# Patient Record
Sex: FEMALE | Race: WHITE | NOT HISPANIC OR LATINO | Employment: UNEMPLOYED | URBAN - METROPOLITAN AREA
[De-identification: names, ages, dates, MRNs, and addresses within clinical notes are randomized per-mention and may not be internally consistent; named-entity substitution may affect disease eponyms.]

---

## 2017-03-15 ENCOUNTER — OFFICE VISIT (OUTPATIENT)
Dept: FAMILY MEDICINE | Facility: CLINIC | Age: 52
End: 2017-03-15
Payer: COMMERCIAL

## 2017-03-15 VITALS
BODY MASS INDEX: 19.36 KG/M2 | RESPIRATION RATE: 14 BRPM | HEIGHT: 65 IN | WEIGHT: 116.19 LBS | TEMPERATURE: 98 F | HEART RATE: 94 BPM | DIASTOLIC BLOOD PRESSURE: 80 MMHG | SYSTOLIC BLOOD PRESSURE: 118 MMHG

## 2017-03-15 DIAGNOSIS — G43.909 MIGRAINE WITHOUT STATUS MIGRAINOSUS, NOT INTRACTABLE, UNSPECIFIED MIGRAINE TYPE: ICD-10-CM

## 2017-03-15 DIAGNOSIS — F33.0 MILD EPISODE OF RECURRENT MAJOR DEPRESSIVE DISORDER: ICD-10-CM

## 2017-03-15 DIAGNOSIS — F32.A DEPRESSION, UNSPECIFIED DEPRESSION TYPE: Primary | ICD-10-CM

## 2017-03-15 DIAGNOSIS — M51.36 DDD (DEGENERATIVE DISC DISEASE), LUMBAR: ICD-10-CM

## 2017-03-15 DIAGNOSIS — H81.03 MENIERE'S DISEASE OF BOTH EARS: ICD-10-CM

## 2017-03-15 DIAGNOSIS — Z00.00 ANNUAL PHYSICAL EXAM: ICD-10-CM

## 2017-03-15 DIAGNOSIS — L65.9 HAIR LOSS: ICD-10-CM

## 2017-03-15 DIAGNOSIS — M50.30 DDD (DEGENERATIVE DISC DISEASE), CERVICAL: ICD-10-CM

## 2017-03-15 DIAGNOSIS — H90.3 SENSORINEURAL HEARING LOSS (SNHL) OF BOTH EARS: ICD-10-CM

## 2017-03-15 DIAGNOSIS — F51.01 PRIMARY INSOMNIA: ICD-10-CM

## 2017-03-15 PROBLEM — M51.369 DDD (DEGENERATIVE DISC DISEASE), LUMBAR: Status: ACTIVE | Noted: 2017-03-15

## 2017-03-15 PROCEDURE — 99386 PREV VISIT NEW AGE 40-64: CPT | Mod: S$GLB,,, | Performed by: FAMILY MEDICINE

## 2017-03-15 PROCEDURE — 99999 PR PBB SHADOW E&M-EST. PATIENT-LVL III: CPT | Mod: PBBFAC,,, | Performed by: FAMILY MEDICINE

## 2017-03-15 RX ORDER — ZOLPIDEM TARTRATE 5 MG/1
5 TABLET ORAL NIGHTLY PRN
Qty: 30 TABLET | Refills: 1
Start: 2017-03-15 | End: 2017-06-14 | Stop reason: SDUPTHER

## 2017-03-15 RX ORDER — TOPIRAMATE 50 MG/1
50 TABLET, FILM COATED ORAL 2 TIMES DAILY
Qty: 60 TABLET | Refills: 11
Start: 2017-03-15 | End: 2019-07-29 | Stop reason: SDUPTHER

## 2017-03-15 RX ORDER — BUPROPION HYDROCHLORIDE 150 MG/1
150 TABLET ORAL DAILY
Qty: 30 TABLET | Refills: 3 | Status: SHIPPED | OUTPATIENT
Start: 2017-03-15 | End: 2017-07-24 | Stop reason: SDUPTHER

## 2017-03-15 RX ORDER — DIAZEPAM 2 MG/1
2 TABLET ORAL EVERY 6 HOURS PRN
Start: 2017-03-15 | End: 2017-08-29

## 2017-03-15 RX ORDER — TRAZODONE HYDROCHLORIDE 150 MG/1
75 TABLET ORAL NIGHTLY
Qty: 15 TABLET | Refills: 1
Start: 2017-03-15 | End: 2019-04-30 | Stop reason: SDUPTHER

## 2017-03-15 RX ORDER — FINASTERIDE 5 MG/1
5 TABLET, FILM COATED ORAL DAILY
Qty: 30 TABLET | Refills: 11
Start: 2017-03-15 | End: 2017-09-11 | Stop reason: SDUPTHER

## 2017-03-15 RX ORDER — NIACIN 50 MG
100 TABLET ORAL
Refills: 0
Start: 2017-03-15 | End: 2017-09-11 | Stop reason: SDUPTHER

## 2017-03-15 RX ORDER — SUMATRIPTAN SUCCINATE 100 MG/1
100 TABLET ORAL
Qty: 40 TABLET | Refills: 1
Start: 2017-03-15 | End: 2023-08-09

## 2017-03-15 RX ORDER — TRAMADOL HYDROCHLORIDE 50 MG/1
50 TABLET ORAL 2 TIMES DAILY PRN
Qty: 60 TABLET | Refills: 0
Start: 2017-03-15 | End: 2017-03-25

## 2017-03-15 NOTE — MR AVS SNAPSHOT
[facility] [address] [place] [address]  Phone: [phone]  Fax: [phone]                  Mae Gregory   3/15/2017 8:40 AM   Office Visit    Description:  Female : 1965   Provider:  Jaun Lazcano MD   Department:  [facility]           Reason for Visit     Establish Care           Diagnoses this Visit        Comments    Depression, unspecified depression type    -  Primary     Migraine without status migrainosus, not intractable, unspecified migraine type         DDD (degenerative disc disease), cervical         DDD (degenerative disc disease), lumbar         Mild episode of recurrent major depressive disorder         Primary insomnia         Meniere's disease of both ears         Sensorineural hearing loss (SNHL) of both ears         Annual physical exam         Hair loss                To Do List           Goals (5 Years of Data)     None      Follow-Up and Disposition     Return in about 6 months (around 9/15/2017).       These Medications        Disp Refills Start End    tramadol (ULTRAM) 50 mg tablet 60 tablet 0 3/15/2017 3/25/2017    Take 1 tablet (50 mg total) by mouth 2 (two) times daily as needed. - Oral    sumatriptan (IMITREX) 100 MG tablet 40 tablet 1 3/15/2017     Take 1 tablet (100 mg total) by mouth every 2 (two) hours as needed for Migraine. - Oral    topiramate (TOPAMAX) 50 MG tablet 60 tablet 11 3/15/2017 3/15/2018    Take 1 tablet (50 mg total) by mouth 2 (two) times daily. - Oral    buPROPion (WELLBUTRIN XL) 150 MG TB24 tablet 30 tablet 3 3/15/2017 3/15/2018    Take 1 tablet (150 mg total) by mouth once daily. - Oral    Pharmacy: [facility]/pharmacy #[id number] - [place] - [address] Ph #: [phone]       zolpidem (AMBIEN) 5 MG Tab 30 tablet 1 3/15/2017 2017    Take 1 tablet (5 mg total) by mouth nightly as needed. - Oral    Pharmacy: [facility]/pharmacy #[id number] [place] - [address] Ph #: [phone]        trazodone (DESYREL) 150 MG tablet 15 tablet 1 3/15/2017 3/15/2018    Take 0.5 tablets (75 mg total) by mouth every evening. - Oral    Pharmacy: [facility]/pharmacy #[id number] [place]. Ph #: [phone]       diazePAM (VALIUM) 2 MG tablet   3/15/2017 4/14/2017    Take 1 tablet (2 mg total) by mouth every 6 (six) hours as needed for Anxiety. - Oral    Pharmacy: [facility]/pharmacy #[place] [address] Ph #: [phone]       niacin 50 MG Tab  0 3/15/2017 3/15/2018    Take 2 tablets (100 mg total) by mouth 3 (three) times daily with meals. - Oral    Pharmacy: [facility]/pharmacy [address] [address] Ph #: [phone]       finasteride (PROSCAR) 5 mg tablet 30 tablet 11 3/15/2017 3/15/2018    Take 1 tablet (5 mg total) by mouth once daily. - Oral    Pharmacy: [facility]/pharmacy [address] [address] Ph #: [phone]         [facility]     [facility] - 24/7 Assistance  Registered nurses in the [facility] provide clinical advisement, health education, appointment booking, and other advisory services.  Call for this free service at [phone].             Medications           Message regarding Medications     Verify the changes and/or additions to your medication regime listed below are the same as discussed with your clinician today.  If any of these changes or additions are incorrect, please notify your healthcare provider.        START taking these NEW medications        Refills    tramadol (ULTRAM) 50 mg tablet 0    Sig: Take 1 tablet (50 mg total) by mouth 2 (two) times daily as needed.    Class: No Print    Route: Oral    sumatriptan (IMITREX) 100 MG tablet 1    Sig: Take 1 tablet (100 mg total) by mouth every 2 (two) hours as needed for Migraine.    Class: No Print    Route: Oral    topiramate (TOPAMAX) 50 MG tablet 11    Sig: Take 1 tablet (50 mg total) by mouth 2 (two) times daily.    Class: No Print    Route: Oral     buPROPion (WELLBUTRIN XL) 150 MG TB24 tablet 3    Sig: Take 1 tablet (150 mg total) by mouth once daily.    Class: Normal    Route: Oral    zolpidem (AMBIEN) 5 MG Tab 1    Sig: Take 1 tablet (5 mg total) by mouth nightly as needed.    Class: No Print    Route: Oral    trazodone (DESYREL) 150 MG tablet 1    Sig: Take 0.5 tablets (75 mg total) by mouth every evening.    Class: No Print    Route: Oral    diazePAM (VALIUM) 2 MG tablet     Sig: Take 1 tablet (2 mg total) by mouth every 6 (six) hours as needed for Anxiety.    Class: No Print    Route: Oral    niacin 50 MG Tab 0    Sig: Take 2 tablets (100 mg total) by mouth 3 (three) times daily with meals.    Class: No Print    Route: Oral    finasteride (PROSCAR) 5 mg tablet 11    Sig: Take 1 tablet (5 mg total) by mouth once daily.    Class: No Print    Route: Oral           Verify that the below list of medications is an accurate representation of the medications you are currently taking.  If none reported, the list may be blank. If incorrect, please contact your healthcare provider. Carry this list with you in case of emergency.           Current Medications     buPROPion (WELLBUTRIN XL) 150 MG TB24 tablet Take 1 tablet (150 mg total) by mouth once daily.    diazePAM (VALIUM) 2 MG tablet Take 1 tablet (2 mg total) by mouth every 6 (six) hours as needed for Anxiety.    finasteride (PROSCAR) 5 mg tablet Take 1 tablet (5 mg total) by mouth once daily.    niacin 50 MG Tab Take 2 tablets (100 mg total) by mouth 3 (three) times daily with meals.    sumatriptan (IMITREX) 100 MG tablet Take 1 tablet (100 mg total) by mouth every 2 (two) hours as needed for Migraine.    topiramate (TOPAMAX) 50 MG tablet Take 1 tablet (50 mg total) by mouth 2 (two) times daily.    tramadol (ULTRAM) 50 mg tablet Take 1 tablet (50 mg total) by mouth 2 (two) times daily as needed.    trazodone (DESYREL) 150 MG tablet Take 0.5 tablets (75 mg total) by mouth every evening.    zolpidem (AMBIEN) 5 MG  "Tab Take 1 tablet (5 mg total) by mouth nightly as needed.           Clinical Reference Information           Your Vitals Were     BP Pulse Temp Resp Height Weight    118/80 (BP Location: Left arm, Patient Position: Sitting, BP Method: Manual) 94 98.2 °F (36.8 °C) (Oral) 14 5' 5" (1.651 m) 52.7 kg (116 lb 2.9 oz)    BMI                19.33 kg/m2          Blood Pressure          Most Recent Value    BP  118/80      Allergies as of 3/15/2017     No Known Allergies      Immunizations Administered on Date of Encounter - 3/15/2017     None      Orders Placed During Today's Visit      Normal Orders This Visit    Ambulatory referral to Dermatology     Future Labs/Procedures Expected by Expires    Mammo Digital Screening Bilateral With CAD  3/15/2017 5/15/2018      [facility] Sign-Up     Activating your [facility] account is as easy as 1-2-3!     1) Visit [email or web address], select Sign Up Now, enter this activation code and your date of birth, then select Next.  [email or web address]  Expires: 4/29/2017  9:51 AM      2) Create a username and password to use when you visit [facility] in the future and select a security question in case you lose your password and select Next.    3) Enter your e-mail address and click Sign Up!    Additional Information  If you have questions, please e-mail [email or web address] or call [phone] to talk to our [facility] staff. Remember, [facility] is NOT to be used for urgent needs. For medical emergencies, dial 911.         Language Assistance Services     ATTENTION: Language assistance services are available, free of charge. Please call [phone].      ATENCIÓN: Si habla español, tiene a dela cruz disposición servicios gratuitos de asistencia lingüística. Llame al [phone].     CHÚ Ý: N?u b?n nói Ti?ng Vi?t, có các d?ch v? h? tr? ngôn ng? mi?n phí dành cho b?n. G?i s? [phone].         [facility] complies with applicable Federal civil rights laws and does not discriminate " on the basis of race, color, national origin, age, disability, or sex.

## 2017-03-15 NOTE — PROGRESS NOTES
Chief Complaint   Patient presents with    [facility]     Need for a new PCP       HPI  Mae Gregory is a 51 y.o. female with medical diagnoses as listed in the medical history and problem list that presents to establish care.      Migraine HA - followed by Neuro, using botox injections q 11-12 weeks. 300 units throughout head and neck.     Cervical/lumbar DDD - chronic, weekly massage therapy.     Insomnia - under control with ambien, trazodone    Meniere's - currently on a antihistamine compound PO, niacin and valium.    Also chronic allergies, tested recently, receives weekly allergy injections.     PAST MEDICAL HISTORY:  History reviewed. No pertinent past medical history.    PAST SURGICAL HISTORY:  History reviewed. No pertinent surgical history.    SOCIAL HISTORY:  Social History     Social History    Marital status:      Spouse name: N/A    Number of children: N/A    Years of education: N/A     Occupational History    Not on file.     Social History Main Topics    Smoking status: Never Smoker    Smokeless tobacco: Not on file    Alcohol use Not on file    Drug use: Not on file    Sexual activity: Not on file     Other Topics Concern    Not on file     Social History Narrative    No narrative on file       FAMILY HISTORY:  History reviewed. No pertinent family history.    ALLERGIES AND MEDICATIONS: updated and reviewed.  Review of patient's allergies indicates:  No Known Allergies  Current Outpatient Prescriptions   Medication Sig Dispense Refill    buPROPion (WELLBUTRIN XL) 150 MG TB24 tablet Take 1 tablet (150 mg total) by mouth once daily. 30 tablet 3    diazePAM (VALIUM) 2 MG tablet Take 1 tablet (2 mg total) by mouth every 6 (six) hours as needed for Anxiety.      finasteride (PROSCAR) 5 mg tablet Take 1 tablet (5 mg total) by mouth once daily. 30 tablet 11    niacin 50 MG Tab Take 2 tablets (100 mg total) by mouth 3 (three) times daily with meals.  0    sumatriptan  "(IMITREX) 100 MG tablet Take 1 tablet (100 mg total) by mouth every 2 (two) hours as needed for Migraine. 40 tablet 1    topiramate (TOPAMAX) 50 MG tablet Take 1 tablet (50 mg total) by mouth 2 (two) times daily. 60 tablet 11    tramadol (ULTRAM) 50 mg tablet Take 1 tablet (50 mg total) by mouth 2 (two) times daily as needed. 60 tablet 0    trazodone (DESYREL) 150 MG tablet Take 0.5 tablets (75 mg total) by mouth every evening. 15 tablet 1    zolpidem (AMBIEN) 5 MG Tab Take 1 tablet (5 mg total) by mouth nightly as needed. 30 tablet 1     No current facility-administered medications for this visit.        ROS  Review of Systems   Constitutional: Negative for activity change, appetite change and fever.   HENT: Positive for hearing loss. Negative for congestion and sore throat.    Eyes: Negative for visual disturbance.   Respiratory: Negative for cough and shortness of breath.    Cardiovascular: Negative for chest pain.   Gastrointestinal: Negative for abdominal pain, diarrhea, nausea and vomiting.   Endocrine: Negative.    Genitourinary: Negative for dysuria.   Musculoskeletal: Positive for back pain, myalgias and neck pain. Negative for arthralgias.   Skin: Negative for rash.   Allergic/Immunologic: Negative.    Neurological: Positive for headaches. Negative for dizziness and weakness.   Hematological: Negative.    Psychiatric/Behavioral: Negative for agitation and confusion.       Physical Exam  Vitals:    03/15/17 0903   BP: 118/80   Pulse: 94   Resp: 14   Temp: 98.2 °F (36.8 °C)    Body mass index is 19.33 kg/(m^2).  Weight: 52.7 kg (116 lb 2.9 oz)   Height: 5' 5" (165.1 cm)     Physical Exam   Constitutional: She is oriented to person, place, and time. She appears well-developed and well-nourished.   HENT:   Head: Normocephalic and atraumatic.   Eyes: Conjunctivae and EOM are normal. Pupils are equal, round, and reactive to light.   Neck: Normal range of motion. Neck supple.   Cardiovascular: Normal rate, " regular rhythm and normal heart sounds.    Pulmonary/Chest: Effort normal and breath sounds normal.   Abdominal: Soft. Bowel sounds are normal.   Musculoskeletal: Normal range of motion.   Neurological: She is alert and oriented to person, place, and time. She has normal reflexes.   Skin: Skin is warm and dry.   Psychiatric: She has a normal mood and affect. Her behavior is normal. Judgment and thought content normal.       Health Maintenance     Patient has no pending health maintenance at this time          Assessment & Plan    Depression, unspecified depression type  -     buPROPion (WELLBUTRIN XL) 150 MG TB24 tablet; Take 1 tablet (150 mg total) by mouth once daily.  Dispense: 30 tablet; Refill: 3  - Continue current medication regimen as prescribed    Migraine without status migrainosus, not intractable, unspecified migraine type  -     sumatriptan (IMITREX) 100 MG tablet; Take 1 tablet (100 mg total) by mouth every 2 (two) hours as needed for Migraine.  Dispense: 40 tablet; Refill: 1  -     topiramate (TOPAMAX) 50 MG tablet; Take 1 tablet (50 mg total) by mouth 2 (two) times daily.  Dispense: 60 tablet; Refill: 11  - Continue current medication regimen as prescribed    DDD (degenerative disc disease), cervical, DDD (degenerative disc disease), lumbar  -     tramadol (ULTRAM) 50 mg tablet; Take 1 tablet (50 mg total) by mouth 2 (two) times daily as needed.  Dispense: 60 tablet; Refill: 0  - Refilled meds today    Primary insomnia  -     zolpidem (AMBIEN) 5 MG Tab; Take 1 tablet (5 mg total) by mouth nightly as needed.  Dispense: 30 tablet; Refill: 1  -     trazodone (DESYREL) 150 MG tablet; Take 0.5 tablets (75 mg total) by mouth every evening.  Dispense: 15 tablet; Refill: 1  - Refilled medications today    Meniere's disease of both ears, Sensorineural hearing loss (SNHL) of both ears  -     diazePAM (VALIUM) 2 MG tablet; Take 1 tablet (2 mg total) by mouth every 6 (six) hours as needed for Anxiety.  -      niacin 50 MG Tab; Take 2 tablets (100 mg total) by mouth 3 (three) times daily with meals.; Refill: 0    Annual physical exam  -     Mammo Digital Screening Bilateral With CAD; Future; Expected date: 3/15/17  -     Ambulatory referral to Dermatology  - Counseled on age appropriate medical preventative services, including age appropriate cancer screenings, over all nutritional health, need for a consistent exercise regimen and an over all push towards maintaining a vigorous and active lifestyle.      - Counseled on age appropriate vaccines and discussed upcoming health care needs based on age/gender.  Spent time with patient counseling on need for a good patient/doctor relationship moving forward.  Discussed use of common OTC medications and supplements.  Discussed common dietary aids and use of caffeine and the need for good sleep hygiene and stress management.    Hair loss  -     finasteride (PROSCAR) 5 mg tablet; Take 1 tablet (5 mg total) by mouth once daily.  Dispense: 30 tablet; Refill: 11        Return in about 6 months (around 9/15/2017).

## 2017-04-07 ENCOUNTER — OFFICE VISIT (OUTPATIENT)
Dept: FAMILY MEDICINE | Facility: CLINIC | Age: 52
End: 2017-04-07
Payer: COMMERCIAL

## 2017-04-07 VITALS
BODY MASS INDEX: 18.95 KG/M2 | HEART RATE: 78 BPM | HEIGHT: 65 IN | DIASTOLIC BLOOD PRESSURE: 58 MMHG | OXYGEN SATURATION: 99 % | TEMPERATURE: 98 F | SYSTOLIC BLOOD PRESSURE: 128 MMHG | WEIGHT: 113.75 LBS | RESPIRATION RATE: 16 BRPM

## 2017-04-07 DIAGNOSIS — H81.03 MENIERE'S DISEASE OF BOTH EARS: ICD-10-CM

## 2017-04-07 DIAGNOSIS — M50.30 DDD (DEGENERATIVE DISC DISEASE), CERVICAL: Primary | ICD-10-CM

## 2017-04-07 DIAGNOSIS — J01.10 ACUTE NON-RECURRENT FRONTAL SINUSITIS: ICD-10-CM

## 2017-04-07 DIAGNOSIS — M51.36 DDD (DEGENERATIVE DISC DISEASE), LUMBAR: ICD-10-CM

## 2017-04-07 PROCEDURE — 99999 PR PBB SHADOW E&M-EST. PATIENT-LVL III: CPT | Mod: PBBFAC,,, | Performed by: FAMILY MEDICINE

## 2017-04-07 PROCEDURE — 1160F RVW MEDS BY RX/DR IN RCRD: CPT | Mod: S$GLB,,, | Performed by: FAMILY MEDICINE

## 2017-04-07 PROCEDURE — 99214 OFFICE O/P EST MOD 30 MIN: CPT | Mod: 25,S$GLB,, | Performed by: FAMILY MEDICINE

## 2017-04-07 PROCEDURE — 96372 THER/PROPH/DIAG INJ SC/IM: CPT | Mod: S$GLB,,, | Performed by: FAMILY MEDICINE

## 2017-04-07 RX ORDER — FLUTICASONE PROPIONATE 50 MCG
1 SPRAY, SUSPENSION (ML) NASAL DAILY
Qty: 16 G | Refills: 12 | Status: SHIPPED | OUTPATIENT
Start: 2017-04-07 | End: 2017-05-07

## 2017-04-07 RX ORDER — AZITHROMYCIN 250 MG/1
TABLET, FILM COATED ORAL
Qty: 6 TABLET | Refills: 0 | Status: SHIPPED | OUTPATIENT
Start: 2017-04-07 | End: 2017-08-29

## 2017-04-07 NOTE — PROGRESS NOTES
..Patient given solumedrol 125 mg injection eight ventrogluteal, tolerated well, no complaints, no reaction noted

## 2017-04-07 NOTE — PROGRESS NOTES
Chief Complaint   Patient presents with    Sinus Problem    Nasal Congestion       HPI  Mae Gregory is a 51 y.o. female with multiple medical diagnoses as listed in the medical history and problem list that presents for URI.    URI - had been followed by ENT/Allergy, had been giving shots to self, continues to follow at this time. Today with URI symptoms, +cough, 3-4x/year hx, exposed to high amounts of dust recently. Decreased sleep, +green/yellow prod cough, 5 day hx, +sore throat, prog worsenting.     Pt is known to me and was last seen by me on 3/15/2017.    PAST MEDICAL HISTORY:  History reviewed. No pertinent past medical history.    PAST SURGICAL HISTORY:  History reviewed. No pertinent surgical history.    SOCIAL HISTORY:  Social History     Social History    Marital status:      Spouse name: N/A    Number of children: N/A    Years of education: N/A     Occupational History    Not on file.     Social History Main Topics    Smoking status: Never Smoker    Smokeless tobacco: Not on file    Alcohol use Not on file    Drug use: Not on file    Sexual activity: Not on file     Other Topics Concern    Not on file     Social History Narrative       FAMILY HISTORY:  History reviewed. No pertinent family history.    ALLERGIES AND MEDICATIONS: updated and reviewed.  Review of patient's allergies indicates:  No Known Allergies  Current Outpatient Prescriptions   Medication Sig Dispense Refill    buPROPion (WELLBUTRIN XL) 150 MG TB24 tablet Take 1 tablet (150 mg total) by mouth once daily. 30 tablet 3    diazePAM (VALIUM) 2 MG tablet Take 1 tablet (2 mg total) by mouth every 6 (six) hours as needed for Anxiety.      finasteride (PROSCAR) 5 mg tablet Take 1 tablet (5 mg total) by mouth once daily. 30 tablet 11    niacin 50 MG Tab Take 2 tablets (100 mg total) by mouth 3 (three) times daily with meals.  0    sumatriptan (IMITREX) 100 MG tablet Take 1 tablet (100 mg total) by mouth every 2 (two)  "hours as needed for Migraine. 40 tablet 1    topiramate (TOPAMAX) 50 MG tablet Take 1 tablet (50 mg total) by mouth 2 (two) times daily. 60 tablet 11    trazodone (DESYREL) 150 MG tablet Take 0.5 tablets (75 mg total) by mouth every evening. 15 tablet 1    zolpidem (AMBIEN) 5 MG Tab Take 1 tablet (5 mg total) by mouth nightly as needed. 30 tablet 1    azithromycin (ZITHROMAX Z-JEREMY) 250 MG tablet 2 tabs today, then 1 tab per day for 4 days. 6 tablet 0    fluticasone (FLONASE) 50 mcg/actuation nasal spray 1 spray by Each Nare route once daily. 16 g 12     No current facility-administered medications for this visit.        ROS  Review of Systems   Constitutional: Positive for fever. Negative for activity change and diaphoresis.   HENT: Positive for congestion, postnasal drip, rhinorrhea, sinus pressure, sneezing and sore throat. Negative for voice change.    Eyes: Negative for pain.   Respiratory: Positive for cough. Negative for shortness of breath.    Cardiovascular: Negative for chest pain.   Gastrointestinal: Negative for abdominal pain and nausea.   Endocrine: Negative.    Genitourinary: Negative for difficulty urinating and frequency.   Musculoskeletal: Negative for arthralgias and neck stiffness.   Skin: Negative for rash.   Allergic/Immunologic: Negative.    Neurological: Negative for dizziness and numbness.   Psychiatric/Behavioral: Positive for sleep disturbance. Negative for agitation.       Physical Exam  Vitals:    04/07/17 0833   BP: (!) 128/58   Pulse: 78   Resp: 16   Temp: 98.4 °F (36.9 °C)    Body mass index is 18.93 kg/(m^2).  Weight: 51.6 kg (113 lb 12.1 oz)   Height: 5' 5" (165.1 cm)     Physical Exam   Constitutional: She is oriented to person, place, and time. She appears well-developed and well-nourished.   HENT:   Head: Normocephalic.   Mouth/Throat: No oropharyngeal exudate.   Erythematous pharynx  Erythematous, edematous nares  Mild dull TMs bilaterally   Eyes: Pupils are equal, round, " and reactive to light. No scleral icterus.   Neck: Normal range of motion. Neck supple.   Cardiovascular: Normal rate, regular rhythm and normal heart sounds.    Pulmonary/Chest: Effort normal and breath sounds normal. No respiratory distress.   Abdominal: Soft. Bowel sounds are normal. There is no tenderness.   Lymphadenopathy:     She has cervical adenopathy.   Neurological: She is alert and oriented to person, place, and time.   Skin: Skin is warm. No rash noted.   Psychiatric: She has a normal mood and affect. Her behavior is normal. Judgment and thought content normal.       Health Maintenance       Date Due Completion Date    Hepatitis C Screening 1965 ---    Lipid Panel 1965 ---    TETANUS VACCINE 6/15/1983 ---    Pap Smear 6/15/1986 ---    Mammogram 6/15/2005 ---    Colonoscopy 6/15/2015 ---    Influenza Vaccine 8/1/2016 ---          Assessment & Plan    DDD (degenerative disc disease), cervical, DDD (degenerative disc disease), lumbar  - Continue current medication regimen as prescribed    Meniere's disease of both ears  - Continue current medication regimen as prescribed  - Cont follow up with ENT as scheduled    Acute non-recurrent frontal sinusitis  -     azithromycin (ZITHROMAX Z-JEREMY) 250 MG tablet; 2 tabs today, then 1 tab per day for 4 days.  Dispense: 6 tablet; Refill: 0  -     methylPREDNISolone sod suc(PF) 125 mg/2 mL injection 125 mg; Inject 125 mg.  -     fluticasone (FLONASE) 50 mcg/actuation nasal spray; 1 spray by Each Nare route once daily.  Dispense: 16 g; Refill: 12  - Counseled on hydration and rest.     Pt previously followed by ENT in [place] who provided allergy testing. We are unable to inject in clinic as [facility] physician required to order.     Discussed with patient, who will discuss with prior ENT. Gave information to local ENT as well as possibly referral to [facility].     Return in about 2 weeks (around 4/21/2017).

## 2017-06-14 DIAGNOSIS — F51.01 PRIMARY INSOMNIA: ICD-10-CM

## 2017-06-14 RX ORDER — ZOLPIDEM TARTRATE 5 MG/1
TABLET ORAL
Qty: 90 TABLET | Refills: 0 | Status: SHIPPED | OUTPATIENT
Start: 2017-06-14 | End: 2017-08-29

## 2017-07-24 DIAGNOSIS — F32.A DEPRESSION, UNSPECIFIED DEPRESSION TYPE: ICD-10-CM

## 2017-07-24 RX ORDER — BUPROPION HYDROCHLORIDE 150 MG/1
150 TABLET ORAL DAILY
Qty: 30 TABLET | Refills: 3 | Status: SHIPPED | OUTPATIENT
Start: 2017-07-24 | End: 2017-12-05 | Stop reason: SDUPTHER

## 2017-08-29 ENCOUNTER — OFFICE VISIT (OUTPATIENT)
Dept: FAMILY MEDICINE | Facility: CLINIC | Age: 52
End: 2017-08-29
Payer: MEDICAID

## 2017-08-29 VITALS
RESPIRATION RATE: 12 BRPM | HEART RATE: 90 BPM | BODY MASS INDEX: 19.91 KG/M2 | HEIGHT: 65 IN | DIASTOLIC BLOOD PRESSURE: 84 MMHG | TEMPERATURE: 98 F | SYSTOLIC BLOOD PRESSURE: 122 MMHG | OXYGEN SATURATION: 99 % | WEIGHT: 119.5 LBS

## 2017-08-29 DIAGNOSIS — B96.89 ACUTE BACTERIAL SINUSITIS: Primary | ICD-10-CM

## 2017-08-29 DIAGNOSIS — J01.90 ACUTE BACTERIAL SINUSITIS: Primary | ICD-10-CM

## 2017-08-29 DIAGNOSIS — G47.00 INSOMNIA, UNSPECIFIED TYPE: ICD-10-CM

## 2017-08-29 DIAGNOSIS — F51.01 PRIMARY INSOMNIA: ICD-10-CM

## 2017-08-29 PROCEDURE — 99214 OFFICE O/P EST MOD 30 MIN: CPT | Mod: S$PBB,,, | Performed by: FAMILY MEDICINE

## 2017-08-29 PROCEDURE — 96372 THER/PROPH/DIAG INJ SC/IM: CPT | Mod: PBBFAC,PO

## 2017-08-29 PROCEDURE — 99999 PR PBB SHADOW E&M-EST. PATIENT-LVL III: CPT | Mod: PBBFAC,,, | Performed by: FAMILY MEDICINE

## 2017-08-29 PROCEDURE — 99213 OFFICE O/P EST LOW 20 MIN: CPT | Mod: PBBFAC,PO | Performed by: FAMILY MEDICINE

## 2017-08-29 PROCEDURE — 3008F BODY MASS INDEX DOCD: CPT | Mod: ,,, | Performed by: FAMILY MEDICINE

## 2017-08-29 RX ORDER — AZITHROMYCIN 250 MG/1
TABLET, FILM COATED ORAL
Qty: 6 TABLET | Refills: 0 | Status: SHIPPED | OUTPATIENT
Start: 2017-08-29 | End: 2018-03-07 | Stop reason: ALTCHOICE

## 2017-08-29 RX ORDER — TRAMADOL HYDROCHLORIDE 200 MG/1
200 TABLET, EXTENDED RELEASE ORAL DAILY PRN
COMMUNITY
Start: 2017-08-15 | End: 2019-04-30

## 2017-08-29 RX ORDER — DIAZEPAM 2 MG/1
TABLET ORAL
COMMUNITY
Start: 2017-06-13 | End: 2019-04-30

## 2017-08-29 RX ORDER — ZOLPIDEM TARTRATE 5 MG/1
5 TABLET ORAL NIGHTLY PRN
Qty: 30 TABLET | Refills: 3 | Status: SHIPPED | OUTPATIENT
Start: 2017-08-29 | End: 2018-01-29 | Stop reason: SDUPTHER

## 2017-08-29 RX ADMIN — METHYLPREDNISOLONE SODIUM SUCCINATE 125 MG: 125 INJECTION, POWDER, FOR SOLUTION INTRAMUSCULAR; INTRAVENOUS at 11:08

## 2017-08-29 NOTE — PROGRESS NOTES
.Patient given solumedrol 125 mg injection right ventrogluteal, tolerated well, no complaints, no reaction noted

## 2017-08-29 NOTE — PROGRESS NOTES
Chief Complaint   Patient presents with    Sore Throat     sore throat since saturday       HPI  Mae Gregory is a 52 y.o. female with multiple medical diagnoses as listed in the medical history and problem list that presents for URI.    URI - 5 day hx, +sore throat, +cough productive, +inhaler use, +chest congestion, +increased PND.     Pt is known to me and was last seen by me on 4/7/2017.    PAST MEDICAL HISTORY:  No past medical history on file.    PAST SURGICAL HISTORY:  No past surgical history on file.    SOCIAL HISTORY:  Social History     Social History    Marital status:      Spouse name: N/A    Number of children: N/A    Years of education: N/A     Occupational History    Not on file.     Social History Main Topics    Smoking status: Never Smoker    Smokeless tobacco: Never Used    Alcohol use Not on file    Drug use: Unknown    Sexual activity: Not on file     Other Topics Concern    Not on file     Social History Narrative    No narrative on file       FAMILY HISTORY:  No family history on file.    ALLERGIES AND MEDICATIONS: updated and reviewed.  Review of patient's allergies indicates:  No Known Allergies  Current Outpatient Prescriptions   Medication Sig Dispense Refill    buPROPion (WELLBUTRIN XL) 150 MG TB24 tablet Take 1 tablet (150 mg total) by mouth once daily. 30 tablet 3    diazePAM (VALIUM) 2 MG tablet       finasteride (PROSCAR) 5 mg tablet Take 1 tablet (5 mg total) by mouth once daily. (Patient taking differently: Take 5 mg by mouth once daily. Takes a piece of tablet) 30 tablet 11    niacin 50 MG Tab Take 2 tablets (100 mg total) by mouth 3 (three) times daily with meals.  0    sumatriptan (IMITREX) 100 MG tablet Take 1 tablet (100 mg total) by mouth every 2 (two) hours as needed for Migraine. 40 tablet 1    topiramate (TOPAMAX) 50 MG tablet Take 1 tablet (50 mg total) by mouth 2 (two) times daily. (Patient taking differently: Take 25 mg by mouth once daily. )  "60 tablet 11    tramadol (ULTRAM-ER) 200 MG Tb24 Take 200 mg by mouth every other day.       trazodone (DESYREL) 150 MG tablet Take 0.5 tablets (75 mg total) by mouth every evening. 15 tablet 1    azithromycin (ZITHROMAX Z-JEREMY) 250 MG tablet 2 tabs today, then 1 tab per day for 4 days. 6 tablet 0    zolpidem (AMBIEN) 5 MG Tab Take 1 tablet (5 mg total) by mouth nightly as needed. 30 tablet 3     No current facility-administered medications for this visit.        ROS  Review of Systems   HENT: Positive for congestion, ear pain and sore throat. Negative for drooling, ear discharge and trouble swallowing.    Respiratory: Positive for cough. Negative for shortness of breath and stridor.    Gastrointestinal: Negative for abdominal pain, diarrhea and vomiting.   Neurological: Negative for headaches.       Physical Exam  Vitals:    08/29/17 1040   BP: 122/84   Pulse: 90   Resp: 12   Temp: 98 °F (36.7 °C)    Body mass index is 19.88 kg/m².  Weight: 54.2 kg (119 lb 7.8 oz)   Height: 5' 5" (165.1 cm)     Physical Exam   Constitutional: She is oriented to person, place, and time. She appears well-developed and well-nourished.   HENT:   Head: Normocephalic.   Mouth/Throat: No oropharyngeal exudate.   Erythematous pharynx  Erythematous, edematous nares  Mild dull TMs bilaterally   Eyes: Pupils are equal, round, and reactive to light. No scleral icterus.   Neck: Normal range of motion. Neck supple.   Cardiovascular: Normal rate, regular rhythm and normal heart sounds.    Pulmonary/Chest: Effort normal and breath sounds normal. No respiratory distress.   Abdominal: Soft. Bowel sounds are normal. There is no tenderness.   Lymphadenopathy:     She has cervical adenopathy.   Neurological: She is alert and oriented to person, place, and time.   Skin: Skin is warm. No rash noted.   Psychiatric: She has a normal mood and affect. Her behavior is normal. Judgment and thought content normal.       Health Maintenance       Date Due " Completion Date    Hepatitis C Screening 1965 ---    TETANUS VACCINE 06/15/1983 ---    Influenza Vaccine 08/01/2017 4/7/2017 (Declined)    Override on 4/7/2017: Declined    Mammogram 12/30/2017 12/30/2015 (Done)    Override on 12/30/2015: Done    Pap Smear with HPV Cotest 09/14/2019 9/14/2016 (Done)    Override on 9/14/2016: Done    Lipid Panel 08/31/2021 8/31/2016 (Done)    Override on 8/31/2016: Done    Colonoscopy 08/21/2023 8/21/2013 (Done)    Override on 8/21/2013: Done (done in [place])          Assessment & Plan    Acute bacterial sinusitis  -     methylPREDNISolone sod suc(PF) injection 125 mg; Inject 125 mg into the muscle one time.  -     azithromycin (ZITHROMAX Z-JEREMY) 250 MG tablet; 2 tabs today, then 1 tab per day for 4 days.  Dispense: 6 tablet; Refill: 0  - Will treat at this time, counseled on hydration and rest.     Insomnia, unspecified type  -     zolpidem (AMBIEN) 5 MG Tab; Take 1 tablet (5 mg total) by mouth nightly as needed.  Dispense: 30 tablet; Refill: 3  - Refilled medications    Primary insomnia  -     zolpidem (AMBIEN) 5 MG Tab; Take 1 tablet (5 mg total) by mouth nightly as needed.  Dispense: 30 tablet; Refill: 3  - Refilled medications today.        Return in about 4 weeks (around 9/26/2017).          Answers for HPI/ROS submitted by the patient on 8/28/2017   Sore throat  Chronicity: new  Onset: in the past 7 days  Progression since onset: rapidly worsening  Pain worse on: neither  Fever: no fever  Pain - numeric: 3/10  hoarse voice: Yes  plugged ear sensation: Yes  swollen glands: Yes

## 2017-09-07 ENCOUNTER — TELEPHONE (OUTPATIENT)
Dept: FAMILY MEDICINE | Facility: CLINIC | Age: 52
End: 2017-09-07

## 2017-09-07 NOTE — TELEPHONE ENCOUNTER
----- Message from Woodrow Coon sent at 9/7/2017  3:29 PM CDT -----  Contact: Self/[phone]  Patient states that she still has a sore throat and it's getting worse. She's requesting a sooner appointment than October 10, 2017. Thank you.

## 2017-09-08 ENCOUNTER — OFFICE VISIT (OUTPATIENT)
Dept: FAMILY MEDICINE | Facility: CLINIC | Age: 52
End: 2017-09-08
Payer: MEDICAID

## 2017-09-08 ENCOUNTER — LAB VISIT (OUTPATIENT)
Dept: LAB | Facility: HOSPITAL | Age: 52
End: 2017-09-08
Attending: FAMILY MEDICINE
Payer: MEDICAID

## 2017-09-08 VITALS
BODY MASS INDEX: 20.06 KG/M2 | TEMPERATURE: 99 F | WEIGHT: 120.38 LBS | DIASTOLIC BLOOD PRESSURE: 82 MMHG | RESPIRATION RATE: 12 BRPM | HEART RATE: 86 BPM | SYSTOLIC BLOOD PRESSURE: 124 MMHG | OXYGEN SATURATION: 99 % | HEIGHT: 65 IN

## 2017-09-08 DIAGNOSIS — R53.83 FATIGUE, UNSPECIFIED TYPE: Primary | ICD-10-CM

## 2017-09-08 DIAGNOSIS — Z00.00 ANNUAL PHYSICAL EXAM: ICD-10-CM

## 2017-09-08 DIAGNOSIS — B37.31 VAGINA, CANDIDIASIS: ICD-10-CM

## 2017-09-08 DIAGNOSIS — J02.9 SORE THROAT: ICD-10-CM

## 2017-09-08 LAB
ALBUMIN SERPL BCP-MCNC: 3.7 G/DL
ALP SERPL-CCNC: 77 U/L
ALT SERPL W/O P-5'-P-CCNC: 29 U/L
ANION GAP SERPL CALC-SCNC: 8 MMOL/L
AST SERPL-CCNC: 33 U/L
BASOPHILS # BLD AUTO: 0.02 K/UL
BASOPHILS NFR BLD: 0.3 %
BILIRUB SERPL-MCNC: 0.2 MG/DL
BILIRUB SERPL-MCNC: NORMAL MG/DL
BLOOD URINE, POC: NORMAL
BUN SERPL-MCNC: 18 MG/DL
CALCIUM SERPL-MCNC: 9.3 MG/DL
CHLORIDE SERPL-SCNC: 104 MMOL/L
CHOLEST SERPL-MCNC: 198 MG/DL
CHOLEST/HDLC SERPL: 2 {RATIO}
CO2 SERPL-SCNC: 25 MMOL/L
COLOR, POC UA: YELLOW
CREAT SERPL-MCNC: 0.8 MG/DL
DIFFERENTIAL METHOD: NORMAL
EOSINOPHIL # BLD AUTO: 0.1 K/UL
EOSINOPHIL NFR BLD: 1.6 %
ERYTHROCYTE [DISTWIDTH] IN BLOOD BY AUTOMATED COUNT: 13.3 %
EST. GFR  (AFRICAN AMERICAN): >60 ML/MIN/1.73 M^2
EST. GFR  (NON AFRICAN AMERICAN): >60 ML/MIN/1.73 M^2
GLUCOSE SERPL-MCNC: 94 MG/DL
GLUCOSE UR QL STRIP: NORMAL
HCT VFR BLD AUTO: 42.2 %
HDLC SERPL-MCNC: 100 MG/DL
HDLC SERPL: 50.5 %
HGB BLD-MCNC: 14.4 G/DL
KETONES UR QL STRIP: NORMAL
LDLC SERPL CALC-MCNC: 84.4 MG/DL
LEUKOCYTE ESTERASE URINE, POC: NORMAL
LYMPHOCYTES # BLD AUTO: 1.5 K/UL
LYMPHOCYTES NFR BLD: 23.7 %
MCH RBC QN AUTO: 30.4 PG
MCHC RBC AUTO-ENTMCNC: 34.1 G/DL
MCV RBC AUTO: 89 FL
MONOCYTES # BLD AUTO: 0.4 K/UL
MONOCYTES NFR BLD: 6.2 %
NEUTROPHILS # BLD AUTO: 4.2 K/UL
NEUTROPHILS NFR BLD: 67.9 %
NITRITE, POC UA: NORMAL
NONHDLC SERPL-MCNC: 98 MG/DL
PH, POC UA: 7
PLATELET # BLD AUTO: 246 K/UL
PMV BLD AUTO: 10.8 FL
POTASSIUM SERPL-SCNC: 4.4 MMOL/L
PROT SERPL-MCNC: 7.3 G/DL
PROTEIN, POC: NORMAL
RBC # BLD AUTO: 4.74 M/UL
SODIUM SERPL-SCNC: 137 MMOL/L
SPECIFIC GRAVITY, POC UA: 1
T4 FREE SERPL-MCNC: 0.84 NG/DL
TRIGL SERPL-MCNC: 68 MG/DL
TSH SERPL DL<=0.005 MIU/L-ACNC: 0.93 UIU/ML
UROBILINOGEN, POC UA: NORMAL
WBC # BLD AUTO: 6.13 K/UL

## 2017-09-08 PROCEDURE — 80074 ACUTE HEPATITIS PANEL: CPT

## 2017-09-08 PROCEDURE — 87591 N.GONORRHOEAE DNA AMP PROB: CPT

## 2017-09-08 PROCEDURE — 85025 COMPLETE CBC W/AUTO DIFF WBC: CPT

## 2017-09-08 PROCEDURE — 81001 URINALYSIS AUTO W/SCOPE: CPT | Mod: PBBFAC,PO | Performed by: FAMILY MEDICINE

## 2017-09-08 PROCEDURE — 99999 PR PBB SHADOW E&M-EST. PATIENT-LVL III: CPT | Mod: PBBFAC,,, | Performed by: FAMILY MEDICINE

## 2017-09-08 PROCEDURE — 36415 COLL VENOUS BLD VENIPUNCTURE: CPT | Mod: PO

## 2017-09-08 PROCEDURE — 86703 HIV-1/HIV-2 1 RESULT ANTBDY: CPT

## 2017-09-08 PROCEDURE — 99396 PREV VISIT EST AGE 40-64: CPT | Mod: S$PBB,,, | Performed by: FAMILY MEDICINE

## 2017-09-08 PROCEDURE — 86592 SYPHILIS TEST NON-TREP QUAL: CPT

## 2017-09-08 PROCEDURE — 84443 ASSAY THYROID STIM HORMONE: CPT

## 2017-09-08 PROCEDURE — 99213 OFFICE O/P EST LOW 20 MIN: CPT | Mod: PBBFAC,PO | Performed by: FAMILY MEDICINE

## 2017-09-08 PROCEDURE — 84439 ASSAY OF FREE THYROXINE: CPT

## 2017-09-08 PROCEDURE — 80053 COMPREHEN METABOLIC PANEL: CPT

## 2017-09-08 PROCEDURE — 80061 LIPID PANEL: CPT

## 2017-09-08 RX ORDER — ACYCLOVIR 200 MG/1
200 CAPSULE ORAL
Qty: 25 CAPSULE | Refills: 0 | Status: SHIPPED | OUTPATIENT
Start: 2017-09-08 | End: 2018-04-30

## 2017-09-08 RX ORDER — FLUCONAZOLE 150 MG/1
150 TABLET ORAL DAILY
Qty: 1 TABLET | Refills: 0 | Status: SHIPPED | OUTPATIENT
Start: 2017-09-08 | End: 2017-09-09

## 2017-09-08 NOTE — PROGRESS NOTES
Chief Complaint   Patient presents with    Sore Throat     feeling bad past couple weeks       HPI  Mae Gregory is a 52 y.o. female with multiple medical diagnoses as listed in the medical history and problem list that presents for sore throat.      Sore throat - completed previous abx, improved initially, then sore throat returned worse with fatigue, +soft stools, +mild abd pain, +vaginal itching/raw, +fatigue, +subj fatigue mild, +prod cough.     Pt is known to me and was last seen by me on 8/29/2017.    PAST MEDICAL HISTORY:  History reviewed. No pertinent past medical history.    PAST SURGICAL HISTORY:  History reviewed. No pertinent surgical history.    SOCIAL HISTORY:  Social History     Social History    Marital status:      Spouse name: N/A    Number of children: N/A    Years of education: N/A     Occupational History    Not on file.     Social History Main Topics    Smoking status: Never Smoker    Smokeless tobacco: Never Used    Alcohol use Not on file    Drug use: Unknown    Sexual activity: Not on file     Other Topics Concern    Not on file     Social History Narrative    No narrative on file       FAMILY HISTORY:  History reviewed. No pertinent family history.    ALLERGIES AND MEDICATIONS: updated and reviewed.  Review of patient's allergies indicates:  No Known Allergies  Current Outpatient Prescriptions   Medication Sig Dispense Refill    azithromycin (ZITHROMAX Z-JEREMY) 250 MG tablet 2 tabs today, then 1 tab per day for 4 days. 6 tablet 0    buPROPion (WELLBUTRIN XL) 150 MG TB24 tablet Take 1 tablet (150 mg total) by mouth once daily. 30 tablet 3    diazePAM (VALIUM) 2 MG tablet       sumatriptan (IMITREX) 100 MG tablet Take 1 tablet (100 mg total) by mouth every 2 (two) hours as needed for Migraine. 40 tablet 1    topiramate (TOPAMAX) 50 MG tablet Take 1 tablet (50 mg total) by mouth 2 (two) times daily. (Patient taking differently: Take 25 mg by mouth once daily. ) 60  "tablet 11    tramadol (ULTRAM-ER) 200 MG Tb24 Take 200 mg by mouth every other day.       trazodone (DESYREL) 150 MG tablet Take 0.5 tablets (75 mg total) by mouth every evening. 15 tablet 1    zolpidem (AMBIEN) 5 MG Tab Take 1 tablet (5 mg total) by mouth nightly as needed. 30 tablet 3    acyclovir (ZOVIRAX) 200 MG capsule Take 1 capsule (200 mg total) by mouth 5 (five) times daily. 25 capsule 0    finasteride (PROSCAR) 5 mg tablet Take 1 tablet (5 mg total) by mouth once daily. 30 tablet 11    niacin 50 MG Tab Take 2 tablets (100 mg total) by mouth 3 (three) times daily with meals.  0     No current facility-administered medications for this visit.        ROS  Review of Systems   Constitutional: Positive for fever. Negative for activity change and diaphoresis.   HENT: Positive for congestion, postnasal drip, rhinorrhea, sinus pressure, sneezing and sore throat. Negative for voice change.    Eyes: Negative for pain.   Respiratory: Positive for cough. Negative for shortness of breath.    Cardiovascular: Negative for chest pain.   Gastrointestinal: Negative for abdominal pain and nausea.   Endocrine: Negative.    Genitourinary: Negative for difficulty urinating and frequency.   Musculoskeletal: Negative for arthralgias and neck stiffness.   Skin: Negative for rash.   Allergic/Immunologic: Negative.    Neurological: Negative for dizziness and numbness.   Psychiatric/Behavioral: Positive for sleep disturbance. Negative for agitation.       Physical Exam  Vitals:    09/08/17 1324   BP: 124/82   Pulse: 86   Resp: 12   Temp: 98.7 °F (37.1 °C)    Body mass index is 20.03 kg/m².  Weight: 54.6 kg (120 lb 5.9 oz)   Height: 5' 5" (165.1 cm)     Physical Exam   Constitutional: She is oriented to person, place, and time. She appears well-developed and well-nourished.   HENT:   Head: Normocephalic.   Mouth/Throat: No oropharyngeal exudate.   Erythematous pharynx  Erythematous, edematous nares  Mild dull TMs bilaterally "   Eyes: Pupils are equal, round, and reactive to light. No scleral icterus.   Neck: Normal range of motion. Neck supple.   Cardiovascular: Normal rate, regular rhythm and normal heart sounds.    Pulmonary/Chest: Effort normal and breath sounds normal. No respiratory distress.   Abdominal: Soft. Bowel sounds are normal. There is no tenderness.   Lymphadenopathy:     She has cervical adenopathy.   Neurological: She is alert and oriented to person, place, and time.   Skin: Skin is warm. No rash noted.   Psychiatric: She has a normal mood and affect. Her behavior is normal. Judgment and thought content normal.       Health Maintenance       Date Due Completion Date    Hepatitis C Screening 1965 ---    TETANUS VACCINE 06/15/1983 ---    Influenza Vaccine 08/01/2017 4/7/2017 (Declined)    Override on 4/7/2017: Declined    Mammogram 12/30/2017 12/30/2015 (Done)    Override on 12/30/2015: Done    Pap Smear with HPV Cotest 09/14/2019 9/14/2016 (Done)    Override on 9/14/2016: Done    Lipid Panel 08/31/2021 8/31/2016 (Done)    Override on 8/31/2016: Done    Colonoscopy 08/21/2023 8/21/2013 (Done)    Override on 8/21/2013: Done (done in [place])          Assessment & Plan    Fatigue, unspecified type  -     POCT urinalysis, dipstick or tablet reag    Sore throat  -     POCT urinalysis, dipstick or tablet reag  -     acyclovir (ZOVIRAX) 200 MG capsule; Take 1 capsule (200 mg total) by mouth 5 (five) times daily.  Dispense: 25 capsule; Refill: 0    Annual physical exam  -     CBC auto differential; Future; Expected date: 09/08/2017  -     Comprehensive metabolic panel; Future; Expected date: 09/08/2017  -     T4, free; Future; Expected date: 09/08/2017  -     TSH; Future; Expected date: 09/08/2017  -     Lipid panel; Future; Expected date: 09/08/2017  -     HIV-1 and HIV-2 antibodies; Future; Expected date: 09/08/2017  -     C. trachomatis/N. gonorrhoeae by AMP DNA Urine  -     RPR; Future; Expected date: 09/08/2017  -      HEPATITIS PANEL, ACUTE; Future; Expected date: 09/08/2017  - Counseled on age appropriate medical preventative services, including age appropriate cancer screenings, over all nutritional health, need for a consistent exercise regimen and an over all push towards maintaining a vigorous and active lifestyle.      - Counseled on age appropriate vaccines and discussed upcoming health care needs based on age/gender.  Spent time with patient counseling on need for a good patient/doctor relationship moving forward.  Discussed use of common OTC medications and supplements.  Discussed common dietary aids and use of caffeine and the need for good sleep hygiene and stress management.      Vagina, candidiasis  -     fluconazole (DIFLUCAN) 150 MG Tab; Take 1 tablet (150 mg total) by mouth once daily.  Dispense: 1 tablet; Refill: 0        Return in about 3 months (around 12/8/2017), or if symptoms worsen or fail to improve.

## 2017-09-09 LAB
C TRACH DNA SPEC QL NAA+PROBE: NOT DETECTED
N GONORRHOEA DNA SPEC QL NAA+PROBE: NOT DETECTED
RPR SER QL: NORMAL

## 2017-09-11 ENCOUNTER — TELEPHONE (OUTPATIENT)
Dept: FAMILY MEDICINE | Facility: CLINIC | Age: 52
End: 2017-09-11

## 2017-09-11 ENCOUNTER — PATIENT MESSAGE (OUTPATIENT)
Dept: FAMILY MEDICINE | Facility: CLINIC | Age: 52
End: 2017-09-11

## 2017-09-11 DIAGNOSIS — H81.03 MENIERE'S DISEASE OF BOTH EARS: ICD-10-CM

## 2017-09-11 LAB
HAV IGM SERPL QL IA: NEGATIVE
HBV CORE IGM SERPL QL IA: NEGATIVE
HBV SURFACE AG SERPL QL IA: NEGATIVE
HCV AB SERPL QL IA: NEGATIVE
HIV 1+2 AB+HIV1 P24 AG SERPL QL IA: NEGATIVE

## 2017-09-11 RX ORDER — FINASTERIDE 5 MG/1
5 TABLET, FILM COATED ORAL DAILY
Qty: 30 TABLET | Refills: 11
Start: 2017-09-11 | End: 2018-03-07 | Stop reason: SDUPTHER

## 2017-09-11 RX ORDER — NIACIN 50 MG
100 TABLET ORAL
Refills: 0
Start: 2017-09-11 | End: 2019-01-14

## 2017-09-11 NOTE — TELEPHONE ENCOUNTER
----- Message from Natividad Lula sent at 9/11/2017 11:37 AM CDT -----  Contact: self  Patient said the medication fluconazole works but the yeast infection is back. Should patient stop taking the antibiotics? Patient can be reached at [phone].        Thanks,

## 2017-09-11 NOTE — TELEPHONE ENCOUNTER
Patient said the medication fluconazole works but the yeast infection is back. Should patient stop taking the antibiotics?  Please advise, thank you.

## 2017-09-12 RX ORDER — FLUCONAZOLE 150 MG/1
150 TABLET ORAL DAILY
Qty: 1 TABLET | Refills: 2 | Status: SHIPPED | OUTPATIENT
Start: 2017-09-12 | End: 2017-09-13

## 2017-09-19 ENCOUNTER — HOSPITAL ENCOUNTER (OUTPATIENT)
Dept: RADIOLOGY | Facility: HOSPITAL | Age: 52
Discharge: HOME | End: 2017-09-19
Attending: FAMILY MEDICINE
Payer: MEDICAID

## 2017-09-19 DIAGNOSIS — Z00.00 ANNUAL PHYSICAL EXAM: ICD-10-CM

## 2017-09-19 DIAGNOSIS — Z12.31 ENCOUNTER FOR SCREENING MAMMOGRAM FOR BREAST CANCER: ICD-10-CM

## 2017-09-19 PROCEDURE — 77067 SCR MAMMO BI INCL CAD: CPT | Mod: 26,,, | Performed by: RADIOLOGY

## 2017-09-19 PROCEDURE — 77067 SCR MAMMO BI INCL CAD: CPT | Mod: TC

## 2017-09-19 PROCEDURE — 77063 BREAST TOMOSYNTHESIS BI: CPT | Mod: 26,,, | Performed by: RADIOLOGY

## 2017-09-25 ENCOUNTER — TELEPHONE (OUTPATIENT)
Dept: FAMILY MEDICINE | Facility: CLINIC | Age: 52
End: 2017-09-25

## 2017-09-25 NOTE — TELEPHONE ENCOUNTER
Patient was in the ER at [facility] last night    She was given prednisone 20 mg bid 3 days and zithromax. Patient is requesting a xopenex inhaler and levoabuterol nebulizer solution sent to [facility]. She has a horrible cough and body aches.      ----- Message from Ashlie Rudd sent at 9/25/2017 12:45 PM CDT -----  Contact: self  Patient called requesting a refill on Xopenex Inhaler and nebulizer solution. Please send to [phone]. Please contact her at [phone].    Thanks!

## 2017-09-26 ENCOUNTER — TELEPHONE (OUTPATIENT)
Dept: FAMILY MEDICINE | Facility: CLINIC | Age: 52
End: 2017-09-26

## 2017-09-26 RX ORDER — LEVALBUTEROL TARTRATE 45 UG/1
2 AEROSOL, METERED ORAL EVERY 8 HOURS PRN
Qty: 1 INHALER | Refills: 12 | Status: SHIPPED | OUTPATIENT
Start: 2017-09-26 | End: 2022-08-09

## 2017-09-26 RX ORDER — LEVALBUTEROL INHALATION SOLUTION 1.25 MG/3ML
1 SOLUTION RESPIRATORY (INHALATION) EVERY 4 HOURS PRN
Qty: 60 ML | Refills: 1 | Status: SHIPPED | OUTPATIENT
Start: 2017-09-26 | End: 2018-03-07

## 2017-09-26 NOTE — TELEPHONE ENCOUNTER
----- Message from Natividad Cotton sent at 9/26/2017 11:40 AM CDT -----  Contact: self  Patient is has question about medication? Patient can be reached at [phone].      Thanks,

## 2017-12-05 DIAGNOSIS — F32.A DEPRESSION, UNSPECIFIED DEPRESSION TYPE: ICD-10-CM

## 2017-12-05 RX ORDER — BUPROPION HYDROCHLORIDE 150 MG/1
150 TABLET ORAL DAILY
Qty: 30 TABLET | Refills: 3 | Status: SHIPPED | OUTPATIENT
Start: 2017-12-05 | End: 2018-03-24 | Stop reason: SDUPTHER

## 2018-01-29 DIAGNOSIS — G47.00 INSOMNIA, UNSPECIFIED TYPE: ICD-10-CM

## 2018-01-29 DIAGNOSIS — F51.01 PRIMARY INSOMNIA: ICD-10-CM

## 2018-01-29 RX ORDER — ZOLPIDEM TARTRATE 5 MG/1
5 TABLET ORAL NIGHTLY PRN
Qty: 30 TABLET | Refills: 1 | Status: SHIPPED | OUTPATIENT
Start: 2018-01-29 | End: 2018-03-26 | Stop reason: SDUPTHER

## 2018-03-02 ENCOUNTER — TELEPHONE (OUTPATIENT)
Dept: FAMILY MEDICINE | Facility: CLINIC | Age: 53
End: 2018-03-02

## 2018-03-02 NOTE — TELEPHONE ENCOUNTER
----- Message from Lorelei Childs sent at 3/2/2018 10:43 AM CST -----  Contact: Self   Patient is trying to schedule a physical but no appointment slots are available. She would like to be squeezed in. Please call at [phone].

## 2018-03-07 ENCOUNTER — OFFICE VISIT (OUTPATIENT)
Dept: FAMILY MEDICINE | Facility: CLINIC | Age: 53
End: 2018-03-07
Payer: MEDICAID

## 2018-03-07 VITALS
TEMPERATURE: 99 F | BODY MASS INDEX: 20.97 KG/M2 | HEART RATE: 84 BPM | SYSTOLIC BLOOD PRESSURE: 100 MMHG | WEIGHT: 125.88 LBS | OXYGEN SATURATION: 99 % | HEIGHT: 65 IN | RESPIRATION RATE: 16 BRPM | DIASTOLIC BLOOD PRESSURE: 74 MMHG

## 2018-03-07 DIAGNOSIS — Z00.00 ANNUAL PHYSICAL EXAM: ICD-10-CM

## 2018-03-07 DIAGNOSIS — H81.03 MENIERE'S DISEASE OF BOTH EARS: ICD-10-CM

## 2018-03-07 DIAGNOSIS — M50.30 DDD (DEGENERATIVE DISC DISEASE), CERVICAL: Primary | ICD-10-CM

## 2018-03-07 DIAGNOSIS — Z23 NEED FOR DIPHTHERIA-TETANUS-PERTUSSIS (TDAP) VACCINE: ICD-10-CM

## 2018-03-07 DIAGNOSIS — G43.909 MIGRAINE WITHOUT STATUS MIGRAINOSUS, NOT INTRACTABLE, UNSPECIFIED MIGRAINE TYPE: ICD-10-CM

## 2018-03-07 DIAGNOSIS — M51.36 DDD (DEGENERATIVE DISC DISEASE), LUMBAR: ICD-10-CM

## 2018-03-07 DIAGNOSIS — H40.9 GLAUCOMA, UNSPECIFIED GLAUCOMA TYPE, UNSPECIFIED LATERALITY: ICD-10-CM

## 2018-03-07 DIAGNOSIS — L65.9 HAIR LOSS: ICD-10-CM

## 2018-03-07 PROCEDURE — 99396 PREV VISIT EST AGE 40-64: CPT | Mod: S$PBB,,, | Performed by: FAMILY MEDICINE

## 2018-03-07 PROCEDURE — 99999 PR PBB SHADOW E&M-EST. PATIENT-LVL IV: CPT | Mod: PBBFAC,,, | Performed by: FAMILY MEDICINE

## 2018-03-07 PROCEDURE — 90471 IMMUNIZATION ADMIN: CPT | Mod: PBBFAC,PO

## 2018-03-07 PROCEDURE — 99214 OFFICE O/P EST MOD 30 MIN: CPT | Mod: PBBFAC,PO,25 | Performed by: FAMILY MEDICINE

## 2018-03-07 RX ORDER — FINASTERIDE 5 MG/1
5 TABLET, FILM COATED ORAL DAILY
Qty: 30 TABLET | Refills: 11
Start: 2018-03-07 | End: 2018-04-03 | Stop reason: SDUPTHER

## 2018-03-07 NOTE — PROGRESS NOTES
Chief Complaint   Patient presents with    Annual Exam       HPI  Mae Gregory is a 52 y.o. female with multiple medical diagnoses as listed in the medical history and problem list that presents for annual exam.      Overall doing well    Avid exerciser and paleo diet    Hx of migraines - followed by Neuro in [place], in need of a botox neuro specialist here.   Insomnia - currently doing well with current medications.     Pt is known to me and was last seen by me on 9/8/2017.    PAST MEDICAL HISTORY:  No past medical history on file.    PAST SURGICAL HISTORY:  Past Surgical History:   Procedure Laterality Date    BREAST SURGERY         SOCIAL HISTORY:  Social History     Social History    Marital status:      Spouse name: N/A    Number of children: N/A    Years of education: N/A     Occupational History    Not on file.     Social History Main Topics    Smoking status: Never Smoker    Smokeless tobacco: Never Used    Alcohol use Not on file    Drug use: Unknown    Sexual activity: Not on file     Other Topics Concern    Not on file     Social History Narrative    No narrative on file       FAMILY HISTORY:  No family history on file.    ALLERGIES AND MEDICATIONS: updated and reviewed.  Review of patient's allergies indicates:  No Known Allergies  Current Outpatient Prescriptions   Medication Sig Dispense Refill    buPROPion (WELLBUTRIN XL) 150 MG TB24 tablet TAKE 1 TABLET (150 MG TOTAL) BY MOUTH ONCE DAILY. 30 tablet 3    diazePAM (VALIUM) 2 MG tablet       finasteride (PROSCAR) 5 mg tablet Take 1 tablet (5 mg total) by mouth once daily. 30 tablet 11    FLUCELVAX QUAD [id number], PF, 60 mcg (15 mcg x 4)/0.5 mL Syrg vaccine 0.5 mLs. Went to [facility] pharmacy 11/2017      levalbuterol (XOPENEX HFA) 45 mcg/actuation inhaler Inhale 2 puffs into the lungs every 8 (eight) hours as needed for Wheezing. (Patient taking differently: Inhale 2 puffs into the lungs every 8 (eight) hours as needed for  Wheezing. PRN) 1 Inhaler 12    niacin 50 MG Tab Take 2 tablets (100 mg total) by mouth 3 (three) times daily with meals. (Patient taking differently: Take 100 mg by mouth 2 (two) times daily with meals. )  0    topiramate (TOPAMAX) 50 MG tablet Take 1 tablet (50 mg total) by mouth 2 (two) times daily. (Patient taking differently: Take 25 mg by mouth once daily. ) 60 tablet 11    tramadol (ULTRAM-ER) 200 MG Tb24 Take 200 mg by mouth every other day.       trazodone (DESYREL) 150 MG tablet Take 0.5 tablets (75 mg total) by mouth every evening. 15 tablet 1    zolpidem (AMBIEN) 5 MG Tab TAKE 1 TABLET (5 MG TOTAL) BY MOUTH NIGHTLY AS NEEDED. 30 tablet 1    acyclovir (ZOVIRAX) 200 MG capsule Take 1 capsule (200 mg total) by mouth 5 (five) times daily. (Patient taking differently: Take 200 mg by mouth 5 (five) times daily. Not longer taking) 25 capsule 0    sumatriptan (IMITREX) 100 MG tablet Take 1 tablet (100 mg total) by mouth every 2 (two) hours as needed for Migraine. 40 tablet 1     No current facility-administered medications for this visit.        ROS  Review of Systems   Constitutional: Negative for activity change, appetite change and fever.   HENT: Positive for hearing loss. Negative for congestion and sore throat.    Eyes: Negative for visual disturbance.   Respiratory: Negative for cough and shortness of breath.    Cardiovascular: Negative for chest pain.   Gastrointestinal: Negative for abdominal pain, diarrhea, nausea and vomiting.   Endocrine: Negative.    Genitourinary: Negative for dysuria.   Musculoskeletal: Positive for myalgias and neck pain. Negative for arthralgias and back pain.   Skin: Negative for rash.   Allergic/Immunologic: Negative.    Neurological: Negative for dizziness, weakness and headaches.   Hematological: Negative.    Psychiatric/Behavioral: Negative for agitation and confusion.       Physical Exam  Vitals:    03/07/18 1123   BP: 100/74   Pulse: 84   Resp: 16   Temp: 98.5 °F  "(36.9 °C)    Body mass index is 20.95 kg/m².  Weight: 57.1 kg (125 lb 14.1 oz)   Height: 5' 5" (165.1 cm)     Physical Exam   Constitutional: She is oriented to person, place, and time. She appears well-developed and well-nourished.   HENT:   Head: Normocephalic and atraumatic.   Eyes: Conjunctivae and EOM are normal. Pupils are equal, round, and reactive to light.   Neck: Normal range of motion. Neck supple.   Cardiovascular: Normal rate, regular rhythm and normal heart sounds.    Pulmonary/Chest: Effort normal and breath sounds normal.   Abdominal: Soft. Bowel sounds are normal.   Musculoskeletal: Normal range of motion.   Neurological: She is alert and oriented to person, place, and time. She has normal reflexes.   Skin: Skin is warm and dry.   Psychiatric: She has a normal mood and affect. Her behavior is normal. Judgment and thought content normal.       Health Maintenance       Date Due Completion Date    TETANUS VACCINE 06/15/1983 ---    Pap Smear with HPV Cotest 09/14/2019 9/14/2016 (Done)    Override on 9/14/2016: Done    Mammogram 09/19/2019 9/19/2017    Override on 12/30/2015: Done    Lipid Panel 09/08/2022 9/8/2017    Override on 8/31/2016: Done    Colonoscopy 08/21/2023 8/21/2013 (Done)    Override on 8/21/2013: Done (done in [place])          Assessment & Plan    DDD (degenerative disc disease), cervical, DDD (degenerative disc disease), lumbar  - Continue current medication regimen as prescribed  - Overall well controlled    Migraine without status migrainosus, not intractable, unspecified migraine type  - Continue current medication regimen as prescribed  - Searching for new provider for botox treatments    Meniere's disease of both ears  - Continue current medication regimen as prescribed    Other orders  -     Ambulatory Referral to Ophthalmology, hx of glaucoma  -     finasteride (PROSCAR) 5 mg tablet; Take 1 tablet (5 mg total) by mouth once daily.  Dispense: 30 tablet; Refill: 11        Follow-up " in about 4 weeks (around 4/4/2018).

## 2018-03-14 ENCOUNTER — TELEPHONE (OUTPATIENT)
Dept: ADMINISTRATIVE | Facility: HOSPITAL | Age: 53
End: 2018-03-14

## 2018-03-24 DIAGNOSIS — F32.A DEPRESSION, UNSPECIFIED DEPRESSION TYPE: ICD-10-CM

## 2018-03-26 DIAGNOSIS — G47.00 INSOMNIA, UNSPECIFIED TYPE: ICD-10-CM

## 2018-03-26 DIAGNOSIS — F51.01 PRIMARY INSOMNIA: ICD-10-CM

## 2018-03-26 RX ORDER — BUPROPION HYDROCHLORIDE 150 MG/1
150 TABLET ORAL DAILY
Qty: 30 TABLET | Refills: 3 | Status: SHIPPED | OUTPATIENT
Start: 2018-03-26 | End: 2018-07-22 | Stop reason: SDUPTHER

## 2018-03-27 RX ORDER — ZOLPIDEM TARTRATE 5 MG/1
5 TABLET ORAL NIGHTLY PRN
Qty: 30 TABLET | Refills: 1 | Status: SHIPPED | OUTPATIENT
Start: 2018-03-27 | End: 2018-05-25 | Stop reason: SDUPTHER

## 2018-04-03 ENCOUNTER — PATIENT MESSAGE (OUTPATIENT)
Dept: FAMILY MEDICINE | Facility: CLINIC | Age: 53
End: 2018-04-03

## 2018-04-03 DIAGNOSIS — L65.9 HAIR LOSS: ICD-10-CM

## 2018-04-03 RX ORDER — FINASTERIDE 5 MG/1
5 TABLET, FILM COATED ORAL DAILY
Qty: 30 TABLET | Refills: 11
Start: 2018-04-03 | End: 2020-08-19 | Stop reason: SDUPTHER

## 2018-04-30 ENCOUNTER — OFFICE VISIT (OUTPATIENT)
Dept: OBSTETRICS AND GYNECOLOGY | Facility: CLINIC | Age: 53
End: 2018-04-30
Attending: OBSTETRICS & GYNECOLOGY
Payer: MEDICAID

## 2018-04-30 VITALS
BODY MASS INDEX: 21.26 KG/M2 | WEIGHT: 127.63 LBS | SYSTOLIC BLOOD PRESSURE: 142 MMHG | HEIGHT: 65 IN | DIASTOLIC BLOOD PRESSURE: 84 MMHG

## 2018-04-30 DIAGNOSIS — Z01.419 WELL WOMAN EXAM WITH ROUTINE GYNECOLOGICAL EXAM: Primary | ICD-10-CM

## 2018-04-30 DIAGNOSIS — Z12.31 VISIT FOR SCREENING MAMMOGRAM: ICD-10-CM

## 2018-04-30 PROCEDURE — 87624 HPV HI-RISK TYP POOLED RSLT: CPT

## 2018-04-30 PROCEDURE — 99213 OFFICE O/P EST LOW 20 MIN: CPT | Mod: PBBFAC | Performed by: OBSTETRICS & GYNECOLOGY

## 2018-04-30 PROCEDURE — 88175 CYTOPATH C/V AUTO FLUID REDO: CPT

## 2018-04-30 PROCEDURE — 99999 PR PBB SHADOW E&M-EST. PATIENT-LVL III: CPT | Mod: PBBFAC,,, | Performed by: OBSTETRICS & GYNECOLOGY

## 2018-04-30 PROCEDURE — 99386 PREV VISIT NEW AGE 40-64: CPT | Mod: S$PBB,,, | Performed by: OBSTETRICS & GYNECOLOGY

## 2018-04-30 NOTE — PROGRESS NOTES
SUBJECTIVE:   52 y.o. female   for annual routine Pap and checkup. No LMP recorded. Patient has had an ablation..  She has no unusual complaints.  Recently moved home from [place].        History reviewed. No pertinent past medical history.  Past Surgical History:   Procedure Laterality Date    BREAST SURGERY       Social History     Social History    Marital status:      Spouse name: N/A    Number of children: N/A    Years of education: N/A     Occupational History    Not on file.     Social History Main Topics    Smoking status: Never Smoker    Smokeless tobacco: Never Used    Alcohol use No    Drug use: No    Sexual activity: Yes     Partners: Male      Comment: ablation     Other Topics Concern    Not on file     Social History Narrative    No narrative on file     Family History   Problem Relation Age of Onset    Stroke Maternal Grandfather     Hypertension Father     Hypertension Mother     Hypertension Brother     Ovarian cancer Neg Hx     Cancer Neg Hx      OB History    Para Term  AB Living   3 3 3         SAB TAB Ectopic Multiple Live Births                  # Outcome Date GA Lbr Vaibhav/2nd Weight Sex Delivery Anes PTL Lv   3 Term            2 Term            1 Term                     Current Outpatient Prescriptions   Medication Sig Dispense Refill    buPROPion (WELLBUTRIN XL) 150 MG TB24 tablet TAKE 1 TABLET (150 MG TOTAL) BY MOUTH ONCE DAILY. 30 tablet 3    diazePAM (VALIUM) 2 MG tablet       finasteride (PROSCAR) 5 mg tablet Take 1 tablet (5 mg total) by mouth once daily. 30 tablet 11    niacin 50 MG Tab Take 2 tablets (100 mg total) by mouth 3 (three) times daily with meals. (Patient taking differently: Take 100 mg by mouth 2 (two) times daily with meals. )  0    sumatriptan (IMITREX) 100 MG tablet Take 1 tablet (100 mg total) by mouth every 2 (two) hours as needed for Migraine. 40 tablet 1    tramadol (ULTRAM-ER) 200 MG Tb24 Take 200 mg by mouth every  other day.       zolpidem (AMBIEN) 5 MG Tab TAKE 1 TABLET (5 MG TOTAL) BY MOUTH NIGHTLY AS NEEDED. 30 tablet 1    levalbuterol (XOPENEX HFA) 45 mcg/actuation inhaler Inhale 2 puffs into the lungs every 8 (eight) hours as needed for Wheezing. (Patient taking differently: Inhale 2 puffs into the lungs every 8 (eight) hours as needed for Wheezing. PRN) 1 Inhaler 12    topiramate (TOPAMAX) 50 MG tablet Take 1 tablet (50 mg total) by mouth 2 (two) times daily. (Patient taking differently: Take 25 mg by mouth once daily. ) 60 tablet 11    trazodone (DESYREL) 150 MG tablet Take 0.5 tablets (75 mg total) by mouth every evening. 15 tablet 1     No current facility-administered medications for this visit.      Allergies: Patient has no known allergies.     ROS:  Constitutional: no weight loss, weight gain, fever, fatigue  Eyes:  No vision changes, glasses/contacts  ENT/Mouth: No ulcers, sinus problems, ears ringing, headache  Cardiovascular: No inability to lie flat, chest pain, exercise intolerance, swelling, heart palpitations  Respiratory: No wheezing, coughing blood, shortness of breath, or cough  Gastrointestinal: No diarrhea, bloody stool, nausea/vomiting, constipation, gas, hemorrhoids  Genitourinary: No blood in urine, painful urination, urgency of urination, frequency of urination, incomplete emptying, incontinence, abnormal bleeding, painful periods, heavy periods, vaginal discharge, vaginal odor, painful intercourse, sexual problems, bleeding after intercourse.  Musculoskeletal: No muscle weakness  Skin/Breast: No painful breasts, nipple discharge, masses, rash, ulcers  Neurological: No passing out, seizures, numbness, headache  Endocrine: No diabetes, hypothyroid, hyperthyroid, hot flashes, hair loss, abnormal hair growth, ance  Psychiatric: No depression, crying  Hematologic: No bruises, bleeding, swollen lymph nodes, anemia.      OBJECTIVE:   The patient appears well, alert, oriented x 3, in no distress.  BP  "(!) 142/84    5' 5" (1.651 m)   Wt 57.9 kg (127 lb 10.3 oz)   BMI 21.24 kg/m²   NECK: no thyromegaly, trachea midline  SKIN: no acne, striae, hirsutism  BREAST EXAM: breasts appear normal, no suspicious masses, no skin or nipple changes or axillary nodes  ABDOMEN: no hernias, masses, or hepatosplenomegaly  GENITALIA: normal external genitalia, no erythema, no discharge  URETHRA: normal urethra, normal urethral meatus  VAGINA: Normal  CERVIX: no lesions or cervical motion tenderness  UTERUS: normal size, contour, position, consistency, mobility, non-tender  ADNEXA: no mass, fullness, tenderness    \  ASSESSMENT:   Kellee was seen today for annual exam, vaginal itching and vulvar rash.    Diagnoses and all orders for this visit:    Well woman exam with routine gynecological exam  -     Liquid-based pap smear, screening  -     HPV High Risk Genotypes, PCR    Visit for screening mammogram  -     Mammo Digital Screening Bilat with Tomos; Future      "

## 2018-04-30 NOTE — LETTER
April 30, 2018      Jaun Lazcano MD  [address] [place] [address]           [facility] [address]  [address] [facility] [address]  Phone: [phone]          Patient: Mae Gregory   MR Number: [id number]   YOB: 1965   Date of Visit: 4/30/2018       Dear Dr. Jaun Lazcano:    Thank you for referring Mae Gregory to me for evaluation. Attached you will find relevant portions of my assessment and plan of care.    If you have questions, please do not hesitate to call me. I look forward to following Mae Gregory along with you.    Sincerely,    Vale Trejo MD    Enclosure  CC:  No Recipients    If you would like to receive this communication electronically, please contact [email or web address] or [phone] to request more information on [employer] access.    For providers and/or their staff who would like to refer a patient to [facility], please contact us through our one-stop-shop provider referral line, Valeriy Holloway, at [phone].    If you feel you have received this communication in error or would no longer like to receive these types of communications, please e-mail [email or web address]

## 2018-05-03 LAB
HPV16 AG SPEC QL: NEGATIVE
HPV16+18+H RISK 12 DNA CVX-IMP: POSITIVE
HPV18 DNA SPEC QL NAA+PROBE: NEGATIVE

## 2018-05-25 DIAGNOSIS — G47.00 INSOMNIA, UNSPECIFIED TYPE: ICD-10-CM

## 2018-05-25 DIAGNOSIS — F51.01 PRIMARY INSOMNIA: ICD-10-CM

## 2018-05-25 RX ORDER — ZOLPIDEM TARTRATE 5 MG/1
5 TABLET ORAL NIGHTLY PRN
Qty: 30 TABLET | Refills: 1 | Status: SHIPPED | OUTPATIENT
Start: 2018-05-25 | End: 2018-07-24 | Stop reason: SDUPTHER

## 2018-07-22 DIAGNOSIS — F32.A DEPRESSION, UNSPECIFIED DEPRESSION TYPE: ICD-10-CM

## 2018-07-23 RX ORDER — BUPROPION HYDROCHLORIDE 150 MG/1
150 TABLET ORAL DAILY
Qty: 30 TABLET | Refills: 3 | Status: SHIPPED | OUTPATIENT
Start: 2018-07-23 | End: 2018-11-13 | Stop reason: SDUPTHER

## 2018-07-24 DIAGNOSIS — G47.00 INSOMNIA, UNSPECIFIED TYPE: ICD-10-CM

## 2018-07-24 DIAGNOSIS — F51.01 PRIMARY INSOMNIA: ICD-10-CM

## 2018-07-27 RX ORDER — ZOLPIDEM TARTRATE 5 MG/1
5 TABLET ORAL NIGHTLY PRN
Qty: 30 TABLET | Refills: 1 | Status: SHIPPED | OUTPATIENT
Start: 2018-07-27 | End: 2018-09-25 | Stop reason: SDUPTHER

## 2018-08-15 ENCOUNTER — PATIENT MESSAGE (OUTPATIENT)
Dept: FAMILY MEDICINE | Facility: CLINIC | Age: 53
End: 2018-08-15

## 2018-08-17 ENCOUNTER — TELEPHONE (OUTPATIENT)
Dept: FAMILY MEDICINE | Facility: CLINIC | Age: 53
End: 2018-08-17

## 2018-08-17 NOTE — TELEPHONE ENCOUNTER
----- Message from Lorelei Ramonatalimigue sent at 8/17/2018  4:00 PM CDT -----  Contact: Howie Lazo's office   's office is calling to inform you that they don't accept medicaid, so they are unable to schedule the patient from the referral sent over. [phone]

## 2018-09-25 DIAGNOSIS — F51.01 PRIMARY INSOMNIA: ICD-10-CM

## 2018-09-25 DIAGNOSIS — G47.00 INSOMNIA, UNSPECIFIED TYPE: ICD-10-CM

## 2018-09-26 RX ORDER — ZOLPIDEM TARTRATE 5 MG/1
5 TABLET ORAL NIGHTLY PRN
Qty: 30 TABLET | Refills: 1 | Status: SHIPPED | OUTPATIENT
Start: 2018-09-26 | End: 2018-11-24 | Stop reason: SDUPTHER

## 2018-10-26 RX ORDER — OXYCODONE AND ACETAMINOPHEN 5; 325 MG/1; MG/1
1 TABLET ORAL EVERY 4 HOURS PRN
Qty: 10 TABLET | Refills: 0 | Status: SHIPPED | OUTPATIENT
Start: 2018-10-26 | End: 2019-01-14

## 2018-10-26 NOTE — TELEPHONE ENCOUNTER
----- Message from Jolie Gibbs sent at 10/26/2018  1:18 PM CDT -----  Contact: Patient  RASHMI- Patient is in pain and [facility] on [address] told the patient she would have to contact our office for a refill on Percocet 5 mg.  Patient can be reached at [phone].

## 2018-10-30 ENCOUNTER — HOSPITAL ENCOUNTER (OUTPATIENT)
Dept: RADIOLOGY | Facility: HOSPITAL | Age: 53
Discharge: HOME | End: 2018-10-30
Attending: NURSE PRACTITIONER
Payer: MEDICAID

## 2018-10-30 ENCOUNTER — TELEPHONE (OUTPATIENT)
Dept: FAMILY MEDICINE | Facility: CLINIC | Age: 53
End: 2018-10-30

## 2018-10-30 ENCOUNTER — OFFICE VISIT (OUTPATIENT)
Dept: FAMILY MEDICINE | Facility: CLINIC | Age: 53
End: 2018-10-30
Payer: MEDICAID

## 2018-10-30 VITALS
HEART RATE: 90 BPM | BODY MASS INDEX: 19 KG/M2 | OXYGEN SATURATION: 97 % | DIASTOLIC BLOOD PRESSURE: 80 MMHG | WEIGHT: 114.06 LBS | HEIGHT: 65 IN | SYSTOLIC BLOOD PRESSURE: 118 MMHG | TEMPERATURE: 98 F

## 2018-10-30 DIAGNOSIS — H57.12 EYE PAIN, LEFT: ICD-10-CM

## 2018-10-30 DIAGNOSIS — H40.9 GLAUCOMA, UNSPECIFIED GLAUCOMA TYPE, UNSPECIFIED LATERALITY: ICD-10-CM

## 2018-10-30 DIAGNOSIS — H11.32 CONJUNCTIVAL HEMORRHAGE OF LEFT EYE: Primary | ICD-10-CM

## 2018-10-30 DIAGNOSIS — R51.9 LEFT-SIDED HEADACHE: ICD-10-CM

## 2018-10-30 DIAGNOSIS — H11.32 CONJUNCTIVAL HEMORRHAGE OF LEFT EYE: ICD-10-CM

## 2018-10-30 PROCEDURE — 99214 OFFICE O/P EST MOD 30 MIN: CPT | Mod: S$PBB,,, | Performed by: NURSE PRACTITIONER

## 2018-10-30 PROCEDURE — 70450 CT HEAD/BRAIN W/O DYE: CPT | Mod: TC

## 2018-10-30 PROCEDURE — 70450 CT HEAD/BRAIN W/O DYE: CPT | Mod: 26,,, | Performed by: RADIOLOGY

## 2018-10-30 PROCEDURE — 99999 PR PBB SHADOW E&M-EST. PATIENT-LVL V: CPT | Mod: PBBFAC,,, | Performed by: NURSE PRACTITIONER

## 2018-10-30 PROCEDURE — 99215 OFFICE O/P EST HI 40 MIN: CPT | Mod: PBBFAC,PO | Performed by: NURSE PRACTITIONER

## 2018-10-30 RX ORDER — DIPHENHYDRAMINE HCL 25 MG
CAPSULE ORAL
COMMUNITY
Start: 2018-10-15 | End: 2020-05-18

## 2018-10-30 RX ORDER — ONDANSETRON 4 MG/1
TABLET, FILM COATED ORAL
COMMUNITY
Start: 2018-10-18 | End: 2019-01-14

## 2018-10-30 RX ORDER — SULFAMETHOXAZOLE AND TRIMETHOPRIM 800; 160 MG/1; MG/1
TABLET ORAL
COMMUNITY
Start: 2018-10-27 | End: 2019-01-14

## 2018-10-30 NOTE — PROGRESS NOTES
Subjective:       Patient ID: Mae Gregory is a 53 y.o. female.    Chief Complaint: Eye Problem    Eye Problem    The left eye is affected. This is a new problem. The current episode started today. The problem occurs constantly. There was no injury mechanism. The pain is at a severity of 2/10. There is no known exposure to pink eye. She does not wear contacts. Associated symptoms include blurred vision and eye redness. Pertinent negatives include no eye discharge, double vision, nausea, vomiting or weakness. Associated symptoms comments: Corrected with glasses. She has tried nothing for the symptoms.       History reviewed. No pertinent past medical history.    Social History     Socioeconomic History    Marital status:      Spouse name: Not on file    Number of children: Not on file    Years of education: Not on file    Highest education level: Not on file   Social Needs    Financial resource strain: Not on file    Food insecurity - worry: Not on file    Food insecurity - inability: Not on file    Transportation needs - medical: Not on file    Transportation needs - non-medical: Not on file   Occupational History    Not on file   Tobacco Use    Smoking status: Never Smoker    Smokeless tobacco: Never Used   Substance and Sexual Activity    Alcohol use: No    Drug use: No    Sexual activity: Yes     Partners: Male     Comment: ablation   Other Topics Concern    Not on file   Social History Narrative    Not on file       Past Surgical History:   Procedure Laterality Date    BREAST SURGERY      COLON SURGERY  10/15/2018    colon resection        Review of Systems   Constitutional: Negative for activity change and unexpected weight change.   HENT: Negative for hearing loss, rhinorrhea and trouble swallowing.    Eyes: Positive for blurred vision, pain, redness and visual disturbance. Negative for double vision and discharge.   Respiratory: Negative for chest tightness and wheezing.   "  Cardiovascular: Negative for chest pain and palpitations.   Gastrointestinal: Negative for blood in stool, constipation, diarrhea, nausea and vomiting.   Endocrine: Negative for polydipsia and polyuria.   Genitourinary: Negative for difficulty urinating, dysuria, hematuria and menstrual problem.   Musculoskeletal: Negative for arthralgias, joint swelling and neck pain.   Neurological: Negative for dizziness, syncope, weakness, light-headedness and headaches.   Psychiatric/Behavioral: Negative for confusion and dysphoric mood.       Objective:   /80 (BP Location: Right arm, Patient Position: Sitting, BP Method: Small (Manual))   Pulse 90   Temp 97.8 °F (36.6 °C) (Oral)   Ht 5' 5" (1.651 m)   Wt 51.8 kg (114 lb 1.4 oz)   SpO2 97%   BMI 18.99 kg/m²      Physical Exam   Constitutional: She is oriented to person, place, and time. She appears well-developed and well-nourished. No distress.   HENT:   Head: Normocephalic and atraumatic.   Right Ear: Hearing, external ear and ear canal normal. A middle ear effusion is present.   Left Ear: Hearing, tympanic membrane, external ear and ear canal normal.   Eyes: EOM and lids are normal. Pupils are equal, round, and reactive to light. Left eye exhibits no discharge and no hordeolum. No foreign body present in the left eye. Left conjunctiva is not injected. Left conjunctiva has a hemorrhage.       Cardiovascular: Normal rate, regular rhythm and normal heart sounds.   Pulmonary/Chest: Effort normal and breath sounds normal.   Neurological: She is alert and oriented to person, place, and time.   Skin: Skin is warm and dry. Capillary refill takes less than 2 seconds. She is not diaphoretic.   Vitals reviewed.      Assessment:       1. Conjunctival hemorrhage of left eye    2. Eye pain, left    3. Glaucoma, unspecified glaucoma type, unspecified laterality    4. Left-sided headache        Plan:       Mae was seen today for eye problem.    Diagnoses and all orders for " this visit:    Conjunctival hemorrhage of left eye  -     CT Head Without Contrast; Future    Eye pain, left    Glaucoma, unspecified glaucoma type, unspecified laterality  -     Ambulatory referral to Ophthalmology    Left-sided headache  -     CT Head Without Contrast; Future  -     Comprehensive metabolic panel; Future        Follow-up if symptoms worsen or fail to improve.

## 2018-10-30 NOTE — PATIENT INSTRUCTIONS
Subconjunctival Hemorrhage    A subconjunctival hemorrhage is when a blood vessel breaks open in the white of the eye. It causes a bright red patch in the white of the eye. It is similar to a bruise on the skin. This type of hemorrhage is common. It can look quite alarming, but it is usually harmless.  Understanding the conjunctiva  The conjunctiva is the thin layer that covers the inside of the eyelids and the surface of the eye. It has many tiny blood vessels that bring oxygen and nutrients to the eye. The sclera is the white part of the eye that lies beneath the conjunctiva. Sometimes a blood vessel in the conjunctiva breaks open and bleeds. The blood then collects under the conjunctiva and turns part of the eye red. Over several weeks, your body then absorbs the blood.  What causes subconjunctival hemorrhage?  In many cases the cause isnt known. But some health conditions may make it more likely. These include:  · Eye injury  · Eye surgery  · High blood pressure  · Inflammation of the conjunctiva  · Contact lens use  · Diabetes  · Arteriosclerosis  · Tumor of the conjunctiva  · Diseases that affect blood clotting  · Violent sneezing, coughing, or vomiting  · Certain medicines that can increase bleeding, such as aspirin  · Pushing hard during childbirth  · Straining during constipation  Symptoms of subconjunctival hemorrhage  The main symptom is a red patch on the eye. You may notice it after waking up in the morning. In most cases just one eye will have a hemorrhage. It usually happens once and then goes away. But some health conditions may cause repeat hemorrhages. You may feel like you have something in your eye, but this is not common. The hemorrhage shouldnt affect your eyesight or cause any pain. If you do have pain, you may have another type of problem with your eye.  Diagnosing subconjunctival hemorrhage  Your healthcare provider will ask about your health history. You may have a physical exam. This  includes a basic eye exam. Your provider will make sure you dont have other causes of red eye that may need other treatment.  You will need to see an eye doctor (ophthalmologist) if you have had an eye injury. This doctor might use a special lighted microscope to look closely at your eye. This helps show the doctor if the injury hurt the eye itself and not just its outer layer.  If this is not your first subconjunctival hemorrhage, your doctor may need to find the cause. For example, you may need blood tests to check for a blood clotting disorder.  Treatment for subconjunctival hemorrhage  In most cases you will not need treatment. The red patch will usually go away on its own in a few weeks. It will turn from red to brown and then yellow. There are no treatments to speed up this process. Your doctor may suggest you use a warm compress and artificial tears eye drops to help relieve some of the redness.  If your subconjunctival hemorrhage was caused by a health condition, that condition will be treated. For example, you may need a blood pressure medicine to treat high blood pressure.  When to call your healthcare provider  Call your healthcare provider right away if you have any of these:  · Hemorrhage that doesnt go away in 2 to 3 weeks  · Eye pain  · Loss of eyesight  · Another subconjunctival hemorrhage    Date Last Reviewed: 2/1/2017  © 2197-1090 [employer]. [address] [place] [address]. All rights reserved. This information is not intended as a substitute for professional medical care. Always follow your healthcare professional's instructions.        Subconjunctival Hemorrhage    A subconjunctival hemorrhage is a result of a broken blood vessel in the white portion of the eye. It is usually painless and may be caused by coughing, sneezing, or vomiting. An injury to the eye can cause this. It can also be a sign of hypertension (high blood pressure) or a bleeding disorder.  Although  it can look frightening, the presence of the blood is not serious. The blood will be reabsorbed without treatment within 2 to 3 weeks.  Home care  You may continue your usual activities.  Follow-up care  Follow up with your healthcare provider, or as advised.  When to seek medical advice  Contact your healthcare provider right away if any of these occur:  · Pain in the eye  · Change in vision  · The blood does not disappear within three weeks  · Increasing redness or swelling of the eye  · Severe headache or dizziness  · Signs of bruising or bleeding from other parts of your body  Date Last Reviewed: 6/14/2015  © 6940-3378 [employer]. [address] [place] [address], [place] [address]. All rights reserved. This information is not intended as a substitute for professional medical care. Always follow your healthcare professional's instructions.

## 2018-11-02 ENCOUNTER — TELEPHONE (OUTPATIENT)
Dept: OPHTHALMOLOGY | Facility: CLINIC | Age: 53
End: 2018-11-02

## 2018-11-06 ENCOUNTER — HOSPITAL ENCOUNTER (OUTPATIENT)
Dept: RADIOLOGY | Facility: HOSPITAL | Age: 53
Discharge: HOME | End: 2018-11-06
Attending: OBSTETRICS & GYNECOLOGY
Payer: MEDICAID

## 2018-11-06 DIAGNOSIS — Z12.31 VISIT FOR SCREENING MAMMOGRAM: ICD-10-CM

## 2018-11-06 PROCEDURE — 77067 SCR MAMMO BI INCL CAD: CPT | Mod: TC,PO

## 2018-11-06 PROCEDURE — 77063 BREAST TOMOSYNTHESIS BI: CPT | Mod: 26,,, | Performed by: RADIOLOGY

## 2018-11-06 PROCEDURE — 77067 SCR MAMMO BI INCL CAD: CPT | Mod: 26,,, | Performed by: RADIOLOGY

## 2018-11-08 ENCOUNTER — OFFICE VISIT (OUTPATIENT)
Dept: NEUROLOGY | Facility: HOSPITAL | Age: 53
End: 2018-11-08
Attending: SURGERY
Payer: MEDICAID

## 2018-11-08 VITALS
WEIGHT: 116.06 LBS | BODY MASS INDEX: 19.34 KG/M2 | HEIGHT: 65 IN | DIASTOLIC BLOOD PRESSURE: 83 MMHG | SYSTOLIC BLOOD PRESSURE: 125 MMHG | HEART RATE: 82 BPM | TEMPERATURE: 98 F

## 2018-11-08 DIAGNOSIS — Z09 POSTOP CHECK: Primary | ICD-10-CM

## 2018-11-08 PROCEDURE — 99214 OFFICE O/P EST MOD 30 MIN: CPT | Performed by: SURGERY

## 2018-11-08 RX ORDER — GABAPENTIN 300 MG/1
300 CAPSULE ORAL 3 TIMES DAILY
Qty: 90 CAPSULE | Refills: 11 | Status: SHIPPED | OUTPATIENT
Start: 2018-11-08 | End: 2019-01-14

## 2018-11-08 RX ORDER — CHOLESTYRAMINE 4 G/9G
4 POWDER, FOR SUSPENSION ORAL
Qty: 270 PACKET | Refills: 3 | Status: SHIPPED | OUTPATIENT
Start: 2018-11-08 | End: 2020-01-28

## 2018-11-08 RX ORDER — CETIRIZINE HYDROCHLORIDE 10 MG/1
10 TABLET ORAL DAILY PRN
COMMUNITY
End: 2023-12-27

## 2018-11-08 NOTE — PROGRESS NOTES
S/p rt hemicolectomy for cecal volvulus    Having diarrhea    Called in for redness of wound    On exam  Erythema upper part, seropurulent fluid drained    Otherwise ok    Having lot of pain    willprecribe gabapentin    F/u 2 months

## 2018-11-12 ENCOUNTER — TELEPHONE (OUTPATIENT)
Dept: NEUROLOGY | Facility: HOSPITAL | Age: 53
End: 2018-11-12

## 2018-11-12 NOTE — TELEPHONE ENCOUNTER
----- Message from Jolie Gibbs sent at 11/12/2018 10:21 AM CST -----  Contact: Patient  RASHMI- Does the doctor want to see the patient today in clinic?  Patient had to drain the wound yesterday. It is not as red today as it was. Please call the patient at [phone].

## 2018-11-12 NOTE — TELEPHONE ENCOUNTER
Returned pts call. Advised pt that per Dr. Vo, no appt necessary, but monitor and call with update next week if not improved. Pt verbalizes understanding.

## 2018-11-13 DIAGNOSIS — F32.A DEPRESSION, UNSPECIFIED DEPRESSION TYPE: ICD-10-CM

## 2018-11-13 RX ORDER — BUPROPION HYDROCHLORIDE 150 MG/1
TABLET ORAL
Qty: 30 TABLET | Refills: 3 | Status: SHIPPED | OUTPATIENT
Start: 2018-11-13 | End: 2019-03-09 | Stop reason: SDUPTHER

## 2018-11-24 DIAGNOSIS — F51.01 PRIMARY INSOMNIA: ICD-10-CM

## 2018-11-24 DIAGNOSIS — G47.00 INSOMNIA, UNSPECIFIED TYPE: ICD-10-CM

## 2018-11-28 ENCOUNTER — TELEPHONE (OUTPATIENT)
Dept: FAMILY MEDICINE | Facility: CLINIC | Age: 53
End: 2018-11-28

## 2018-11-28 RX ORDER — ZOLPIDEM TARTRATE 5 MG/1
5 TABLET ORAL NIGHTLY PRN
Qty: 30 TABLET | Refills: 1 | Status: SHIPPED | OUTPATIENT
Start: 2018-11-28 | End: 2019-01-26 | Stop reason: SDUPTHER

## 2018-11-28 NOTE — TELEPHONE ENCOUNTER
----- Message from Vania London sent at 11/28/2018 11:14 AM CST -----  Contact: self  [phone]  Pt is requesting a refill on Ambein. PLs call..    [facility]/pharmacy #[id number] - [place] - [address]  [address] [place] [address]  Phone: [phone] Fax: [phone]    Thanks......Angela

## 2019-01-14 ENCOUNTER — OFFICE VISIT (OUTPATIENT)
Dept: NEUROLOGY | Facility: HOSPITAL | Age: 54
End: 2019-01-14
Attending: SURGERY
Payer: MEDICAID

## 2019-01-14 VITALS
HEART RATE: 92 BPM | HEIGHT: 65 IN | SYSTOLIC BLOOD PRESSURE: 155 MMHG | BODY MASS INDEX: 20.22 KG/M2 | WEIGHT: 121.38 LBS | DIASTOLIC BLOOD PRESSURE: 95 MMHG | TEMPERATURE: 98 F

## 2019-01-14 DIAGNOSIS — Z90.49 S/P COLECTOMY: Primary | ICD-10-CM

## 2019-01-14 DIAGNOSIS — K90.89 BILE SALT-INDUCED DIARRHEA: ICD-10-CM

## 2019-01-14 PROCEDURE — 99214 OFFICE O/P EST MOD 30 MIN: CPT | Performed by: SURGERY

## 2019-01-14 NOTE — PATIENT INSTRUCTIONS
Someone will contact you w/ appt w/ Dr. Castanon   Return to clinic in 3 months--appointment made

## 2019-01-14 NOTE — PROGRESS NOTES
"NOLANETS:  [facility]  A collaboration between [facility] and [facility]      PATIENT: Mae Gregory  MRN: [id number]  DATE: 1/14/2019    Subjective:      Chief Complaint: Follow-up    Doing ok  Had 2 more times upper part of incision opened, spontaneously healed not much pain    However diarrrhea is regular 2-6 times. Intermittently takes cholestyramine but still not much helpful    Vitals:   Vitals:    01/14/19 1504   BP: (!) 155/95   Pulse: 92   Temp: 98.1 °F (36.7 °C)   TempSrc: Oral   Weight: 55 kg (121 lb 5.8 oz)   Height: 5' 5" (1.651 m)        Karnofsky Score:     Diagnosis: No diagnosis found.     Oncologic History:   Interval History:     Past Medical History:  History reviewed. No pertinent past medical history.    Past Surgical History:  Past Surgical History:   Procedure Laterality Date    BREAST SURGERY      COLON SURGERY  10/15/2018    colon resection        Family History:  Family History   Problem Relation Age of Onset    Stroke Maternal Grandfather     Hypertension Father     Hypertension Mother     Hypertension Brother     Ovarian cancer Neg Hx     Cancer Neg Hx        Allergies:  Review of patient's allergies indicates:  No Known Allergies    Medications:  Current Outpatient Medications   Medication Sig Dispense Refill    buPROPion (WELLBUTRIN XL) 150 MG TB24 tablet TAKE 1 TABLET BY MOUTH EVERY DAY 30 tablet 3    cetirizine (ZYRTEC) 10 MG tablet Take 10 mg by mouth once daily.      cholestyramine (QUESTRAN) 4 gram packet Take 1 packet (4 g total) by mouth 3 (three) times daily with meals. 270 packet 3    COCONUT OIL ORAL Take by mouth.      diphenhydrAMINE (BENADRYL) 25 mg capsule Take by mouth.      finasteride (PROSCAR) 5 mg tablet Take 1 tablet (5 mg total) by mouth once daily. (Patient taking differently: Take 1 mg by mouth once daily. ) 30 tablet 11    levalbuterol (XOPENEX HFA) 45 " mcg/actuation inhaler Inhale 2 puffs into the lungs every 8 (eight) hours as needed for Wheezing. (Patient taking differently: Inhale 2 puffs into the lungs every 8 (eight) hours as needed for Wheezing. PRN) 1 Inhaler 12    multivitamin with minerals tablet Take 1 tablet by mouth once daily.      sumatriptan (IMITREX) 100 MG tablet Take 1 tablet (100 mg total) by mouth every 2 (two) hours as needed for Migraine. 40 tablet 1    topiramate (TOPAMAX) 50 MG tablet Take 1 tablet (50 mg total) by mouth 2 (two) times daily. (Patient taking differently: Take 25 mg by mouth once daily. ) 60 tablet 11    tramadol (ULTRAM-ER) 200 MG Tb24 Take 200 mg by mouth daily as needed.       trazodone (DESYREL) 150 MG tablet Take 0.5 tablets (75 mg total) by mouth every evening. 15 tablet 1    zolpidem (AMBIEN) 5 MG Tab TAKE 1 TABLET (5 MG TOTAL) BY MOUTH NIGHTLY AS NEEDED. 30 tablet 1    diazePAM (VALIUM) 2 MG tablet        No current facility-administered medications for this visit.        Review of Systems   Constitutional: Negative for activity change, appetite change, chills, diaphoresis, fatigue, fever and unexpected weight change.   HENT: Negative for drooling, ear pain, facial swelling, nosebleeds, postnasal drip, rhinorrhea, sinus pressure and sinus pain.    Eyes: Negative for photophobia, discharge, redness, itching and visual disturbance.   Respiratory: Negative for cough, chest tightness, shortness of breath, wheezing and stridor.    Cardiovascular: Negative for chest pain, palpitations and leg swelling.   Gastrointestinal: Positive for diarrhea. Negative for blood in stool, constipation, rectal pain and vomiting.   Endocrine: Negative.    Musculoskeletal: Negative.    Skin: Negative.    Allergic/Immunologic: Negative.    Neurological: Negative.    Hematological: Negative.    Psychiatric/Behavioral: Negative.       Objective:      Physical Exam   Constitutional: She is oriented to person, place, and time. She appears  well-developed and well-nourished. No distress.   HENT:   Head: Normocephalic and atraumatic.   Right Ear: External ear normal.   Left Ear: External ear normal.   Eyes: Conjunctivae and EOM are normal. Pupils are equal, round, and reactive to light.   Neck: Normal range of motion.   Cardiovascular: Normal rate and regular rhythm.   Pulmonary/Chest: Effort normal and breath sounds normal.   Abdominal: Soft. Bowel sounds are normal. She exhibits no distension and no mass. There is no tenderness. There is no rebound and no guarding. No hernia.   Incision healed completely   Musculoskeletal: Normal range of motion.   Neurological: She is alert and oriented to person, place, and time.   Skin: Skin is warm. She is not diaphoretic.   Psychiatric: She has a normal mood and affect. Her behavior is normal.      Assessment:       No diagnosis found.    Laboratory Data:       Impression: s/p t hemicolectomy for cecal volvulus with bile acid diarrhea  Not responding to cholestyramine    Will start on creon  Will refer to dr. Castanon  Plan:       Take half tab imodium daily  Start creon 1 tab thrice daily with meals  F/u 3 months                  KECIA Vo MD, FACS   Associate Professor of Surgery, [facility]   Neuroendocrine Surgery, Hepatic/Pancreatic & General Surgery   [address], [address]   [place] [address]   ph. [phone]; [phone]   fax. [phone]

## 2019-01-17 ENCOUNTER — TELEPHONE (OUTPATIENT)
Dept: NEUROLOGY | Facility: HOSPITAL | Age: 54
End: 2019-01-17

## 2019-01-17 DIAGNOSIS — R19.7 DIARRHEA, UNSPECIFIED TYPE: Primary | ICD-10-CM

## 2019-01-17 NOTE — TELEPHONE ENCOUNTER
Nulytely colon prep to [facility] at [phone].  Take as directed with no refills.

## 2019-01-17 NOTE — TELEPHONE ENCOUNTER
----- Message from Jose Castanon MD sent at 1/16/2019 10:35 AM CST -----  You can direct schedule for colonoscopy with nulytely prep     ----- Message -----  From: Sera Dillon LPN  Sent: 1/15/2019   1:16 PM  To: Jose Castanon MD    Ok to schedule in clinic?  ----- Message -----  From: Danielle Agrawal LPN  Sent: 1/14/2019   3:32 PM  To: DEMI Richards,   Can Dr. Castanon see this pt for a colonoscopy? Diarrhea post op. Wanted to ask before I put her on since she has medicaid     Thanks!  Whit

## 2019-01-17 NOTE — TELEPHONE ENCOUNTER
Colonoscopy scheduled with pt on Monday, February 11, 2019.  Nulytely prep instructions with pt.  Pt notified Nulytely to [facility] pharmacy at [phone].  GOLYTELY/ COLYTE/ NULYTELY Instructions    You are scheduled for a colonoscopy with Dr. Castanon on Monday, February 11, 2019 at 7am arrival.  To ensure that your test is accurate and complete, you MUST follow these instructions listed below.  If you have any questions, please call our office at [phone].  Plan on being at the hospital for your procedure for 3-4 hours.    1.  Follow a CLEAR LIQUID DIET for the entire day before your scheduled colonoscopy.  This means no solid food the entire day starting when you wake.  You may have as much of the clear liquids as you want throughout the day.   CLEAR LIQUID DIET:   - Avoid Red, Orange, Purple, and/or Blue food coloring   - NO DAIRY   - You can have:  Coffee with sugar (no creamer), tea, water, soda, apple or white grape juice, chicken or beef broth/bouillon (no meat, noodles, or veggies), green/yellow popsicles, green/yellow Jell-O, lemonade.    2.  MIX GOLYTELY/COLYTE/NULYTELY (all names for same product) WITH ONE (1) GALLON OF WATER.  YOU MAY ADD A FLAVOR PACKET OR YELLOW/GREEN POWDER DRINK MIX TO THIS.  PUT IN REFRIGERATOR.  This is easier to drink if this solution is cold, so you can mix the solution one day ahead of time and place in the refrigerator prior to drinking.  You have to drink the solution within 24-36 hours of mixing it.  Do NOT put this solution over ice.  It IS ok to drink with a straw.    3. AT 5 PM THE DAY BEFORE YOUR COLONOSCOPY, DRINK ONE (1) 8 OUNCE GLASS OF MIXTURE EVERY 10 MINUTES UNTIL HALF OF THE GALLON IS CONSUMED.  Keep this mixture cold and in refrigerator as much as you can while drinking it.  Place the remaining half of mixture in the refrigerator when you finish the first half.    4.  The endoscopy department will call you 2 days before your colonoscopy to tell you the exact time to  arrive, AND to tell you the exact time to drink the 2nd portion of your prep (which will be FIVE HOURS BEFORE YOUR ARRIVAL TIME).  At this time given to you, DRINK ONE (1) 8 OUNCE GLASS OF MIXTURE EVERY 10 MINUTES UNTIL THE OTHER HALF IS CONSUMED. Keep the mixture cold while you are drinking it. Once this is complete, you may not have ANYTHING else by mouth!      5.  You must have someone with you to DRIVE YOU HOME since you will be receiving IV sedation for the colonoscopy.    6.  It is ok to take your heart, blood pressure, and seizure medications in the morning of your test with a SIP of water.  Hold other medications until after your procedure.  Do NOT have anything else to eat or drink the morning of your colonoscopy.  It is ok to brush your teeth.    7.  If you are on blood thinners THAT YOU HAVE BEEN INSTRUCTED TO HOLD BY YOUR DOCTOR FOR THIS PROCEDURE, then do NOT take this the morning of your colonoscopy.  Do NOT stop these medications on your own, they must be approved to be held by your doctor.  Your colonoscopy can NOT be done if you are on these medications.  Examples of blood thinners include: Coumadin, Aggrenox, Plavix, Pradaxa, Reapro, Pletal, Xarelto, Ticagrelor, Brilinta, Eliquis, and high dose aspirin (325 mg).  You do not have to stop baby aspirin 81 mg.    8.  IF YOU ARE DIABETIC:  NO INSULIN OR ORAL MEDICATIONS THE MORNING OF THE COLONOSCOPY.  TAKE ONLY HALF THE DOSE OF YOUR INSULIN THE DAY BEFORE THE COLONOSCOPY.  DO NOT TAKE ANY ORAL DIABETIC MEDICATIONS THE DAY BEFORE THE COLONOSCOPY.  IF YOU ARE AN INSULIN DEPENDENT DIABETIC WITH UNSTABLE BLOOD SUGARDS, NOTIFY YOUR PRIMARY CARE PHYSICIAN FOR INSTRUCTIONS.

## 2019-01-26 DIAGNOSIS — G47.00 INSOMNIA, UNSPECIFIED TYPE: ICD-10-CM

## 2019-01-26 DIAGNOSIS — F51.01 PRIMARY INSOMNIA: ICD-10-CM

## 2019-01-28 RX ORDER — ZOLPIDEM TARTRATE 5 MG/1
5 TABLET ORAL NIGHTLY PRN
Qty: 30 TABLET | Refills: 1 | Status: SHIPPED | OUTPATIENT
Start: 2019-01-28 | End: 2019-04-30 | Stop reason: SDUPTHER

## 2019-02-05 ENCOUNTER — TELEPHONE (OUTPATIENT)
Dept: NEUROLOGY | Facility: HOSPITAL | Age: 54
End: 2019-02-05

## 2019-02-05 NOTE — TELEPHONE ENCOUNTER
Pt request to reschedule colonoscopy from 2/11/19. Pt must confirm with mother before rescheduling.

## 2019-02-05 NOTE — TELEPHONE ENCOUNTER
----- Message from Trisha Gilson sent at 2/5/2019  4:31 PM CST -----  GI- Patient is needing to reschedule procedure for next Monday. Please call back to assist at [phone].

## 2019-03-09 DIAGNOSIS — L65.9 HAIR LOSS: ICD-10-CM

## 2019-03-09 DIAGNOSIS — F32.A DEPRESSION, UNSPECIFIED DEPRESSION TYPE: ICD-10-CM

## 2019-03-09 RX ORDER — FINASTERIDE 5 MG/1
TABLET, FILM COATED ORAL
Qty: 30 TABLET | Refills: 11 | OUTPATIENT
Start: 2019-03-09

## 2019-03-11 RX ORDER — BUPROPION HYDROCHLORIDE 150 MG/1
TABLET ORAL
Qty: 30 TABLET | Refills: 3 | Status: SHIPPED | OUTPATIENT
Start: 2019-03-11 | End: 2019-04-30 | Stop reason: SDUPTHER

## 2019-03-27 DIAGNOSIS — F51.01 PRIMARY INSOMNIA: ICD-10-CM

## 2019-03-27 DIAGNOSIS — G47.00 INSOMNIA, UNSPECIFIED TYPE: ICD-10-CM

## 2019-03-27 RX ORDER — ZOLPIDEM TARTRATE 5 MG/1
5 TABLET ORAL NIGHTLY PRN
Qty: 30 TABLET | Refills: 1 | OUTPATIENT
Start: 2019-03-27 | End: 2019-09-25

## 2019-03-27 NOTE — TELEPHONE ENCOUNTER
----- Message from Chantel Butch sent at 3/27/2019  1:32 PM CDT -----  Contact: pt  Name of Who is Calling: pt      What is the request in detail: pt is calling for a refill for zolpidem (AMBIEN) 5 MG Tab. Previous doctor was eliceo. Scheduled appt with np. [facility] [address]       Can the clinic reply by [facility]: no      What Number to Call Back if not in [facility]: [phone]

## 2019-03-27 NOTE — TELEPHONE ENCOUNTER
Patient use to see dr fenton. Now has an appt to get established with you on 4/16/19. Would like a refill on Ambien 5mg 30 day supply sent to [facility] in [place]

## 2019-03-28 NOTE — TELEPHONE ENCOUNTER
----- Message from Zohra Koroma MA sent at 3/27/2019  4:17 PM CDT -----  Patient has an appt schedule with blayne on 4/16/19. Was a past [facility] patient. Patient stated she will out of her ambien in 2 days, can not sleep with out it. Sent a refill request to [facility] and it was denied. She has medicaid and no sooner spots open for her insurance. Can yall possible put her in sooner.  Thanks   Zohra BROWN   [facility]

## 2019-03-28 NOTE — TELEPHONE ENCOUNTER
Patient advised that she needs to select a new PCP if she wishes to continue being seen at the [place] office and that she must wait until the scheduled appointment to get the refill from [facility].  I also offered her the phone number to the [facility] so that she may see if a sooner appointment would be available by a provider accepting her insurance.  She verbalized intent to seek a new PCP but refused the [facility] number stating that she doesn't want to see different people.

## 2019-04-30 ENCOUNTER — OFFICE VISIT (OUTPATIENT)
Dept: FAMILY MEDICINE | Facility: CLINIC | Age: 54
End: 2019-04-30
Payer: MEDICAID

## 2019-04-30 VITALS
HEART RATE: 81 BPM | WEIGHT: 122.44 LBS | TEMPERATURE: 98 F | BODY MASS INDEX: 20.4 KG/M2 | HEIGHT: 65 IN | SYSTOLIC BLOOD PRESSURE: 134 MMHG | DIASTOLIC BLOOD PRESSURE: 86 MMHG | OXYGEN SATURATION: 99 %

## 2019-04-30 DIAGNOSIS — M50.30 DDD (DEGENERATIVE DISC DISEASE), CERVICAL: ICD-10-CM

## 2019-04-30 DIAGNOSIS — F51.01 PRIMARY INSOMNIA: ICD-10-CM

## 2019-04-30 DIAGNOSIS — F32.A DEPRESSION, UNSPECIFIED DEPRESSION TYPE: ICD-10-CM

## 2019-04-30 DIAGNOSIS — Z00.00 ANNUAL PHYSICAL EXAM: Primary | ICD-10-CM

## 2019-04-30 DIAGNOSIS — F41.9 ANXIETY: ICD-10-CM

## 2019-04-30 PROCEDURE — 99396 PR PREVENTIVE VISIT,EST,40-64: ICD-10-PCS | Mod: S$PBB,,, | Performed by: FAMILY MEDICINE

## 2019-04-30 PROCEDURE — 99999 PR PBB SHADOW E&M-EST. PATIENT-LVL III: CPT | Mod: PBBFAC,,, | Performed by: FAMILY MEDICINE

## 2019-04-30 PROCEDURE — 99396 PREV VISIT EST AGE 40-64: CPT | Mod: S$PBB,,, | Performed by: FAMILY MEDICINE

## 2019-04-30 PROCEDURE — 99213 OFFICE O/P EST LOW 20 MIN: CPT | Mod: PBBFAC,PO | Performed by: FAMILY MEDICINE

## 2019-04-30 PROCEDURE — 99999 PR PBB SHADOW E&M-EST. PATIENT-LVL III: ICD-10-PCS | Mod: PBBFAC,,, | Performed by: FAMILY MEDICINE

## 2019-04-30 RX ORDER — BUPROPION HYDROCHLORIDE 150 MG/1
150 TABLET ORAL DAILY
Qty: 90 TABLET | Refills: 3 | Status: SHIPPED | OUTPATIENT
Start: 2019-04-30 | End: 2020-02-18 | Stop reason: SDUPTHER

## 2019-04-30 RX ORDER — ZOLPIDEM TARTRATE 5 MG/1
5 TABLET ORAL NIGHTLY PRN
Qty: 30 TABLET | Refills: 2 | Status: SHIPPED | OUTPATIENT
Start: 2019-04-30 | End: 2019-07-29 | Stop reason: SDUPTHER

## 2019-04-30 RX ORDER — TRAZODONE HYDROCHLORIDE 150 MG/1
150 TABLET ORAL NIGHTLY PRN
Qty: 90 TABLET | Refills: 3 | Status: SHIPPED | OUTPATIENT
Start: 2019-04-30 | End: 2019-07-29 | Stop reason: SDUPTHER

## 2019-04-30 NOTE — PROGRESS NOTES
Office Visit    Patient Name: Mae Gregory    : 1965  MRN: [id number]      Assessment/Plan:  Mae Gregory is a 53 y.o. female who presents today for :    Annual physical exam  -     Hemoglobin A1c; Future; Expected date: 2019  -     CBC Without Differential; Future; Expected date: 2019  -     Comprehensive metabolic panel; Future; Expected date: 2019  -anticipatory guidance provided with age appropriate preventative services discussed, healthy diet and regular physical exercise also discussed with patient  -Diet and exercise planning discussed.  -Recommend 15-30 minutes of moderate intensity exercise 5 days/week.    Primary insomnia  -     traZODone (DESYREL) 150 MG tablet; Take 1 tablet (150 mg total) by mouth nightly as needed for Insomnia.  Dispense: 90 tablet; Refill: 3  -     zolpidem (AMBIEN) 5 MG Tab; Take 1 tablet (5 mg total) by mouth nightly as needed.  Dispense: 30 tablet; Refill: 2  -stable, continue current meds  -potential side effects associated with medications discussed with patient, which patient voices understanding    Anxiety  -     buPROPion (WELLBUTRIN XL) 150 MG TB24 tablet; Take 1 tablet (150 mg total) by mouth once daily.  Dispense: 90 tablet; Refill: 3  Depression, unspecified depression type  -stable, continue current meds    Chronic migraine  -stable, continue current meds PRN    DDD (degenerative disc disease)  -daily stretches and exercises      Follow up in about 6 months (around 10/30/2019).     This note was created by combination of typed  and Dragon dictation.  Transcription errors may be present.  If there are any questions, please contact me.        ----------------------------------------------------------------------------------------------------------------------      HPI:  Patient Care Team:  Rajiv Garcia MD as PCP - General (Family Medicine)  Tavo Boateng MD as Consulting Physician (General Surgery)    Mae is a 53 y.o.  female with      Patient Active Problem List   Diagnosis    Migraine    DDD (degenerative disc disease), cervical    DDD (degenerative disc disease), lumbar    Mild episode of recurrent major depressive disorder    Primary insomnia    Meniere's disease of both ears    Sensorineural hearing loss (SNHL) of both ears    Hair loss    Glaucoma    Anxiety    Depression     This patient is new to me   Patient presents today for:  Annual      Patient is doing well and has no major complaints today.   Patient is up to date on MMG/Pap - but has upcoming Colonoscopy.  No new changes in health since last visit with her former PCP, ernestina recently left our practice.  She has h/o controlled depression and anxiety -for which she takes Wellbutrin daily. She also has chronic insomnia, which she takes Trazodone daily and Ambien as needed - no adverse side effects.  She also takes topamax daily with PRN use of Imitrex - she follows up with her neurologist in [place] as needed as he treats her for Dystonia with botox injections - which she pays out of pocket for as her insurance doesn't cover it. Patient denies any cardiovascular or neurologic complaints and has no known vascular disease.  She also has chronic h/o mid back pain that is controlled with regular stretches, prev on Tramadol, but she had weaned herself off of it.          Answers for HPI/ROS submitted by the patient on 4/28/2019   activity change: No  unexpected weight change: No  neck pain: No  hearing loss: No  rhinorrhea: No  trouble swallowing: No  eye discharge: No  visual disturbance: No  chest tightness: No  wheezing: No  chest pain: No  palpitations: No  blood in stool: No  constipation: No  vomiting: No  diarrhea: No  polydipsia: No  polyuria: No  difficulty urinating: No  hematuria: No  menstrual problem: No  dysuria: No  joint swelling: No  arthralgias: No  headaches: No  weakness: No  confusion: No  dysphoric mood: No        Current  Medications  Medications reviewed and updated.       Current Outpatient Medications:     buPROPion (WELLBUTRIN XL) 150 MG TB24 tablet, Take 1 tablet (150 mg total) by mouth once daily., Disp: 90 tablet, Rfl: 3    cetirizine (ZYRTEC) 10 MG tablet, Take 10 mg by mouth once daily., Disp: , Rfl:     cholestyramine (QUESTRAN) 4 gram packet, Take 1 packet (4 g total) by mouth 3 (three) times daily with meals., Disp: 270 packet, Rfl: 3    COCONUT OIL ORAL, Take by mouth., Disp: , Rfl:     diphenhydrAMINE (BENADRYL) 25 mg capsule, Take by mouth., Disp: , Rfl:     multivitamin with minerals tablet, Take 1 tablet by mouth once daily., Disp: , Rfl:     sumatriptan (IMITREX) 100 MG tablet, Take 1 tablet (100 mg total) by mouth every 2 (two) hours as needed for Migraine., Disp: 40 tablet, Rfl: 1    traZODone (DESYREL) 150 MG tablet, Take 1 tablet (150 mg total) by mouth nightly as needed for Insomnia., Disp: 90 tablet, Rfl: 3    zolpidem (AMBIEN) 5 MG Tab, Take 1 tablet (5 mg total) by mouth nightly as needed., Disp: 30 tablet, Rfl: 2    finasteride (PROSCAR) 5 mg tablet, Take 1 tablet (5 mg total) by mouth once daily. (Patient taking differently: Take 1 mg by mouth once daily. ), Disp: 30 tablet, Rfl: 11    levalbuterol (XOPENEX HFA) 45 mcg/actuation inhaler, Inhale 2 puffs into the lungs every 8 (eight) hours as needed for Wheezing. (Patient taking differently: Inhale 2 puffs into the lungs every 8 (eight) hours as needed for Wheezing. PRN), Disp: 1 Inhaler, Rfl: 12    lipase-protease-amylase 24,000-76,000-120,000 units (CREON) 24,000-76,000 -120,000 unit capsule, Take 1 capsule by mouth 3 (three) times daily with meals., Disp: 90 capsule, Rfl: 11    topiramate (TOPAMAX) 50 MG tablet, Take 1 tablet (50 mg total) by mouth 2 (two) times daily. (Patient taking differently: Take 25 mg by mouth once daily. ), Disp: 60 tablet, Rfl: 11    Past Surgical History:   Procedure Laterality Date    BREAST SURGERY      COLON  "SURGERY  10/15/2018    colon resection        Family History   Problem Relation Age of Onset    Stroke Maternal Grandfather     Hypertension Father     Hypertension Mother     Hypertension Brother     Ovarian cancer Neg Hx     Cancer Neg Hx        Social History     Socioeconomic History    Marital status:      Spouse name: Not on file    Number of children: Not on file    Years of education: Not on file    Highest education level: Not on file   Occupational History    Not on file   Social Needs    Financial resource strain: Not on file    Food insecurity:     Worry: Not on file     Inability: Not on file    Transportation needs:     Medical: Not on file     Non-medical: Not on file   Tobacco Use    Smoking status: Never Smoker    Smokeless tobacco: Never Used   Substance and Sexual Activity    Alcohol use: No    Drug use: No    Sexual activity: Yes     Partners: Male     Comment: ablation   Lifestyle    Physical activity:     Days per week: Not on file     Minutes per session: Not on file    Stress: Not on file   Relationships    Social connections:     Talks on phone: Not on file     Gets together: Not on file     Attends Gnosticist service: Not on file     Active member of club or organization: Not on file     Attends meetings of clubs or organizations: Not on file     Relationship status: Not on file   Other Topics Concern    Not on file   Social History Narrative    Not on file           Allergies   Review of patient's allergies indicates:  No Known Allergies          Review of Systems  See HPI      Physical Exam  /86   Pulse 81   Temp 98.4 °F (36.9 °C) (Oral)   Ht 5' 5" (1.651 m)   Wt 55.6 kg (122 lb 7.5 oz)   SpO2 99%   BMI 20.38 kg/m²     GEN: NAD, well developed, pleasant, well nourished  HEENT: NCAT, PERRLA, EOMI, sclera clear, anicteric, bilateral ear exam wnl, O/P clear, MMM with no lesions  NECK: normal, supple with midline trachea, no LAD, no " thyromegaly  LUNGS: CTAB, no w/r/r, no increased work of breathing   HEART: RRR, normal S1 and S2, no m/r/g, no edema  ABD: s/nt/nd, NABS  SKIN: normal turgor, no rashes  PSYCH: AOx3, appropriate mood and affect  MSK: warm/well perfused, normal ROM in all extremities, no c/c/e.

## 2019-07-15 ENCOUNTER — PATIENT OUTREACH (OUTPATIENT)
Dept: ADMINISTRATIVE | Facility: HOSPITAL | Age: 54
End: 2019-07-15

## 2019-07-29 ENCOUNTER — OFFICE VISIT (OUTPATIENT)
Dept: FAMILY MEDICINE | Facility: CLINIC | Age: 54
End: 2019-07-29
Payer: MEDICAID

## 2019-07-29 VITALS
SYSTOLIC BLOOD PRESSURE: 126 MMHG | HEART RATE: 81 BPM | RESPIRATION RATE: 16 BRPM | TEMPERATURE: 98 F | OXYGEN SATURATION: 98 % | WEIGHT: 121.81 LBS | BODY MASS INDEX: 20.29 KG/M2 | DIASTOLIC BLOOD PRESSURE: 80 MMHG | HEIGHT: 65 IN

## 2019-07-29 DIAGNOSIS — L70.8 OTHER ACNE: ICD-10-CM

## 2019-07-29 DIAGNOSIS — G24.9 DYSTONIA: ICD-10-CM

## 2019-07-29 DIAGNOSIS — F51.01 PRIMARY INSOMNIA: ICD-10-CM

## 2019-07-29 DIAGNOSIS — G43.909 MIGRAINE WITHOUT STATUS MIGRAINOSUS, NOT INTRACTABLE, UNSPECIFIED MIGRAINE TYPE: Primary | ICD-10-CM

## 2019-07-29 PROCEDURE — 99214 OFFICE O/P EST MOD 30 MIN: CPT | Mod: S$PBB,,, | Performed by: FAMILY MEDICINE

## 2019-07-29 PROCEDURE — 99999 PR PBB SHADOW E&M-EST. PATIENT-LVL III: CPT | Mod: PBBFAC,,, | Performed by: FAMILY MEDICINE

## 2019-07-29 PROCEDURE — 99999 PR PBB SHADOW E&M-EST. PATIENT-LVL III: ICD-10-PCS | Mod: PBBFAC,,, | Performed by: FAMILY MEDICINE

## 2019-07-29 PROCEDURE — 99214 PR OFFICE/OUTPT VISIT, EST, LEVL IV, 30-39 MIN: ICD-10-PCS | Mod: S$PBB,,, | Performed by: FAMILY MEDICINE

## 2019-07-29 PROCEDURE — 99213 OFFICE O/P EST LOW 20 MIN: CPT | Mod: PBBFAC,PO | Performed by: FAMILY MEDICINE

## 2019-07-29 RX ORDER — TRAZODONE HYDROCHLORIDE 150 MG/1
150 TABLET ORAL NIGHTLY PRN
Qty: 90 TABLET | Refills: 3 | Status: SHIPPED | OUTPATIENT
Start: 2019-07-29 | End: 2020-02-18 | Stop reason: SDUPTHER

## 2019-07-29 RX ORDER — TOPIRAMATE 50 MG/1
50 TABLET, FILM COATED ORAL 2 TIMES DAILY
Qty: 60 TABLET | Refills: 11 | Status: SHIPPED | OUTPATIENT
Start: 2019-07-29 | End: 2020-08-24

## 2019-07-29 RX ORDER — ZOLPIDEM TARTRATE 5 MG/1
5 TABLET ORAL NIGHTLY PRN
Qty: 30 TABLET | Refills: 5 | Status: SHIPPED | OUTPATIENT
Start: 2019-07-29 | End: 2020-01-27

## 2019-07-29 NOTE — PROGRESS NOTES
Office Visit    Patient Name: Mae Gregory    : 1965  MRN: [id number]      Assessment/Plan:  Mae Gregory is a 54 y.o. female who presents today for :    Migraine without status migrainosus, not intractable, unspecified migraine type  -     topiramate (TOPAMAX) 50 MG tablet; Take 1 tablet (50 mg total) by mouth 2 (two) times daily.  Dispense: 60 tablet; Refill: 11  -controlled on Imitrex and Topamax  -f/u neurology as needed    Dystonia - sees Dr. Maynard - neurology in [place]  -     Hemoglobin A1c; Future; Expected date: 2019  -     CBC Without Differential; Future; Expected date: 2019  -     Comprehensive metabolic panel; Future; Expected date: 2019  -     Lipid panel; Future; Expected date: 2019  -stable, f/u Neurology as needed      Primary insomnia  -     traZODone (DESYREL) 150 MG tablet; Take 1 tablet (150 mg total) by mouth nightly as needed for Insomnia.  Dispense: 90 tablet; Refill: 3  -     zolpidem (AMBIEN) 5 MG Tab; Take 1 tablet (5 mg total) by mouth nightly as needed.  Dispense: 30 tablet; Refill: 5  -stable, continue current medication regimen  Prn, -potential side effects associated with medications reviewed with patient, which patient voices understanding    Other acne  -     Ambulatory referral to Dermatology        Follow up in about 6 months (around 2020).     This note was created by combination of typed  and Dragon dictation.  Transcription errors may be present.  If there are any questions, please contact me.      ----------------------------------------------------------------------------------------------------------------------      HPI:  Patient Care Team:  Rajiv Garcia MD as PCP - General (Family Medicine)  Tavo Boateng MD as Consulting Physician (General Surgery)  May De La Torre MA    Mae is a 54 y.o. female with      Patient Active Problem List   Diagnosis    Migraine    DDD (degenerative disc disease), cervical    DDD  (degenerative disc disease), lumbar    Mild episode of recurrent major depressive disorder    Primary insomnia    Meniere's disease of both ears    Sensorineural hearing loss (SNHL) of both ears    Hair loss    Glaucoma    Anxiety    Depression       Patient presents today for f/u :  Medication Refill (Topiramate, Trazodone, and Ambien)    Insomnia - controlled on ambien prn, she also takes Trazodone prn for sleepwalking. No side effects reports.    Migraine - well controlled on Topamax and Imitrex, which has managed by Neurology in [place] Dr. Maynard, who also provides Botox injections for patient every 3 months for her chronic dystonia.     She would like to be referred to Derm for chronic recurrent acne.      Additional ROS    No F/C/wt changes/fatigue  No dysphagia/sore throat  No CP/DELANEY/palpitations/swelling  No cough/wheezing/SOB  No nausea/vomiting/abd pain  No MSK weakness/HA/tingling/numbness  No anxiety/depression  No dysuria/hematuria              Patient Active Problem List   Diagnosis    Migraine    DDD (degenerative disc disease), cervical    DDD (degenerative disc disease), lumbar    Mild episode of recurrent major depressive disorder    Primary insomnia    Meniere's disease of both ears    Sensorineural hearing loss (SNHL) of both ears    Hair loss    Glaucoma    Anxiety    Depression       Current Medications  Medications reviewed/updated.     Current Outpatient Medications on File Prior to Visit   Medication Sig Dispense Refill    buPROPion (WELLBUTRIN XL) 150 MG TB24 tablet Take 1 tablet (150 mg total) by mouth once daily. 90 tablet 3    cetirizine (ZYRTEC) 10 MG tablet Take 10 mg by mouth once daily.      cholestyramine (QUESTRAN) 4 gram packet Take 1 packet (4 g total) by mouth 3 (three) times daily with meals. 270 packet 3    COCONUT OIL ORAL Take by mouth.      lipase-protease-amylase 24,000-76,000-120,000 units (CREON) 24,000-76,000 -120,000 unit capsule Take 1 capsule  by mouth 3 (three) times daily with meals. 90 capsule 11    multivitamin with minerals tablet Take 1 tablet by mouth once daily.      sumatriptan (IMITREX) 100 MG tablet Take 1 tablet (100 mg total) by mouth every 2 (two) hours as needed for Migraine. 40 tablet 1    [DISCONTINUED] topiramate (TOPAMAX) 50 MG tablet Take 1 tablet (50 mg total) by mouth 2 (two) times daily. (Patient taking differently: Take 25 mg by mouth once daily. ) 60 tablet 11    [DISCONTINUED] traZODone (DESYREL) 150 MG tablet Take 1 tablet (150 mg total) by mouth nightly as needed for Insomnia. 90 tablet 3    [DISCONTINUED] zolpidem (AMBIEN) 5 MG Tab Take 1 tablet (5 mg total) by mouth nightly as needed. 30 tablet 2    diphenhydrAMINE (BENADRYL) 25 mg capsule Take by mouth.      finasteride (PROSCAR) 5 mg tablet Take 1 tablet (5 mg total) by mouth once daily. (Patient taking differently: Take 1 mg by mouth once daily. ) 30 tablet 11    levalbuterol (XOPENEX HFA) 45 mcg/actuation inhaler Inhale 2 puffs into the lungs every 8 (eight) hours as needed for Wheezing. (Patient taking differently: Inhale 2 puffs into the lungs every 8 (eight) hours as needed for Wheezing. PRN) 1 Inhaler 12     No current facility-administered medications on file prior to visit.            Past Surgical History:   Procedure Laterality Date    BREAST SURGERY      COLON SURGERY  10/15/2018    colon resection        Family History   Problem Relation Age of Onset    Stroke Maternal Grandfather     Hypertension Father     Hypertension Mother     Hypertension Brother     Ovarian cancer Neg Hx     Cancer Neg Hx        Social History     Socioeconomic History    Marital status:      Spouse name: Not on file    Number of children: Not on file    Years of education: Not on file    Highest education level: Not on file   Occupational History    Not on file   Social Needs    Financial resource strain: Not on file    Food insecurity:     Worry: Not on  "file     Inability: Not on file    Transportation needs:     Medical: Not on file     Non-medical: Not on file   Tobacco Use    Smoking status: Never Smoker    Smokeless tobacco: Never Used   Substance and Sexual Activity    Alcohol use: No    Drug use: No    Sexual activity: Yes     Partners: Male     Comment: ablation   Lifestyle    Physical activity:     Days per week: Not on file     Minutes per session: Not on file    Stress: Not on file   Relationships    Social connections:     Talks on phone: Not on file     Gets together: Not on file     Attends [employer] service: Not on file     Active member of club or organization: Not on file     Attends meetings of clubs or organizations: Not on file     Relationship status: Not on file   Other Topics Concern    Not on file   Social History Narrative    Not on file             Allergies   Review of patient's allergies indicates:  No Known Allergies          Review of Systems  See HPI      Physical Exam  /80 (BP Location: Right arm, Patient Position: Sitting, BP Method: Small (Manual))   Pulse 81   Temp 98.2 °F (36.8 °C) (Oral)   Resp 16   Ht 5' 5" (1.651 m)   Wt 55.3 kg (121 lb 12.9 oz)   SpO2 98%   BMI 20.27 kg/m²     GEN: NAD, well developed, pleasant, well nourished  HEENT: NCAT, PERRLA, EOMI, sclera clear, anicteric, O/P clear, MMM with no lesions  NECK: normal, supple with midline trachea, no LAD, no thyromegaly  LUNGS: CTAB, no w/r/r, no increased work of breathing   HEART: RRR, normal S1 and S2, no m/r/g, no edema  ABD: s/nt/nd, NABS  SKIN: normal turgor, no rashes but +scattered acne over L face  PSYCH: AOx3, appropriate mood and affect  MSK: warm/well perfused, normal ROM in all extremities, no c/c/e.          "

## 2019-08-12 ENCOUNTER — OFFICE VISIT (OUTPATIENT)
Dept: OBSTETRICS AND GYNECOLOGY | Facility: CLINIC | Age: 54
End: 2019-08-12
Attending: OBSTETRICS & GYNECOLOGY
Payer: MEDICAID

## 2019-08-12 VITALS
SYSTOLIC BLOOD PRESSURE: 122 MMHG | WEIGHT: 117.06 LBS | DIASTOLIC BLOOD PRESSURE: 70 MMHG | HEIGHT: 65 IN | BODY MASS INDEX: 19.5 KG/M2

## 2019-08-12 DIAGNOSIS — Z01.419 WELL WOMAN EXAM WITH ROUTINE GYNECOLOGICAL EXAM: Primary | ICD-10-CM

## 2019-08-12 DIAGNOSIS — Z12.31 VISIT FOR SCREENING MAMMOGRAM: ICD-10-CM

## 2019-08-12 PROCEDURE — 99999 PR PBB SHADOW E&M-EST. PATIENT-LVL IV: CPT | Mod: PBBFAC,,, | Performed by: OBSTETRICS & GYNECOLOGY

## 2019-08-12 PROCEDURE — 88175 CYTOPATH C/V AUTO FLUID REDO: CPT | Performed by: PATHOLOGY

## 2019-08-12 PROCEDURE — 99999 PR PBB SHADOW E&M-EST. PATIENT-LVL IV: ICD-10-PCS | Mod: PBBFAC,,, | Performed by: OBSTETRICS & GYNECOLOGY

## 2019-08-12 PROCEDURE — 99396 PREV VISIT EST AGE 40-64: CPT | Mod: S$PBB,,, | Performed by: OBSTETRICS & GYNECOLOGY

## 2019-08-12 PROCEDURE — 87624 HPV HI-RISK TYP POOLED RSLT: CPT

## 2019-08-12 PROCEDURE — 88141 CYTOPATH C/V INTERPRET: CPT | Mod: ,,, | Performed by: PATHOLOGY

## 2019-08-12 PROCEDURE — 88141 LIQUID-BASED PAP SMEAR, SCREENING: ICD-10-PCS | Mod: ,,, | Performed by: PATHOLOGY

## 2019-08-12 PROCEDURE — 99396 PR PREVENTIVE VISIT,EST,40-64: ICD-10-PCS | Mod: S$PBB,,, | Performed by: OBSTETRICS & GYNECOLOGY

## 2019-08-12 PROCEDURE — 99214 OFFICE O/P EST MOD 30 MIN: CPT | Mod: PBBFAC | Performed by: OBSTETRICS & GYNECOLOGY

## 2019-08-12 RX ORDER — VALACYCLOVIR HYDROCHLORIDE 1 G/1
TABLET, FILM COATED ORAL
COMMUNITY
Start: 2019-08-02 | End: 2020-01-28

## 2019-08-12 RX ORDER — IBUPROFEN 800 MG/1
TABLET ORAL
COMMUNITY
Start: 2019-06-10 | End: 2020-01-28

## 2019-08-12 RX ORDER — AMOXICILLIN 875 MG/1
TABLET, FILM COATED ORAL
COMMUNITY
Start: 2019-08-02 | End: 2020-01-28

## 2019-08-12 NOTE — PROGRESS NOTES
SUBJECTIVE:   54 y.o. female   for annual routine Pap and checkup. No LMP recorded. Patient has had an ablation..  She complains of occasional hot flashes and vaginal dryness.        History reviewed. No pertinent past medical history.  Past Surgical History:   Procedure Laterality Date    BREAST SURGERY      COLON SURGERY  10/15/2018    colon resection      Social History     Socioeconomic History    Marital status:      Spouse name: Not on file    Number of children: Not on file    Years of education: Not on file    Highest education level: Not on file   Occupational History    Not on file   Social Needs    Financial resource strain: Not on file    Food insecurity:     Worry: Not on file     Inability: Not on file    Transportation needs:     Medical: Not on file     Non-medical: Not on file   Tobacco Use    Smoking status: Never Smoker    Smokeless tobacco: Never Used   Substance and Sexual Activity    Alcohol use: No    Drug use: No    Sexual activity: Yes     Partners: Male     Comment: ablation   Lifestyle    Physical activity:     Days per week: Not on file     Minutes per session: Not on file    Stress: Not on file   Relationships    Social connections:     Talks on phone: Not on file     Gets together: Not on file     Attends [employer] service: Not on file     Active member of club or organization: Not on file     Attends meetings of clubs or organizations: Not on file     Relationship status: Not on file   Other Topics Concern    Not on file   Social History Narrative    Not on file     Family History   Problem Relation Age of Onset    Stroke Maternal Grandfather     Hypertension Father     Hypertension Mother     Hypertension Brother     Ovarian cancer Neg Hx     Cancer Neg Hx      OB History    Para Term  AB Living   3 3 3         SAB TAB Ectopic Multiple Live Births                  # Outcome Date GA Lbr Vaibhav/2nd Weight Sex Delivery Anes PTL Lv   3  Term            2 Term            1 Term                  Current Outpatient Medications   Medication Sig Dispense Refill    amoxicillin (AMOXIL) 875 MG tablet       buPROPion (WELLBUTRIN XL) 150 MG TB24 tablet Take 1 tablet (150 mg total) by mouth once daily. 90 tablet 3    cetirizine (ZYRTEC) 10 MG tablet Take 10 mg by mouth once daily.      cholestyramine (QUESTRAN) 4 gram packet Take 1 packet (4 g total) by mouth 3 (three) times daily with meals. 270 packet 3    COCONUT OIL ORAL Take by mouth.      diphenhydrAMINE (BENADRYL) 25 mg capsule Take by mouth.       mg tablet       lipase-protease-amylase 24,000-76,000-120,000 units (CREON) 24,000-76,000 -120,000 unit capsule Take 1 capsule by mouth 3 (three) times daily with meals. 90 capsule 11    multivitamin with minerals tablet Take 1 tablet by mouth once daily.      sumatriptan (IMITREX) 100 MG tablet Take 1 tablet (100 mg total) by mouth every 2 (two) hours as needed for Migraine. 40 tablet 1    topiramate (TOPAMAX) 50 MG tablet Take 1 tablet (50 mg total) by mouth 2 (two) times daily. 60 tablet 11    traZODone (DESYREL) 150 MG tablet Take 1 tablet (150 mg total) by mouth nightly as needed for Insomnia. 90 tablet 3    valACYclovir (VALTREX) 1000 MG tablet       zolpidem (AMBIEN) 5 MG Tab Take 1 tablet (5 mg total) by mouth nightly as needed. 30 tablet 5    finasteride (PROSCAR) 5 mg tablet Take 1 tablet (5 mg total) by mouth once daily. (Patient taking differently: Take 1 mg by mouth once daily. ) 30 tablet 11    levalbuterol (XOPENEX HFA) 45 mcg/actuation inhaler Inhale 2 puffs into the lungs every 8 (eight) hours as needed for Wheezing. (Patient taking differently: Inhale 2 puffs into the lungs every 8 (eight) hours as needed for Wheezing. PRN) 1 Inhaler 12     No current facility-administered medications for this visit.      Allergies: Patient has no known allergies.     ROS:  Constitutional: no weight loss, weight gain, fever,  "fatigue  Eyes:  No vision changes, glasses/contacts  ENT/Mouth: No ulcers, sinus problems, ears ringing, headache  Cardiovascular: No inability to lie flat, chest pain, exercise intolerance, swelling, heart palpitations  Respiratory: No wheezing, coughing blood, shortness of breath, or cough  Gastrointestinal: No diarrhea, bloody stool, nausea/vomiting, constipation, gas, hemorrhoids  Genitourinary: No blood in urine, painful urination, urgency of urination, frequency of urination, incomplete emptying, incontinence, abnormal bleeding, painful periods, heavy periods, vaginal discharge, vaginal odor, painful intercourse, sexual problems, bleeding after intercourse.  Musculoskeletal: No muscle weakness  Skin/Breast: No painful breasts, nipple discharge, masses, rash, ulcers  Neurological: No passing out, seizures, numbness, headache  Endocrine: No diabetes, hypothyroid, hyperthyroid, hot flashes, hair loss, abnormal hair growth, ance  Psychiatric: No depression, crying  Hematologic: No bruises, bleeding, swollen lymph nodes, anemia.      OBJECTIVE:   The patient appears well, alert, oriented x 3, in no distress.  /70   Ht 5' 5" (1.651 m)   Wt 53.1 kg (117 lb 1 oz)   BMI 19.48 kg/m²   NECK: no thyromegaly, trachea midline  SKIN: no acne, striae, hirsutism  BREAST EXAM: breasts appear normal, no suspicious masses, no skin or nipple changes or axillary nodes  ABDOMEN: no hernias, masses, or hepatosplenomegaly  GENITALIA: normal external genitalia, no erythema, no discharge  URETHRA: normal urethra, normal urethral meatus  VAGINA: Normal  CERVIX: no lesions or cervical motion tenderness  UTERUS: normal size, contour, position, consistency, mobility, non-tender  ADNEXA: normal adnexa and no mass, fullness, tenderness    \  ASSESSMENT:   Kellee was seen today for annual exam.    Diagnoses and all orders for this visit:    Well woman exam with routine gynecological exam  -     Liquid-based pap smear, screening  -     " HPV High Risk Genotypes, PCR    Visit for screening mammogram  -     Mammo Digital Screening Bilat w/ Lb; Future    A full discussion of the benefit-risk ratio of hormonal replacement therapy was carried out. Improvement in vasomotor and other climacteric symptoms is discussed, including possible improvements in sleep and mood. Reduction of risk for osteoporosis was explained. We discussed the study data showing increased risk of thrombo-embolic events such as myocardial infarction, stroke and also possibly breast cancer with estrogen replacement, and how this might affect her. The range of side effects such as breast tenderness, weight gain and including possible increases in lifetime risk of breast cancer and possible thrombotic complications was discussed. We also discussed ACOG's recommendation to use hormone replacement therapy for the relief of hot flashes alone and to be on the lowest dose possible for the shortest amount of time.  Alternative such as herbal and soy-based products were reviewed. All of her questions about this therapy were answered.  She will keep a symptom diary and let me know

## 2019-08-15 LAB
HPV HR 12 DNA CVX QL NAA+PROBE: POSITIVE
HPV16 AG SPEC QL: NEGATIVE
HPV18 DNA SPEC QL NAA+PROBE: NEGATIVE

## 2019-08-26 ENCOUNTER — TELEPHONE (OUTPATIENT)
Dept: OBSTETRICS AND GYNECOLOGY | Facility: CLINIC | Age: 54
End: 2019-08-26

## 2019-08-26 NOTE — TELEPHONE ENCOUNTER
----- Message from Andra Hinton MA sent at 8/26/2019  4:29 PM CDT -----  Pt returning your call [phone]

## 2019-08-27 ENCOUNTER — TELEPHONE (OUTPATIENT)
Dept: FAMILY MEDICINE | Facility: CLINIC | Age: 54
End: 2019-08-27

## 2019-08-27 NOTE — LETTER
August 27, 2019    Mae Briseno Colt  [address]  [place] [address]             [place] [id number] [facility]  [place] [address]  Phone: [phone]  Fax: [phone] Dear Mrs. Gregory:    Sorry we were unable to contact you to schedule your Dermatology appointment. Please give the referral department a call at [phone].        If you have any questions or concerns, please don't hesitate to call.    Sincerely,        Dixie Rg MA

## 2019-08-30 DIAGNOSIS — Z12.11 SPECIAL SCREENING FOR MALIGNANT NEOPLASMS, COLON: Primary | ICD-10-CM

## 2019-08-30 RX ORDER — POLYETHYLENE GLYCOL 3350, SODIUM SULFATE ANHYDROUS, SODIUM BICARBONATE, SODIUM CHLORIDE, POTASSIUM CHLORIDE 236; 22.74; 6.74; 5.86; 2.97 G/4L; G/4L; G/4L; G/4L; G/4L
4 POWDER, FOR SOLUTION ORAL ONCE
Qty: 4000 ML | Refills: 0 | Status: SHIPPED | OUTPATIENT
Start: 2019-08-30 | End: 2019-08-30

## 2019-09-09 ENCOUNTER — PATIENT MESSAGE (OUTPATIENT)
Dept: OBSTETRICS AND GYNECOLOGY | Facility: CLINIC | Age: 54
End: 2019-09-09

## 2019-09-09 ENCOUNTER — TELEPHONE (OUTPATIENT)
Dept: OBSTETRICS AND GYNECOLOGY | Facility: CLINIC | Age: 54
End: 2019-09-09

## 2019-09-09 NOTE — TELEPHONE ENCOUNTER
----- Message from Omaira Dean LPN sent at 8/26/2019  4:48 PM CDT -----  Called and notified of abnormal pap.  Please schedule colpo

## 2019-09-24 ENCOUNTER — PROCEDURE VISIT (OUTPATIENT)
Dept: OBSTETRICS AND GYNECOLOGY | Facility: CLINIC | Age: 54
End: 2019-09-24
Payer: MEDICAID

## 2019-09-24 VITALS
SYSTOLIC BLOOD PRESSURE: 124 MMHG | WEIGHT: 121.69 LBS | BODY MASS INDEX: 20.25 KG/M2 | DIASTOLIC BLOOD PRESSURE: 80 MMHG

## 2019-09-24 DIAGNOSIS — R87.810 ASCUS WITH POSITIVE HIGH RISK HPV CERVICAL: Primary | ICD-10-CM

## 2019-09-24 DIAGNOSIS — R87.610 ASCUS WITH POSITIVE HIGH RISK HPV CERVICAL: Primary | ICD-10-CM

## 2019-09-24 PROCEDURE — 57456 ENDOCERV CURETTAGE W/SCOPE: CPT | Mod: PBBFAC,PO | Performed by: OBSTETRICS & GYNECOLOGY

## 2019-09-24 PROCEDURE — 88305 TISSUE EXAM BY PATHOLOGIST: CPT | Mod: 26,,, | Performed by: PATHOLOGY

## 2019-09-24 PROCEDURE — 88305 TISSUE SPECIMEN TO PATHOLOGY, OBSTETRICS/GYNECOLOGY: ICD-10-PCS | Mod: 26,,, | Performed by: PATHOLOGY

## 2019-09-24 PROCEDURE — 88305 TISSUE EXAM BY PATHOLOGIST: CPT | Performed by: PATHOLOGY

## 2019-09-24 PROCEDURE — 57456 COLPOSCOPY: ICD-10-PCS | Mod: S$PBB,,, | Performed by: OBSTETRICS & GYNECOLOGY

## 2019-09-24 RX ORDER — PIMECROLIMUS 10 MG/G
CREAM TOPICAL
COMMUNITY
Start: 2019-09-06 | End: 2021-02-05

## 2019-09-24 RX ORDER — POLYETHYLENE GLYCOL-3350 AND ELECTROLYTES 236; 6.74; 5.86; 2.97; 22.74 G/274.31G; G/274.31G; G/274.31G; G/274.31G; G/274.31G
POWDER, FOR SOLUTION ORAL
COMMUNITY
Start: 2019-08-30 | End: 2020-10-07

## 2019-09-24 RX ORDER — ACYCLOVIR 50 MG/G
OINTMENT TOPICAL
COMMUNITY
Start: 2019-08-21 | End: 2020-01-28

## 2019-09-24 RX ORDER — METRONIDAZOLE 7.5 MG/G
GEL TOPICAL
COMMUNITY
Start: 2019-09-06 | End: 2021-02-05

## 2019-09-24 NOTE — PROCEDURES
Colposcopy  Date/Time: 9/24/2019 8:00 AM  Performed by: Vale Trejo MD  Authorized by: Vale Trejo MD     Consent Done?:  Yes (Written)  Assistants?: No      Colposcopy Site:  Cervix  Position:  Supine  Acrowhite Lesion: No    Atypical Vessels: No    Transformation Zone Adequate?: No    Biopsy?: No    ECC Performed?: Yes    LEEP Performed?: No     Patient tolerated the procedure well with no immediate complications.   Post-operative instructions were provided for the patient.   Patient was discharged and will follow up if any complications occur

## 2020-01-22 ENCOUNTER — HOSPITAL ENCOUNTER (OUTPATIENT)
Dept: RADIOLOGY | Facility: HOSPITAL | Age: 55
Discharge: HOME | End: 2020-01-22
Attending: OBSTETRICS & GYNECOLOGY
Payer: MEDICAID

## 2020-01-22 DIAGNOSIS — Z12.31 VISIT FOR SCREENING MAMMOGRAM: ICD-10-CM

## 2020-01-22 PROCEDURE — 77063 MAMMO DIGITAL SCREENING BILAT WITH TOMOSYNTHESIS_CAD: ICD-10-PCS | Mod: 26,,, | Performed by: RADIOLOGY

## 2020-01-22 PROCEDURE — 77067 SCR MAMMO BI INCL CAD: CPT | Mod: 26,,, | Performed by: RADIOLOGY

## 2020-01-22 PROCEDURE — 77063 BREAST TOMOSYNTHESIS BI: CPT | Mod: 26,,, | Performed by: RADIOLOGY

## 2020-01-22 PROCEDURE — 77067 SCR MAMMO BI INCL CAD: CPT | Mod: TC,PO

## 2020-01-22 PROCEDURE — 77067 MAMMO DIGITAL SCREENING BILAT WITH TOMOSYNTHESIS_CAD: ICD-10-PCS | Mod: 26,,, | Performed by: RADIOLOGY

## 2020-01-27 DIAGNOSIS — F51.01 PRIMARY INSOMNIA: ICD-10-CM

## 2020-01-27 RX ORDER — ZOLPIDEM TARTRATE 5 MG/1
5 TABLET ORAL NIGHTLY PRN
Qty: 30 TABLET | Refills: 3 | Status: SHIPPED | OUTPATIENT
Start: 2020-01-27 | End: 2020-02-05 | Stop reason: SDUPTHER

## 2020-01-28 ENCOUNTER — OFFICE VISIT (OUTPATIENT)
Dept: OBSTETRICS AND GYNECOLOGY | Facility: CLINIC | Age: 55
End: 2020-01-28
Payer: MEDICAID

## 2020-01-28 VITALS
WEIGHT: 119 LBS | DIASTOLIC BLOOD PRESSURE: 62 MMHG | HEIGHT: 65 IN | BODY MASS INDEX: 19.83 KG/M2 | SYSTOLIC BLOOD PRESSURE: 124 MMHG

## 2020-01-28 DIAGNOSIS — F51.01 PRIMARY INSOMNIA: ICD-10-CM

## 2020-01-28 DIAGNOSIS — N95.1 SYMPTOMATIC MENOPAUSAL OR FEMALE CLIMACTERIC STATES: Primary | ICD-10-CM

## 2020-01-28 PROCEDURE — 99213 PR OFFICE/OUTPT VISIT, EST, LEVL III, 20-29 MIN: ICD-10-PCS | Mod: S$PBB,,, | Performed by: OBSTETRICS & GYNECOLOGY

## 2020-01-28 PROCEDURE — 99999 PR PBB SHADOW E&M-EST. PATIENT-LVL III: CPT | Mod: PBBFAC,,, | Performed by: OBSTETRICS & GYNECOLOGY

## 2020-01-28 PROCEDURE — 99999 PR PBB SHADOW E&M-EST. PATIENT-LVL III: ICD-10-PCS | Mod: PBBFAC,,, | Performed by: OBSTETRICS & GYNECOLOGY

## 2020-01-28 PROCEDURE — 99213 OFFICE O/P EST LOW 20 MIN: CPT | Mod: PBBFAC,PO | Performed by: OBSTETRICS & GYNECOLOGY

## 2020-01-28 PROCEDURE — 99213 OFFICE O/P EST LOW 20 MIN: CPT | Mod: S$PBB,,, | Performed by: OBSTETRICS & GYNECOLOGY

## 2020-01-28 RX ORDER — DOXYCYCLINE 100 MG/1
CAPSULE ORAL
COMMUNITY
Start: 2020-01-07 | End: 2020-05-19

## 2020-01-28 RX ORDER — HYDROCORTISONE 25 MG/G
CREAM TOPICAL
COMMUNITY
Start: 2020-01-07 | End: 2021-09-13

## 2020-01-28 RX ORDER — ESTRADIOL AND NORETHINDRONE ACETATE 1; .5 MG/1; MG/1
1 TABLET ORAL DAILY
Qty: 28 TABLET | Refills: 12 | Status: SHIPPED | OUTPATIENT
Start: 2020-01-28 | End: 2020-10-07 | Stop reason: SDUPTHER

## 2020-01-28 RX ORDER — ZOLPIDEM TARTRATE 5 MG/1
TABLET ORAL
Qty: 30 TABLET | OUTPATIENT
Start: 2020-01-28

## 2020-01-28 NOTE — PROGRESS NOTES
Subjective:       Patient ID: Mae Gregory is a 54 y.o. female.    Chief Complaint: Menopause (pt states that she has been suffering with hot flashes,fatigue, and mood irreg.)    HPI She complains of hot flashes, difficulty sleeping, mood changes, and vaginal dryness.  I have previously counseled her on treatment options.  She is interested in hrt.    Review of Systems    ROS:  GENERAL: No fever, chills, fatigability or weight loss.  VULVAR: No pain, no lesions and no itching.  VAGINAL: No relaxation, no itching, no discharge, no abnormal bleeding and no lesions.  ABDOMEN: No abdominal pain. Denies nausea. Denies vomiting. No diarrhea. No constipation  BREAST: Denies pain. No lumps. No discharge.  URINARY: No incontinence, no nocturia, no frequency and no dysuria.  CARDIOVASCULAR: No chest pain. No shortness of breath. No leg cramps.  NEUROLOGICAL: no headaches. No vision changes.   Objective:      Physical Exam    Assessment:       1. Symptomatic menopausal or female climacteric states        Plan:       Mae was seen today for menopause.    Diagnoses and all orders for this visit:    Symptomatic menopausal or female climacteric states  -     estradiol-norethindrone (ACTIVELLA) 1-0.5 mg per tablet; Take 1 tablet by mouth once daily.

## 2020-01-31 ENCOUNTER — PATIENT MESSAGE (OUTPATIENT)
Dept: OBSTETRICS AND GYNECOLOGY | Facility: CLINIC | Age: 55
End: 2020-01-31

## 2020-02-05 ENCOUNTER — TELEPHONE (OUTPATIENT)
Dept: OBSTETRICS AND GYNECOLOGY | Facility: CLINIC | Age: 55
End: 2020-02-05

## 2020-02-05 DIAGNOSIS — F51.01 PRIMARY INSOMNIA: ICD-10-CM

## 2020-02-05 RX ORDER — ZOLPIDEM TARTRATE 5 MG/1
5 TABLET ORAL NIGHTLY PRN
Qty: 30 TABLET | Refills: 3 | Status: SHIPPED | OUTPATIENT
Start: 2020-02-05 | End: 2020-02-18 | Stop reason: SDUPTHER

## 2020-02-05 NOTE — TELEPHONE ENCOUNTER
----- Message from Omaira Dean LPN sent at 2/4/2020  4:10 PM CST -----  Hi Dr. Trejo,     I was hoping you had a chance to see me earlier message.   Many thanks,   Mae

## 2020-02-05 NOTE — TELEPHONE ENCOUNTER
Called to notify pt that Rx for Ambien has been sent to the pharmacy. No answer. Left message to call the clinic.

## 2020-02-18 ENCOUNTER — OFFICE VISIT (OUTPATIENT)
Dept: FAMILY MEDICINE | Facility: CLINIC | Age: 55
End: 2020-02-18
Payer: MEDICAID

## 2020-02-18 VITALS
BODY MASS INDEX: 20.57 KG/M2 | TEMPERATURE: 99 F | HEART RATE: 72 BPM | SYSTOLIC BLOOD PRESSURE: 110 MMHG | DIASTOLIC BLOOD PRESSURE: 69 MMHG | HEIGHT: 65 IN | OXYGEN SATURATION: 99 % | WEIGHT: 123.44 LBS

## 2020-02-18 DIAGNOSIS — F32.A DEPRESSION, UNSPECIFIED DEPRESSION TYPE: ICD-10-CM

## 2020-02-18 DIAGNOSIS — F51.3 SLEEPWALKING: ICD-10-CM

## 2020-02-18 DIAGNOSIS — F41.9 ANXIETY: Primary | ICD-10-CM

## 2020-02-18 DIAGNOSIS — G24.9 DYSTONIA: ICD-10-CM

## 2020-02-18 DIAGNOSIS — R73.09 ABNORMAL GLUCOSE: ICD-10-CM

## 2020-02-18 DIAGNOSIS — F51.01 PRIMARY INSOMNIA: ICD-10-CM

## 2020-02-18 DIAGNOSIS — G43.909 MIGRAINE WITHOUT STATUS MIGRAINOSUS, NOT INTRACTABLE, UNSPECIFIED MIGRAINE TYPE: ICD-10-CM

## 2020-02-18 PROCEDURE — 99214 PR OFFICE/OUTPT VISIT, EST, LEVL IV, 30-39 MIN: ICD-10-PCS | Mod: S$PBB,,, | Performed by: FAMILY MEDICINE

## 2020-02-18 PROCEDURE — 99999 PR PBB SHADOW E&M-EST. PATIENT-LVL III: CPT | Mod: PBBFAC,,, | Performed by: FAMILY MEDICINE

## 2020-02-18 PROCEDURE — 99999 PR PBB SHADOW E&M-EST. PATIENT-LVL III: ICD-10-PCS | Mod: PBBFAC,,, | Performed by: FAMILY MEDICINE

## 2020-02-18 PROCEDURE — 99214 OFFICE O/P EST MOD 30 MIN: CPT | Mod: S$PBB,,, | Performed by: FAMILY MEDICINE

## 2020-02-18 PROCEDURE — 99213 OFFICE O/P EST LOW 20 MIN: CPT | Mod: PBBFAC,PO | Performed by: FAMILY MEDICINE

## 2020-02-18 RX ORDER — MONTELUKAST SODIUM 10 MG/1
TABLET ORAL
COMMUNITY
Start: 2020-02-06 | End: 2021-07-15 | Stop reason: SDUPTHER

## 2020-02-18 RX ORDER — ZOLPIDEM TARTRATE 5 MG/1
5 TABLET ORAL NIGHTLY PRN
Qty: 30 TABLET | Refills: 2 | Status: SHIPPED | OUTPATIENT
Start: 2020-02-18 | End: 2020-04-24 | Stop reason: SDUPTHER

## 2020-02-18 RX ORDER — BUPROPION HYDROCHLORIDE 150 MG/1
150 TABLET ORAL DAILY
Qty: 90 TABLET | Refills: 0 | Status: SHIPPED | OUTPATIENT
Start: 2020-02-18 | End: 2020-06-16

## 2020-02-18 RX ORDER — TRAZODONE HYDROCHLORIDE 150 MG/1
150 TABLET ORAL NIGHTLY PRN
Qty: 30 TABLET | Refills: 2 | Status: SHIPPED | OUTPATIENT
Start: 2020-02-18 | End: 2020-05-11 | Stop reason: SDUPTHER

## 2020-02-18 NOTE — PROGRESS NOTES
Office Visit    Patient Name: Mae Gregory    : 1965  MRN: [id number]      Assessment/Plan:  Mae Gregory is a 54 y.o. female who presents today for :    Anxiety  -     buPROPion (WELLBUTRIN XL) 150 MG TB24 tablet; Take 1 tablet (150 mg total) by mouth once daily. Follow up Dr. KRIS Garcia May 11 2020  Dispense: 90 tablet; Refill: 0  -     Hemoglobin A1c; Future; Expected date: 2020  -     CBC Without Differential; Future; Expected date: 2020  Depression, unspecified depression type  Primary insomnia  -     traZODone (DESYREL) 150 MG tablet; Take 1 tablet (150 mg total) by mouth nightly as needed for Insomnia. Follow up Dr. KRIS Garcia May 11 2020  Dispense: 30 tablet; Refill: 2  -     zolpidem (AMBIEN) 5 MG Tab; Take 1 tablet (5 mg total) by mouth nightly as needed. Follow up Dr. KRIS Garcia May 11 2020  Dispense: 30 tablet; Refill: 2  Sleepwalking - controlled on PRN Trazodone  -stable, continue current medication regimen, discussed use of Trazodone PRN and potential side effects with her combination of medications - which patient voices understanding.     Migraine without status migrainosus, not intractable, unspecified migraine type  Dystonia - sees neurology Dr. Maynard in [place] for q12w Botox injections  -stable, continue current regimen - f/u with Neurology as needed              Follow up 3month      This note was created by combination of typed  and MModal dictation.  Transcription errors may be present.  If there are any questions, please contact me.      ----------------------------------------------------------------------------------------------------------------------      HPI:  Patient Care Team:  Rajiv Garcia MD as PCP - General (Family Medicine)  Tavo Boateng MD as Consulting Physician (General Surgery)  MICHELLE Jackson MA as Care Coordinator    Mae is a 54 y.o. female with      Patient Active Problem List   Diagnosis    Migraine    DDD  (degenerative disc disease), cervical    DDD (degenerative disc disease), lumbar    Mild episode of recurrent major depressive disorder    Primary insomnia    Meniere's disease of both ears    Sensorineural hearing loss (SNHL) of both ears    Hair loss    Glaucoma    Anxiety    Depression    Sleepwalking - controlled on PRN Trazodone    Dystonia - sees neurology Dr. Maynard in [place] for q12w Botox injections         Patient presents today for f/u of:  Medication Refill  for her chronic medical issues      Anxiety/Depression -   Doing well on wellbutrin - she denies any recent change in jobs/marital status. Has been cutting down Trazodone to only as needed use as she sometimes sleep walks without it.  She denies CP/SOB/palpitation/panic attacks/SI/HI/mood changes/new stressors. She continues to see Neurology in [place] for migraines and dystonia follow up. Denies changes in sleep or appetite. No adverse side effects, but we reviewed potential side effects associated with medications, which patient acknowledges understanding.        Additional ROS    No F/C/wt changes/fatigue  No dysphagia/sore throat  No CP/DELANEY/palpitations/swelling  No cough/wheezing/SOB  No nausea/vomiting/abd pain  No MSK weakness/HA/tingling/numbness  No rashes  No anxiety/depression  No dysuria/hematuria              Patient Active Problem List   Diagnosis    Migraine    DDD (degenerative disc disease), cervical    DDD (degenerative disc disease), lumbar    Mild episode of recurrent major depressive disorder    Primary insomnia    Meniere's disease of both ears    Sensorineural hearing loss (SNHL) of both ears    Hair loss    Glaucoma    Anxiety    Depression    Sleepwalking - controlled on PRN Trazodone    Dystonia - sees neurology Dr. Maynard in [place] for q12w Botox injections       Current Medications  Medications reviewed/updated.     Current Outpatient Medications on File Prior to Visit   Medication Sig Dispense Refill     cetirizine (ZYRTEC) 10 MG tablet Take 10 mg by mouth once daily.      COCONUT OIL ORAL Take by mouth.      diphenhydrAMINE (BENADRYL) 25 mg capsule Take by mouth.      doxycycline (MONODOX) 100 MG capsule       estradiol-norethindrone (ACTIVELLA) 1-0.5 mg per tablet Take 1 tablet by mouth once daily. 28 tablet 12    GAVILYTE-G 236-22.74-6.74 -5.86 gram suspension       hydrocortisone 2.5 % cream       metroNIDAZOLE (METROGEL) 0.75 % gel       montelukast (SINGULAIR) 10 mg tablet       multivitamin with minerals tablet Take 1 tablet by mouth once daily.      pimecrolimus (ELIDEL) 1 % cream       sumatriptan (IMITREX) 100 MG tablet Take 1 tablet (100 mg total) by mouth every 2 (two) hours as needed for Migraine. 40 tablet 1    topiramate (TOPAMAX) 50 MG tablet Take 1 tablet (50 mg total) by mouth 2 (two) times daily. 60 tablet 11    [DISCONTINUED] buPROPion (WELLBUTRIN XL) 150 MG TB24 tablet Take 1 tablet (150 mg total) by mouth once daily. 90 tablet 3    [DISCONTINUED] traZODone (DESYREL) 150 MG tablet Take 1 tablet (150 mg total) by mouth nightly as needed for Insomnia. 90 tablet 3    [DISCONTINUED] zolpidem (AMBIEN) 5 MG Tab Take 1 tablet (5 mg total) by mouth nightly as needed. Follow up Dr. KRIS Garica APRIL 2020 30 tablet 3    finasteride (PROSCAR) 5 mg tablet Take 1 tablet (5 mg total) by mouth once daily. (Patient taking differently: Take 1 mg by mouth once daily. ) 30 tablet 11    levalbuterol (XOPENEX HFA) 45 mcg/actuation inhaler Inhale 2 puffs into the lungs every 8 (eight) hours as needed for Wheezing. (Patient taking differently: Inhale 2 puffs into the lungs every 8 (eight) hours as needed for Wheezing. PRN) 1 Inhaler 12     No current facility-administered medications on file prior to visit.            Past Surgical History:   Procedure Laterality Date    AUGMENTATION OF BREAST      BREAST SURGERY      COLON SURGERY  10/15/2018    colon resection        Family History   Problem  "Relation Age of Onset    Stroke Maternal Grandfather     Hypertension Father     Hypertension Mother     Hypertension Brother     Ovarian cancer Neg Hx     Cancer Neg Hx        Social History     Socioeconomic History    Marital status:      Spouse name: Not on file    Number of children: Not on file    Years of education: Not on file    Highest education level: Not on file   Occupational History    Not on file   Social Needs    Financial resource strain: Not on file    Food insecurity:     Worry: Not on file     Inability: Not on file    Transportation needs:     Medical: Not on file     Non-medical: Not on file   Tobacco Use    Smoking status: Never Smoker    Smokeless tobacco: Never Used   Substance and Sexual Activity    Alcohol use: No     Binge frequency: Never    Drug use: No    Sexual activity: Yes     Partners: Male     Comment: ablation   Lifestyle    Physical activity:     Days per week: 5 days     Minutes per session: 60 min    Stress: Not on file   Relationships    Social connections:     Talks on phone: Patient refused     Gets together: Patient refused     Attends Jew service: Not on file     Active member of club or organization: Patient refused     Attends meetings of clubs or organizations: Patient refused     Relationship status:    Other Topics Concern    Not on file   Social History Narrative    Not on file             Allergies   Review of patient's allergies indicates:  No Known Allergies          Review of Systems  See HPI      Physical Exam  /69   Pulse 72   Temp 98.7 °F (37.1 °C)   Ht 5' 5" (1.651 m)   Wt 56 kg (123 lb 7.3 oz)   SpO2 99%   BMI 20.54 kg/m²     GEN: NAD, well developed, pleasant, well nourished  HEENT: NCAT, PERRLA, EOMI, sclera clear, anicteric, O/P clear, MMM with no lesions  NECK: normal, supple with midline trachea, no LAD, no thyromegaly  LUNGS: CTAB, no w/r/r, no increased work of breathing   HEART: RRR, normal " S1 and S2, no m/r/g, no edema  ABD: s/nt/nd, NABS  SKIN: normal turgor, no rashes  PSYCH: AOx3, appropriate mood and affect  MSK: warm/well perfused, normal ROM in all extremities, no c/c/e.      Labs  No results found for: LABA1C, HGBA1C  Lab Results   Component Value Date     09/08/2017    K 4.4 09/08/2017     09/08/2017    CO2 25 09/08/2017    BUN 18 09/08/2017    CREATININE 0.8 09/08/2017    CALCIUM 9.3 09/08/2017    ANIONGAP 8 09/08/2017    ESTGFRAFRICA >60.0 09/08/2017    EGFRNONAA >60.0 09/08/2017     Lab Results   Component Value Date    CHOL 198 09/08/2017     Lab Results   Component Value Date     (H) 09/08/2017     Lab Results   Component Value Date    LDLCALC 84.4 09/08/2017     Lab Results   Component Value Date    TRIG 68 09/08/2017     Lab Results   Component Value Date    CHOLHDL 50.5 (H) 09/08/2017     Last set of blood work has been reviewed as noted above.

## 2020-04-24 DIAGNOSIS — F51.01 PRIMARY INSOMNIA: ICD-10-CM

## 2020-04-24 RX ORDER — ZOLPIDEM TARTRATE 5 MG/1
5 TABLET ORAL NIGHTLY PRN
Qty: 30 TABLET | Refills: 1 | Status: SHIPPED | OUTPATIENT
Start: 2020-04-24 | End: 2020-05-11 | Stop reason: SDUPTHER

## 2020-05-11 ENCOUNTER — OFFICE VISIT (OUTPATIENT)
Dept: FAMILY MEDICINE | Facility: CLINIC | Age: 55
End: 2020-05-11
Payer: MEDICAID

## 2020-05-11 DIAGNOSIS — F51.01 PRIMARY INSOMNIA: Primary | ICD-10-CM

## 2020-05-11 DIAGNOSIS — F51.3 SLEEPWALKING: ICD-10-CM

## 2020-05-11 PROCEDURE — 99442 PR PHYSICIAN TELEPHONE EVALUATION 11-20 MIN: ICD-10-PCS | Mod: 95,,, | Performed by: FAMILY MEDICINE

## 2020-05-11 PROCEDURE — 99442 PR PHYSICIAN TELEPHONE EVALUATION 11-20 MIN: CPT | Mod: 95,,, | Performed by: FAMILY MEDICINE

## 2020-05-11 RX ORDER — TRAZODONE HYDROCHLORIDE 150 MG/1
150 TABLET ORAL NIGHTLY PRN
Qty: 30 TABLET | Refills: 5 | Status: SHIPPED | OUTPATIENT
Start: 2020-05-11 | End: 2021-06-01

## 2020-05-11 RX ORDER — ZOLPIDEM TARTRATE 5 MG/1
5 TABLET ORAL NIGHTLY PRN
Qty: 30 TABLET | Refills: 5 | Status: SHIPPED | OUTPATIENT
Start: 2020-05-11 | End: 2020-08-20 | Stop reason: SDUPTHER

## 2020-05-11 NOTE — PROGRESS NOTES
Established Patient - Audio Only Telehealth Visit    The patient location is: home  The chief complaint leading to consultation is: Insomnia    Visit type: Virtual visit with audio only (telephone) - patient verbally consented to an audio visit today prior to this telephone encounter.  The reason for the audio only service rather than synchronous audio and video virtual visit was related to technical difficulties or patient preference/necessity.  Total time spent with patient: 15 minutes  Each patient to whom he or she provides medical services by telemedicine is:  (1) informed of the relationship between the physician and patient and the respective role of any other health care provider with respect to management of the patient; and (2) notified that he or she may decline to receive medical services by telemedicine and may withdraw from such care at any time.     Office Visit    Patient Name: Mae Gregory    : 1965  MRN: [id number]      Assessment/Plan:  Mae Gregory is a 54 y.o. female who presents today for :    Primary insomnia  -     traZODone (DESYREL) 150 MG tablet; Take 1 tablet (150 mg total) by mouth nightly as needed for Insomnia. Followup with doctor 2020  Dispense: 30 tablet; Refill: 5  -     zolpidem (AMBIEN) 5 MG Tab; Take 1 tablet (5 mg total) by mouth nightly as needed. Followup with doctor 2020  Dispense: 30 tablet; Refill: 5  Sleepwalking - controlled on PRN Trazodone  -     traZODone (DESYREL) 150 MG tablet; Take 1 tablet (150 mg total) by mouth nightly as needed for Insomnia. Followup with doctor 2020  Dispense: 30 tablet; Refill: 5  -stable, continue current regimen, potential side effects associated with medications reviewed with patient, which patient voices understanding      Follow up PRN    This note was created by combination of typed  and MModal dictation.  Transcription errors may be present.  If there are any questions, please contact  me.      ----------------------------------------------------------------------------------------------------------------------      HPI:  Patient Care Team:  Rajiv Garcia MD as PCP - General (Family Medicine)  Tavo Boateng MD as Consulting Physician (General Surgery)  MICHELLE Jackson MA as Care Coordinator    Mae is a 54 y.o. female with      Patient Active Problem List   Diagnosis    Migraine    DDD (degenerative disc disease), cervical    DDD (degenerative disc disease), lumbar    Mild episode of recurrent major depressive disorder    Primary insomnia    Meniere's disease of both ears    Sensorineural hearing loss (SNHL) of both ears    Hair loss    Glaucoma    Anxiety    Depression    Sleepwalking - controlled on PRN Trazodone    Dystonia - sees neurology Dr. Maynard in [place] for q12w Botox injections       Patient presents today for f/u of:  Insomnia  -this is a chronic issue for patient, which is controlled by taking Ambien PRN. She also has a h/o sleepwalking that she takes Trazodone with good control. She denies any adverse side effects, no falls/lethargay/excessive drowsiness.      Additional ROS    No F/C/wt changes/fatigue  No dysphagia/sore throat  No CP/DELANEY/palpitations/swelling  No cough/wheezing/SOB  No nausea/vomiting/abd pain/no diarrhea, no constipation  No rashes  No anxiety/depression  No dysuria/hematuria              Patient Active Problem List   Diagnosis    Migraine    DDD (degenerative disc disease), cervical    DDD (degenerative disc disease), lumbar    Mild episode of recurrent major depressive disorder    Primary insomnia    Meniere's disease of both ears    Sensorineural hearing loss (SNHL) of both ears    Hair loss    Glaucoma    Anxiety    Depression    Sleepwalking - controlled on PRN Trazodone    Dystonia - sees neurology Dr. Maynard in [place] for q12w Botox injections       Current Medications  Medications reviewed/updated.      Current Outpatient Medications on File Prior to Visit   Medication Sig Dispense Refill    buPROPion (WELLBUTRIN XL) 150 MG TB24 tablet Take 1 tablet (150 mg total) by mouth once daily. Follow up Dr. KRIS Garcia May 11 2020 90 tablet 0    cetirizine (ZYRTEC) 10 MG tablet Take 10 mg by mouth once daily.      COCONUT OIL ORAL Take by mouth.      diphenhydrAMINE (BENADRYL) 25 mg capsule Take by mouth.      doxycycline (MONODOX) 100 MG capsule       estradiol-norethindrone (ACTIVELLA) 1-0.5 mg per tablet Take 1 tablet by mouth once daily. 28 tablet 12    finasteride (PROSCAR) 5 mg tablet Take 1 tablet (5 mg total) by mouth once daily. (Patient taking differently: Take 1 mg by mouth once daily. ) 30 tablet 11    GAVILYTE-G 236-22.74-6.74 -5.86 gram suspension       hydrocortisone 2.5 % cream       levalbuterol (XOPENEX HFA) 45 mcg/actuation inhaler Inhale 2 puffs into the lungs every 8 (eight) hours as needed for Wheezing. (Patient taking differently: Inhale 2 puffs into the lungs every 8 (eight) hours as needed for Wheezing. PRN) 1 Inhaler 12    metroNIDAZOLE (METROGEL) 0.75 % gel       montelukast (SINGULAIR) 10 mg tablet       multivitamin with minerals tablet Take 1 tablet by mouth once daily.      pimecrolimus (ELIDEL) 1 % cream       sumatriptan (IMITREX) 100 MG tablet Take 1 tablet (100 mg total) by mouth every 2 (two) hours as needed for Migraine. 40 tablet 1    topiramate (TOPAMAX) 50 MG tablet Take 1 tablet (50 mg total) by mouth 2 (two) times daily. 60 tablet 11    [DISCONTINUED] traZODone (DESYREL) 150 MG tablet Take 1 tablet (150 mg total) by mouth nightly as needed for Insomnia. Follow up Dr. KRIS Garcia May 11 2020 30 tablet 2    [DISCONTINUED] zolpidem (AMBIEN) 5 MG Tab Take 1 tablet (5 mg total) by mouth nightly as needed. Followup with doctor June 2020, get fasting labs 1wk before (ordered) 30 tablet 1     No current facility-administered medications on file prior to visit.             Past Surgical History:   Procedure Laterality Date    AUGMENTATION OF BREAST      BREAST SURGERY      COLON SURGERY  10/15/2018    colon resection        Family History   Problem Relation Age of Onset    Stroke Maternal Grandfather     Hypertension Father     Hypertension Mother     Hypertension Brother     Ovarian cancer Neg Hx     Cancer Neg Hx        Social History     Socioeconomic History    Marital status:      Spouse name: Not on file    Number of children: Not on file    Years of education: Not on file    Highest education level: Not on file   Occupational History    Not on file   Social Needs    Financial resource strain: Not on file    Food insecurity:     Worry: Not on file     Inability: Not on file    Transportation needs:     Medical: Not on file     Non-medical: Not on file   Tobacco Use    Smoking status: Never Smoker    Smokeless tobacco: Never Used   Substance and Sexual Activity    Alcohol use: No     Binge frequency: Never    Drug use: No    Sexual activity: Yes     Partners: Male     Comment: ablation   Lifestyle    Physical activity:     Days per week: 5 days     Minutes per session: 60 min    Stress: Not on file   Relationships    Social connections:     Talks on phone: Patient refused     Gets together: Patient refused     Attends Jain service: Not on file     Active member of club or organization: Patient refused     Attends meetings of clubs or organizations: Patient refused     Relationship status:    Other Topics Concern    Not on file   Social History Narrative    Not on file             Allergies   Review of patient's allergies indicates:  No Known Allergies          Review of Systems  See HPI      Physical Exam  There were no vitals taken for this visit.

## 2020-05-15 ENCOUNTER — PATIENT MESSAGE (OUTPATIENT)
Dept: FAMILY MEDICINE | Facility: CLINIC | Age: 55
End: 2020-05-15

## 2020-05-15 DIAGNOSIS — Z00.00 ANNUAL PHYSICAL EXAM: Primary | ICD-10-CM

## 2020-05-15 NOTE — TELEPHONE ENCOUNTER
Annual physical exam  -     Hemoglobin A1C; Future; Expected date: 05/15/2020  -     CBC Without Differential; Future; Expected date: 05/15/2020  -     Comprehensive metabolic panel; Future; Expected date: 05/15/2020  -     COVID-19 (SARS CoV-2) IgG Antibody; Future; Expected date: 05/15/2020

## 2020-05-16 ENCOUNTER — LAB VISIT (OUTPATIENT)
Dept: LAB | Facility: HOSPITAL | Age: 55
End: 2020-05-16
Attending: FAMILY MEDICINE
Payer: MEDICAID

## 2020-05-16 DIAGNOSIS — Z00.00 ANNUAL PHYSICAL EXAM: ICD-10-CM

## 2020-05-16 LAB
ALBUMIN SERPL BCP-MCNC: 4 G/DL (ref 3.5–5.2)
ALP SERPL-CCNC: 42 U/L (ref 55–135)
ALT SERPL W/O P-5'-P-CCNC: 10 U/L (ref 10–44)
ANION GAP SERPL CALC-SCNC: 11 MMOL/L (ref 8–16)
AST SERPL-CCNC: 19 U/L (ref 10–40)
BILIRUB SERPL-MCNC: 0.4 MG/DL (ref 0.1–1)
BUN SERPL-MCNC: 19 MG/DL (ref 6–20)
CALCIUM SERPL-MCNC: 8.8 MG/DL (ref 8.7–10.5)
CHLORIDE SERPL-SCNC: 109 MMOL/L (ref 95–110)
CO2 SERPL-SCNC: 19 MMOL/L (ref 23–29)
CREAT SERPL-MCNC: 0.9 MG/DL (ref 0.5–1.4)
ERYTHROCYTE [DISTWIDTH] IN BLOOD BY AUTOMATED COUNT: 12.9 % (ref 11.5–14.5)
EST. GFR  (AFRICAN AMERICAN): >60 ML/MIN/1.73 M^2
EST. GFR  (NON AFRICAN AMERICAN): >60 ML/MIN/1.73 M^2
GLUCOSE SERPL-MCNC: 85 MG/DL (ref 70–110)
HCT VFR BLD AUTO: 42.4 % (ref 37–48.5)
HGB BLD-MCNC: 14 G/DL (ref 12–16)
MCH RBC QN AUTO: 30.2 PG (ref 27–31)
MCHC RBC AUTO-ENTMCNC: 33 G/DL (ref 32–36)
MCV RBC AUTO: 91 FL (ref 82–98)
PLATELET # BLD AUTO: 204 K/UL (ref 150–350)
PMV BLD AUTO: 10.6 FL (ref 9.2–12.9)
POTASSIUM SERPL-SCNC: 4 MMOL/L (ref 3.5–5.1)
PROT SERPL-MCNC: 6.8 G/DL (ref 6–8.4)
RBC # BLD AUTO: 4.64 M/UL (ref 4–5.4)
SODIUM SERPL-SCNC: 139 MMOL/L (ref 136–145)
WBC # BLD AUTO: 4.3 K/UL (ref 3.9–12.7)

## 2020-05-16 PROCEDURE — 80053 COMPREHEN METABOLIC PANEL: CPT

## 2020-05-16 PROCEDURE — 86769 SARS-COV-2 COVID-19 ANTIBODY: CPT

## 2020-05-16 PROCEDURE — 36415 COLL VENOUS BLD VENIPUNCTURE: CPT

## 2020-05-16 PROCEDURE — 83036 HEMOGLOBIN GLYCOSYLATED A1C: CPT

## 2020-05-16 PROCEDURE — 85027 COMPLETE CBC AUTOMATED: CPT

## 2020-05-17 LAB
ESTIMATED AVG GLUCOSE: 94 MG/DL (ref 68–131)
HBA1C MFR BLD HPLC: 4.9 % (ref 4–5.6)
SARS-COV-2 IGG SERPLBLD QL IA.RAPID: NEGATIVE

## 2020-05-18 ENCOUNTER — OFFICE VISIT (OUTPATIENT)
Dept: FAMILY MEDICINE | Facility: CLINIC | Age: 55
End: 2020-05-18
Payer: MEDICAID

## 2020-05-18 VITALS
WEIGHT: 130.06 LBS | BODY MASS INDEX: 21.67 KG/M2 | SYSTOLIC BLOOD PRESSURE: 132 MMHG | DIASTOLIC BLOOD PRESSURE: 62 MMHG | TEMPERATURE: 98 F | OXYGEN SATURATION: 98 % | HEIGHT: 65 IN | HEART RATE: 81 BPM

## 2020-05-18 DIAGNOSIS — Z12.11 COLON CANCER SCREENING: ICD-10-CM

## 2020-05-18 DIAGNOSIS — Z00.00 ANNUAL PHYSICAL EXAM: Primary | ICD-10-CM

## 2020-05-18 DIAGNOSIS — F51.3 SLEEPWALKING: ICD-10-CM

## 2020-05-18 DIAGNOSIS — G43.909 MIGRAINE WITHOUT STATUS MIGRAINOSUS, NOT INTRACTABLE, UNSPECIFIED MIGRAINE TYPE: ICD-10-CM

## 2020-05-18 DIAGNOSIS — F32.A DEPRESSION, UNSPECIFIED DEPRESSION TYPE: ICD-10-CM

## 2020-05-18 DIAGNOSIS — G24.9 DYSTONIA: ICD-10-CM

## 2020-05-18 DIAGNOSIS — F41.9 ANXIETY: ICD-10-CM

## 2020-05-18 PROCEDURE — 99999 PR PBB SHADOW E&M-EST. PATIENT-LVL III: CPT | Mod: PBBFAC,,, | Performed by: FAMILY MEDICINE

## 2020-05-18 PROCEDURE — 99213 OFFICE O/P EST LOW 20 MIN: CPT | Mod: PBBFAC,PO | Performed by: FAMILY MEDICINE

## 2020-05-18 PROCEDURE — 99396 PREV VISIT EST AGE 40-64: CPT | Mod: S$PBB,,, | Performed by: FAMILY MEDICINE

## 2020-05-18 PROCEDURE — 99999 PR PBB SHADOW E&M-EST. PATIENT-LVL III: ICD-10-PCS | Mod: PBBFAC,,, | Performed by: FAMILY MEDICINE

## 2020-05-18 PROCEDURE — 99396 PR PREVENTIVE VISIT,EST,40-64: ICD-10-PCS | Mod: S$PBB,,, | Performed by: FAMILY MEDICINE

## 2020-05-18 RX ORDER — ALBUTEROL SULFATE 90 UG/1
AEROSOL, METERED RESPIRATORY (INHALATION)
COMMUNITY
Start: 2020-02-20 | End: 2021-02-05 | Stop reason: SDUPTHER

## 2020-05-19 NOTE — PROGRESS NOTES
Office Visit    Patient Name: Mae Gregory    : 1965  MRN: [id number]      Assessment/Plan:  Mae Gregory is a 54 y.o. female who presents today for :    Annual physical exam  Colon cancer screening  -     Cologuard Screening (Multitarget Stool DNA); Future; Expected date: 2020  -previous labs reviewed and discussed with patient  -anticipatory guidance provided with age appropriate preventative services discussed, healthy diet and regular physical exercise also discussed with patient  -any additional health maintenance will be readdressed at the next physical if declined or deferred by the patient today   -Recommend 15-30 minutes of moderate intensity exercise 5 days/week.      Migraine without status migrainosus, not intractable, unspecified migraine type  -stable, continue Topamax    Sleepwalking - controlled on PRN Trazodone  -stable, continue current regimen     Dystonia - sees neurology Dr. Maynard in [place] for q12w Botox injections  -stable, has f/u with Neurologist in 2 days    Anxiety  Depression, unspecified depression type  -stable, continue con Wellbutrin  -f/u as needed            Follow up PRN    This note was created by combination of typed  and MModal dictation.  Transcription errors may be present.  If there are any questions, please contact me.        ----------------------------------------------------------------------------------------------------------------------      HPI:  Patient Care Team:  Rajiv Garcia MD as PCP - General (Family Medicine)  Tavo Boateng MD as Consulting Physician (General Surgery)  MICHELLE Jackson MA as Care Coordinator    Mae is a 54 y.o. female with      Patient Active Problem List   Diagnosis    Migraine    DDD (degenerative disc disease), cervical    DDD (degenerative disc disease), lumbar    Mild episode of recurrent major depressive disorder    Primary insomnia    Meniere's disease of both ears     Sensorineural hearing loss (SNHL) of both ears    Hair loss    Glaucoma    Anxiety    Depression    Sleepwalking - controlled on PRN Trazodone    Dystonia - sees neurology Dr. Maynard in [place] for q12w Botox injections         Patient presents today for:  Annual Exam (labs results )        Patient is doing well and has no major complaints today. Health maintenance-wise, she is due for colon cancer screening. She is up to date on MMG/Pap. Overall, no new changes in health since prior visit. She feels her anxiety and depression is well controlled on Wellbutrin. She has f/u with Neurology for botox injection in 2 days for management of her dystonia - no new flare up recently.  She denies any cardiovascular or neurologic complaints today        Answers for HPI/ROS submitted by the patient on 5/15/2020   activity change: No  unexpected weight change: No  neck pain: No  hearing loss: No  rhinorrhea: No  trouble swallowing: No  eye discharge: No  visual disturbance: No  chest tightness: No  wheezing: No  chest pain: No  palpitations: No  blood in stool: No  constipation: No  vomiting: No  diarrhea: No  polydipsia: No  polyuria: No  difficulty urinating: No  hematuria: No  menstrual problem: No  dysuria: No  joint swelling: No  arthralgias: No  headaches: No  weakness: No  confusion: No  dysphoric mood: No            Current Medications  Medications reviewed and updated.       Current Outpatient Medications:     albuterol (PROVENTIL/VENTOLIN HFA) 90 mcg/actuation inhaler, TAKE 1 2 PUFF(S) (INHALATION) EVERY 4 HOURS PRN   WHEEZING, Disp: , Rfl:     buPROPion (WELLBUTRIN XL) 150 MG TB24 tablet, Take 1 tablet (150 mg total) by mouth once daily. Follow up Dr. KRIS Garcia May 11 2020, Disp: 90 tablet, Rfl: 0    cetirizine (ZYRTEC) 10 MG tablet, Take 10 mg by mouth once daily., Disp: , Rfl:     COCONUT OIL ORAL, Take by mouth., Disp: , Rfl:     estradiol-norethindrone (ACTIVELLA) 1-0.5 mg per tablet, Take 1 tablet by mouth  once daily., Disp: 28 tablet, Rfl: 12    GAVILYTE-G 236-22.74-6.74 -5.86 gram suspension, , Disp: , Rfl:     hydrocortisone 2.5 % cream, , Disp: , Rfl:     metroNIDAZOLE (METROGEL) 0.75 % gel, , Disp: , Rfl:     montelukast (SINGULAIR) 10 mg tablet, , Disp: , Rfl:     multivitamin with minerals tablet, Take 1 tablet by mouth once daily., Disp: , Rfl:     pimecrolimus (ELIDEL) 1 % cream, , Disp: , Rfl:     sumatriptan (IMITREX) 100 MG tablet, Take 1 tablet (100 mg total) by mouth every 2 (two) hours as needed for Migraine., Disp: 40 tablet, Rfl: 1    topiramate (TOPAMAX) 50 MG tablet, Take 1 tablet (50 mg total) by mouth 2 (two) times daily., Disp: 60 tablet, Rfl: 11    traZODone (DESYREL) 150 MG tablet, Take 1 tablet (150 mg total) by mouth nightly as needed for Insomnia. Followup with doctor NOV 2020, Disp: 30 tablet, Rfl: 5    zolpidem (AMBIEN) 5 MG Tab, Take 1 tablet (5 mg total) by mouth nightly as needed. Followup with doctor NOV 2020, Disp: 30 tablet, Rfl: 5    finasteride (PROSCAR) 5 mg tablet, Take 1 tablet (5 mg total) by mouth once daily. (Patient taking differently: Take 1 mg by mouth once daily. ), Disp: 30 tablet, Rfl: 11    levalbuterol (XOPENEX HFA) 45 mcg/actuation inhaler, Inhale 2 puffs into the lungs every 8 (eight) hours as needed for Wheezing. (Patient taking differently: Inhale 2 puffs into the lungs every 8 (eight) hours as needed for Wheezing. PRN), Disp: 1 Inhaler, Rfl: 12    Past Surgical History:   Procedure Laterality Date    AUGMENTATION OF BREAST      BREAST SURGERY      COLON SURGERY  10/15/2018    colon resection        Family History   Problem Relation Age of Onset    Stroke Maternal Grandfather     Hypertension Father     Hypertension Mother     Hypertension Brother     Ovarian cancer Neg Hx     Cancer Neg Hx        Social History     Socioeconomic History    Marital status:      Spouse name: Not on file    Number of children: Not on file    Years of  "education: Not on file    Highest education level: Not on file   Occupational History    Not on file   Social Needs    Financial resource strain: Not on file    Food insecurity:     Worry: Not on file     Inability: Not on file    Transportation needs:     Medical: Not on file     Non-medical: Not on file   Tobacco Use    Smoking status: Never Smoker    Smokeless tobacco: Never Used   Substance and Sexual Activity    Alcohol use: No     Binge frequency: Never    Drug use: No    Sexual activity: Yes     Partners: Male     Comment: ablation   Lifestyle    Physical activity:     Days per week: 5 days     Minutes per session: 60 min    Stress: Not on file   Relationships    Social connections:     Talks on phone: Patient refused     Gets together: Patient refused     Attends Worship service: Not on file     Active member of club or organization: Patient refused     Attends meetings of clubs or organizations: Patient refused     Relationship status:    Other Topics Concern    Not on file   Social History Narrative    Not on file           Allergies   Review of patient's allergies indicates:  No Known Allergies          Review of Systems  See HPI      Physical Exam  /62   Pulse 81   Temp 98.2 °F (36.8 °C)   Ht 5' 5" (1.651 m)   Wt 59 kg (130 lb 1.1 oz)   SpO2 98%   BMI 21.64 kg/m²     GEN: NAD, well developed, pleasant, well nourished  HEENT: NCAT, PERRLA, EOMI, sclera clear, anicteric, bilateral ear exam wnl, O/P clear, MMM with no lesions  NECK: normal, supple with midline trachea, no LAD, no thyromegaly  LUNGS: CTAB, no w/r/r, no increased work of breathing   HEART: RRR, normal S1 and S2, no m/r/g, no edema  ABD: s/nt/nd, NABS  SKIN: normal turgor, no rashes  PSYCH: AOx3, appropriate mood and affect  MSK: warm/well perfused, normal ROM in all extremities, no c/c/e.  NEURO: normal without focal findings, CN II-XII are grossly intact.  Sensation/strength grossly normal, gait and " station normal.         Labs  Lab Results   Component Value Date    HGBA1C 4.9 05/16/2020     Lab Results   Component Value Date     05/16/2020    K 4.0 05/16/2020     05/16/2020    CO2 19 (L) 05/16/2020    BUN 19 05/16/2020    CREATININE 0.9 05/16/2020    CALCIUM 8.8 05/16/2020    ANIONGAP 11 05/16/2020    ESTGFRAFRICA >60 05/16/2020    EGFRNONAA >60 05/16/2020     Lab Results   Component Value Date    CHOL 198 09/08/2017     Lab Results   Component Value Date     (H) 09/08/2017     Lab Results   Component Value Date    LDLCALC 84.4 09/08/2017     Lab Results   Component Value Date    TRIG 68 09/08/2017     Lab Results   Component Value Date    CHOLHDL 50.5 (H) 09/08/2017     Last set of blood work has been reviewed as noted above.

## 2020-07-27 ENCOUNTER — LAB VISIT (OUTPATIENT)
Dept: LAB | Facility: OTHER | Age: 55
End: 2020-07-27
Attending: FAMILY MEDICINE
Payer: MEDICAID

## 2020-07-27 DIAGNOSIS — F41.9 ANXIETY: ICD-10-CM

## 2020-07-27 DIAGNOSIS — R73.09 ABNORMAL GLUCOSE: ICD-10-CM

## 2020-07-27 DIAGNOSIS — G24.9 DYSTONIA: ICD-10-CM

## 2020-07-27 DIAGNOSIS — L65.9 ALOPECIA: ICD-10-CM

## 2020-07-27 LAB
ALBUMIN SERPL BCP-MCNC: 3.9 G/DL (ref 3.5–5.2)
ALBUMIN SERPL BCP-MCNC: 3.9 G/DL (ref 3.5–5.2)
ALP SERPL-CCNC: 44 U/L (ref 55–135)
ALP SERPL-CCNC: 44 U/L (ref 55–135)
ALT SERPL W/O P-5'-P-CCNC: 20 U/L (ref 10–44)
ALT SERPL W/O P-5'-P-CCNC: 20 U/L (ref 10–44)
ANION GAP SERPL CALC-SCNC: 7 MMOL/L (ref 8–16)
ANION GAP SERPL CALC-SCNC: 7 MMOL/L (ref 8–16)
AST SERPL-CCNC: 21 U/L (ref 10–40)
AST SERPL-CCNC: 21 U/L (ref 10–40)
BILIRUB SERPL-MCNC: 0.4 MG/DL (ref 0.1–1)
BILIRUB SERPL-MCNC: 0.4 MG/DL (ref 0.1–1)
BUN SERPL-MCNC: 29 MG/DL (ref 6–20)
BUN SERPL-MCNC: 29 MG/DL (ref 6–20)
CALCIUM SERPL-MCNC: 8.7 MG/DL (ref 8.7–10.5)
CALCIUM SERPL-MCNC: 8.7 MG/DL (ref 8.7–10.5)
CHLORIDE SERPL-SCNC: 108 MMOL/L (ref 95–110)
CHLORIDE SERPL-SCNC: 108 MMOL/L (ref 95–110)
CHOLEST SERPL-MCNC: 143 MG/DL (ref 120–199)
CHOLEST/HDLC SERPL: 2.4 {RATIO} (ref 2–5)
CO2 SERPL-SCNC: 22 MMOL/L (ref 23–29)
CO2 SERPL-SCNC: 22 MMOL/L (ref 23–29)
CREAT SERPL-MCNC: 0.9 MG/DL (ref 0.5–1.4)
CREAT SERPL-MCNC: 0.9 MG/DL (ref 0.5–1.4)
ERYTHROCYTE [DISTWIDTH] IN BLOOD BY AUTOMATED COUNT: 12.2 % (ref 11.5–14.5)
ERYTHROCYTE [DISTWIDTH] IN BLOOD BY AUTOMATED COUNT: 12.2 % (ref 11.5–14.5)
EST. GFR  (AFRICAN AMERICAN): >60 ML/MIN/1.73 M^2
EST. GFR  (AFRICAN AMERICAN): >60 ML/MIN/1.73 M^2
EST. GFR  (NON AFRICAN AMERICAN): >60 ML/MIN/1.73 M^2
EST. GFR  (NON AFRICAN AMERICAN): >60 ML/MIN/1.73 M^2
GLUCOSE SERPL-MCNC: 95 MG/DL (ref 70–110)
GLUCOSE SERPL-MCNC: 95 MG/DL (ref 70–110)
HCT VFR BLD AUTO: 41.7 % (ref 37–48.5)
HCT VFR BLD AUTO: 41.7 % (ref 37–48.5)
HDLC SERPL-MCNC: 59 MG/DL (ref 40–75)
HDLC SERPL: 41.3 % (ref 20–50)
HGB BLD-MCNC: 13.7 G/DL (ref 12–16)
HGB BLD-MCNC: 13.7 G/DL (ref 12–16)
LDLC SERPL CALC-MCNC: 68.2 MG/DL (ref 63–159)
MCH RBC QN AUTO: 29.8 PG (ref 27–31)
MCH RBC QN AUTO: 29.8 PG (ref 27–31)
MCHC RBC AUTO-ENTMCNC: 32.9 G/DL (ref 32–36)
MCHC RBC AUTO-ENTMCNC: 32.9 G/DL (ref 32–36)
MCV RBC AUTO: 91 FL (ref 82–98)
MCV RBC AUTO: 91 FL (ref 82–98)
NONHDLC SERPL-MCNC: 84 MG/DL
PLATELET # BLD AUTO: 215 K/UL (ref 150–350)
PLATELET # BLD AUTO: 215 K/UL (ref 150–350)
PMV BLD AUTO: 10.9 FL (ref 9.2–12.9)
PMV BLD AUTO: 10.9 FL (ref 9.2–12.9)
POTASSIUM SERPL-SCNC: 4.1 MMOL/L (ref 3.5–5.1)
POTASSIUM SERPL-SCNC: 4.1 MMOL/L (ref 3.5–5.1)
PROT SERPL-MCNC: 6.8 G/DL (ref 6–8.4)
PROT SERPL-MCNC: 6.8 G/DL (ref 6–8.4)
RBC # BLD AUTO: 4.59 M/UL (ref 4–5.4)
RBC # BLD AUTO: 4.59 M/UL (ref 4–5.4)
SODIUM SERPL-SCNC: 137 MMOL/L (ref 136–145)
SODIUM SERPL-SCNC: 137 MMOL/L (ref 136–145)
TRIGL SERPL-MCNC: 79 MG/DL (ref 30–150)
TSH SERPL DL<=0.005 MIU/L-ACNC: 1.22 UIU/ML (ref 0.4–4)
WBC # BLD AUTO: 5.28 K/UL (ref 3.9–12.7)
WBC # BLD AUTO: 5.28 K/UL (ref 3.9–12.7)

## 2020-07-27 PROCEDURE — 80053 COMPREHEN METABOLIC PANEL: CPT

## 2020-07-27 PROCEDURE — 80061 LIPID PANEL: CPT

## 2020-07-27 PROCEDURE — 83036 HEMOGLOBIN GLYCOSYLATED A1C: CPT

## 2020-07-27 PROCEDURE — 85027 COMPLETE CBC AUTOMATED: CPT

## 2020-07-27 PROCEDURE — 36415 COLL VENOUS BLD VENIPUNCTURE: CPT

## 2020-07-27 PROCEDURE — 84443 ASSAY THYROID STIM HORMONE: CPT

## 2020-07-28 LAB
ESTIMATED AVG GLUCOSE: 97 MG/DL (ref 68–131)
HBA1C MFR BLD HPLC: 5 % (ref 4–5.6)

## 2020-08-07 ENCOUNTER — TELEPHONE (OUTPATIENT)
Dept: DERMATOLOGY | Facility: CLINIC | Age: 55
End: 2020-08-07

## 2020-08-07 NOTE — TELEPHONE ENCOUNTER
Scheduled patient appointment per message received in the Dermatology department. Patient acknowledges appointment made and confirms.       RD        ----- Message from Marsha Flanagan sent at 8/7/2020  1:51 PM CDT -----  Contact: self @ [phone]  Pt is calling to schedule a NP appt for excessive hair loss.  There is a referral in the system.  There is currently nothing available.  That may be due to pot having Medicaid.  Pls call.

## 2020-08-14 ENCOUNTER — OFFICE VISIT (OUTPATIENT)
Dept: DERMATOLOGY | Facility: CLINIC | Age: 55
End: 2020-08-14
Payer: MEDICAID

## 2020-08-14 DIAGNOSIS — L65.9 HAIR LOSS DISORDER: Primary | ICD-10-CM

## 2020-08-14 DIAGNOSIS — L65.9 ALOPECIA: ICD-10-CM

## 2020-08-14 PROCEDURE — 99999 PR PBB SHADOW E&M-EST. PATIENT-LVL III: ICD-10-PCS | Mod: PBBFAC,,, | Performed by: PHYSICIAN ASSISTANT

## 2020-08-14 PROCEDURE — 99999 PR PBB SHADOW E&M-EST. PATIENT-LVL III: CPT | Mod: PBBFAC,,, | Performed by: PHYSICIAN ASSISTANT

## 2020-08-14 PROCEDURE — 99213 OFFICE O/P EST LOW 20 MIN: CPT | Mod: PBBFAC | Performed by: PHYSICIAN ASSISTANT

## 2020-08-14 PROCEDURE — 99202 OFFICE O/P NEW SF 15 MIN: CPT | Mod: S$PBB,,, | Performed by: PHYSICIAN ASSISTANT

## 2020-08-14 PROCEDURE — 99202 PR OFFICE/OUTPT VISIT, NEW, LEVL II, 15-29 MIN: ICD-10-PCS | Mod: S$PBB,,, | Performed by: PHYSICIAN ASSISTANT

## 2020-08-14 RX ORDER — CLOBETASOL PROPIONATE 0.46 MG/ML
SOLUTION TOPICAL
Qty: 50 ML | Refills: 3 | Status: SHIPPED | OUTPATIENT
Start: 2020-08-14 | End: 2021-02-05

## 2020-08-14 RX ORDER — KETOCONAZOLE 20 MG/ML
SHAMPOO, SUSPENSION TOPICAL
Qty: 120 ML | Refills: 5 | Status: SHIPPED | OUTPATIENT
Start: 2020-08-14 | End: 2021-02-05

## 2020-08-14 NOTE — PROGRESS NOTES
Subjective:       Patient ID:  Mae Gregory is a 55 y.o. female who presents for   Chief Complaint   Patient presents with    Hair Loss     scalp, X6mos, thinning, no tx      Pt c/o hair loss (see below).  Recent labs including TSH and CBC wnl.  +fhx of hair loss/ thin hair (mother)    Hair Loss - Initial  Affected locations: scalp  Duration: many yrs but worsened within the past 6 months.  Signs and Symptoms: increased shedding with washing.  Exacerbated by: started shortly after beginning HRT per OBGYN.  Treatments tried: propecia x yrs, nutrafol.  Improvement on treatment: no relief      Review of Systems   Constitutional: Negative for fever, chills, weight loss, weight gain and fatigue.   Genitourinary: Negative for irregular periods (suman-menopausal).   Psychiatric/Behavioral: Negative for high stress.   Hematologic/Lymphatic: Does not bruise/bleed easily.   Allergic/Immunologic: Positive for environmental allergies (seasonal).        Objective:    Physical Exam   Constitutional: She appears well-developed and well-nourished. No distress.   Neurological: She is alert and oriented to person, place, and time. She is not disoriented.   Psychiatric: She has a normal mood and affect.   Skin:   Areas Examined (abnormalities noted in diagram):   Scalp / Hair Palpated and Inspected  Head / Face Inspection Performed  Neck Inspection Performed              Diagram Legend     Erythematous scaling macule/papule c/w actinic keratosis       Vascular papule c/w angioma      Pigmented verrucoid papule/plaque c/w seborrheic keratosis      Yellow umbilicated papule c/w sebaceous hyperplasia      Irregularly shaped tan macule c/w lentigo     1-2 mm smooth white papules consistent with Milia      Movable subcutaneous cyst with punctum c/w epidermal inclusion cyst      Subcutaneous movable cyst c/w pilar cyst      Firm pink to brown papule c/w dermatofibroma      Pedunculated fleshy papule(s) c/w skin tag(s)      Evenly  pigmented macule c/w junctional nevus     Mildly variegated pigmented, slightly irregular-bordered macule c/w mildly atypical nevus      Flesh colored to evenly pigmented papule c/w intradermal nevus       Pink pearly papule/plaque c/w basal cell carcinoma      Erythematous hyperkeratotic cursted plaque c/w SCC      Surgical scar with no sign of skin cancer recurrence      Open and closed comedones      Inflammatory papules and pustules      Verrucoid papule consistent consistent with wart     Erythematous eczematous patches and plaques     Dystrophic onycholytic nail with subungual debris c/w onychomycosis     Umbilicated papule    Erythematous-base heme-crusted tan verrucoid plaque consistent with inflamed seborrheic keratosis     Erythematous Silvery Scaling Plaque c/w Psoriasis     See annotation    Assessment / Plan:      Hair loss disorder (suspect combination of female pattern hair loss and telogen effluvium due to recent increase in hair shedding)  -     ketoconazole (NIZORAL) 2 % shampoo; Wash hair with medicated shampoo at least 2x/week - let sit on scalp at least 5 minutes prior to rinsing  Dispense: 120 mL; Refill: 5  -     clobetasoL (TEMOVATE) 0.05 % external solution; Apply to scalp qhs.  Dispense: 50 mL; Refill: 3    Continue Nutrafol and propecia.    Start Men's otc Rogaine once daily to scalp 5% solution  Use vaseline around hairline to prevent excessive hair growth  Encourage natural (chemical and heat-free) hair styles and limited styling products.  Counseling done on chronic nature of hair loss and that at least a 6 month trial must be done before stopping minoxidil.         Follow up if symptoms worsen or fail to improve.

## 2020-08-19 ENCOUNTER — PATIENT MESSAGE (OUTPATIENT)
Dept: FAMILY MEDICINE | Facility: CLINIC | Age: 55
End: 2020-08-19

## 2020-08-19 DIAGNOSIS — L65.9 HAIR LOSS: ICD-10-CM

## 2020-08-19 DIAGNOSIS — F51.01 PRIMARY INSOMNIA: ICD-10-CM

## 2020-08-19 RX ORDER — FINASTERIDE 5 MG/1
5 TABLET, FILM COATED ORAL DAILY
Qty: 30 TABLET | Refills: 11
Start: 2020-08-19 | End: 2021-02-05 | Stop reason: SDUPTHER

## 2020-08-19 RX ORDER — ZOLPIDEM TARTRATE 5 MG/1
5 TABLET ORAL NIGHTLY PRN
Qty: 30 TABLET | Refills: 5 | Status: CANCELLED | OUTPATIENT
Start: 2020-08-19

## 2020-08-20 ENCOUNTER — PATIENT MESSAGE (OUTPATIENT)
Dept: FAMILY MEDICINE | Facility: CLINIC | Age: 55
End: 2020-08-20

## 2020-08-20 DIAGNOSIS — F51.01 PRIMARY INSOMNIA: ICD-10-CM

## 2020-08-20 RX ORDER — ZOLPIDEM TARTRATE 5 MG/1
5 TABLET ORAL NIGHTLY PRN
Qty: 30 TABLET | Refills: 5 | Status: SHIPPED | OUTPATIENT
Start: 2020-08-20 | End: 2021-02-05 | Stop reason: SDUPTHER

## 2020-08-20 NOTE — TELEPHONE ENCOUNTER
Primary insomnia  -     zolpidem (AMBIEN) 5 MG Tab; Take 1 tablet (5 mg total) by mouth nightly as needed. Followup with doctor NOV 2020  Dispense: 30 tablet; Refill: 5

## 2020-10-06 ENCOUNTER — PATIENT OUTREACH (OUTPATIENT)
Dept: ADMINISTRATIVE | Facility: OTHER | Age: 55
End: 2020-10-06

## 2020-10-07 ENCOUNTER — OFFICE VISIT (OUTPATIENT)
Dept: OBSTETRICS AND GYNECOLOGY | Facility: CLINIC | Age: 55
End: 2020-10-07
Payer: MEDICAID

## 2020-10-07 DIAGNOSIS — Z01.419 WELL WOMAN EXAM WITH ROUTINE GYNECOLOGICAL EXAM: Primary | ICD-10-CM

## 2020-10-07 DIAGNOSIS — N95.1 SYMPTOMATIC MENOPAUSAL OR FEMALE CLIMACTERIC STATES: ICD-10-CM

## 2020-10-07 DIAGNOSIS — N76.0 ACUTE VAGINITIS: ICD-10-CM

## 2020-10-07 PROCEDURE — 99396 PR PREVENTIVE VISIT,EST,40-64: ICD-10-PCS | Mod: S$PBB,,, | Performed by: OBSTETRICS & GYNECOLOGY

## 2020-10-07 PROCEDURE — 99999 PR PBB SHADOW E&M-EST. PATIENT-LVL III: ICD-10-PCS | Mod: PBBFAC,,, | Performed by: OBSTETRICS & GYNECOLOGY

## 2020-10-07 PROCEDURE — 99999 PR PBB SHADOW E&M-EST. PATIENT-LVL III: CPT | Mod: PBBFAC,,, | Performed by: OBSTETRICS & GYNECOLOGY

## 2020-10-07 PROCEDURE — 88175 CYTOPATH C/V AUTO FLUID REDO: CPT

## 2020-10-07 PROCEDURE — 99396 PREV VISIT EST AGE 40-64: CPT | Mod: S$PBB,,, | Performed by: OBSTETRICS & GYNECOLOGY

## 2020-10-07 PROCEDURE — 99213 OFFICE O/P EST LOW 20 MIN: CPT | Mod: PBBFAC,PO | Performed by: OBSTETRICS & GYNECOLOGY

## 2020-10-07 PROCEDURE — 87624 HPV HI-RISK TYP POOLED RSLT: CPT

## 2020-10-07 RX ORDER — ESTRADIOL AND NORETHINDRONE ACETATE 1; .5 MG/1; MG/1
1 TABLET ORAL DAILY
Qty: 28 TABLET | Refills: 12 | Status: SHIPPED | OUTPATIENT
Start: 2020-10-07 | End: 2021-09-28 | Stop reason: SDUPTHER

## 2020-10-07 NOTE — PROGRESS NOTES
SUBJECTIVE:   55 y.o. female  complains of white vaginal discharge for 1 week. No LMP recorded. Patient has had an ablation..  She is doing well on her hrt.    ROS:  GENERAL: No fever, chills, fatigability or weight loss.  VULVAR: No pain, no lesions and no itching.  VAGINAL: No relaxation, no itching, no discharge, no abnormal bleeding and no lesions.  ABDOMEN: No abdominal pain. Denies nausea. Denies vomiting. No diarrhea. No constipation  BREAST: Denies pain. No lumps. No discharge.  URINARY: No incontinence, no nocturia, no frequency and no dysuria.  CARDIOVASCULAR: No chest pain. No shortness of breath. No leg cramps.  NEUROLOGICAL: No headaches. No vision changes.        There were no vitals filed for this visit.      OBJECTIVE:   She appears well, afebrile.  Abdomen: benign, soft, nontender, no masses.  VULVA: Normal external female genitalia, normal urethra, normal urethral meatus  VAGINA:discharge: scant and white  CERVIXNormal  UTERUSnormal size, contour, position, consistency, mobility, non-tender  ADNEXAnormal adnexa and no mass, fullness, tenderness      ASSESSMENT:   Mae was seen today for well woman and vaginal discharge.    Diagnoses and all orders for this visit:    Well woman exam with routine gynecological exam  -     Liquid-Based Pap Smear, Screening  -     HPV High Risk Genotypes, PCR    Acute vaginitis  -     Vaginosis Screen by DNA Probe

## 2020-10-08 LAB
CANDIDA RRNA VAG QL PROBE: NEGATIVE
G VAGINALIS RRNA GENITAL QL PROBE: NEGATIVE
T VAGINALIS RRNA GENITAL QL PROBE: NEGATIVE

## 2020-10-13 ENCOUNTER — PATIENT MESSAGE (OUTPATIENT)
Dept: FAMILY MEDICINE | Facility: CLINIC | Age: 55
End: 2020-10-13

## 2020-10-13 DIAGNOSIS — M79.673 PAIN OF FOOT, UNSPECIFIED LATERALITY: Primary | ICD-10-CM

## 2020-10-13 NOTE — TELEPHONE ENCOUNTER
Pain of foot, unspecified laterality  -     Ambulatory referral/consult to Podiatry; Future; Expected date: 10/20/2020

## 2020-10-14 LAB
HPV HR 12 DNA SPEC QL NAA+PROBE: NEGATIVE
HPV16 AG SPEC QL: NEGATIVE
HPV18 DNA SPEC QL NAA+PROBE: NEGATIVE

## 2020-11-03 LAB
FINAL PATHOLOGIC DIAGNOSIS: NORMAL
Lab: NORMAL

## 2020-12-31 ENCOUNTER — HOSPITAL ENCOUNTER (OUTPATIENT)
Dept: RADIOLOGY | Facility: HOSPITAL | Age: 55
Discharge: HOME | End: 2020-12-31
Attending: PODIATRIST
Payer: MEDICAID

## 2020-12-31 ENCOUNTER — OFFICE VISIT (OUTPATIENT)
Dept: PODIATRY | Facility: CLINIC | Age: 55
End: 2020-12-31
Payer: MEDICAID

## 2020-12-31 VITALS — BODY MASS INDEX: 21.67 KG/M2 | HEIGHT: 65 IN | WEIGHT: 130.06 LBS

## 2020-12-31 DIAGNOSIS — M79.673 PAIN OF FOOT, UNSPECIFIED LATERALITY: ICD-10-CM

## 2020-12-31 DIAGNOSIS — M79.673 PAIN OF FOOT, UNSPECIFIED LATERALITY: Primary | ICD-10-CM

## 2020-12-31 PROCEDURE — 99203 OFFICE O/P NEW LOW 30 MIN: CPT | Mod: S$PBB,,, | Performed by: PODIATRIST

## 2020-12-31 PROCEDURE — 99999 PR PBB SHADOW E&M-EST. PATIENT-LVL V: CPT | Mod: PBBFAC,,, | Performed by: PODIATRIST

## 2020-12-31 PROCEDURE — 73630 XR FOOT COMPLETE 3 VIEW BILATERAL: ICD-10-PCS | Mod: 26,50,, | Performed by: RADIOLOGY

## 2020-12-31 PROCEDURE — 73630 X-RAY EXAM OF FOOT: CPT | Mod: 26,50,, | Performed by: RADIOLOGY

## 2020-12-31 PROCEDURE — 73630 X-RAY EXAM OF FOOT: CPT | Mod: TC,50,FY,PO

## 2020-12-31 PROCEDURE — 99999 PR PBB SHADOW E&M-EST. PATIENT-LVL V: ICD-10-PCS | Mod: PBBFAC,,, | Performed by: PODIATRIST

## 2020-12-31 PROCEDURE — 99203 PR OFFICE/OUTPT VISIT, NEW, LEVL III, 30-44 MIN: ICD-10-PCS | Mod: S$PBB,,, | Performed by: PODIATRIST

## 2020-12-31 PROCEDURE — 99215 OFFICE O/P EST HI 40 MIN: CPT | Mod: PBBFAC,25,PO | Performed by: PODIATRIST

## 2020-12-31 RX ORDER — DICLOFENAC SODIUM 10 MG/G
2 GEL TOPICAL DAILY
Qty: 100 G | Refills: 3 | Status: SHIPPED | OUTPATIENT
Start: 2020-12-31 | End: 2021-09-28

## 2020-12-31 RX ORDER — METHYLPREDNISOLONE 4 MG/1
TABLET ORAL
Qty: 1 PACKAGE | Refills: 0 | Status: SHIPPED | OUTPATIENT
Start: 2020-12-31 | End: 2021-01-21

## 2021-01-01 ENCOUNTER — PATIENT OUTREACH (OUTPATIENT)
Dept: ADMINISTRATIVE | Facility: OTHER | Age: 56
End: 2021-01-01

## 2021-01-04 ENCOUNTER — HOSPITAL ENCOUNTER (OUTPATIENT)
Dept: RADIOLOGY | Facility: HOSPITAL | Age: 56
Discharge: HOME | End: 2021-01-04
Attending: PODIATRIST
Payer: MEDICAID

## 2021-01-04 DIAGNOSIS — M79.673 PAIN OF FOOT, UNSPECIFIED LATERALITY: ICD-10-CM

## 2021-01-04 PROCEDURE — 73718 MRI FOOT (MIDFOOT) LEFT WITHOUT CONTRAST: ICD-10-PCS | Mod: 26,LT,, | Performed by: RADIOLOGY

## 2021-01-04 PROCEDURE — 73718 MRI LOWER EXTREMITY W/O DYE: CPT | Mod: TC,PO,LT

## 2021-01-04 PROCEDURE — 73718 MRI LOWER EXTREMITY W/O DYE: CPT | Mod: 26,LT,, | Performed by: RADIOLOGY

## 2021-01-09 ENCOUNTER — TELEPHONE (OUTPATIENT)
Dept: PODIATRY | Facility: CLINIC | Age: 56
End: 2021-01-09

## 2021-01-13 ENCOUNTER — CLINICAL SUPPORT (OUTPATIENT)
Dept: REHABILITATION | Facility: HOSPITAL | Age: 56
End: 2021-01-13
Attending: PODIATRIST
Payer: MEDICAID

## 2021-01-13 DIAGNOSIS — M79.673 PAIN OF FOOT, UNSPECIFIED LATERALITY: ICD-10-CM

## 2021-01-13 DIAGNOSIS — R52 PAIN: ICD-10-CM

## 2021-01-13 PROCEDURE — 97161 PT EVAL LOW COMPLEX 20 MIN: CPT | Performed by: PHYSICAL THERAPIST

## 2021-01-13 PROCEDURE — 97140 MANUAL THERAPY 1/> REGIONS: CPT | Performed by: PHYSICAL THERAPIST

## 2021-01-25 ENCOUNTER — CLINICAL SUPPORT (OUTPATIENT)
Dept: REHABILITATION | Facility: HOSPITAL | Age: 56
End: 2021-01-25
Attending: PODIATRIST
Payer: MEDICAID

## 2021-01-25 DIAGNOSIS — R52 PAIN: ICD-10-CM

## 2021-01-25 PROCEDURE — 97110 THERAPEUTIC EXERCISES: CPT | Mod: CQ

## 2021-01-27 DIAGNOSIS — Z12.31 OTHER SCREENING MAMMOGRAM: ICD-10-CM

## 2021-02-02 ENCOUNTER — CLINICAL SUPPORT (OUTPATIENT)
Dept: REHABILITATION | Facility: HOSPITAL | Age: 56
End: 2021-02-02
Attending: PODIATRIST
Payer: MEDICAID

## 2021-02-02 DIAGNOSIS — R52 PAIN: ICD-10-CM

## 2021-02-02 PROCEDURE — 97110 THERAPEUTIC EXERCISES: CPT | Performed by: PHYSICAL THERAPIST

## 2021-02-02 PROCEDURE — 97140 MANUAL THERAPY 1/> REGIONS: CPT | Performed by: PHYSICAL THERAPIST

## 2021-02-05 ENCOUNTER — OFFICE VISIT (OUTPATIENT)
Dept: FAMILY MEDICINE | Facility: CLINIC | Age: 56
End: 2021-02-05
Payer: MEDICAID

## 2021-02-05 VITALS
HEART RATE: 79 BPM | BODY MASS INDEX: 21.6 KG/M2 | HEIGHT: 65 IN | DIASTOLIC BLOOD PRESSURE: 71 MMHG | WEIGHT: 129.63 LBS | OXYGEN SATURATION: 97 % | RESPIRATION RATE: 18 BRPM | SYSTOLIC BLOOD PRESSURE: 110 MMHG

## 2021-02-05 DIAGNOSIS — B00.1 FEVER BLISTER: ICD-10-CM

## 2021-02-05 DIAGNOSIS — J45.20 MILD INTERMITTENT ASTHMA WITHOUT COMPLICATION: Primary | ICD-10-CM

## 2021-02-05 DIAGNOSIS — L65.9 HAIR LOSS: ICD-10-CM

## 2021-02-05 DIAGNOSIS — F51.01 PRIMARY INSOMNIA: ICD-10-CM

## 2021-02-05 PROCEDURE — 99999 PR PBB SHADOW E&M-EST. PATIENT-LVL IV: CPT | Mod: PBBFAC,,, | Performed by: FAMILY MEDICINE

## 2021-02-05 PROCEDURE — 99999 PR PBB SHADOW E&M-EST. PATIENT-LVL IV: ICD-10-PCS | Mod: PBBFAC,,, | Performed by: FAMILY MEDICINE

## 2021-02-05 PROCEDURE — 99214 OFFICE O/P EST MOD 30 MIN: CPT | Mod: S$PBB,,, | Performed by: FAMILY MEDICINE

## 2021-02-05 PROCEDURE — 99214 OFFICE O/P EST MOD 30 MIN: CPT | Mod: PBBFAC,PO | Performed by: FAMILY MEDICINE

## 2021-02-05 PROCEDURE — 99214 PR OFFICE/OUTPT VISIT, EST, LEVL IV, 30-39 MIN: ICD-10-PCS | Mod: S$PBB,,, | Performed by: FAMILY MEDICINE

## 2021-02-05 RX ORDER — FINASTERIDE 5 MG/1
5 TABLET, FILM COATED ORAL DAILY
Qty: 30 TABLET | Refills: 11
Start: 2021-02-05 | End: 2024-02-26

## 2021-02-05 RX ORDER — VALACYCLOVIR HYDROCHLORIDE 1 G/1
2000 TABLET, FILM COATED ORAL EVERY 12 HOURS
Qty: 4 TABLET | Refills: 3 | Status: SHIPPED | OUTPATIENT
Start: 2021-02-05 | End: 2021-12-03 | Stop reason: SDUPTHER

## 2021-02-05 RX ORDER — ALBUTEROL SULFATE 90 UG/1
AEROSOL, METERED RESPIRATORY (INHALATION)
Qty: 18 G | Refills: 12 | Status: SHIPPED | OUTPATIENT
Start: 2021-02-05 | End: 2022-12-02 | Stop reason: SDUPTHER

## 2021-02-05 RX ORDER — MINOCYCLINE HYDROCHLORIDE 100 MG/1
CAPSULE ORAL
COMMUNITY
Start: 2021-01-16 | End: 2021-09-13

## 2021-02-05 RX ORDER — SPIRONOLACTONE 100 MG/1
TABLET, FILM COATED ORAL
COMMUNITY
Start: 2021-01-16 | End: 2021-09-13

## 2021-02-05 RX ORDER — ZOLPIDEM TARTRATE 5 MG/1
5 TABLET ORAL NIGHTLY PRN
Qty: 30 TABLET | Refills: 4 | Status: SHIPPED | OUTPATIENT
Start: 2021-02-05 | End: 2021-07-12 | Stop reason: SDUPTHER

## 2021-02-08 ENCOUNTER — HOSPITAL ENCOUNTER (OUTPATIENT)
Dept: RADIOLOGY | Facility: HOSPITAL | Age: 56
Discharge: HOME | End: 2021-02-08
Attending: FAMILY MEDICINE
Payer: MEDICAID

## 2021-02-08 DIAGNOSIS — Z12.31 OTHER SCREENING MAMMOGRAM: ICD-10-CM

## 2021-02-08 PROCEDURE — 77067 MAMMO DIGITAL SCREENING BILAT WITH TOMO: ICD-10-PCS | Mod: 26,,, | Performed by: RADIOLOGY

## 2021-02-08 PROCEDURE — 77067 SCR MAMMO BI INCL CAD: CPT | Mod: 26,,, | Performed by: RADIOLOGY

## 2021-02-08 PROCEDURE — 77067 SCR MAMMO BI INCL CAD: CPT | Mod: TC,PO

## 2021-02-08 PROCEDURE — 77063 BREAST TOMOSYNTHESIS BI: CPT | Mod: 26,,, | Performed by: RADIOLOGY

## 2021-02-08 PROCEDURE — 77063 MAMMO DIGITAL SCREENING BILAT WITH TOMO: ICD-10-PCS | Mod: 26,,, | Performed by: RADIOLOGY

## 2021-02-09 ENCOUNTER — CLINICAL SUPPORT (OUTPATIENT)
Dept: REHABILITATION | Facility: HOSPITAL | Age: 56
End: 2021-02-09
Attending: PODIATRIST
Payer: MEDICAID

## 2021-02-09 DIAGNOSIS — R52 PAIN: ICD-10-CM

## 2021-02-09 PROCEDURE — 97110 THERAPEUTIC EXERCISES: CPT | Performed by: PHYSICAL THERAPIST

## 2021-02-09 PROCEDURE — 97140 MANUAL THERAPY 1/> REGIONS: CPT | Performed by: PHYSICAL THERAPIST

## 2021-03-11 ENCOUNTER — OFFICE VISIT (OUTPATIENT)
Dept: PODIATRY | Facility: CLINIC | Age: 56
End: 2021-03-11
Payer: MEDICAID

## 2021-03-11 VITALS — BODY MASS INDEX: 21.6 KG/M2 | WEIGHT: 129.63 LBS | HEIGHT: 65 IN

## 2021-03-11 DIAGNOSIS — D36.10 NEUROMA: ICD-10-CM

## 2021-03-11 DIAGNOSIS — M79.671 RIGHT FOOT PAIN: Primary | ICD-10-CM

## 2021-03-11 PROCEDURE — 99999 PR PBB SHADOW E&M-EST. PATIENT-LVL III: CPT | Mod: PBBFAC,,, | Performed by: PODIATRIST

## 2021-03-11 PROCEDURE — 99499 UNLISTED E&M SERVICE: CPT | Mod: S$PBB,,, | Performed by: PODIATRIST

## 2021-03-11 PROCEDURE — 99213 OFFICE O/P EST LOW 20 MIN: CPT | Mod: PBBFAC,PO | Performed by: PODIATRIST

## 2021-03-11 PROCEDURE — 99499 NO LOS: ICD-10-PCS | Mod: S$PBB,,, | Performed by: PODIATRIST

## 2021-03-11 PROCEDURE — 64455 PR INJECT ANES/STEROID PLANTAR COMMON DIGITAL NERVE: ICD-10-PCS | Mod: S$PBB,RT,, | Performed by: PODIATRIST

## 2021-03-11 PROCEDURE — 64455 NJX AA&/STRD PLTR COM DG NRV: CPT | Mod: S$PBB,RT,, | Performed by: PODIATRIST

## 2021-03-11 PROCEDURE — 64455 NJX AA&/STRD PLTR COM DG NRV: CPT | Mod: PBBFAC,PO | Performed by: PODIATRIST

## 2021-03-11 PROCEDURE — 99999 PR PBB SHADOW E&M-EST. PATIENT-LVL III: ICD-10-PCS | Mod: PBBFAC,,, | Performed by: PODIATRIST

## 2021-03-11 RX ORDER — TRIAMCINOLONE ACETONIDE 40 MG/ML
20 INJECTION, SUSPENSION INTRA-ARTICULAR; INTRAMUSCULAR ONCE
Status: COMPLETED | OUTPATIENT
Start: 2021-03-11 | End: 2021-03-11

## 2021-03-11 RX ADMIN — TRIAMCINOLONE ACETONIDE 20 MG: 40 INJECTION, SUSPENSION INTRA-ARTICULAR; INTRAMUSCULAR at 11:03

## 2021-03-23 LAB — HEMOCCULT STL QL IA: NEGATIVE

## 2021-03-29 ENCOUNTER — PATIENT OUTREACH (OUTPATIENT)
Dept: ADMINISTRATIVE | Facility: HOSPITAL | Age: 56
End: 2021-03-29

## 2021-04-12 DIAGNOSIS — F41.9 ANXIETY: ICD-10-CM

## 2021-04-12 DIAGNOSIS — Z00.00 ANNUAL PHYSICAL EXAM: Primary | ICD-10-CM

## 2021-04-12 RX ORDER — BUPROPION HYDROCHLORIDE 150 MG/1
150 TABLET ORAL DAILY
Qty: 90 TABLET | Refills: 1 | Status: SHIPPED | OUTPATIENT
Start: 2021-04-12 | End: 2021-10-25

## 2021-05-27 ENCOUNTER — OFFICE VISIT (OUTPATIENT)
Dept: PODIATRY | Facility: CLINIC | Age: 56
End: 2021-05-27
Payer: MEDICAID

## 2021-05-27 VITALS — HEIGHT: 65 IN | WEIGHT: 129.63 LBS | BODY MASS INDEX: 21.6 KG/M2

## 2021-05-27 DIAGNOSIS — M79.671 RIGHT FOOT PAIN: Primary | ICD-10-CM

## 2021-05-27 DIAGNOSIS — D36.10 NEUROMA: ICD-10-CM

## 2021-05-27 PROCEDURE — 99214 OFFICE O/P EST MOD 30 MIN: CPT | Mod: S$PBB,,, | Performed by: PODIATRIST

## 2021-05-27 PROCEDURE — 99999 PR PBB SHADOW E&M-EST. PATIENT-LVL IV: CPT | Mod: PBBFAC,,, | Performed by: PODIATRIST

## 2021-05-27 PROCEDURE — 99999 PR PBB SHADOW E&M-EST. PATIENT-LVL IV: ICD-10-PCS | Mod: PBBFAC,,, | Performed by: PODIATRIST

## 2021-05-27 PROCEDURE — 99214 PR OFFICE/OUTPT VISIT, EST, LEVL IV, 30-39 MIN: ICD-10-PCS | Mod: S$PBB,,, | Performed by: PODIATRIST

## 2021-05-27 PROCEDURE — 99214 OFFICE O/P EST MOD 30 MIN: CPT | Mod: PBBFAC,PO | Performed by: PODIATRIST

## 2021-07-09 ENCOUNTER — PATIENT MESSAGE (OUTPATIENT)
Dept: FAMILY MEDICINE | Facility: CLINIC | Age: 56
End: 2021-07-09

## 2021-07-09 DIAGNOSIS — F51.01 PRIMARY INSOMNIA: ICD-10-CM

## 2021-07-12 ENCOUNTER — TELEPHONE (OUTPATIENT)
Dept: RHEUMATOLOGY | Facility: CLINIC | Age: 56
End: 2021-07-12

## 2021-07-12 RX ORDER — ZOLPIDEM TARTRATE 5 MG/1
5 TABLET ORAL NIGHTLY PRN
Qty: 30 TABLET | Refills: 4 | Status: SHIPPED | OUTPATIENT
Start: 2021-07-12 | End: 2021-07-20

## 2021-07-15 ENCOUNTER — PATIENT MESSAGE (OUTPATIENT)
Dept: FAMILY MEDICINE | Facility: CLINIC | Age: 56
End: 2021-07-15

## 2021-07-20 ENCOUNTER — PATIENT MESSAGE (OUTPATIENT)
Dept: FAMILY MEDICINE | Facility: CLINIC | Age: 56
End: 2021-07-20

## 2021-07-20 RX ORDER — MONTELUKAST SODIUM 10 MG/1
10 TABLET ORAL DAILY
Qty: 30 TABLET | Refills: 3 | Status: SHIPPED | OUTPATIENT
Start: 2021-07-20 | End: 2021-09-13

## 2021-09-13 ENCOUNTER — OFFICE VISIT (OUTPATIENT)
Dept: RHEUMATOLOGY | Facility: CLINIC | Age: 56
End: 2021-09-13
Payer: MEDICAID

## 2021-09-13 DIAGNOSIS — D36.10 NEUROMA: ICD-10-CM

## 2021-09-13 DIAGNOSIS — I73.00 RAYNAUD'S PHENOMENON WITHOUT GANGRENE: Primary | ICD-10-CM

## 2021-09-13 PROCEDURE — 99204 PR OFFICE/OUTPT VISIT, NEW, LEVL IV, 45-59 MIN: ICD-10-PCS | Mod: 95,,,

## 2021-09-13 PROCEDURE — 99204 OFFICE O/P NEW MOD 45 MIN: CPT | Mod: 95,,,

## 2021-09-14 ENCOUNTER — TELEPHONE (OUTPATIENT)
Dept: FAMILY MEDICINE | Facility: CLINIC | Age: 56
End: 2021-09-14

## 2021-09-28 ENCOUNTER — OFFICE VISIT (OUTPATIENT)
Dept: OBSTETRICS AND GYNECOLOGY | Facility: CLINIC | Age: 56
End: 2021-09-28
Attending: OBSTETRICS & GYNECOLOGY
Payer: MEDICAID

## 2021-09-28 VITALS
BODY MASS INDEX: 21.01 KG/M2 | WEIGHT: 126.13 LBS | HEIGHT: 65 IN | DIASTOLIC BLOOD PRESSURE: 70 MMHG | SYSTOLIC BLOOD PRESSURE: 140 MMHG

## 2021-09-28 DIAGNOSIS — N95.1 SYMPTOMATIC MENOPAUSAL OR FEMALE CLIMACTERIC STATES: ICD-10-CM

## 2021-09-28 DIAGNOSIS — R39.15 URINARY URGENCY: ICD-10-CM

## 2021-09-28 DIAGNOSIS — Z01.419 WELL WOMAN EXAM WITH ROUTINE GYNECOLOGICAL EXAM: Primary | ICD-10-CM

## 2021-09-28 DIAGNOSIS — N76.0 ACUTE VAGINITIS: ICD-10-CM

## 2021-09-28 PROCEDURE — 99999 PR PBB SHADOW E&M-EST. PATIENT-LVL III: CPT | Mod: PBBFAC,,, | Performed by: OBSTETRICS & GYNECOLOGY

## 2021-09-28 PROCEDURE — 99999 PR PBB SHADOW E&M-EST. PATIENT-LVL III: ICD-10-PCS | Mod: PBBFAC,,, | Performed by: OBSTETRICS & GYNECOLOGY

## 2021-09-28 PROCEDURE — 87481 CANDIDA DNA AMP PROBE: CPT | Mod: 59 | Performed by: OBSTETRICS & GYNECOLOGY

## 2021-09-28 PROCEDURE — 99213 OFFICE O/P EST LOW 20 MIN: CPT | Mod: PBBFAC,PN | Performed by: OBSTETRICS & GYNECOLOGY

## 2021-09-28 PROCEDURE — 99396 PR PREVENTIVE VISIT,EST,40-64: ICD-10-PCS | Mod: S$PBB,,, | Performed by: OBSTETRICS & GYNECOLOGY

## 2021-09-28 PROCEDURE — 87086 URINE CULTURE/COLONY COUNT: CPT | Performed by: OBSTETRICS & GYNECOLOGY

## 2021-09-28 PROCEDURE — 99396 PREV VISIT EST AGE 40-64: CPT | Mod: S$PBB,,, | Performed by: OBSTETRICS & GYNECOLOGY

## 2021-09-28 RX ORDER — ESTRADIOL AND NORETHINDRONE ACETATE 1; .5 MG/1; MG/1
1 TABLET ORAL DAILY
Qty: 28 TABLET | Refills: 12 | Status: SHIPPED | OUTPATIENT
Start: 2021-09-28 | End: 2021-10-20

## 2021-09-30 ENCOUNTER — PATIENT MESSAGE (OUTPATIENT)
Dept: OBSTETRICS AND GYNECOLOGY | Facility: CLINIC | Age: 56
End: 2021-09-30

## 2021-09-30 LAB — BACTERIA UR CULT: NORMAL

## 2021-09-30 RX ORDER — OXYBUTYNIN CHLORIDE 5 MG/1
5 TABLET ORAL 2 TIMES DAILY
Qty: 60 TABLET | Refills: 2 | Status: SHIPPED | OUTPATIENT
Start: 2021-09-30 | End: 2022-03-30

## 2021-10-04 LAB
BACTERIAL VAGINOSIS DNA: NEGATIVE
CANDIDA GLABRATA DNA: NEGATIVE
CANDIDA KRUSEI DNA: NEGATIVE
CANDIDA RRNA VAG QL PROBE: POSITIVE
T VAGINALIS RRNA GENITAL QL PROBE: NEGATIVE

## 2021-10-05 ENCOUNTER — PATIENT MESSAGE (OUTPATIENT)
Dept: OBSTETRICS AND GYNECOLOGY | Facility: CLINIC | Age: 56
End: 2021-10-05

## 2021-10-05 RX ORDER — FLUCONAZOLE 150 MG/1
150 TABLET ORAL DAILY
Qty: 1 TABLET | Refills: 1 | Status: SHIPPED | OUTPATIENT
Start: 2021-10-05 | End: 2021-10-06

## 2021-10-15 ENCOUNTER — LAB VISIT (OUTPATIENT)
Dept: LAB | Facility: HOSPITAL | Age: 56
End: 2021-10-15
Payer: MEDICAID

## 2021-10-15 DIAGNOSIS — I73.00 RAYNAUD'S PHENOMENON WITHOUT GANGRENE: ICD-10-CM

## 2021-10-15 PROCEDURE — 84165 PATHOLOGIST INTERPRETATION SPE: ICD-10-PCS | Mod: 26,,, | Performed by: PATHOLOGY

## 2021-10-15 PROCEDURE — 86334 PATHOLOGIST INTERPRETATION IFE: ICD-10-PCS | Mod: 26,,, | Performed by: PATHOLOGY

## 2021-10-15 PROCEDURE — 86146 BETA-2 GLYCOPROTEIN ANTIBODY: CPT | Mod: 59

## 2021-10-15 PROCEDURE — 36415 COLL VENOUS BLD VENIPUNCTURE: CPT

## 2021-10-15 PROCEDURE — 86334 IMMUNOFIX E-PHORESIS SERUM: CPT

## 2021-10-15 PROCEDURE — 84165 PROTEIN E-PHORESIS SERUM: CPT | Mod: 26,,, | Performed by: PATHOLOGY

## 2021-10-15 PROCEDURE — 85613 RUSSELL VIPER VENOM DILUTED: CPT

## 2021-10-15 PROCEDURE — 86147 CARDIOLIPIN ANTIBODY EA IG: CPT

## 2021-10-15 PROCEDURE — 86038 ANTINUCLEAR ANTIBODIES: CPT

## 2021-10-15 PROCEDURE — 86334 IMMUNOFIX E-PHORESIS SERUM: CPT | Mod: 26,,, | Performed by: PATHOLOGY

## 2021-10-15 PROCEDURE — 84165 PROTEIN E-PHORESIS SERUM: CPT

## 2021-10-18 LAB
ALBUMIN SERPL ELPH-MCNC: 4.06 G/DL (ref 3.35–5.55)
ALPHA1 GLOB SERPL ELPH-MCNC: 0.26 G/DL (ref 0.17–0.41)
ALPHA2 GLOB SERPL ELPH-MCNC: 0.63 G/DL (ref 0.43–0.99)
ANA SER QL IF: NORMAL
B-GLOBULIN SERPL ELPH-MCNC: 0.87 G/DL (ref 0.5–1.1)
CARDIOLIPIN IGG SER IA-ACNC: <9.4 GPL (ref 0–14.99)
CARDIOLIPIN IGM SER IA-ACNC: <9.4 MPL (ref 0–12.49)
GAMMA GLOB SERPL ELPH-MCNC: 0.58 G/DL (ref 0.67–1.58)
INTERPRETATION SERPL IFE-IMP: NORMAL
PATHOLOGIST INTERPRETATION IFE: NORMAL
PROT SERPL-MCNC: 6.4 G/DL (ref 6–8.4)

## 2021-10-19 LAB
B2 GLYCOPROT1 IGA SER QL: <9 SAU
B2 GLYCOPROT1 IGG SER QL: <9 SGU
B2 GLYCOPROT1 IGM SER QL: <9 SMU
PATHOLOGIST INTERPRETATION SPE: NORMAL

## 2021-10-20 LAB
APTT IMM NP PPP: ABNORMAL SEC (ref 32–48)
APTT P HEP NEUT PPP: ABNORMAL SEC (ref 32–48)
CONFIRM APTT STACLOT: ABNORMAL
DRVVT SCREEN TO CONFIRM RATIO: ABNORMAL RATIO
LA 3 SCREEN W REFLEX-IMP: ABNORMAL
LA NT DPL PPP QL: ABNORMAL
MIXING DRVVT: ABNORMAL SEC (ref 33–44)
PROTHROMBIN TIME: 12.5 SEC (ref 12–15.5)
REPTILASE TIME: ABNORMAL SEC
SCREEN APTT: 35 SEC (ref 32–48)
SCREEN DRVVT: 32 SEC (ref 33–44)
THROMBIN TIME: ABNORMAL SEC (ref 14.7–19.5)

## 2021-10-25 ENCOUNTER — LAB VISIT (OUTPATIENT)
Dept: LAB | Facility: HOSPITAL | Age: 56
End: 2021-10-25
Payer: MEDICAID

## 2021-10-25 ENCOUNTER — OFFICE VISIT (OUTPATIENT)
Dept: RHEUMATOLOGY | Facility: CLINIC | Age: 56
End: 2021-10-25
Payer: MEDICAID

## 2021-10-25 VITALS
SYSTOLIC BLOOD PRESSURE: 120 MMHG | WEIGHT: 129.19 LBS | BODY MASS INDEX: 21.52 KG/M2 | HEIGHT: 65 IN | DIASTOLIC BLOOD PRESSURE: 86 MMHG

## 2021-10-25 DIAGNOSIS — R13.10 DYSPHAGIA, UNSPECIFIED TYPE: ICD-10-CM

## 2021-10-25 DIAGNOSIS — R77.8 ABNORMAL SPEP: ICD-10-CM

## 2021-10-25 DIAGNOSIS — I73.00 RAYNAUD'S PHENOMENON WITHOUT GANGRENE: Primary | ICD-10-CM

## 2021-10-25 LAB
C3 SERPL-MCNC: 105 MG/DL (ref 50–180)
C4 SERPL-MCNC: 28 MG/DL (ref 11–44)
CCP AB SER IA-ACNC: <0.5 U/ML
IGA SERPL-MCNC: 380 MG/DL (ref 40–350)
IGG SERPL-MCNC: 619 MG/DL (ref 650–1600)
IGM SERPL-MCNC: 27 MG/DL (ref 50–300)
RHEUMATOID FACT SERPL-ACNC: <13 IU/ML (ref 0–15)

## 2021-10-25 PROCEDURE — 82595 ASSAY OF CRYOGLOBULIN: CPT

## 2021-10-25 PROCEDURE — 86160 COMPLEMENT ANTIGEN: CPT | Mod: 59

## 2021-10-25 PROCEDURE — 86162 COMPLEMENT TOTAL (CH50): CPT

## 2021-10-25 PROCEDURE — 86200 CCP ANTIBODY: CPT

## 2021-10-25 PROCEDURE — 86803 HEPATITIS C AB TEST: CPT

## 2021-10-25 PROCEDURE — 86160 COMPLEMENT ANTIGEN: CPT

## 2021-10-25 PROCEDURE — 86431 RHEUMATOID FACTOR QUANT: CPT

## 2021-10-25 PROCEDURE — 86704 HEP B CORE ANTIBODY TOTAL: CPT

## 2021-10-25 PROCEDURE — 99214 OFFICE O/P EST MOD 30 MIN: CPT | Mod: S$PBB,,,

## 2021-10-25 PROCEDURE — 99999 PR PBB SHADOW E&M-EST. PATIENT-LVL IV: CPT | Mod: PBBFAC,,,

## 2021-10-25 PROCEDURE — 86706 HEP B SURFACE ANTIBODY: CPT

## 2021-10-25 PROCEDURE — 82784 ASSAY IGA/IGD/IGG/IGM EACH: CPT

## 2021-10-25 PROCEDURE — 99214 OFFICE O/P EST MOD 30 MIN: CPT | Mod: PBBFAC

## 2021-10-25 PROCEDURE — 87340 HEPATITIS B SURFACE AG IA: CPT

## 2021-10-25 PROCEDURE — 99214 PR OFFICE/OUTPT VISIT, EST, LEVL IV, 30-39 MIN: ICD-10-PCS | Mod: S$PBB,,,

## 2021-10-25 PROCEDURE — 99999 PR PBB SHADOW E&M-EST. PATIENT-LVL IV: ICD-10-PCS | Mod: PBBFAC,,,

## 2021-10-25 RX ORDER — AMLODIPINE BESYLATE 2.5 MG/1
2.5 TABLET ORAL DAILY
Qty: 30 TABLET | Refills: 3 | Status: SHIPPED | OUTPATIENT
Start: 2021-10-25 | End: 2022-03-21 | Stop reason: SDUPTHER

## 2021-10-25 ASSESSMENT — ROUTINE ASSESSMENT OF PATIENT INDEX DATA (RAPID3)
PSYCHOLOGICAL DISTRESS SCORE: 0
MDHAQ FUNCTION SCORE: 0
AM STIFFNESS SCORE: 0, NO
PAIN SCORE: 0
PATIENT GLOBAL ASSESSMENT SCORE: 0.5
FATIGUE SCORE: 0
TOTAL RAPID3 SCORE: 0.17

## 2021-10-26 LAB
HBV CORE AB SERPL QL IA: NEGATIVE
HBV SURFACE AB SER-ACNC: POSITIVE M[IU]/ML
HBV SURFACE AG SERPL QL IA: NEGATIVE
HCV AB SERPL QL IA: NEGATIVE

## 2021-10-29 LAB — CH50 SERPL-ACNC: 78 U/ML (ref 42–95)

## 2021-11-04 LAB — CRYOGLOB SER QL: NORMAL

## 2021-11-06 ENCOUNTER — PATIENT MESSAGE (OUTPATIENT)
Dept: RHEUMATOLOGY | Facility: CLINIC | Age: 56
End: 2021-11-06
Payer: MEDICAID

## 2021-11-06 DIAGNOSIS — R77.8 ABNORMAL SPEP: Primary | ICD-10-CM

## 2021-11-16 ENCOUNTER — OFFICE VISIT (OUTPATIENT)
Dept: HEMATOLOGY/ONCOLOGY | Facility: CLINIC | Age: 56
End: 2021-11-16
Payer: MEDICAID

## 2021-11-16 DIAGNOSIS — R77.8 ABNORMAL SPEP: ICD-10-CM

## 2021-11-16 PROCEDURE — 99205 PR OFFICE/OUTPT VISIT, NEW, LEVL V, 60-74 MIN: ICD-10-PCS | Mod: 95,,, | Performed by: INTERNAL MEDICINE

## 2021-11-16 PROCEDURE — 99205 OFFICE O/P NEW HI 60 MIN: CPT | Mod: 95,,, | Performed by: INTERNAL MEDICINE

## 2021-11-30 ENCOUNTER — PATIENT MESSAGE (OUTPATIENT)
Dept: FAMILY MEDICINE | Facility: CLINIC | Age: 56
End: 2021-11-30
Payer: MEDICAID

## 2021-12-02 ENCOUNTER — PATIENT MESSAGE (OUTPATIENT)
Dept: FAMILY MEDICINE | Facility: CLINIC | Age: 56
End: 2021-12-02

## 2021-12-02 ENCOUNTER — OFFICE VISIT (OUTPATIENT)
Dept: FAMILY MEDICINE | Facility: CLINIC | Age: 56
End: 2021-12-02
Payer: COMMERCIAL

## 2021-12-02 VITALS
WEIGHT: 126.13 LBS | BODY MASS INDEX: 21.01 KG/M2 | HEART RATE: 75 BPM | TEMPERATURE: 98 F | SYSTOLIC BLOOD PRESSURE: 118 MMHG | DIASTOLIC BLOOD PRESSURE: 84 MMHG | HEIGHT: 65 IN | OXYGEN SATURATION: 99 %

## 2021-12-02 DIAGNOSIS — F41.9 ANXIETY: ICD-10-CM

## 2021-12-02 DIAGNOSIS — F51.01 PRIMARY INSOMNIA: ICD-10-CM

## 2021-12-02 DIAGNOSIS — Z00.00 ANNUAL PHYSICAL EXAM: Primary | ICD-10-CM

## 2021-12-02 DIAGNOSIS — R77.8 ABNORMAL SPEP: ICD-10-CM

## 2021-12-02 DIAGNOSIS — G43.909 MIGRAINE WITHOUT STATUS MIGRAINOSUS, NOT INTRACTABLE, UNSPECIFIED MIGRAINE TYPE: ICD-10-CM

## 2021-12-02 DIAGNOSIS — F32.A DEPRESSION, UNSPECIFIED DEPRESSION TYPE: ICD-10-CM

## 2021-12-02 DIAGNOSIS — B00.1 FEVER BLISTER: ICD-10-CM

## 2021-12-02 DIAGNOSIS — F51.3 SLEEPWALKING: ICD-10-CM

## 2021-12-02 DIAGNOSIS — I73.00 RAYNAUD'S PHENOMENON WITHOUT GANGRENE: ICD-10-CM

## 2021-12-02 DIAGNOSIS — G24.9 DYSTONIA: ICD-10-CM

## 2021-12-02 PROCEDURE — 99396 PR PREVENTIVE VISIT,EST,40-64: ICD-10-PCS | Mod: S$GLB,,, | Performed by: FAMILY MEDICINE

## 2021-12-02 PROCEDURE — 99999 PR PBB SHADOW E&M-EST. PATIENT-LVL IV: ICD-10-PCS | Mod: PBBFAC,,, | Performed by: FAMILY MEDICINE

## 2021-12-02 PROCEDURE — 99396 PREV VISIT EST AGE 40-64: CPT | Mod: S$GLB,,, | Performed by: FAMILY MEDICINE

## 2021-12-02 PROCEDURE — 99214 OFFICE O/P EST MOD 30 MIN: CPT | Mod: PBBFAC,PO | Performed by: FAMILY MEDICINE

## 2021-12-02 PROCEDURE — 99999 PR PBB SHADOW E&M-EST. PATIENT-LVL IV: CPT | Mod: PBBFAC,,, | Performed by: FAMILY MEDICINE

## 2021-12-02 RX ORDER — ZOLPIDEM TARTRATE 5 MG/1
5 TABLET ORAL NIGHTLY PRN
Qty: 30 TABLET | Refills: 5 | Status: SHIPPED | OUTPATIENT
Start: 2021-12-02 | End: 2022-04-19

## 2021-12-05 RX ORDER — VALACYCLOVIR HYDROCHLORIDE 1 G/1
2000 TABLET, FILM COATED ORAL EVERY 12 HOURS
Qty: 4 TABLET | Refills: 5 | Status: SHIPPED | OUTPATIENT
Start: 2021-12-05 | End: 2023-02-06 | Stop reason: SDUPTHER

## 2021-12-13 ENCOUNTER — PATIENT MESSAGE (OUTPATIENT)
Dept: HEMATOLOGY/ONCOLOGY | Facility: CLINIC | Age: 56
End: 2021-12-13
Payer: MEDICAID

## 2021-12-13 DIAGNOSIS — R77.8 ABNORMAL SPEP: Primary | ICD-10-CM

## 2021-12-19 ENCOUNTER — PATIENT MESSAGE (OUTPATIENT)
Dept: RHEUMATOLOGY | Facility: HOSPITAL | Age: 56
End: 2021-12-19
Payer: MEDICAID

## 2021-12-23 ENCOUNTER — PATIENT MESSAGE (OUTPATIENT)
Dept: HEMATOLOGY/ONCOLOGY | Facility: CLINIC | Age: 56
End: 2021-12-23
Payer: MEDICAID

## 2022-01-04 ENCOUNTER — LAB VISIT (OUTPATIENT)
Dept: LAB | Facility: HOSPITAL | Age: 57
End: 2022-01-04
Attending: INTERNAL MEDICINE
Payer: COMMERCIAL

## 2022-01-04 DIAGNOSIS — R77.8 ABNORMAL SPEP: ICD-10-CM

## 2022-01-04 LAB
ALBUMIN SERPL BCP-MCNC: 3.9 G/DL (ref 3.5–5.2)
ALP SERPL-CCNC: 40 U/L (ref 55–135)
ALT SERPL W/O P-5'-P-CCNC: 15 U/L (ref 10–44)
ANION GAP SERPL CALC-SCNC: 6 MMOL/L (ref 8–16)
AST SERPL-CCNC: 18 U/L (ref 10–40)
BILIRUB SERPL-MCNC: 0.3 MG/DL (ref 0.1–1)
BUN SERPL-MCNC: 13 MG/DL (ref 6–20)
CALCIUM SERPL-MCNC: 8.8 MG/DL (ref 8.7–10.5)
CHLORIDE SERPL-SCNC: 108 MMOL/L (ref 95–110)
CO2 SERPL-SCNC: 23 MMOL/L (ref 23–29)
CREAT SERPL-MCNC: 0.8 MG/DL (ref 0.5–1.4)
EST. GFR  (AFRICAN AMERICAN): >60 ML/MIN/1.73 M^2
EST. GFR  (NON AFRICAN AMERICAN): >60 ML/MIN/1.73 M^2
GLUCOSE SERPL-MCNC: 93 MG/DL (ref 70–110)
POTASSIUM SERPL-SCNC: 3.8 MMOL/L (ref 3.5–5.1)
PROT SERPL-MCNC: 6.5 G/DL (ref 6–8.4)
SODIUM SERPL-SCNC: 137 MMOL/L (ref 136–145)

## 2022-01-04 PROCEDURE — 83520 IMMUNOASSAY QUANT NOS NONAB: CPT | Performed by: INTERNAL MEDICINE

## 2022-01-04 PROCEDURE — 36415 COLL VENOUS BLD VENIPUNCTURE: CPT | Performed by: INTERNAL MEDICINE

## 2022-01-04 PROCEDURE — 80053 COMPREHEN METABOLIC PANEL: CPT | Performed by: INTERNAL MEDICINE

## 2022-01-05 LAB
KAPPA LC SER QL IA: 1.42 MG/DL (ref 0.33–1.94)
KAPPA LC/LAMBDA SER IA: 1.53 (ref 0.26–1.65)
LAMBDA LC SER QL IA: 0.93 MG/DL (ref 0.57–2.63)

## 2022-01-07 ENCOUNTER — HOSPITAL ENCOUNTER (OUTPATIENT)
Dept: RADIOLOGY | Facility: OTHER | Age: 57
Discharge: HOME | End: 2022-01-07
Attending: INTERNAL MEDICINE
Payer: COMMERCIAL

## 2022-01-07 DIAGNOSIS — R77.8 ABNORMAL SPEP: ICD-10-CM

## 2022-01-07 PROCEDURE — A9585 GADOBUTROL INJECTION: HCPCS | Performed by: INTERNAL MEDICINE

## 2022-01-07 PROCEDURE — 72158 MRI SPINE CERVICAL-THORACIC-LUMBAR W W/O CONTRAST (XPD): ICD-10-PCS | Mod: 26,,, | Performed by: RADIOLOGY

## 2022-01-07 PROCEDURE — 72158 MRI LUMBAR SPINE W/O & W/DYE: CPT | Mod: 26,,, | Performed by: RADIOLOGY

## 2022-01-07 PROCEDURE — 72157 MRI CHEST SPINE W/O & W/DYE: CPT | Mod: TC

## 2022-01-07 PROCEDURE — 72156 MRI SPINE CERVICAL-THORACIC-LUMBAR W W/O CONTRAST (XPD): ICD-10-PCS | Mod: 26,,, | Performed by: RADIOLOGY

## 2022-01-07 PROCEDURE — 72157 MRI CHEST SPINE W/O & W/DYE: CPT | Mod: 26,,, | Performed by: RADIOLOGY

## 2022-01-07 PROCEDURE — 25500020 PHARM REV CODE 255: Performed by: INTERNAL MEDICINE

## 2022-01-07 PROCEDURE — 72157 MRI SPINE CERVICAL-THORACIC-LUMBAR W W/O CONTRAST (XPD): ICD-10-PCS | Mod: 26,,, | Performed by: RADIOLOGY

## 2022-01-07 PROCEDURE — 72156 MRI NECK SPINE W/O & W/DYE: CPT | Mod: TC

## 2022-01-07 PROCEDURE — 72156 MRI NECK SPINE W/O & W/DYE: CPT | Mod: 26,,, | Performed by: RADIOLOGY

## 2022-01-07 RX ORDER — GADOBUTROL 604.72 MG/ML
6 INJECTION INTRAVENOUS
Status: COMPLETED | OUTPATIENT
Start: 2022-01-07 | End: 2022-01-07

## 2022-01-07 RX ADMIN — GADOBUTROL 6 ML: 604.72 INJECTION INTRAVENOUS at 02:01

## 2022-02-14 ENCOUNTER — OFFICE VISIT (OUTPATIENT)
Dept: RHEUMATOLOGY | Facility: CLINIC | Age: 57
End: 2022-02-14
Payer: COMMERCIAL

## 2022-02-14 ENCOUNTER — HOSPITAL ENCOUNTER (OUTPATIENT)
Dept: RADIOLOGY | Facility: HOSPITAL | Age: 57
Discharge: HOME | End: 2022-02-14
Payer: COMMERCIAL

## 2022-02-14 VITALS
WEIGHT: 129 LBS | DIASTOLIC BLOOD PRESSURE: 73 MMHG | HEIGHT: 65 IN | BODY MASS INDEX: 21.49 KG/M2 | HEART RATE: 79 BPM | SYSTOLIC BLOOD PRESSURE: 111 MMHG

## 2022-02-14 DIAGNOSIS — R13.10 DYSPHAGIA, UNSPECIFIED TYPE: ICD-10-CM

## 2022-02-14 DIAGNOSIS — R77.8 ABNORMAL SPEP: ICD-10-CM

## 2022-02-14 DIAGNOSIS — I73.00 RAYNAUD'S PHENOMENON WITHOUT GANGRENE: ICD-10-CM

## 2022-02-14 DIAGNOSIS — I73.00 RAYNAUD'S PHENOMENON WITHOUT GANGRENE: Primary | ICD-10-CM

## 2022-02-14 PROCEDURE — 99999 PR PBB SHADOW E&M-EST. PATIENT-LVL IV: CPT | Mod: PBBFAC,,,

## 2022-02-14 PROCEDURE — 99214 OFFICE O/P EST MOD 30 MIN: CPT | Mod: S$GLB,,,

## 2022-02-14 PROCEDURE — 99214 OFFICE O/P EST MOD 30 MIN: CPT | Mod: PBBFAC,25

## 2022-02-14 PROCEDURE — 99999 PR PBB SHADOW E&M-EST. PATIENT-LVL IV: ICD-10-PCS | Mod: PBBFAC,,,

## 2022-02-14 PROCEDURE — 99214 PR OFFICE/OUTPT VISIT, EST, LEVL IV, 30-39 MIN: ICD-10-PCS | Mod: S$GLB,,,

## 2022-02-14 PROCEDURE — 71046 XR CHEST PA AND LATERAL: ICD-10-PCS | Mod: 26,,, | Performed by: STUDENT IN AN ORGANIZED HEALTH CARE EDUCATION/TRAINING PROGRAM

## 2022-02-14 PROCEDURE — 71046 X-RAY EXAM CHEST 2 VIEWS: CPT | Mod: 26,,, | Performed by: STUDENT IN AN ORGANIZED HEALTH CARE EDUCATION/TRAINING PROGRAM

## 2022-02-14 PROCEDURE — 71046 X-RAY EXAM CHEST 2 VIEWS: CPT | Mod: TC,FY

## 2022-02-14 NOTE — PROGRESS NOTES
I have personally taken the history and examined the patient and concur with the resident's note as above.  At this point in time she appears to have primary Raynaud's phenomena.  She has no evidence of an underlying connective tissue disease.  She has not responded to low-dose Norvasc.  We will gradually increase the dosage to see if we can improve her symptoms.

## 2022-02-14 NOTE — PROGRESS NOTES
Subjective:       Patient ID: Mae Gregory is a 56 y.o. female.    Chief Complaint: Follow-up for Raynaud's    Interval History:  Pt following up from her last clinic visit on 10/25.  Overall she is doing well.  No new symptoms.  She has been taking the Amlodipine 2.5mg daily and tolerating well.  Does have occasional slight lightheadedness.  Has not provided too much reduction in the flares of her RP.  She denies any digital ulcerations.  She has been following with Dr. Francisco for her abnormal SPEP.  At this point seems to be MGUS.  She has had an MRI of her spine which showed no evidence of plasmacytoma and will get repeat labs and follow-up with Dr. Francisco in several months.    Initial HPI:  Pt is a 55yo F who presents after being referred from podiatry to be further evaluated for Raynaud's.  Patient states that she has been having this for a little over ten years approximately.  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  Reports classic color changes of white digits fingers > toes which would be numb then painful and turn purple and red with clear demartcations.  Has never seen a rheumatologist before.  States that she has very frequent episodes and manages by cold avoidance and wearing thick layers.  Warming her hands in warm water helps relieve her symptoms.  She denies ever taking any mediations or trying any topical medications to help with the RP.  Denies any skin thickening or decreased mouth opening.  Has not had nail changes or ulcerations of her fingers.      Review of Systems   Constitutional: Negative for fever and unexpected weight change.   HENT: Negative for mouth sores and trouble swallowing.    Eyes: Negative for redness.   Respiratory: Negative for cough and shortness of breath.    Cardiovascular: Negative for chest pain.   Gastrointestinal: Negative for constipation and diarrhea.   Genitourinary: Negative for dysuria and  "genital sores.   Skin: Negative for rash.   Neurological: Negative for headaches.   Hematological: Does not bruise/bleed easily.       +Blistering rash around nasolabial folds worse with heat; No malar rash, photosensitivity   No telangiectasias   No calcinosis   No psoriasis   No patchy alopecia   No oral and nasal ulcers   No dry eyes and dry mouth   No pleurisy or any cardiopulmonary complaints   +Possibly mild swallowing difficulty; No diplopia, dysphonia or muscle weakness   No n/v/d/c   +acid reflux  +Raynaud's  No digital ulcers   No cytopenias   No renal issues   No blood clots   No fever, chills, night sweats, weight loss or loss of appetite   +1 miscarriage at 13.5 weeks; 1 early delivery at 28 weeks 2/2 HELLP; 1 helathy pregnancy  No new onset headaches; History of migraines since 2012  No recurrent conjunctivitis, uveitis, scleritis or episcleritis   No chronic or bloody diarrhea with no ulcerative colitis or crohn's disease/inflammatory bowel disease   No vaginal or urethral d/c; No STDs; No genital ulcers     FamHx: No autoimmune history she is aware of    SocHx: Tob: Denies; Alcohol: Rarely; Occupation:       Objective:   /73   Pulse 79   Ht 5' 5" (1.651 m)   Wt 58.5 kg (128 lb 15.5 oz)   BMI 21.46 kg/m²         Physical Exam   Constitutional: She is oriented to person, place, and time. No distress.   HENT:   Head: Normocephalic and atraumatic.   Eyes: Pupils are equal, round, and reactive to light. Conjunctivae are normal. Right eye exhibits no discharge. Left eye exhibits no discharge. No scleral icterus.   Cardiovascular: Normal rate and regular rhythm.   Pulmonary/Chest: Effort normal and breath sounds normal. No respiratory distress.   Abdominal: Soft. She exhibits no distension. There is no abdominal tenderness. There is no rebound and no guarding.   Musculoskeletal:         General: Normal range of motion.      Cervical back: Normal range of motion and neck supple. "   Neurological: She is alert and oriented to person, place, and time.   Skin: Skin is warm and dry. No rash noted. She is not diaphoretic. No erythema.   Psychiatric: Mood and affect normal.   Nursing note and vitals reviewed.       No data to display     Assessment:       1. Raynaud's phenomenon without gangrene    2. Abnormal SPEP    3. Dysphagia, unspecified type          Pt is a 55yo F with long-standing history of Raynaud's Phenomenon with classic, well-demarcated color changes (white>purple>red) with cold exposure.  Denies any ulcerations or skin fissuring.  Does not seem to have any other associated symptoms that are worrisome for this being a secondary RP to an autoimmune disorder although she does have a history of miscarriages, but the remainder of her rheumatologic review of systems is unremarkable.  CATA Negative.  APS labs negative.  SPEP showed a paraprotein band in beta-2 and STANLEY showed two adjacent IgA kappa specific monoclonal bands.  Evaluated by Hematology.  Appears to be MGUS.  MRI imaging of spine without plasmacytoma.  Her Hep labs, RF, CCP, C3, C4, CH50 were normal.    Plan:         Will plan to increase Amlodipine to 5mg daily.  Encouraged to check blood pressure at home and let us know if it seems to be helping.  Will get CXR to screen for ILD changed or dilation of esophagus.        Problem List Items Addressed This Visit        Cardiac/Vascular    Raynaud's phenomenon - follows Rheumatology - on Norvasc 2.5mg - Primary    Relevant Orders    X-Ray Chest PA And Lateral      Other Visit Diagnoses     Abnormal SPEP        Relevant Orders    X-Ray Chest PA And Lateral    Dysphagia, unspecified type        Relevant Orders    X-Ray Chest PA And Lateral              RTC in 4 months    Patient discussed with Dr. Godinez.    Ryan Mayers MD, PGY-5  [facility]

## 2022-03-17 ENCOUNTER — PATIENT MESSAGE (OUTPATIENT)
Dept: RHEUMATOLOGY | Facility: CLINIC | Age: 57
End: 2022-03-17
Payer: MEDICAID

## 2022-03-21 DIAGNOSIS — I73.00 RAYNAUD'S PHENOMENON WITHOUT GANGRENE: ICD-10-CM

## 2022-03-21 RX ORDER — AMLODIPINE BESYLATE 5 MG/1
5 TABLET ORAL DAILY
Qty: 30 TABLET | Refills: 11 | Status: SHIPPED | OUTPATIENT
Start: 2022-03-21 | End: 2022-11-01 | Stop reason: SDUPTHER

## 2022-04-17 DIAGNOSIS — F51.01 PRIMARY INSOMNIA: ICD-10-CM

## 2022-04-18 NOTE — TELEPHONE ENCOUNTER
No new care gaps identified.  Powered by [employer] by [employer]. Reference number: [id number].   4/17/2022 9:03:39 PM CDT

## 2022-04-19 RX ORDER — ZOLPIDEM TARTRATE 5 MG/1
5 TABLET ORAL NIGHTLY PRN
Qty: 30 TABLET | Refills: 5 | Status: SHIPPED | OUTPATIENT
Start: 2022-04-19 | End: 2022-11-01 | Stop reason: SDUPTHER

## 2022-04-19 RX ORDER — LIDOCAINE HYDROCHLORIDE 20 MG/ML
SOLUTION ORAL; TOPICAL
COMMUNITY
Start: 2022-01-18 | End: 2022-05-09

## 2022-04-19 RX ORDER — FLUTICASONE PROPIONATE 50 MCG
1 SPRAY, SUSPENSION (ML) NASAL 2 TIMES DAILY
COMMUNITY
Start: 2022-01-18 | End: 2022-08-09

## 2022-04-19 RX ORDER — BENZONATATE 200 MG/1
200 CAPSULE ORAL 3 TIMES DAILY PRN
COMMUNITY
Start: 2022-01-18 | End: 2022-05-09

## 2022-04-19 RX ORDER — METHYLPREDNISOLONE 4 MG/1
TABLET ORAL
COMMUNITY
Start: 2021-12-28 | End: 2022-05-09 | Stop reason: ALTCHOICE

## 2022-04-19 RX ORDER — AMOXICILLIN AND CLAVULANATE POTASSIUM 875; 125 MG/1; MG/1
1 TABLET, FILM COATED ORAL 2 TIMES DAILY
COMMUNITY
Start: 2022-01-18 | End: 2022-05-09 | Stop reason: ALTCHOICE

## 2022-04-19 RX ORDER — AZITHROMYCIN 250 MG/1
TABLET, FILM COATED ORAL
COMMUNITY
Start: 2021-12-28 | End: 2022-05-09 | Stop reason: ALTCHOICE

## 2022-04-29 DIAGNOSIS — F41.9 ANXIETY: ICD-10-CM

## 2022-04-29 RX ORDER — BUPROPION HYDROCHLORIDE 150 MG/1
150 TABLET ORAL DAILY
Qty: 90 TABLET | Refills: 2 | Status: SHIPPED | OUTPATIENT
Start: 2022-04-29 | End: 2022-08-17

## 2022-04-29 NOTE — TELEPHONE ENCOUNTER
No new care gaps identified.  Powered by [employer]. Reference number: [id number].   4/29/2022 12:31:40 AM CDT

## 2022-05-09 ENCOUNTER — OFFICE VISIT (OUTPATIENT)
Dept: RHEUMATOLOGY | Facility: CLINIC | Age: 57
End: 2022-05-09
Payer: COMMERCIAL

## 2022-05-09 VITALS
TEMPERATURE: 98 F | BODY MASS INDEX: 21.71 KG/M2 | HEIGHT: 65 IN | SYSTOLIC BLOOD PRESSURE: 120 MMHG | WEIGHT: 130.31 LBS | DIASTOLIC BLOOD PRESSURE: 85 MMHG | HEART RATE: 81 BPM

## 2022-05-09 DIAGNOSIS — I73.00 RAYNAUD'S PHENOMENON WITHOUT GANGRENE: Primary | ICD-10-CM

## 2022-05-09 PROCEDURE — 99214 OFFICE O/P EST MOD 30 MIN: CPT | Mod: PBBFAC

## 2022-05-09 PROCEDURE — 99999 PR PBB SHADOW E&M-EST. PATIENT-LVL IV: CPT | Mod: PBBFAC,,,

## 2022-05-09 PROCEDURE — 99214 PR OFFICE/OUTPT VISIT, EST, LEVL IV, 30-39 MIN: ICD-10-PCS | Mod: S$GLB,,,

## 2022-05-09 PROCEDURE — 99999 PR PBB SHADOW E&M-EST. PATIENT-LVL IV: ICD-10-PCS | Mod: PBBFAC,,,

## 2022-05-09 PROCEDURE — 99214 OFFICE O/P EST MOD 30 MIN: CPT | Mod: S$GLB,,,

## 2022-05-09 NOTE — PROGRESS NOTES
Subjective:       Patient ID: Mae Gregory is a 56 y.o. female.    Chief Complaint: Follow-up for Raynaud's    Interval History:  Pt following up from her last clinic visit on 2/14/21.  Overall she is doing well.  No new symptoms.  She has been taking the Amlodipine 5mg daily and tolerating well.  Increase of dose has greatly helped with the frequency of her RP flares. Has noticed some increase in swelling of her feet at the end of the day, although she does report eating a lot of sodium with boiled seafood lately.  She denies any digital ulcerations.  She has been following with Dr. Francisco for her abnormal SPEP.  At this point seems to be MGUS.  She has had an MRI of her spine which showed no evidence of plasmacytoma and will get repeat labs and follow-up with Dr. Francisco in a couple months.    Initial HPI:  Pt is a 55yo F who presents after being referred from podiatry to be further evaluated for Raynaud's.  Patient states that she has been having this for a little over ten years approximately.  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  Reports classic color changes of white digits fingers > toes which would be numb then painful and turn purple and red with clear demartcations.  Has never seen a rheumatologist before.  States that she has very frequent episodes and manages by cold avoidance and wearing thick layers.  Warming her hands in warm water helps relieve her symptoms.  She denies ever taking any mediations or trying any topical medications to help with the RP.  Denies any skin thickening or decreased mouth opening.  Has not had nail changes or ulcerations of her fingers.      Review of Systems   Constitutional: Negative for fever and unexpected weight change.   HENT: Negative for mouth sores and trouble swallowing.    Eyes: Negative for redness.   Respiratory: Negative for cough and shortness of breath.    Cardiovascular: Negative for  "chest pain.   Gastrointestinal: Negative for constipation and diarrhea.   Genitourinary: Negative for dysuria and genital sores.   Skin: Negative for rash.   Neurological: Negative for headaches.   Hematological: Does not bruise/bleed easily.       +Blistering rash around nasolabial folds worse with heat; No malar rash, photosensitivity   No telangiectasias   No calcinosis   No psoriasis   No patchy alopecia   No oral and nasal ulcers   No dry eyes and dry mouth   No pleurisy or any cardiopulmonary complaints   +Possibly mild swallowing difficulty; No diplopia, dysphonia or muscle weakness   No n/v/d/c   +acid reflux  +Raynaud's  No digital ulcers   No cytopenias   No renal issues   No blood clots   No fever, chills, night sweats, weight loss or loss of appetite   +1 miscarriage at 13.5 weeks; 1 early delivery at 28 weeks 2/2 HELLP; 1 helathy pregnancy  No new onset headaches; History of migraines since 2012  No recurrent conjunctivitis, uveitis, scleritis or episcleritis   No chronic or bloody diarrhea with no ulcerative colitis or crohn's disease/inflammatory bowel disease   No vaginal or urethral d/c; No STDs; No genital ulcers     FamHx: No autoimmune history she is aware of    SocHx: Tob: Denies; Alcohol: Rarely; Occupation:       Objective:   /85   Pulse 81   Temp 97.7 °F (36.5 °C)   Ht 5' 5" (1.651 m)   Wt 59.1 kg (130 lb 4.7 oz)   BMI 21.68 kg/m²         Physical Exam   Constitutional: She is oriented to person, place, and time. No distress.   HENT:   Head: Normocephalic and atraumatic.   Eyes: Pupils are equal, round, and reactive to light. Conjunctivae are normal. Right eye exhibits no discharge. Left eye exhibits no discharge. No scleral icterus.   Cardiovascular: Normal rate and regular rhythm.   Pulmonary/Chest: Effort normal and breath sounds normal. No respiratory distress.   Abdominal: Soft. She exhibits no distension. There is no abdominal tenderness. There is no rebound " and no guarding.   Musculoskeletal:         General: Normal range of motion.      Cervical back: Normal range of motion and neck supple.   Neurological: She is alert and oriented to person, place, and time.   Skin: Skin is warm and dry. No rash noted. She is not diaphoretic. No erythema.   Psychiatric: Mood and affect normal.   Nursing note and vitals reviewed.       No data to display     Assessment:       1. Raynaud's phenomenon without gangrene          Pt is a 57yo F with long-standing history of Raynaud's Phenomenon with classic, well-demarcated color changes (white>purple>red) with cold exposure.  Denies any ulcerations or skin fissuring.  Does not seem to have any other associated symptoms that are worrisome for this being a secondary RP to an autoimmune disorder although she does have a history of miscarriages, but the remainder of her rheumatologic review of systems is unremarkable.  CATA Negative.  APS labs negative.  SPEP showed a paraprotein band in beta-2 and STANLEY showed two adjacent IgA kappa specific monoclonal bands.  Evaluated by Hematology.  Appears to be MGUS.  MRI imaging of spine without plasmacytoma.  Her Hep labs, RF, CCP, C3, C4, CH50 were normal.    Plan:         Continue Amlodipine 5mg daily.  Advised to decrease sodium consumption and elevate legs.  Will let us know if peripheral edema worsens.  Continue follow-up with Dr. Francisco for MGUS.      Problem List Items Addressed This Visit        Cardiac/Vascular    Raynaud's phenomenon - follows Rheumatology - on Norvasc 2.5mg - Primary              RTC in 6 months    Patient discussed with Dr. Slater.    Ryan Mayers MD, PGY-5  [facility]

## 2022-05-09 NOTE — PROGRESS NOTES
Answers for HPI/ROS submitted by the patient on 5/9/2022  fever: No  eye redness: No  mouth sores: No  headaches: No  shortness of breath: No  chest pain: No  trouble swallowing: No  diarrhea: No  constipation: No  Bruises or bleeds easily: No  cough: No

## 2022-05-09 NOTE — PROGRESS NOTES
I have personally taken the history and examined the patient to verify the fellow's notation, and I agree with the impression and plan stated.      She has primary Raynaud's, possibly due to MGUS, with improved sx on amlodipine  Will request 6 mo f/u to monitor for evolution of disease.    WD

## 2022-07-21 ENCOUNTER — TELEPHONE (OUTPATIENT)
Dept: NEUROLOGY | Facility: CLINIC | Age: 57
End: 2022-07-21
Payer: COMMERCIAL

## 2022-07-21 NOTE — TELEPHONE ENCOUNTER
[facility] email request to arrange [facility] eval with Dr. Gates. Hx of chronic HA, has been treated in [place] (flies back and forth sometimes). Old CTH and MRI c-spine on file (negative per report). Past/current meds include TPX. imitrex and trazadone.    Appt offered/accepted for 8/9/22 @ 9:00am with Dr. Gates at [facility]. Direct callback # provided. Appt letter placed in the mail.

## 2022-08-09 ENCOUNTER — OFFICE VISIT (OUTPATIENT)
Dept: NEUROLOGY | Facility: CLINIC | Age: 57
End: 2022-08-09
Payer: COMMERCIAL

## 2022-08-09 ENCOUNTER — PATIENT MESSAGE (OUTPATIENT)
Dept: HEMATOLOGY/ONCOLOGY | Facility: CLINIC | Age: 57
End: 2022-08-09
Payer: COMMERCIAL

## 2022-08-09 VITALS
SYSTOLIC BLOOD PRESSURE: 128 MMHG | HEIGHT: 65 IN | WEIGHT: 122 LBS | DIASTOLIC BLOOD PRESSURE: 84 MMHG | HEART RATE: 90 BPM | BODY MASS INDEX: 20.33 KG/M2

## 2022-08-09 DIAGNOSIS — G43.109 MIGRAINE WITH AURA AND WITHOUT STATUS MIGRAINOSUS, NOT INTRACTABLE: Primary | ICD-10-CM

## 2022-08-09 DIAGNOSIS — M79.18 CERVICAL MYOFASCIAL PAIN SYNDROME: ICD-10-CM

## 2022-08-09 PROCEDURE — 3074F SYST BP LT 130 MM HG: CPT | Mod: CPTII,S$GLB,, | Performed by: PSYCHIATRY & NEUROLOGY

## 2022-08-09 PROCEDURE — 99205 PR OFFICE/OUTPT VISIT, NEW, LEVL V, 60-74 MIN: ICD-10-PCS | Mod: S$GLB,,, | Performed by: PSYCHIATRY & NEUROLOGY

## 2022-08-09 PROCEDURE — 1159F MED LIST DOCD IN RCRD: CPT | Mod: CPTII,S$GLB,, | Performed by: PSYCHIATRY & NEUROLOGY

## 2022-08-09 PROCEDURE — 3079F DIAST BP 80-89 MM HG: CPT | Mod: CPTII,S$GLB,, | Performed by: PSYCHIATRY & NEUROLOGY

## 2022-08-09 PROCEDURE — 99999 PR PBB SHADOW E&M-EST. PATIENT-LVL III: ICD-10-PCS | Mod: PBBFAC,,, | Performed by: PSYCHIATRY & NEUROLOGY

## 2022-08-09 PROCEDURE — 99205 OFFICE O/P NEW HI 60 MIN: CPT | Mod: S$GLB,,, | Performed by: PSYCHIATRY & NEUROLOGY

## 2022-08-09 PROCEDURE — 3008F PR BODY MASS INDEX (BMI) DOCUMENTED: ICD-10-PCS | Mod: CPTII,S$GLB,, | Performed by: PSYCHIATRY & NEUROLOGY

## 2022-08-09 PROCEDURE — 3079F PR MOST RECENT DIASTOLIC BLOOD PRESSURE 80-89 MM HG: ICD-10-PCS | Mod: CPTII,S$GLB,, | Performed by: PSYCHIATRY & NEUROLOGY

## 2022-08-09 PROCEDURE — 99999 PR PBB SHADOW E&M-EST. PATIENT-LVL III: CPT | Mod: PBBFAC,,, | Performed by: PSYCHIATRY & NEUROLOGY

## 2022-08-09 PROCEDURE — 99213 OFFICE O/P EST LOW 20 MIN: CPT | Mod: PBBFAC | Performed by: PSYCHIATRY & NEUROLOGY

## 2022-08-09 PROCEDURE — 1159F PR MEDICATION LIST DOCUMENTED IN MEDICAL RECORD: ICD-10-PCS | Mod: CPTII,S$GLB,, | Performed by: PSYCHIATRY & NEUROLOGY

## 2022-08-09 PROCEDURE — 3074F PR MOST RECENT SYSTOLIC BLOOD PRESSURE < 130 MM HG: ICD-10-PCS | Mod: CPTII,S$GLB,, | Performed by: PSYCHIATRY & NEUROLOGY

## 2022-08-09 PROCEDURE — 3008F BODY MASS INDEX DOCD: CPT | Mod: CPTII,S$GLB,, | Performed by: PSYCHIATRY & NEUROLOGY

## 2022-08-09 RX ORDER — RIMEGEPANT SULFATE 75 MG/75MG
75 TABLET, ORALLY DISINTEGRATING ORAL
COMMUNITY
Start: 2022-08-03 | End: 2023-01-12 | Stop reason: SDUPTHER

## 2022-08-09 RX ORDER — METHOCARBAMOL 500 MG/1
500 TABLET, FILM COATED ORAL 2 TIMES DAILY PRN
COMMUNITY
Start: 2022-08-03 | End: 2023-02-06

## 2022-08-09 NOTE — PROGRESS NOTES
Outpatient Neurology Consultation    Requesting physician: self-referred    Impression:  1. Episodic migraine with visual aura with recent exacerbation to daily HA with cervicogenic component  2. Cervical myofascial pain  3. Lumbar and cervical spondylosis  4. Meniere's      Plan:  1. Continue topiramate 50mg qhs for prophylaxis  2. Increase trazodone to 150mg qhs  3. Continue Imitrex or Nurtec prn  4. Cervical PT  5. Home cervical massage/stretching encouraged  6. Arrange Botox Oct '22 (migraine and cervical dystonia) with Dr. Victoria.  She has requested records from Dr. Maynard which will outline previous treatment plans.  7. Continue methocarbamol until cervical pain improved, then D/C  8. We will consider a taper off of buproprion when HAs controlled  9. She will send [facility] message if above not helping; consider GONB/tizanidine  10. RTC 3 months or sooner, prn    Problem List Items Addressed This Visit        1 - High    Migraine - Primary    Relevant Orders    Ambulatory referral/consult to Physical/Occupational Therapy       2     Cervical myofascial pain syndrome    Relevant Orders    Ambulatory referral/consult to Physical/Occupational Therapy          CC:  migraine    HPI:  56 y/o WF here for migraine treatment.      She has a long-standing history of migraine with visual aura.     Headaches last hours with photophobia, phonophobia, + nausea.  Has visual aura.  Often L cervico-occipital.  Bilateral anteriorly.   She has been seeing Dr. Maynard in [place] but moved back to the [place] area several years ago.  She had a remote, nl MRI brain in [place].     Freq: 30/30 and 20/30     Last Botox June 30, 2022 and not as effective as usual.  Usually only 2-3 HAs/month but now having daily HA.     Abortives:   Effective: Nurtec > Imitrex    Ineffective: ketamine/lidocaine    Prophylactics:   Effective: Botox, topiramate 50mg qhs (bid sedation), trazodone, methocarbamol    Past Medical History:   Diagnosis Date     DDD (degenerative disc disease), cervical 3/15/2017    DDD (degenerative disc disease), lumbar 3/15/2017    Glaucoma 3/7/2018    Migraine 3/15/2017    Primary insomnia 3/15/2017      Past Surgical History:   Procedure Laterality Date    AUGMENTATION OF BREAST      BREAST SURGERY       SECTION   and 2001    x2    COLON SURGERY  10/15/2018    colon resection       Outpatient Medications Marked as Taking for the 22 encounter (Office Visit) with Sukumar Gates MD   Medication Sig Dispense Refill    albuterol (PROVENTIL/VENTOLIN HFA) 90 mcg/actuation inhaler TAKE 1 2 PUFF(S) (INHALATION) EVERY 4 HOURS PRN   WHEEZING 18 g 12    amLODIPine (NORVASC) 5 MG tablet Take 1 tablet (5 mg total) by mouth once daily. 30 tablet 11    buPROPion (WELLBUTRIN XL) 150 MG TB24 tablet Take 1 tablet (150 mg total) by mouth once daily. 90 tablet 2    cetirizine (ZYRTEC) 10 MG tablet Take 10 mg by mouth once daily.      COCONUT OIL ORAL Take by mouth.      estradiol-norethindrone (ACTIVELLA) 1-0.5 mg per tablet TAKE 1 TABLET BY MOUTH EVERY DAY 28 tablet 12    methocarbamoL (ROBAXIN) 500 MG Tab Take 500 mg by mouth 2 (two) times daily as needed.      multivitamin with minerals tablet Take 1 tablet by mouth once daily.      NURTEC 75 mg odt Take 75 mg by mouth.      oxybutynin (DITROPAN) 5 MG Tab TAKE ONE TABLET BY MOUTH TWICE DAILY 60 tablet 2    sumatriptan (IMITREX) 100 MG tablet Take 1 tablet (100 mg total) by mouth every 2 (two) hours as needed for Migraine. 40 tablet 1    topiramate (TOPAMAX) 50 MG tablet TAKE 1 TABLET BY MOUTH TWICE A  tablet 3    traZODone (DESYREL) 150 MG tablet TAKE 1 TABLET (150 MG TOTAL) BY MOUTH NIGHTLY AS NEEDED FOR INSOMNIA. FOLLOWUP WITH DOCTOR 2020 30 tablet 5    zolpidem (AMBIEN) 5 MG Tab Take 1 tablet (5 mg total) by mouth nightly as needed. 30 tablet 5      Review of patient's allergies indicates:  No Known Allergies   Family History   Problem Relation  "Age of Onset    Stroke Maternal Grandfather     Hypertension Father     Hypertension Mother     Hypertension Brother     Ovarian cancer Neg Hx     Cancer Neg Hx       Social History     Socioeconomic History    Marital status:    Tobacco Use    Smoking status: Never Smoker    Smokeless tobacco: Never Used   Substance and Sexual Activity    Alcohol use: No    Drug use: No    Sexual activity: Yes     Partners: Male     Comment: ablation     Review Of Systems:  General: Negative for fever   HENT: Negative for tinnitus, nose bleeds, neck stiffness   Cardiac Negative for palpitations   Vascular: Negative for easy bruising   Pulmonary: Negative for cough   Gastrointestinal: Negative for constipation   Urinary: Negative for incomplete bladder emptying   Musculoskeletal: Negative for muscle aches   Neurological: As above. Otherwise negative for abnormal movements   Psychiatric:  Negative for depression       /84   Pulse 90   Ht 5' 5" (1.651 m)   Wt 55.3 kg (122 lb)   BMI 20.30 kg/m²    Well developed, well nourished female  Extremities: no edema  Neck: decreased ROM, particularly lat flex and rotation; tender L trap/c-paraspinals and c-o junction    Mental status:   Awake, alert and appropriately oriented   Normal recent and remote memory   Normal attention and concentration   Normal speech and language   Normal fund of knowledge   No extinction  Cranial nerves:   Normal funduscopic - discs sharp   PERRLA   EOMF without nystagmus   VFF   Normal facial sensation   Normal facial movements   Decreased hearing on L   Palate elevates symmetrically   Normal SCM and trapezius strength   Tongue midline  Motor:   No pronator drift   Normal FF movements bilaterally   Normal muscle tone, bulk and power   No abnormal movements  Sensory   Intact to LT, temperature  DTRs   2+ and symmetric; 1+ AJs   Plantar responses are flexor bilaterally  Coordination   Intact to FNF, RAH, and HTS  Gait   Normal base and " gait   Normal toe, heel and tandem   No Romberg    Data Reviewed:  MRI spine    78 mins chart review, face to face, documentation    Sukumar Gates MD

## 2022-08-16 ENCOUNTER — CLINICAL SUPPORT (OUTPATIENT)
Dept: REHABILITATION | Facility: HOSPITAL | Age: 57
End: 2022-08-16
Attending: PSYCHIATRY & NEUROLOGY
Payer: COMMERCIAL

## 2022-08-16 DIAGNOSIS — M79.18 CERVICAL MYOFASCIAL PAIN SYNDROME: ICD-10-CM

## 2022-08-16 DIAGNOSIS — G43.109 MIGRAINE WITH AURA AND WITHOUT STATUS MIGRAINOSUS, NOT INTRACTABLE: ICD-10-CM

## 2022-08-16 DIAGNOSIS — M54.2 NECK PAIN, CHRONIC: ICD-10-CM

## 2022-08-16 DIAGNOSIS — G89.29 NECK PAIN, CHRONIC: ICD-10-CM

## 2022-08-16 DIAGNOSIS — R29.898 SHOULDER WEAKNESS: ICD-10-CM

## 2022-08-16 PROCEDURE — 97162 PT EVAL MOD COMPLEX 30 MIN: CPT | Mod: PN | Performed by: PHYSICAL THERAPIST

## 2022-08-16 PROCEDURE — 97110 THERAPEUTIC EXERCISES: CPT | Mod: PN | Performed by: PHYSICAL THERAPIST

## 2022-08-16 NOTE — PLAN OF CARE
[facility]   Physical Therapy Initial Evaluation     Date: 8/16/2022   Name: Mae Gregory  Clinic Number: [id number]    Therapy Diagnosis:   Encounter Diagnoses   Name Primary?    Shoulder weakness     Neck pain, chronic Yes     Referring Provider: Sukumar Gates MD    Referring Provider Orders: PT eval and treat  Medical Diagnosis from Referral: Cervical myofascial pain syndrome [M79.18], Migraine with aura and without status migrainosus, not intractable [G43.109]  Evaluation Date: 8/16/2022  Authorization Period Expiration: 8/9/2023  Plan of Care Expiration: 11/16/2022  Visit # / Visits authorized: 1/pending   FOTO: 1/3 (8/16/2022)    Precautions: standard    Time In: 11:00  Time Out: 11:45  Total Appointment Time (timed & untimed codes): 45 minutes    SUBJECTIVE     Date of onset: several years ago  History of current condition - Mae reports chronic L-sided neck pain with migraines. Radiating paresthesia, intermittent weakness, and pain down the L arm. She gets botox injections regularly for the last few years, but the most recent injection did not provide significant relief. Pt notes head/neck heaviness at the end of the day. Lifting her L arm makes it hard to breathe sometimes.    Falls: none    Imaging: MRI (12/2021) revealed no evidence for plasmacytoma within the cervical, thoracic, or lumbar spine and mild degenerative changes.    Prior Therapy: no physical therapy for neck issues  Social History: lives in a condo with elevator  Occupation:   Prior Level of Function: milder neck pain and difficulty with ADLs  Current Level of Function: moderate to severe neck pain and difficulty with ADLs due to neck pain    Pain  Current: 2/10, worst: 7/10, best: 1/10   Location: left neck  Description: sharp, stabbing, catching, constant  Aggravating Factors: neck movements (particularly looking down), computer work, driving  Easing Factors: stretching, botox  injections    Patients goals: reduce pain, get stronger     Medical History:   Past Medical History:   Diagnosis Date    DDD (degenerative disc disease), cervical 3/15/2017    DDD (degenerative disc disease), lumbar 3/15/2017    Glaucoma 3/7/2018    Migraine 3/15/2017    Primary insomnia 3/15/2017       Surgical History:   Mae Gregory  has a past surgical history that includes Breast surgery; Colon surgery (10/15/2018); Augmentation of breast; and  section ( and ).    Medications:   Mae has a current medication list which includes the following prescription(s): albuterol, amlodipine, bupropion, cetirizine, coconut oil, estradiol-norethindrone, finasteride, methocarbamol, multivitamin with minerals, nurtec, oxybutynin, sumatriptan, topiramate, trazodone, valacyclovir, and zolpidem.    Allergies:   Review of patient's allergies indicates:  No Known Allergies       OBJECTIVE     Posture: forward head/rounded shoulders but able to assume more upright posture without cues  Palpation: tenderness to palpation of L suboccipitals, cervical paraspinals, upper trapezius, levator scapulae, scalenes, and sternocleidomastoid  Cervical spine: pain with central PA mobilizations to lower cervical spine, gross hypomobility  Thoracic spine: gross hypomobility    Cervical Strength 22   Flexion 3+/5   Extension 4-/5   R side-bending 4-/5   L side-bending 4-/5     Cervical Range of Motion 22   Flexion 55°    Extension 25°    R side-bending 40°    L side-bending 40°    R rotation 75°    L rotation 75°      Upper Extremity Strength 22   R latissimus dorsi 4/5   R middle trapezius 4/5   R lower trapezius 4-/5   L latissimus dorsi 4/5   L middle trapezius 4-/5   L lower trapezius 3+/5       SPECIAL TESTING  Cervical Traction Test positive   Brachial Plexus Tension Test/ULTT1 negative (L)   Deep Neck Flexor Endurance Test positive       Limitation/Restriction for FOTO Neck Survey    Therapist  reviewed FOTO scores for Mae Gregory on 8/16/2022.   FOTO documents entered into [employer] - see Media section.    Limitation Score: 50% impairment       TREATMENT     Total Treatment time (time-based codes) separate from Evaluation: 8 minutes   Mae received the treatments listed below:      Therapeutic exercises to develop strength, endurance and core stabilization for 8 minutes -  [x] Cervical retraction isometric with yellow band, x10  [x] Open books with yellow band (R&L), x8  [x] Seated bilateral shoulder external rotation with yellow band, x10  [x] HEP building/HEP review    PATIENT EDUCATION AND HOME EXERCISES     Education provided: cervical spine anatomy, relationship between cervical and thoracic spine, PT plan of care, pt prognosis    Written Home Exercises Provided: yes. Exercises were reviewed and Mae was able to demonstrate them prior to the end of the session.  Mae demonstrated good  understanding of the education provided. See EMR under Patient Instructions for exercises provided during therapy sessions.    ASSESSMENT     Mae is a 57 y.o. female referred to outpatient physical therapy with a medical diagnosis of cervical myofascial pain syndrome and migraines. Patient presents with deficits to cervical range of motion, scapular strength, deep neck flexor strength, spinal joint hypermobility, as well as myofascial pain in the neck/shoulder region. Symptoms appear consistent with myofascial dysfunction in the cervical region, as well as cervical segmental dysfunction. Pain impairs her ability to tolerate positions, work, and perform ADLs.    Patient prognosis is Good.   Patient will benefit from skilled outpatient physical therapy to address the deficits stated above and in the chart below, provide patient /family education, and to maximize patient's level of independence.     Plan of care discussed with patient: yes  Patient's spiritual, cultural and educational needs considered and patient is  agreeable to the plan of care and goals as stated below:     Anticipated Barriers for therapy: none    Medical Necessity is demonstrated by the following  History  Co-morbidities and personal factors that may impact the plan of care Co-morbidities:   DDD (degenerative disc disease), cervical   DDD (degenerative disc disease), lumbar   Glaucoma   Migraine   Primary insomnia   Chronic pain    Personal Factors:   no deficits     moderate   Examination  Body Structures and Functions, activity limitations and participation restrictions that may impact the plan of care Body Regions:   neck  back  upper extremities    Body Systems:    ROM  strength  motor control    Participation Restrictions:   Lifting, reaching, computer work, sleeping, driving    Activity limitations:   Learning and applying knowledge  no deficits    General Tasks and Commands  no deficits    Communication  no deficits    Mobility  lifting and carrying objects  fine hand use (grasping/picking up)  driving (bike, car, motorcycle)    Self care  no deficits    Domestic Life  doing house work (cleaning house, washing dishes, laundry), assisting others    Interactions/Relationships  family relationships    Life Areas  employment    Community and Social Life  community life  recreation and leisure         moderate   Clinical Presentation stable and uncomplicated low   Decision Making/ Complexity Score: moderate         Short Term Goals (6 weeks)  Pt will report pain of no more than 5/10 in the neck/L shoulderwith regular activity  Pt will demonstrate bilateral scapular strength of at least 4/5 for improved support of the spine with ADLs and functional mobility  Pt will report 50% improvement to tenderness to palpation of L levator scapulae, suboccipitals, and upper trapezius to demonstrate improved myofascial dysfunction  Pt will be independent with introductory HEP    Long Term Goals (12 weeks)  Pt will report pain of no more than 3/10 in the neck/L shoulder  with regular activity  Pt will demonstrate bilateral scapular strength of at least 4+/5 for improved support of the spine with ADLs and functional mobility  Pt will report > 75% improvement to tenderness to palpation of L levator scapulae, suboccipitals, and upper trapezius to demonstrate improved myofascial dysfunction  Pt will be able to lift L arm overhead without discomfort for improved performance of ADLs  Pt will demonstrate cervical range of motion WNL for improved positional tolerance and functional mobility  Pt will score no more than 40% impaired on the FOTO Neck Survey to demonstrate overall functional improvement  Pt will be independent with comprehensive HEP      PLAN     Plan of Care Certification: 8/16/2022 to 11/16/2022    Outpatient Physical Therapy: 2 times weekly for 12 weeks to include the following interventions - Therapeutic Exercise, Manual Therapy, Therapeutic Activity, Neuromuscular Re-education, Electrical Stimulation (Unattended)      Nadia Gallegos, PT, DPT

## 2022-08-17 DIAGNOSIS — R77.8 ABNORMAL SPEP: Primary | ICD-10-CM

## 2022-08-17 RX ORDER — BUPROPION HYDROCHLORIDE 150 MG/1
150 TABLET ORAL DAILY
COMMUNITY
End: 2023-02-27 | Stop reason: SDUPTHER

## 2022-08-18 ENCOUNTER — CLINICAL SUPPORT (OUTPATIENT)
Dept: REHABILITATION | Facility: HOSPITAL | Age: 57
End: 2022-08-18
Attending: PSYCHIATRY & NEUROLOGY
Payer: COMMERCIAL

## 2022-08-18 DIAGNOSIS — R29.898 SHOULDER WEAKNESS: ICD-10-CM

## 2022-08-18 DIAGNOSIS — M54.2 NECK PAIN, CHRONIC: Primary | ICD-10-CM

## 2022-08-18 DIAGNOSIS — G89.29 NECK PAIN, CHRONIC: Primary | ICD-10-CM

## 2022-08-18 PROCEDURE — 97110 THERAPEUTIC EXERCISES: CPT | Mod: PN,CQ

## 2022-08-18 NOTE — PROGRESS NOTES
[facility]   Physical Therapy Treatment Note     Name: Mae Gregory  Clinic Number: [id number]    Therapy Diagnosis:   Encounter Diagnoses   Name Primary?    Neck pain, chronic Yes    Shoulder weakness      Physician: Sukumar Gates MD    Visit Date: 8/18/2022    Referring Provider Orders: PT eval and treat  Medical Diagnosis from Referral: Cervical myofascial pain syndrome [M79.18], Migraine with aura and without status migrainosus, not intractable [G43.109]  Evaluation Date: 8/16/2022  Authorization Period Expiration: 8/9/2023  Plan of Care Expiration: 11/16/2022  Visit # / Visits authorized: 2/pending   FOTO: 1/3 (8/16/2022)     Precautions: standard      PTA Visit #: 1/5     Time In: 840  Time Out: 925  Total Billable Time: 45 minutes    SUBJECTIVE     Pt reports: that she has no change in symptoms between treatment session.  Patient states that she had a little soreness after previous treatment session.    She was compliant with home exercise program.  Response to previous treatment: general soreness  Functional change: no reported change     Pain: 2/10  Location: left neck      OBJECTIVE     Objective Measures updated at progress report unless specified.     Treatment     Mae received the treatments listed below:      therapeutic exercises to develop strength, endurance, posture and core stabilization for 40 minutes including:    Seated Scap Retraction 5sec 15x  Seated Bruggers with orange Theraband 3x10  Standing Rows with red Theraband 3x10  Standing Shoulder Extension with red theratube 3x10  Standing shoulder depression with red theratube 3x10  Bilateral Standing Shoulder internal rotation with Red theraband 2x10  Bilateral Standing Shoulder external rotation with Yellow Theraband 2x10  Bilateral Serratus Punches with red theratube 2min    manual therapy techniques: Joint mobilizations, Soft tissue Mobilization and Friction Massage were applied to the: neck for 00  minutes, including:      Not completed at today's treatment session                    Patient Education and Home Exercises     Home Exercises Provided and Patient Education Provided     Education provided:     Complete all exercise and acitivities of daily living in pain free range      Written Home Exercises Provided: Patient instructed to cont prior HEP. Exercises were reviewed and Mae was able to demonstrate them prior to the end of the session.  Mae demonstrated good  understanding of the education provided. See EMR under Patient Instructions for exercises provided during therapy sessions    ASSESSMENT     Good tolerance to exercise patient able to incorporate new exercise in a pain free range.  Patient did require some verbal cueing for correct positioning and sequencing of exercise.  Patient reports that she feels that she had a good workout and reports decreased symptoms.      Mae Is progressing well towards her goals.   Pt prognosis is Good.     Pt will continue to benefit from skilled outpatient physical therapy to address the deficits listed in the problem list box on initial evaluation, provide pt/family education and to maximize pt's level of independence in the home and community environment.     Pt's spiritual, cultural and educational needs considered and pt agreeable to plan of care and goals.     Anticipated barriers to physical therapy: none    Goals:     Short Term Goals (6 weeks)  Pt will report pain of no more than 5/10 in the neck/L shoulderwith regular activity  Pt will demonstrate bilateral scapular strength of at least 4/5 for improved support of the spine with ADLs and functional mobility  Pt will report 50% improvement to tenderness to palpation of L levator scapulae, suboccipitals, and upper trapezius to demonstrate improved myofascial dysfunction  Pt will be independent with introductory HEP     Long Term Goals (12 weeks)  Pt will report pain of no more than 3/10 in the neck/L  shoulder with regular activity  Pt will demonstrate bilateral scapular strength of at least 4+/5 for improved support of the spine with ADLs and functional mobility  Pt will report > 75% improvement to tenderness to palpation of L levator scapulae, suboccipitals, and upper trapezius to demonstrate improved myofascial dysfunction  Pt will be able to lift L arm overhead without discomfort for improved performance of ADLs  Pt will demonstrate cervical range of motion WNL for improved positional tolerance and functional mobility  Pt will score no more than 40% impaired on the FOTO Neck Survey to demonstrate overall functional improvement  Pt will be independent with comprehensive HEP      PLAN     Progress as tolerated per Plan of Care      Rasta Felix, PTA

## 2022-08-22 ENCOUNTER — CLINICAL SUPPORT (OUTPATIENT)
Dept: REHABILITATION | Facility: HOSPITAL | Age: 57
End: 2022-08-22
Payer: COMMERCIAL

## 2022-08-22 DIAGNOSIS — R29.898 SHOULDER WEAKNESS: ICD-10-CM

## 2022-08-22 DIAGNOSIS — G89.29 NECK PAIN, CHRONIC: Primary | ICD-10-CM

## 2022-08-22 DIAGNOSIS — M54.2 NECK PAIN, CHRONIC: Primary | ICD-10-CM

## 2022-08-22 PROCEDURE — 97140 MANUAL THERAPY 1/> REGIONS: CPT | Mod: PN,CQ

## 2022-08-22 PROCEDURE — 97110 THERAPEUTIC EXERCISES: CPT | Mod: PN,CQ

## 2022-08-22 NOTE — PROGRESS NOTES
[facility]   Physical Therapy Treatment Note     Name: Mae Gregory  Clinic Number: [id number]    Therapy Diagnosis:   Encounter Diagnoses   Name Primary?    Neck pain, chronic Yes    Shoulder weakness      Physician: Sukumar Gates MD    Visit Date: 8/22/2022    Referring Provider Orders: PT eval and treat  Medical Diagnosis from Referral: Cervical myofascial pain syndrome [M79.18], Migraine with aura and without status migrainosus, not intractable [G43.109]  Evaluation Date: 8/16/2022  Authorization Period Expiration: 8/9/2023  Plan of Care Expiration: 11/16/2022  Visit # / Visits authorized: 2/pending   FOTO: 1/3 (8/16/2022)     Precautions: standard      PTA Visit #: 1/5     Time In: 1105  Time Out: 1140  Total Billable Time: 45 minutes    SUBJECTIVE     Pt reports: that she ad increased symptoms secondary to headache/migrane 2 days prior.  States that her symptoms are better today.  Patient states that medication has helped reduce symptoms.    She was compliant with home exercise program.  Response to previous treatment: decreased symptoms  Functional change: no reported change     Pain: 3/10  Location: left neck      OBJECTIVE     Objective Measures updated at progress report unless specified.     Treatment     Mae received the treatments listed below:      therapeutic exercises to develop strength, endurance, posture and core stabilization for 30 minutes including:    Seated Scap Retraction 5sec 15x  Seated Bruggers with orange Theraband 3x10  Standing Rows with red Theraband 3x10  Standing Shoulder Extension with red theratube 3x10  Standing shoulder depression with red theratube 3x10  Bilateral Standing Shoulder internal rotation with Red theraband 2x10  Bilateral Standing Shoulder external rotation with Yellow Theraband 2x10  Bilateral Serratus Punches with red theratube 2min    manual therapy techniques: Joint mobilizations, Soft tissue Mobilization and Friction  Massage were applied to the: neck for 10 minutes, including:      STM to the cervical paraspinals occipital region and upper trap                    Patient Education and Home Exercises     Home Exercises Provided and Patient Education Provided     Education provided:     Complete all exercise and acitivities of daily living in pain free range      Written Home Exercises Provided: Patient instructed to cont prior HEP. Exercises were reviewed and Mae was able to demonstrate them prior to the end of the session.  Mae demonstrated good  understanding of the education provided. See EMR under Patient Instructions for exercises provided during therapy sessions    ASSESSMENT     Good tolerance to exercise patient able to main exercise reps and intenisty.   Patient responded well to MT.  Patient reports  decreased symptoms post treatment session.      Mae Is progressing well towards her goals.   Pt prognosis is Good.     Pt will continue to benefit from skilled outpatient physical therapy to address the deficits listed in the problem list box on initial evaluation, provide pt/family education and to maximize pt's level of independence in the home and community environment.     Pt's spiritual, cultural and educational needs considered and pt agreeable to plan of care and goals.     Anticipated barriers to physical therapy: none    Goals:     Short Term Goals (6 weeks)  Pt will report pain of no more than 5/10 in the neck/L shoulderwith regular activity  Pt will demonstrate bilateral scapular strength of at least 4/5 for improved support of the spine with ADLs and functional mobility  Pt will report 50% improvement to tenderness to palpation of L levator scapulae, suboccipitals, and upper trapezius to demonstrate improved myofascial dysfunction  Pt will be independent with introductory HEP     Long Term Goals (12 weeks)  Pt will report pain of no more than 3/10 in the neck/L shoulder with regular activity  Pt will  demonstrate bilateral scapular strength of at least 4+/5 for improved support of the spine with ADLs and functional mobility  Pt will report > 75% improvement to tenderness to palpation of L levator scapulae, suboccipitals, and upper trapezius to demonstrate improved myofascial dysfunction  Pt will be able to lift L arm overhead without discomfort for improved performance of ADLs  Pt will demonstrate cervical range of motion WNL for improved positional tolerance and functional mobility  Pt will score no more than 40% impaired on the FOTO Neck Survey to demonstrate overall functional improvement  Pt will be independent with comprehensive HEP      PLAN     Progress as tolerated per Plan of Care      Rasta Felix, PTA

## 2022-08-23 ENCOUNTER — LAB VISIT (OUTPATIENT)
Dept: LAB | Facility: HOSPITAL | Age: 57
End: 2022-08-23
Payer: COMMERCIAL

## 2022-08-23 DIAGNOSIS — R77.8 ABNORMAL SPEP: ICD-10-CM

## 2022-08-23 LAB
ALBUMIN SERPL BCP-MCNC: 4.1 G/DL (ref 3.5–5.2)
ALP SERPL-CCNC: 39 U/L (ref 55–135)
ALT SERPL W/O P-5'-P-CCNC: 11 U/L (ref 10–44)
ANION GAP SERPL CALC-SCNC: 7 MMOL/L (ref 8–16)
AST SERPL-CCNC: 15 U/L (ref 10–40)
BASOPHILS # BLD AUTO: 0.03 K/UL (ref 0–0.2)
BASOPHILS NFR BLD: 0.6 % (ref 0–1.9)
BILIRUB SERPL-MCNC: 0.4 MG/DL (ref 0.1–1)
BUN SERPL-MCNC: 19 MG/DL (ref 6–20)
CALCIUM SERPL-MCNC: 9 MG/DL (ref 8.7–10.5)
CHLORIDE SERPL-SCNC: 106 MMOL/L (ref 95–110)
CO2 SERPL-SCNC: 22 MMOL/L (ref 23–29)
CREAT SERPL-MCNC: 0.8 MG/DL (ref 0.5–1.4)
DIFFERENTIAL METHOD: ABNORMAL
EOSINOPHIL # BLD AUTO: 0.1 K/UL (ref 0–0.5)
EOSINOPHIL NFR BLD: 1 % (ref 0–8)
ERYTHROCYTE [DISTWIDTH] IN BLOOD BY AUTOMATED COUNT: 12.8 % (ref 11.5–14.5)
EST. GFR  (NO RACE VARIABLE): >60 ML/MIN/1.73 M^2
GLUCOSE SERPL-MCNC: 101 MG/DL (ref 70–110)
HCT VFR BLD AUTO: 40.3 % (ref 37–48.5)
HGB BLD-MCNC: 13.8 G/DL (ref 12–16)
IGA SERPL-MCNC: 383 MG/DL (ref 40–350)
IGG SERPL-MCNC: 602 MG/DL (ref 650–1600)
IGM SERPL-MCNC: 29 MG/DL (ref 50–300)
IMM GRANULOCYTES # BLD AUTO: 0.01 K/UL (ref 0–0.04)
IMM GRANULOCYTES NFR BLD AUTO: 0.2 % (ref 0–0.5)
LYMPHOCYTES # BLD AUTO: 1.4 K/UL (ref 1–4.8)
LYMPHOCYTES NFR BLD: 30.1 % (ref 18–48)
MCH RBC QN AUTO: 31.9 PG (ref 27–31)
MCHC RBC AUTO-ENTMCNC: 34.2 G/DL (ref 32–36)
MCV RBC AUTO: 93 FL (ref 82–98)
MONOCYTES # BLD AUTO: 0.3 K/UL (ref 0.3–1)
MONOCYTES NFR BLD: 7.1 % (ref 4–15)
NEUTROPHILS # BLD AUTO: 2.9 K/UL (ref 1.8–7.7)
NEUTROPHILS NFR BLD: 61 % (ref 38–73)
NRBC BLD-RTO: 0 /100 WBC
PLATELET # BLD AUTO: 221 K/UL (ref 150–450)
PMV BLD AUTO: 10.8 FL (ref 9.2–12.9)
POTASSIUM SERPL-SCNC: 4 MMOL/L (ref 3.5–5.1)
PROT SERPL-MCNC: 6.8 G/DL (ref 6–8.4)
RBC # BLD AUTO: 4.33 M/UL (ref 4–5.4)
SODIUM SERPL-SCNC: 135 MMOL/L (ref 136–145)
WBC # BLD AUTO: 4.78 K/UL (ref 3.9–12.7)

## 2022-08-23 PROCEDURE — 84165 PATHOLOGIST INTERPRETATION SPE: ICD-10-PCS | Mod: 26,,, | Performed by: PATHOLOGY

## 2022-08-23 PROCEDURE — 86334 IMMUNOFIX E-PHORESIS SERUM: CPT | Mod: 26,,, | Performed by: PATHOLOGY

## 2022-08-23 PROCEDURE — 84165 PROTEIN E-PHORESIS SERUM: CPT | Performed by: INTERNAL MEDICINE

## 2022-08-23 PROCEDURE — 86334 PATHOLOGIST INTERPRETATION IFE: ICD-10-PCS | Mod: 26,,, | Performed by: PATHOLOGY

## 2022-08-23 PROCEDURE — 80053 COMPREHEN METABOLIC PANEL: CPT | Performed by: INTERNAL MEDICINE

## 2022-08-23 PROCEDURE — 82784 ASSAY IGA/IGD/IGG/IGM EACH: CPT | Mod: 59 | Performed by: INTERNAL MEDICINE

## 2022-08-23 PROCEDURE — 36415 COLL VENOUS BLD VENIPUNCTURE: CPT | Performed by: INTERNAL MEDICINE

## 2022-08-23 PROCEDURE — 83520 IMMUNOASSAY QUANT NOS NONAB: CPT | Performed by: INTERNAL MEDICINE

## 2022-08-23 PROCEDURE — 85025 COMPLETE CBC W/AUTO DIFF WBC: CPT | Performed by: INTERNAL MEDICINE

## 2022-08-23 PROCEDURE — 84165 PROTEIN E-PHORESIS SERUM: CPT | Mod: 26,,, | Performed by: PATHOLOGY

## 2022-08-23 PROCEDURE — 86334 IMMUNOFIX E-PHORESIS SERUM: CPT | Performed by: INTERNAL MEDICINE

## 2022-08-24 LAB
ALBUMIN SERPL ELPH-MCNC: 4.2 G/DL (ref 3.35–5.55)
ALPHA1 GLOB SERPL ELPH-MCNC: 0.28 G/DL (ref 0.17–0.41)
ALPHA2 GLOB SERPL ELPH-MCNC: 0.64 G/DL (ref 0.43–0.99)
B-GLOBULIN SERPL ELPH-MCNC: 0.9 G/DL (ref 0.5–1.1)
GAMMA GLOB SERPL ELPH-MCNC: 0.57 G/DL (ref 0.67–1.58)
INTERPRETATION SERPL IFE-IMP: NORMAL
KAPPA LC SER QL IA: 1.1 MG/DL (ref 0.33–1.94)
KAPPA LC/LAMBDA SER IA: 1.38 (ref 0.26–1.65)
LAMBDA LC SER QL IA: 0.8 MG/DL (ref 0.57–2.63)
PROT SERPL-MCNC: 6.6 G/DL (ref 6–8.4)

## 2022-08-25 LAB
PATHOLOGIST INTERPRETATION IFE: NORMAL
PATHOLOGIST INTERPRETATION SPE: NORMAL

## 2022-08-25 NOTE — PROGRESS NOTES
[facility]   Physical Therapy Treatment Note     Name: Mae Gregory  Clinic Number: [id number]    Therapy Diagnosis:   Encounter Diagnoses   Name Primary?    Neck pain, chronic Yes    Shoulder weakness      Referring Provider: Sukumar Gates MD    Visit Date: 8/30/2022    Referring Provider Orders: PT eval and treat  Medical Diagnosis from Referral: Cervical myofascial pain syndrome [M79.18], Migraine with aura and without status migrainosus, not intractable [G43.109]  Evaluation Date: 8/16/2022  Authorization Period Expiration: 8/9/2023  Plan of Care Expiration: 11/16/2022  Visit # / Visits authorized: 4/21   FOTO: 1/3 (8/16/2022)     Precautions: standard      PTA Visit #: 0/5     Time In: 14:52  Time Out: 15:30  Total Billable Time: 38 minutes    SUBJECTIVE     Mae reports continued increased symptoms from over the weekend, and she still has a migraine.    She was compliant with home exercise program - except in the past few days  Response to previous treatment: decreased symptoms  Functional change: no reported change     Pain: 5/10  Location: left neck      OBJECTIVE     Objective Measures updated at progress report unless specified.     Treatment     Mae received the treatments listed below:      Therapeutic exercises to develop strength, endurance, posture and core stabilization for 15 minutes -  [] Seated Scap Retraction 5sec 15x  [x] Seated Bruggers with orange Theraband, x15  [] Standing Rows with red Theraband 3x10  [] Standing Shoulder Extension with red theratube 3x10  [] Standing shoulder depression with red theratube 3x10  [] Bilateral Standing Shoulder internal rotation with Red theraband 2x10  [] Bilateral Standing Shoulder external rotation with Yellow Theraband 2x10  [] Bilateral Serratus Punches with red theratube 2min  [x] Serratus wall slide, x5 - significantly increased symptoms, discontinue  [x] Quadruped cervical retraction range of motion, x12  [x]  Supine alternating shoulder flexion (R&L), 2x10  [x] Supine alternating chest press + protraction (R&L), 2x10  [x] Supine chin tucks with 5-second hold, 2x15  [x] HEP review    Manual therapy techniques: to develop flexibility, desensitization, and extensibility for 23 minutes -  [x] Suboccipital release  [x] Manual cervical traction  [x] STM to the L cervical paraspinals, occipital region, scalenes, sternocleidomastoid, and upper trap      Patient Education and Home Exercises     Education provided:     Complete all exercise and acitivities of daily living in pain free range      Written Home Exercises Provided: Patient instructed to cont prior HEP. Exercises were reviewed and Mae was able to demonstrate them prior to the end of the session.  Mae demonstrated good  understanding of the education provided. See EMR under Patient Instructions for exercises provided during therapy sessions    ASSESSMENT     Pt tolerated treatment session well, with mostly mild increased symptoms with exercise and good tolerance for new cervical stability exercises. Pt's functional mobility and ability to perform ADLs still limited by pain and scapular/deep neck flexor weakness. She requires skilled therapy for continued patient education and progressive resistance exercise.    Mae is progressing well towards her goals.   Pt prognosis is Good.     Pt will continue to benefit from skilled outpatient physical therapy to address the deficits listed in the problem list box on initial evaluation, provide pt/family education and to maximize pt's level of independence in the home and community environment.     Pt's spiritual, cultural and educational needs considered and pt agreeable to plan of care and goals.  Anticipated barriers to physical therapy: none      Short Term Goals (6 weeks)  Pt will report pain of no more than 5/10 in the neck/L shoulderwith regular activity - NOT MET  Pt will demonstrate bilateral scapular strength of at  least 4/5 for improved support of the spine with ADLs and functional mobility - NOT MET  Pt will report 50% improvement to tenderness to palpation of L levator scapulae, suboccipitals, and upper trapezius to demonstrate improved myofascial dysfunction - NOT MET  Pt will be independent with introductory HEP - MET     Long Term Goals (12 weeks)  Pt will report pain of no more than 3/10 in the neck/L shoulder with regular activity - NOT MET  Pt will demonstrate bilateral scapular strength of at least 4+/5 for improved support of the spine with ADLs and functional mobility - NOT MET  Pt will report > 75% improvement to tenderness to palpation of L levator scapulae, suboccipitals, and upper trapezius to demonstrate improved myofascial dysfunction - NOT MET  Pt will be able to lift L arm overhead without discomfort for improved performance of ADLs - NOT MET  Pt will demonstrate cervical range of motion WNL for improved positional tolerance and functional mobility - NOT MET  Pt will score no more than 40% impaired on the FOTO Neck Survey to demonstrate overall functional improvement - NOT MET  Pt will be independent with comprehensive HEP - NOT MET      PLAN     Progress as tolerated per Plan of Care      Nadia Gallegos, PT

## 2022-08-30 ENCOUNTER — CLINICAL SUPPORT (OUTPATIENT)
Dept: REHABILITATION | Facility: HOSPITAL | Age: 57
End: 2022-08-30
Payer: COMMERCIAL

## 2022-08-30 DIAGNOSIS — R29.898 SHOULDER WEAKNESS: ICD-10-CM

## 2022-08-30 DIAGNOSIS — M54.2 NECK PAIN, CHRONIC: Primary | ICD-10-CM

## 2022-08-30 DIAGNOSIS — G89.29 NECK PAIN, CHRONIC: Primary | ICD-10-CM

## 2022-08-30 PROCEDURE — 97110 THERAPEUTIC EXERCISES: CPT | Mod: PN | Performed by: PHYSICAL THERAPIST

## 2022-08-30 PROCEDURE — 97140 MANUAL THERAPY 1/> REGIONS: CPT | Mod: PN | Performed by: PHYSICAL THERAPIST

## 2022-09-06 ENCOUNTER — CLINICAL SUPPORT (OUTPATIENT)
Dept: REHABILITATION | Facility: HOSPITAL | Age: 57
End: 2022-09-06
Payer: COMMERCIAL

## 2022-09-06 DIAGNOSIS — M54.2 NECK PAIN, CHRONIC: Primary | ICD-10-CM

## 2022-09-06 DIAGNOSIS — G89.29 NECK PAIN, CHRONIC: Primary | ICD-10-CM

## 2022-09-06 DIAGNOSIS — R29.898 SHOULDER WEAKNESS: ICD-10-CM

## 2022-09-06 PROCEDURE — 97110 THERAPEUTIC EXERCISES: CPT | Mod: PN,CQ

## 2022-09-06 NOTE — PROGRESS NOTES
[facility]   Physical Therapy Treatment Note     Name: Mae Gregory  Clinic Number: [id number]    Therapy Diagnosis:   Encounter Diagnoses   Name Primary?    Neck pain, chronic Yes    Shoulder weakness        Referring Provider: Sukumar Gates MD    Visit Date: 9/6/2022    Referring Provider Orders: PT eval and treat  Medical Diagnosis from Referral: Cervical myofascial pain syndrome [M79.18], Migraine with aura and without status migrainosus, not intractable [G43.109]  Evaluation Date: 8/16/2022  Authorization Period Expiration: 8/9/2023  Plan of Care Expiration: 11/16/2022  Visit # / Visits authorized: 5/21   FOTO: 1/3 (8/16/2022)     Precautions: standard      PTA Visit #: 1/5     Time In: 1705  Time Out: 1745  Total Billable Time: 40 minutes    SUBJECTIVE     Patient states that she is having decreased symptoms between treatment sessions.  Patient states that she is having decreased frequency and decreased      She was compliant with home exercise program   Response to previous treatment: decreased symptoms  Functional change: improved acitivities of daily living     Pain: 2/10  Location: left neck      OBJECTIVE     Objective Measures updated at progress report unless specified.     Treatment     Mae received the treatments listed below:      Therapeutic exercises to develop strength, endurance, posture and core stabilization for 15 minutes -  [] Seated Scap Retraction 5sec 90sec  [x] Seated Bruggers with orange Theraband, 2min  [x] Standing Rows with red Theraband 2min  [x] Standing Shoulder Extension with red theratube   [x] Standing shoulder depression with red theratube 3x10  [x] Bilateral Standing Shoulder internal rotation with Green theraband 2x10  [x] Bilateral Standing Shoulder external rotation with Red Theraband 2x10  [x] Bilateral Serratus Punches with red theratube 2min  [] Serratus wall slide, x5 - significantly increased symptoms, discontinue  [x] Seated  cervical retraction range of motion, 10x, 10x  [] Supine alternating shoulder flexion (R&L), 2x10  [] Supine alternating chest press + protraction (R&L), 2x10  [] Supine chin tucks with 5-second hold, 2x15  [x] HEP review    Manual therapy techniques: to develop flexibility, desensitization, and extensibility for 23 minutes -  [] Suboccipital release  [] Manual cervical traction  [x] STM to the L cervical paraspinals, occipital region, scalenes, sternocleidomastoid, and upper trap      Patient Education and Home Exercises     Education provided:     Complete all exercise and acitivities of daily living in pain free range      Written Home Exercises Provided: Patient instructed to cont prior HEP. Exercises were reviewed and Mae was able to demonstrate them prior to the end of the session.  Mae demonstrated good  understanding of the education provided. See EMR under Patient Instructions for exercises provided during therapy sessions    ASSESSMENT     Good tolerance to exercise.  Patient completed program with reported increase in symptoms.  Patient states that she was had some soreness post seated cervical retraction.    Mae is progressing well towards her goals.   Pt prognosis is Good.     Pt will continue to benefit from skilled outpatient physical therapy to address the deficits listed in the problem list box on initial evaluation, provide pt/family education and to maximize pt's level of independence in the home and community environment.     Pt's spiritual, cultural and educational needs considered and pt agreeable to plan of care and goals.  Anticipated barriers to physical therapy: none      Short Term Goals (6 weeks)  Pt will report pain of no more than 5/10 in the neck/L shoulderwith regular activity - NOT MET  Pt will demonstrate bilateral scapular strength of at least 4/5 for improved support of the spine with ADLs and functional mobility - NOT MET  Pt will report 50% improvement to tenderness to  palpation of L levator scapulae, suboccipitals, and upper trapezius to demonstrate improved myofascial dysfunction - NOT MET  Pt will be independent with introductory HEP - MET     Long Term Goals (12 weeks)  Pt will report pain of no more than 3/10 in the neck/L shoulder with regular activity - NOT MET  Pt will demonstrate bilateral scapular strength of at least 4+/5 for improved support of the spine with ADLs and functional mobility - NOT MET  Pt will report > 75% improvement to tenderness to palpation of L levator scapulae, suboccipitals, and upper trapezius to demonstrate improved myofascial dysfunction - NOT MET  Pt will be able to lift L arm overhead without discomfort for improved performance of ADLs - NOT MET  Pt will demonstrate cervical range of motion WNL for improved positional tolerance and functional mobility - NOT MET  Pt will score no more than 40% impaired on the FOTO Neck Survey to demonstrate overall functional improvement - NOT MET  Pt will be independent with comprehensive HEP - NOT MET      PLAN     Progress as tolerated per Plan of Care      Rasta Felix, PTA

## 2022-09-13 ENCOUNTER — CLINICAL SUPPORT (OUTPATIENT)
Dept: REHABILITATION | Facility: HOSPITAL | Age: 57
End: 2022-09-13
Payer: COMMERCIAL

## 2022-09-13 DIAGNOSIS — M54.2 NECK PAIN, CHRONIC: Primary | ICD-10-CM

## 2022-09-13 DIAGNOSIS — R29.898 SHOULDER WEAKNESS: ICD-10-CM

## 2022-09-13 DIAGNOSIS — G89.29 NECK PAIN, CHRONIC: Primary | ICD-10-CM

## 2022-09-13 PROCEDURE — 97110 THERAPEUTIC EXERCISES: CPT | Mod: PN,CQ

## 2022-09-13 NOTE — PROGRESS NOTES
[facility]   Physical Therapy Treatment Note     Name: Mae Gregory  Clinic Number: [id number]    Therapy Diagnosis:   Encounter Diagnoses   Name Primary?    Neck pain, chronic Yes    Shoulder weakness        Referring Provider: Sukumar Gates MD    Visit Date: 9/13/2022    Referring Provider Orders: PT eval and treat  Medical Diagnosis from Referral: Cervical myofascial pain syndrome [M79.18], Migraine with aura and without status migrainosus, not intractable [G43.109]  Evaluation Date: 8/16/2022  Authorization Period Expiration: 8/9/2023  Plan of Care Expiration: 11/16/2022  Visit # / Visits authorized: 5/21   FOTO: 1/3 (8/16/2022)     Precautions: standard      PTA Visit #: 2/5     Time In: 1100  Time Out: 1145  Total Billable Time: 40 minutes    SUBJECTIVE     Patient states that she is having increased symptoms today that she attributes to several hours of driving the previous day.  Patient reports that she feels the benefit of treatment session and states that she feels increased strength, endurance and ability to perform acitivities of daily living.      She was compliant with home exercise program   Response to previous treatment: decreased symptoms  Functional change: improved acitivities of daily living     Pain: 3/10  Location: left neck      OBJECTIVE     Objective Measures updated at progress report unless specified.     Treatment     Mae received the treatments listed below:      Therapeutic exercises to develop strength, endurance, posture and core stabilization for 15 minutes -  [x] Arm Bike 3min forward 3min backward  [x] Seated Bruggers with green Theraband, 30x  [x] Standing Rows with green Theraband 2min  [x] Standing Shoulder Extension with green theratube 2min  [x] Standing shoulder depression with green theratube 3x10  [x] Bilateral Standing Shoulder internal rotation with Green theraband 2x10  [x] Bilateral Standing Shoulder external rotation with Red  Theraband 2x10  [x] Bilateral Serratus Punches with green theratube 2min  [] Serratus wall slide, x5 - significantly increased symptoms, discontinue  [] Seated cervical retraction range of motion, 10x, 10x  [] Supine alternating shoulder flexion (R&L), 2x10  [] Supine alternating chest press + protraction (R&L), 2x10  [x] Bilateral Standing shoulder flexion and abduction to 90deg 2x10ea  [x] HEP review    Manual therapy techniques: to develop flexibility, desensitization, and extensibility for 8 minutes -  [] Suboccipital release  [] Manual cervical traction  [x] STM to the L cervical paraspinals, occipital region, scalenes, sternocleidomastoid, and upper trap      Patient Education and Home Exercises     Education provided:     Complete all exercise and acitivities of daily living in pain free range      Written Home Exercises Provided: Patient instructed to cont prior HEP. Exercises were reviewed and Mae was able to demonstrate them prior to the end of the session.  Mae demonstrated good  understanding of the education provided. See EMR under Patient Instructions for exercises provided during therapy sessions    ASSESSMENT     Good tolerance to exercise.  Patient able to progress well with intensity for exercise.  Added in Arm Bike with no reported pain or adverse reactions.    Mae is progressing well towards her goals.   Pt prognosis is Good.     Pt will continue to benefit from skilled outpatient physical therapy to address the deficits listed in the problem list box on initial evaluation, provide pt/family education and to maximize pt's level of independence in the home and community environment.     Pt's spiritual, cultural and educational needs considered and pt agreeable to plan of care and goals.  Anticipated barriers to physical therapy: none      Short Term Goals (6 weeks)  Pt will report pain of no more than 5/10 in the neck/L shoulderwith regular activity - NOT MET  Pt will demonstrate bilateral  scapular strength of at least 4/5 for improved support of the spine with ADLs and functional mobility - NOT MET  Pt will report 50% improvement to tenderness to palpation of L levator scapulae, suboccipitals, and upper trapezius to demonstrate improved myofascial dysfunction - NOT MET  Pt will be independent with introductory HEP - MET     Long Term Goals (12 weeks)  Pt will report pain of no more than 3/10 in the neck/L shoulder with regular activity - NOT MET  Pt will demonstrate bilateral scapular strength of at least 4+/5 for improved support of the spine with ADLs and functional mobility - NOT MET  Pt will report > 75% improvement to tenderness to palpation of L levator scapulae, suboccipitals, and upper trapezius to demonstrate improved myofascial dysfunction - NOT MET  Pt will be able to lift L arm overhead without discomfort for improved performance of ADLs - NOT MET  Pt will demonstrate cervical range of motion WNL for improved positional tolerance and functional mobility - NOT MET  Pt will score no more than 40% impaired on the FOTO Neck Survey to demonstrate overall functional improvement - NOT MET  Pt will be independent with comprehensive HEP - NOT MET      PLAN     Progress as tolerated per Plan of Care      Rasta Felix, KLAUS

## 2022-09-19 ENCOUNTER — PATIENT MESSAGE (OUTPATIENT)
Dept: NEUROLOGY | Facility: CLINIC | Age: 57
End: 2022-09-19
Payer: COMMERCIAL

## 2022-09-20 ENCOUNTER — PATIENT MESSAGE (OUTPATIENT)
Dept: NEUROLOGY | Facility: CLINIC | Age: 57
End: 2022-09-20
Payer: COMMERCIAL

## 2022-09-20 DIAGNOSIS — G43.109 MIGRAINE WITH AURA AND WITHOUT STATUS MIGRAINOSUS, NOT INTRACTABLE: Primary | ICD-10-CM

## 2022-10-13 PROBLEM — M54.2 NECK PAIN, CHRONIC: Status: RESOLVED | Noted: 2022-08-16 | Resolved: 2022-10-13

## 2022-10-13 PROBLEM — G89.29 NECK PAIN, CHRONIC: Status: RESOLVED | Noted: 2022-08-16 | Resolved: 2022-10-13

## 2022-10-13 PROBLEM — R29.898 SHOULDER WEAKNESS: Status: RESOLVED | Noted: 2022-08-16 | Resolved: 2022-10-13

## 2022-10-18 ENCOUNTER — PROCEDURE VISIT (OUTPATIENT)
Dept: NEUROLOGY | Facility: CLINIC | Age: 57
End: 2022-10-18
Payer: COMMERCIAL

## 2022-10-18 DIAGNOSIS — G43.709 CHRONIC MIGRAINE WITHOUT AURA WITHOUT STATUS MIGRAINOSUS, NOT INTRACTABLE: Primary | ICD-10-CM

## 2022-10-18 PROCEDURE — 64615 CHEMODENERV MUSC MIGRAINE: CPT | Mod: S$GLB,,, | Performed by: PSYCHIATRY & NEUROLOGY

## 2022-10-18 PROCEDURE — 64615 PR CHEMODENERVATION OF MUSCLE FOR CHRONIC MIGRAINE: ICD-10-PCS | Mod: S$GLB,,, | Performed by: PSYCHIATRY & NEUROLOGY

## 2022-10-18 NOTE — PROCEDURES
Procedures    Pt of Dr. Kody SHI:  Headaches last hours with photophobia, phonophobia, + nausea.  Has visual aura.  Often L cervico-occipital.  Bilateral anteriorly.   She has been seeing Dr. Maynard in [place] but moved back to the [place] area several years ago.  She had a remote, nl MRI brain in [place].      Freq: 30/30 and 20/30     Last Botox June 30, 2022 and not as effective as usual.  Usually only 2-3 HAs/month but now having daily HA.      Abortives:              Effective: Nurtec > Imitrex              Ineffective: ketamine/lidocaine     Prophylactics:             Effective: Botox, topiramate 50mg qhs (bid sedation), trazodone, methocarbamol      BOTOX was performed as an indicated therapy for intractable chronic migraine headaches given that the patient failed > 3  prophylactic medications    Botulinum Toxin Injection Procedure   Pre-operative diagnosis: Chronic migraine   Post-operative diagnosis: Same   Procedure: Chemical neurolysis   After risks and benefits were explained including bleeding, infection, worsening of pain, damage to the areas being injected, weakness of muscles, loss of muscle control, dysphagia if injecting the head or neck, facial droop if injecting the facial area, painful injection, allergic or other reaction to the medications being injected, and the failure of the procedure to help the problem, a signed consent was obtained.   The patient was placed in a comfortable area and the sites to be treated were identified.The area to be treated was prepped three times with alcohol and the alcohol allowed to dry. Next, a 30 gauge needle was used to inject the medication in the area to be treated.   Area(s) injected:   Total Botox used: 155 Units   Botox wastage: 45 Units   Injection sites:    muscle bilaterally ( a total of 10 units divided into 2 sites)   Procerus muscle (5 units)   Frontalis muscle bilaterally (a total of 20 units divided into 4 sites)   Temporalis  muscle bilaterally (a total of 40 units divided into 8 sites)   Occipitalis muscle bilaterally (a total of 30 units divided into 6 sites)   Cervical paraspinal muscles (a total of 20 units divided into 4 sites)   Trapezius muscle bilaterally (a total of 30 units divided into 6 sites)   Complications: none   RTC for the next Botox injection: 3 months

## 2022-11-01 ENCOUNTER — OFFICE VISIT (OUTPATIENT)
Dept: FAMILY MEDICINE | Facility: CLINIC | Age: 57
End: 2022-11-01
Payer: MEDICAID

## 2022-11-01 DIAGNOSIS — G43.709 CHRONIC MIGRAINE WITHOUT AURA WITHOUT STATUS MIGRAINOSUS, NOT INTRACTABLE: ICD-10-CM

## 2022-11-01 DIAGNOSIS — F51.01 PRIMARY INSOMNIA: Primary | ICD-10-CM

## 2022-11-01 DIAGNOSIS — I73.00 RAYNAUD'S PHENOMENON WITHOUT GANGRENE: ICD-10-CM

## 2022-11-01 PROCEDURE — 99214 PR OFFICE/OUTPT VISIT, EST, LEVL IV, 30-39 MIN: ICD-10-PCS | Mod: 95,,, | Performed by: FAMILY MEDICINE

## 2022-11-01 PROCEDURE — 1160F PR REVIEW ALL MEDS BY PRESCRIBER/CLIN PHARMACIST DOCUMENTED: ICD-10-PCS | Mod: CPTII,95,, | Performed by: FAMILY MEDICINE

## 2022-11-01 PROCEDURE — 1159F MED LIST DOCD IN RCRD: CPT | Mod: CPTII,95,, | Performed by: FAMILY MEDICINE

## 2022-11-01 PROCEDURE — 1159F PR MEDICATION LIST DOCUMENTED IN MEDICAL RECORD: ICD-10-PCS | Mod: CPTII,95,, | Performed by: FAMILY MEDICINE

## 2022-11-01 PROCEDURE — 99214 OFFICE O/P EST MOD 30 MIN: CPT | Mod: 95,,, | Performed by: FAMILY MEDICINE

## 2022-11-01 PROCEDURE — 1160F RVW MEDS BY RX/DR IN RCRD: CPT | Mod: CPTII,95,, | Performed by: FAMILY MEDICINE

## 2022-11-01 RX ORDER — ZOLPIDEM TARTRATE 5 MG/1
5 TABLET ORAL NIGHTLY PRN
Qty: 30 TABLET | Refills: 5 | Status: SHIPPED | OUTPATIENT
Start: 2022-11-01 | End: 2023-04-25 | Stop reason: SDUPTHER

## 2022-11-01 RX ORDER — AMLODIPINE BESYLATE 5 MG/1
5 TABLET ORAL DAILY
Qty: 90 TABLET | Refills: 3 | Status: SHIPPED | OUTPATIENT
Start: 2022-11-01 | End: 2023-05-15

## 2022-11-01 NOTE — PROGRESS NOTES
The patient location is: home  The chief complaint leading to consultation is: Medication Refill    Visit type: Virtual visit with synchronous audio and video  Total time spent with patient: 16 minutes  Each patient to whom he or she provides medical services by telemedicine is:  (1) informed of the relationship between the physician and patient and the respective role of any other health care provider with respect to management of the patient; and (2) notified that he or she may decline to receive medical services by telemedicine and may withdraw from such care at any time.         Office Visit    Patient Name: Mae Gregory    : 1965  MRN: [id number]      Assessment/Plan:  Mae Gregory is a 57 y.o. female who presents today for :    Primary insomnia  -     zolpidem (AMBIEN) 5 MG Tab; Take 1 tablet (5 mg total) by mouth nightly as needed.  Dispense: 30 tablet; Refill: 5  -stable, continue current regimen, potential side effects associated with medication reviewed     Raynaud's phenomenon without gangrene  -     amLODIPine (NORVASC) 5 MG tablet; Take 1 tablet (5 mg total) by mouth once daily.  Dispense: 90 tablet; Refill: 3  -stable, continue current regimen   -f/u Rheum as needed    Chronic migraine without aura without status migrainosus, not intractable  -stable, continue current regimen             Follow up in 2months for Annual    This note was created by combination of typed  and MModal dictation.  Transcription errors may be present.  If there are any questions, please contact me.      ----------------------------------------------------------------------------------------------------------------------      HPI:  Patient Care Team:  Rajiv Garcia MD as PCP - General (Family Medicine)  Tavo Boateng MD as Consulting Physician (General Surgery)  May De La Torre MA (Inactive)  May De La Torre MA (Inactive) as Care Coordinator    Mae is a 57 y.o. female with      Patient Active  Problem List   Diagnosis    Migraine    DDD (degenerative disc disease), cervical    DDD (degenerative disc disease), lumbar    Mild episode of recurrent major depressive disorder    Primary insomnia    Meniere's disease of both ears    Sensorineural hearing loss (SNHL) of both ears    Hair loss    Glaucoma    Anxiety    Depression    Sleepwalking - controlled on PRN Trazodone    Dystonia - sees neurology Dr. Maynard in [place] for q12w Botox injections    Pain    Raynaud's phenomenon - follows Rheumatology - on Norvasc 2.5mg    Cervical myofascial pain syndrome       Patient presents today for f/u of:  Medication Refill      Her last follow up with me was over 11 months ago. She will return to clinic for an Annual checkup when she is able to schedule an in office appointment with us.     Insomnia - controlled on PRN Ambien, denies any side effects.    Raynaud's - stable on amlodipine. No recent flare up.    Migraine HA - follows Neurology - controlled on Imitrex/Topamax/Botox injections      Answers submitted by the patient for this visit:  Review of Systems Questionnaire (Submitted on 11/1/2022)  activity change: No  unexpected weight change: No  hearing loss: No  trouble swallowing: No  eye discharge: No  visual disturbance: No  chest tightness: No  wheezing: No  chest pain: No  palpitations: No  blood in stool: No  constipation: No  vomiting: No  diarrhea: No  polydipsia: No  polyuria: No  difficulty urinating: No  hematuria: No  menstrual problem: No  headaches: Yes - controlled on Topamax  dysphoric mood: No                Patient Active Problem List   Diagnosis    Migraine    DDD (degenerative disc disease), cervical    DDD (degenerative disc disease), lumbar    Mild episode of recurrent major depressive disorder    Primary insomnia    Meniere's disease of both ears    Sensorineural hearing loss (SNHL) of both ears    Hair loss    Glaucoma    Anxiety    Depression    Sleepwalking - controlled on PRN Trazodone     Dystonia - sees neurology Dr. Maynard in [place] for q12w Botox injections    Pain    Raynaud's phenomenon - follows Rheumatology - on Norvasc 2.5mg    Cervical myofascial pain syndrome       Current Medications  Medications reviewed/updated.     Current Outpatient Medications on File Prior to Visit   Medication Sig Dispense Refill    albuterol (PROVENTIL/VENTOLIN HFA) 90 mcg/actuation inhaler TAKE 1 2 PUFF(S) (INHALATION) EVERY 4 HOURS PRN   WHEEZING 18 g 12    buPROPion (WELLBUTRIN XL) 150 MG TB24 tablet Take 150 mg by mouth once daily.      cetirizine (ZYRTEC) 10 MG tablet Take 10 mg by mouth once daily.      COCONUT OIL ORAL Take by mouth.      estradiol-norethindrone (ACTIVELLA) 1-0.5 mg per tablet TAKE 1 TABLET BY MOUTH EVERY DAY 28 tablet 12    finasteride (PROSCAR) 5 mg tablet Take 1 tablet (5 mg total) by mouth once daily. 30 tablet 11    methocarbamoL (ROBAXIN) 500 MG Tab Take 500 mg by mouth 2 (two) times daily as needed.      multivitamin with minerals tablet Take 1 tablet by mouth once daily.      NURTEC 75 mg odt Take 75 mg by mouth.      oxybutynin (DITROPAN) 5 MG Tab TAKE ONE TABLET BY MOUTH TWICE DAILY 60 tablet 2    sumatriptan (IMITREX) 100 MG tablet Take 1 tablet (100 mg total) by mouth every 2 (two) hours as needed for Migraine. 40 tablet 1    topiramate (TOPAMAX) 50 MG tablet TAKE 1 TABLET BY MOUTH TWICE A  tablet 3    traZODone (DESYREL) 150 MG tablet Take 1 tablet (150 mg total) by mouth nightly as needed for Insomnia. Followup with doctor NOV 2020 30 tablet 5    valACYclovir (VALTREX) 1000 MG tablet Take 2 tablets (2,000 mg total) by mouth every 12 (twelve) hours. For cold sore for 1 day 4 tablet 5    [DISCONTINUED] amLODIPine (NORVASC) 5 MG tablet Take 1 tablet (5 mg total) by mouth once daily. 30 tablet 11    [DISCONTINUED] zolpidem (AMBIEN) 5 MG Tab Take 1 tablet (5 mg total) by mouth nightly as needed. 30 tablet 5     No current facility-administered medications on file prior to  visit.           Past Surgical History:   Procedure Laterality Date    AUGMENTATION OF BREAST      BREAST SURGERY       SECTION   and 2001    x2    COLON SURGERY  10/15/2018    colon resection        Family History   Problem Relation Age of Onset    Stroke Maternal Grandfather     Hypertension Father     Hypertension Mother     Hypertension Brother     Ovarian cancer Neg Hx     Cancer Neg Hx        Social History     Socioeconomic History    Marital status:    Tobacco Use    Smoking status: Never    Smokeless tobacco: Never   Substance and Sexual Activity    Alcohol use: No    Drug use: No    Sexual activity: Yes     Partners: Male     Comment: ablation             Allergies   Review of patient's allergies indicates:  No Known Allergies          Review of Systems  See HPI        Physical Exam  There were no vitals taken for this visit.    GEN: NAD

## 2022-11-15 ENCOUNTER — OFFICE VISIT (OUTPATIENT)
Dept: OBSTETRICS AND GYNECOLOGY | Facility: CLINIC | Age: 57
End: 2022-11-15
Attending: OBSTETRICS & GYNECOLOGY
Payer: COMMERCIAL

## 2022-11-15 ENCOUNTER — TELEPHONE (OUTPATIENT)
Dept: NEUROLOGY | Facility: CLINIC | Age: 57
End: 2022-11-15
Payer: COMMERCIAL

## 2022-11-15 VITALS
HEIGHT: 65 IN | DIASTOLIC BLOOD PRESSURE: 62 MMHG | WEIGHT: 124.31 LBS | BODY MASS INDEX: 20.71 KG/M2 | SYSTOLIC BLOOD PRESSURE: 110 MMHG

## 2022-11-15 DIAGNOSIS — Z12.31 BREAST CANCER SCREENING BY MAMMOGRAM: Primary | ICD-10-CM

## 2022-11-15 PROCEDURE — 3078F PR MOST RECENT DIASTOLIC BLOOD PRESSURE < 80 MM HG: ICD-10-PCS | Mod: CPTII,S$GLB,, | Performed by: OBSTETRICS & GYNECOLOGY

## 2022-11-15 PROCEDURE — 3074F PR MOST RECENT SYSTOLIC BLOOD PRESSURE < 130 MM HG: ICD-10-PCS | Mod: CPTII,S$GLB,, | Performed by: OBSTETRICS & GYNECOLOGY

## 2022-11-15 PROCEDURE — 3078F DIAST BP <80 MM HG: CPT | Mod: CPTII,S$GLB,, | Performed by: OBSTETRICS & GYNECOLOGY

## 2022-11-15 PROCEDURE — 99214 OFFICE O/P EST MOD 30 MIN: CPT | Mod: PBBFAC,PN | Performed by: OBSTETRICS & GYNECOLOGY

## 2022-11-15 PROCEDURE — 1160F PR REVIEW ALL MEDS BY PRESCRIBER/CLIN PHARMACIST DOCUMENTED: ICD-10-PCS | Mod: CPTII,S$GLB,, | Performed by: OBSTETRICS & GYNECOLOGY

## 2022-11-15 PROCEDURE — 99396 PR PREVENTIVE VISIT,EST,40-64: ICD-10-PCS | Mod: S$GLB,,, | Performed by: OBSTETRICS & GYNECOLOGY

## 2022-11-15 PROCEDURE — 99999 PR PBB SHADOW E&M-EST. PATIENT-LVL IV: ICD-10-PCS | Mod: PBBFAC,,, | Performed by: OBSTETRICS & GYNECOLOGY

## 2022-11-15 PROCEDURE — 1159F MED LIST DOCD IN RCRD: CPT | Mod: CPTII,S$GLB,, | Performed by: OBSTETRICS & GYNECOLOGY

## 2022-11-15 PROCEDURE — 99396 PREV VISIT EST AGE 40-64: CPT | Mod: S$GLB,,, | Performed by: OBSTETRICS & GYNECOLOGY

## 2022-11-15 PROCEDURE — 99999 PR PBB SHADOW E&M-EST. PATIENT-LVL IV: CPT | Mod: PBBFAC,,, | Performed by: OBSTETRICS & GYNECOLOGY

## 2022-11-15 PROCEDURE — 3074F SYST BP LT 130 MM HG: CPT | Mod: CPTII,S$GLB,, | Performed by: OBSTETRICS & GYNECOLOGY

## 2022-11-15 PROCEDURE — 1160F RVW MEDS BY RX/DR IN RCRD: CPT | Mod: CPTII,S$GLB,, | Performed by: OBSTETRICS & GYNECOLOGY

## 2022-11-15 PROCEDURE — 1159F PR MEDICATION LIST DOCUMENTED IN MEDICAL RECORD: ICD-10-PCS | Mod: CPTII,S$GLB,, | Performed by: OBSTETRICS & GYNECOLOGY

## 2022-11-15 PROCEDURE — 3008F PR BODY MASS INDEX (BMI) DOCUMENTED: ICD-10-PCS | Mod: CPTII,S$GLB,, | Performed by: OBSTETRICS & GYNECOLOGY

## 2022-11-15 PROCEDURE — 3008F BODY MASS INDEX DOCD: CPT | Mod: CPTII,S$GLB,, | Performed by: OBSTETRICS & GYNECOLOGY

## 2022-11-15 NOTE — TELEPHONE ENCOUNTER
----- Message from Tamera Patino sent at 11/12/2022 12:16 PM CST -----  Type :  Needs Medical Advice    Who Called: pt  Symptoms (please be specific): Headaches, Migraine, Botox,  How long has patient had these symptoms:  Headaches, Migraine, Botox,  Would the patient rather a call back or a response via [facility]?  Call   Best Call Back Number: [phone]  Additional Information:  call appt

## 2022-11-15 NOTE — PROGRESS NOTES
"  Past medical, surgical, social, family, and obstetric histories; medications; prior records and results; and available outside records were reviewed and updated in the EMR.  Pertinent findings were noted below.    Reason for Visit   Gynecologic Exam    HPI   57 y.o. female  here for well woman visit.    New patient: No    Menopausal: Yes    History of abnormal paps:  H/o of LSIL on colpo in 2019. Repeat pap in  NILM, HPV-  Abnormal or postmenopausal bleeding: DENIES  History of abnormal mammograms:DENIES   Family history of breast or ovarian cancer: DENIES  Any breast masses, pain, skin changes, or nipple discharge: DENIES  Possible recent STD exposure: Denies, not sexually active since May  Contraception: s/p BTL    No other complaints today. Doing well on HRT.    Pap: 11/3/2020, NILM/HPV(-)  Mammogram: BIRADS1, T-C=7.07%  Allergies: Patient has no known allergies.    Review of Systems   Constitutional:  Negative for chills, fatigue and fever.   HENT:  Negative for nasal congestion.    Eyes:  Negative for visual disturbance.   Respiratory:  Negative for cough, shortness of breath and wheezing.    Cardiovascular:  Negative for chest pain.   Gastrointestinal:  Negative for nausea and vomiting.   Genitourinary:  Negative for dysuria, pelvic pain, vaginal bleeding and vaginal discharge.   Musculoskeletal:  Negative for myalgias.   Integumentary:  Negative for rash.   Neurological:  Negative for syncope.   Hematological:  Negative for adenopathy.   Psychiatric/Behavioral:  Negative for depression.      Exam   /62   Ht 5' 5" (1.651 m)   Wt 56.4 kg (124 lb 5.4 oz)   BMI 20.69 kg/m²     Physical Exam  Constitutional:       Appearance: She is well-developed.   Genitourinary:      Vulva normal.      No lesions in the vagina.      Right Labia: No rash or tenderness.     Left Labia: No tenderness or rash.     No labial fusion noted.      No vaginal discharge or tenderness.        Right Adnexa: not " tender.     Left Adnexa: not tender.     No cervical motion tenderness, discharge or lesion.      Uterus is not tender.   HENT:      Head: Normocephalic.   Eyes:      General: No scleral icterus.     Extraocular Movements: Extraocular movements intact.   Cardiovascular:      Rate and Rhythm: Normal rate.   Pulmonary:      Effort: Pulmonary effort is normal. No respiratory distress.   Abdominal:      General: There is no distension.      Palpations: Abdomen is soft. There is no hepatomegaly or mass.      Tenderness: There is no abdominal tenderness.   Musculoskeletal:         General: Normal range of motion.      Cervical back: Normal range of motion.   Neurological:      Mental Status: She is alert and oriented to person, place, and time.   Skin:     General: Skin is warm and dry.   Psychiatric:         Mood and Affect: Mood normal.         Behavior: Behavior normal.   Vitals reviewed. Exam conducted with a chaperone present.     Assessment and Plan   Breast cancer screening by mammogram  -     Mammo Digital Screening Bilat w/ Lb; Future; Expected date: 11/15/2022    Annual exam  Breast and pelvic exam: benign as above  Patient was counseled on ASCCP guidelines for cervical cytology screening  Cervical screening: repeat pap co test next year  Patient was counseled on current recommendations for breast cancer screening. 2022 Mammogram ordered.  Mammogram screening: @ 39yo unless new risk factors warrant earlier screening  VitD/Ca supplementation recommendations based on age:  <50yoa - Ca 1000mg/day, VitD 600IU/day  51-70yoa - Ca 1200mg/day, VitD 600IU/day  >71yoa - Ca 1200mg/day, VitD 800IU/day  STD testing: Declines  Contraception: s/p BTL  HRT reordered    She was counseled to follow up with her PCP for other routine health maintenance    Nimisha Ramirez MD  OB-GYN, PGY-1

## 2022-11-29 ENCOUNTER — HOSPITAL ENCOUNTER (OUTPATIENT)
Dept: RADIOLOGY | Facility: HOSPITAL | Age: 57
Discharge: HOME | End: 2022-11-29
Payer: COMMERCIAL

## 2022-11-29 DIAGNOSIS — Z12.31 BREAST CANCER SCREENING BY MAMMOGRAM: ICD-10-CM

## 2022-11-29 PROCEDURE — 77063 BREAST TOMOSYNTHESIS BI: CPT | Mod: 26,,, | Performed by: RADIOLOGY

## 2022-11-29 PROCEDURE — 77067 MAMMO DIGITAL SCREENING BILAT WITH TOMO: ICD-10-PCS | Mod: 26,,, | Performed by: RADIOLOGY

## 2022-11-29 PROCEDURE — 77063 MAMMO DIGITAL SCREENING BILAT WITH TOMO: ICD-10-PCS | Mod: 26,,, | Performed by: RADIOLOGY

## 2022-11-29 PROCEDURE — 77067 SCR MAMMO BI INCL CAD: CPT | Mod: 26,,, | Performed by: RADIOLOGY

## 2022-11-29 PROCEDURE — 77063 BREAST TOMOSYNTHESIS BI: CPT | Mod: TC

## 2023-01-12 ENCOUNTER — PROCEDURE VISIT (OUTPATIENT)
Dept: NEUROLOGY | Facility: CLINIC | Age: 58
End: 2023-01-12
Payer: COMMERCIAL

## 2023-01-12 VITALS
WEIGHT: 124.31 LBS | HEART RATE: 72 BPM | SYSTOLIC BLOOD PRESSURE: 110 MMHG | DIASTOLIC BLOOD PRESSURE: 73 MMHG | HEIGHT: 65 IN | BODY MASS INDEX: 20.71 KG/M2

## 2023-01-12 DIAGNOSIS — G43.709 CHRONIC MIGRAINE WITHOUT AURA WITHOUT STATUS MIGRAINOSUS, NOT INTRACTABLE: Primary | ICD-10-CM

## 2023-01-12 PROCEDURE — 64615 PR CHEMODENERVATION OF MUSCLE FOR CHRONIC MIGRAINE: ICD-10-PCS | Mod: S$GLB,,, | Performed by: PSYCHIATRY & NEUROLOGY

## 2023-01-12 PROCEDURE — 64615 CHEMODENERV MUSC MIGRAINE: CPT | Mod: S$GLB,,, | Performed by: PSYCHIATRY & NEUROLOGY

## 2023-01-12 RX ORDER — RIMEGEPANT SULFATE 75 MG/75MG
75 TABLET, ORALLY DISINTEGRATING ORAL ONCE AS NEEDED
Qty: 12 TABLET | Refills: 12 | Status: SHIPPED | OUTPATIENT
Start: 2023-01-12 | End: 2023-01-12

## 2023-01-12 RX ORDER — CYCLOBENZAPRINE HCL 10 MG
10 TABLET ORAL 3 TIMES DAILY PRN
Qty: 30 TABLET | Refills: 12 | Status: SHIPPED | OUTPATIENT
Start: 2023-01-12 | End: 2023-02-11

## 2023-01-12 NOTE — PROCEDURES
Procedures    Overall doing well.   HA is much better   Using less pain meds   Using Nurtec   Feels L jaw line paresthesias     Pt of Dr. Kody SHI:  Headaches last hours with photophobia, phonophobia, + nausea.  Has visual aura.  Often L cervico-occipital.  Bilateral anteriorly.   She has been seeing Dr. Maynard in [place] but moved back to the [place] area several years ago.  She had a remote, nl MRI brain in [place].      Freq: 30/30 and 20/30     Last Botox June 30, 2022 and not as effective as usual.  Usually only 2-3 HAs/month but now having daily HA.    BOTOX #1 10/18/22 - helped     Abortives:              Effective: Nurtec > Imitrex              Ineffective: ketamine/lidocaine  PRN flexeril      Prophylactics:             Effective: Botox, topiramate 50mg qhs (bid sedation), trazodone      BOTOX was performed as an indicated therapy for intractable chronic migraine headaches given that the patient failed > 3  prophylactic medications    Botulinum Toxin Injection Procedure   Pre-operative diagnosis: Chronic migraine   Post-operative diagnosis: Same   Procedure: Chemical neurolysis   After risks and benefits were explained including bleeding, infection, worsening of pain, damage to the areas being injected, weakness of muscles, loss of muscle control, dysphagia if injecting the head or neck, facial droop if injecting the facial area, painful injection, allergic or other reaction to the medications being injected, and the failure of the procedure to help the problem, a signed consent was obtained.   The patient was placed in a comfortable area and the sites to be treated were identified.The area to be treated was prepped three times with alcohol and the alcohol allowed to dry. Next, a 30 gauge needle was used to inject the medication in the area to be treated.   Area(s) injected:   Total Botox used: 160 Units   Botox wastage: 40 Units   Injection sites:    muscle bilaterally ( a total of 10  units divided into 2 sites)   Procerus muscle (5 units)   Frontalis muscle bilaterally (a total of 20 units divided into 4 sites)   Temporalis muscle bilaterally (a total of 40 units divided into 8 sites)   Occipitalis muscle bilaterally (a total of 30 units divided into 6 sites)   Cervical paraspinal muscles (a total of 20 units divided into 4 sites)   Trapezius muscle bilaterally (a total of 30 units divided into 6 sites)   Mastoid 5 units on L     Complications: none   RTC for the next Botox injection: 3 months

## 2023-02-06 ENCOUNTER — OFFICE VISIT (OUTPATIENT)
Dept: FAMILY MEDICINE | Facility: CLINIC | Age: 58
End: 2023-02-06
Payer: COMMERCIAL

## 2023-02-06 VITALS
OXYGEN SATURATION: 99 % | BODY MASS INDEX: 20.09 KG/M2 | TEMPERATURE: 98 F | DIASTOLIC BLOOD PRESSURE: 80 MMHG | HEART RATE: 98 BPM | WEIGHT: 120.56 LBS | SYSTOLIC BLOOD PRESSURE: 118 MMHG | HEIGHT: 65 IN

## 2023-02-06 DIAGNOSIS — I73.00 RAYNAUD'S PHENOMENON WITHOUT GANGRENE: ICD-10-CM

## 2023-02-06 DIAGNOSIS — M79.18 CERVICAL MYOFASCIAL PAIN SYNDROME: ICD-10-CM

## 2023-02-06 DIAGNOSIS — F41.8 ANXIETY WITH DEPRESSION: ICD-10-CM

## 2023-02-06 DIAGNOSIS — G24.9 DYSTONIA: ICD-10-CM

## 2023-02-06 DIAGNOSIS — H81.03 MENIERE'S DISEASE OF BOTH EARS: ICD-10-CM

## 2023-02-06 DIAGNOSIS — F51.01 PRIMARY INSOMNIA: ICD-10-CM

## 2023-02-06 DIAGNOSIS — Z00.00 ANNUAL PHYSICAL EXAM: Primary | ICD-10-CM

## 2023-02-06 DIAGNOSIS — B00.1 FEVER BLISTER: ICD-10-CM

## 2023-02-06 DIAGNOSIS — F51.3 SLEEPWALKING: ICD-10-CM

## 2023-02-06 DIAGNOSIS — H69.92 EUSTACHIAN TUBE DISORDER, LEFT: ICD-10-CM

## 2023-02-06 PROCEDURE — 3079F PR MOST RECENT DIASTOLIC BLOOD PRESSURE 80-89 MM HG: ICD-10-PCS | Mod: CPTII,S$GLB,, | Performed by: FAMILY MEDICINE

## 2023-02-06 PROCEDURE — 99396 PREV VISIT EST AGE 40-64: CPT | Mod: S$GLB,,, | Performed by: FAMILY MEDICINE

## 2023-02-06 PROCEDURE — 1160F PR REVIEW ALL MEDS BY PRESCRIBER/CLIN PHARMACIST DOCUMENTED: ICD-10-PCS | Mod: CPTII,S$GLB,, | Performed by: FAMILY MEDICINE

## 2023-02-06 PROCEDURE — 1160F RVW MEDS BY RX/DR IN RCRD: CPT | Mod: CPTII,S$GLB,, | Performed by: FAMILY MEDICINE

## 2023-02-06 PROCEDURE — 99999 PR PBB SHADOW E&M-EST. PATIENT-LVL IV: CPT | Mod: PBBFAC,,, | Performed by: FAMILY MEDICINE

## 2023-02-06 PROCEDURE — 1159F MED LIST DOCD IN RCRD: CPT | Mod: CPTII,S$GLB,, | Performed by: FAMILY MEDICINE

## 2023-02-06 PROCEDURE — 3074F SYST BP LT 130 MM HG: CPT | Mod: CPTII,S$GLB,, | Performed by: FAMILY MEDICINE

## 2023-02-06 PROCEDURE — 3008F BODY MASS INDEX DOCD: CPT | Mod: CPTII,S$GLB,, | Performed by: FAMILY MEDICINE

## 2023-02-06 PROCEDURE — 1159F PR MEDICATION LIST DOCUMENTED IN MEDICAL RECORD: ICD-10-PCS | Mod: CPTII,S$GLB,, | Performed by: FAMILY MEDICINE

## 2023-02-06 PROCEDURE — 3074F PR MOST RECENT SYSTOLIC BLOOD PRESSURE < 130 MM HG: ICD-10-PCS | Mod: CPTII,S$GLB,, | Performed by: FAMILY MEDICINE

## 2023-02-06 PROCEDURE — 3079F DIAST BP 80-89 MM HG: CPT | Mod: CPTII,S$GLB,, | Performed by: FAMILY MEDICINE

## 2023-02-06 PROCEDURE — 99999 PR PBB SHADOW E&M-EST. PATIENT-LVL IV: ICD-10-PCS | Mod: PBBFAC,,, | Performed by: FAMILY MEDICINE

## 2023-02-06 PROCEDURE — 99396 PR PREVENTIVE VISIT,EST,40-64: ICD-10-PCS | Mod: S$GLB,,, | Performed by: FAMILY MEDICINE

## 2023-02-06 PROCEDURE — 3008F PR BODY MASS INDEX (BMI) DOCUMENTED: ICD-10-PCS | Mod: CPTII,S$GLB,, | Performed by: FAMILY MEDICINE

## 2023-02-06 RX ORDER — VALACYCLOVIR HYDROCHLORIDE 1 G/1
2000 TABLET, FILM COATED ORAL EVERY 12 HOURS
Qty: 30 TABLET | Refills: 11 | Status: SHIPPED | OUTPATIENT
Start: 2023-02-06 | End: 2023-05-11 | Stop reason: SDUPTHER

## 2023-02-06 NOTE — PROGRESS NOTES
"  Physical Exam  /80   Pulse 98   Temp 97.8 °F (36.6 °C) (Oral)   Ht 5' 5" (1.651 m)   Wt 54.7 kg (120 lb 9.5 oz)   SpO2 99%   BMI 20.07 kg/m²      Office Visit    Patient Name: Mae Gregory    : 1965  MRN: [id number]      Assessment/Plan:  Mae Gregory is a 57 y.o. female who presents today for :    Annual physical exam  -     Hemoglobin A1C; Future; Expected date: 2023  -     CBC Without Differential; Future; Expected date: 2023  -     Comprehensive Metabolic Panel; Future; Expected date: 2023  -     Lipid Panel; Future; Expected date: 2023  -     TSH; Future; Expected date: 2023  -     Vitamin D; Future; Expected date: 2023  -anticipatory guidance provided with age appropriate preventative services discussed, healthy diet and regular physical exercise also discussed with patient    Fever blister  -     valACYclovir (VALTREX) 1000 MG tablet; Take 2 tablets (2,000 mg total) by mouth every 12 (twelve) hours. For cold sore for 1 day  Dispense: 30 tablet; Refill: 11    -Anxiety with depression - controlled on Wellbutrin      -Cervical myofascial pain syndrome - follows Neurology for injections  -Dystonia - follows Neurology regularly for Botox injections  -stable, continue current regimen     -Meniere's disease of both ears      -Raynaud's phenomenon - follows Rheumatology - on Norvasc 2.5mg  -stable, continue current regimen     -Insomnia  -controlled on PRN Ambien      Eustachian tube disorder, left  -     Ambulatory referral/consult to ENT; Future; Expected date: 2023        Follow up 6mo    This note was created by combination of typed  and MModal dictation.  Transcription errors may be present.  If there are any questions, please contact me.          ----------------------------------------------------------------------------------------------------------------------      HPI:  Patient Care Team:  Rajiv Garcia MD as PCP - General (Family " Medicine)  Tavo Boateng MD as Consulting Physician (General Surgery)  May De La Torre MA (Inactive)  May De La Torre MA (Inactive) as Care Coordinator  Sukumar Gates MD as Consulting Physician (Neurology)    Mae is a 57 y.o. female with      Patient Active Problem List   Diagnosis    Migraine    DDD (degenerative disc disease), cervical    DDD (degenerative disc disease), lumbar    -Insomnia    Meniere's disease of both ears    Sensorineural hearing loss (SNHL) of both ears    Hair loss    Glaucoma    -Sleepwalking - controlled on PRN Trazodone    -Dystonia - sees neurology Dr. Maynard in [place] for q12w Botox injections    Pain    -Raynaud's phenomenon - follows Rheumatology - on Norvasc 2.5mg    -Cervical myofascial pain syndrome - follows Neurology for injections    -Anxiety with depression - controlled on Wellbutrin       Mae has PMHx as noted above and presents today for:  Annual Exam          Her last Annual checkup with me was about a year ago. She is doing well overall.   Health maintenance-wise, she is due for routine labs. She is up to date on colon cancer screening/MMG/Pap screening. For the past year, she had transitioned her treatment for dystonia from [place] to the [facility] - feels that her treatment in the [facility] area provides more relief, as well as easier access due to not have to travel far for treatments. Her migraines is otherwise controlled. She denies any cardiovascular complaints today            Additional ROS    CONST: no fever, no activity change, weight stable. +fatigue  EYES: +vision change.   ENT: no sore throat. No dysphagia. +chronic recurrent L sinus/ear congestion and fullness  CV: no CP with exertion  RESP: no SOB  GI: no N/V/diarrhea/constipation  : no urinary concerns  MSK: no new myalgias or arthralgias.   SKIN: no new rashes  NEURO: no focal deficits.   PSYCH: no new issues.   ENDOCRINE: no polyuria.             Current Medications  Medications  reviewed and updated.       Current Outpatient Medications:     amLODIPine (NORVASC) 5 MG tablet, Take 1 tablet (5 mg total) by mouth once daily., Disp: 90 tablet, Rfl: 3    buPROPion (WELLBUTRIN XL) 150 MG TB24 tablet, Take 150 mg by mouth once daily., Disp: , Rfl:     cetirizine (ZYRTEC) 10 MG tablet, Take 10 mg by mouth once daily., Disp: , Rfl:     COCONUT OIL ORAL, Take by mouth., Disp: , Rfl:     cyclobenzaprine (FLEXERIL) 10 MG tablet, Take 1 tablet (10 mg total) by mouth 3 (three) times daily as needed for Muscle spasms., Disp: 30 tablet, Rfl: 12    estradiol-norethindrone (ACTIVELLA) 1-0.5 mg per tablet, TAKE 1 TABLET BY MOUTH EVERY DAY, Disp: 28 tablet, Rfl: 12    finasteride (PROSCAR) 5 mg tablet, Take 1 tablet (5 mg total) by mouth once daily., Disp: 30 tablet, Rfl: 11    multivitamin with minerals tablet, Take 1 tablet by mouth once daily., Disp: , Rfl:     oxybutynin (DITROPAN) 5 MG Tab, TAKE ONE TABLET BY MOUTH TWICE DAILY, Disp: 60 tablet, Rfl: 2    sumatriptan (IMITREX) 100 MG tablet, Take 1 tablet (100 mg total) by mouth every 2 (two) hours as needed for Migraine., Disp: 40 tablet, Rfl: 1    topiramate (TOPAMAX) 50 MG tablet, TAKE 1 TABLET BY MOUTH TWICE A DAY, Disp: 180 tablet, Rfl: 3    traZODone (DESYREL) 150 MG tablet, Take 1 tablet (150 mg total) by mouth nightly as needed for Insomnia. Followup with doctor NOV 2020, Disp: 30 tablet, Rfl: 5    zolpidem (AMBIEN) 5 MG Tab, Take 1 tablet (5 mg total) by mouth nightly as needed., Disp: 30 tablet, Rfl: 5    albuterol (PROVENTIL/VENTOLIN HFA) 90 mcg/actuation inhaler, TAKE 1 2 PUFF(S) (INHALATION) EVERY 4 HOURS PRN   WHEEZING, Disp: 18 g, Rfl: 12    methocarbamoL (ROBAXIN) 500 MG Tab, Take 500 mg by mouth 2 (two) times daily as needed., Disp: , Rfl:     valACYclovir (VALTREX) 1000 MG tablet, Take 2 tablets (2,000 mg total) by mouth every 12 (twelve) hours. For cold sore for 1 day, Disp: 30 tablet, Rfl: 11    Past Surgical History:   Procedure  "Laterality Date    AUGMENTATION OF BREAST      BREAST SURGERY       SECTION   and 2001    x2    COLON SURGERY  10/15/2018    colon resection        Family History   Problem Relation Age of Onset    Stroke Maternal Grandfather     Hypertension Father     Hypertension Mother     Hypertension Brother     Ovarian cancer Neg Hx     Cancer Neg Hx        Social History     Socioeconomic History    Marital status:    Tobacco Use    Smoking status: Never    Smokeless tobacco: Never   Substance and Sexual Activity    Alcohol use: No    Drug use: No    Sexual activity: Yes     Partners: Male     Comment: ablation           Allergies   Review of patient's allergies indicates:  No Known Allergies          Review of Systems  See HPI      [unfilled]  /80   Pulse 98   Temp 97.8 °F (36.6 °C) (Oral)   Ht 5' 5" (1.651 m)   Wt 54.7 kg (120 lb 9.5 oz)   SpO2 99%   BMI 20.07 kg/m²     GEN: NAD, well developed, pleasant, well nourished  HEENT: NCAT, PERRLA, EOMI, sclera clear, anicteric, bilateral ear exam wnl, O/P clear, MMM with no lesions  NECK: normal, supple with midline trachea, no LAD, no thyromegaly  LUNGS: CTAB, no w/r/r, no increased work of breathing   HEART: RRR, normal S1 and S2, no m/r/g, no edema  ABD: s/nt/nd, NABS  SKIN: normal turgor, no rashes  PSYCH: AOx3, appropriate mood and affect  MSK: warm/well perfused, normal ROM in all extremities, no c/c/e.  NEURO: normal without focal findings, CN II-XII are grossly intact.  Sensation/strength grossly normal, gait and station normal.               "

## 2023-02-07 ENCOUNTER — PATIENT MESSAGE (OUTPATIENT)
Dept: HEMATOLOGY/ONCOLOGY | Facility: CLINIC | Age: 58
End: 2023-02-07
Payer: COMMERCIAL

## 2023-02-07 DIAGNOSIS — R77.8 ABNORMAL SPEP: Primary | ICD-10-CM

## 2023-02-08 ENCOUNTER — LAB VISIT (OUTPATIENT)
Dept: LAB | Facility: HOSPITAL | Age: 58
End: 2023-02-08
Payer: COMMERCIAL

## 2023-02-08 DIAGNOSIS — R77.8 ABNORMAL SPEP: ICD-10-CM

## 2023-02-08 DIAGNOSIS — Z00.00 ANNUAL PHYSICAL EXAM: ICD-10-CM

## 2023-02-08 LAB
25(OH)D3+25(OH)D2 SERPL-MCNC: 111 NG/ML (ref 30–96)
ALBUMIN SERPL BCP-MCNC: 4 G/DL (ref 3.5–5.2)
ALBUMIN SERPL BCP-MCNC: 4 G/DL (ref 3.5–5.2)
ALP SERPL-CCNC: 45 U/L (ref 55–135)
ALP SERPL-CCNC: 45 U/L (ref 55–135)
ALT SERPL W/O P-5'-P-CCNC: 11 U/L (ref 10–44)
ALT SERPL W/O P-5'-P-CCNC: 11 U/L (ref 10–44)
ANION GAP SERPL CALC-SCNC: 8 MMOL/L (ref 8–16)
ANION GAP SERPL CALC-SCNC: 8 MMOL/L (ref 8–16)
AST SERPL-CCNC: 16 U/L (ref 10–40)
AST SERPL-CCNC: 16 U/L (ref 10–40)
BASOPHILS # BLD AUTO: 0.03 K/UL (ref 0–0.2)
BASOPHILS NFR BLD: 1.1 % (ref 0–1.9)
BILIRUB SERPL-MCNC: 0.3 MG/DL (ref 0.1–1)
BILIRUB SERPL-MCNC: 0.3 MG/DL (ref 0.1–1)
BUN SERPL-MCNC: 14 MG/DL (ref 6–20)
BUN SERPL-MCNC: 14 MG/DL (ref 6–20)
CALCIUM SERPL-MCNC: 8.9 MG/DL (ref 8.7–10.5)
CALCIUM SERPL-MCNC: 8.9 MG/DL (ref 8.7–10.5)
CHLORIDE SERPL-SCNC: 112 MMOL/L (ref 95–110)
CHLORIDE SERPL-SCNC: 112 MMOL/L (ref 95–110)
CHOLEST SERPL-MCNC: 135 MG/DL (ref 120–199)
CHOLEST/HDLC SERPL: 2.9 {RATIO} (ref 2–5)
CO2 SERPL-SCNC: 18 MMOL/L (ref 23–29)
CO2 SERPL-SCNC: 18 MMOL/L (ref 23–29)
CREAT SERPL-MCNC: 0.9 MG/DL (ref 0.5–1.4)
CREAT SERPL-MCNC: 0.9 MG/DL (ref 0.5–1.4)
DIFFERENTIAL METHOD: ABNORMAL
EOSINOPHIL # BLD AUTO: 0.1 K/UL (ref 0–0.5)
EOSINOPHIL NFR BLD: 2.5 % (ref 0–8)
ERYTHROCYTE [DISTWIDTH] IN BLOOD BY AUTOMATED COUNT: 12.5 % (ref 11.5–14.5)
ERYTHROCYTE [DISTWIDTH] IN BLOOD BY AUTOMATED COUNT: 12.5 % (ref 11.5–14.5)
EST. GFR  (NO RACE VARIABLE): >60 ML/MIN/1.73 M^2
EST. GFR  (NO RACE VARIABLE): >60 ML/MIN/1.73 M^2
ESTIMATED AVG GLUCOSE: 94 MG/DL (ref 68–131)
GLUCOSE SERPL-MCNC: 92 MG/DL (ref 70–110)
GLUCOSE SERPL-MCNC: 92 MG/DL (ref 70–110)
HBA1C MFR BLD: 4.9 % (ref 4–5.6)
HCT VFR BLD AUTO: 41 % (ref 37–48.5)
HCT VFR BLD AUTO: 41 % (ref 37–48.5)
HDLC SERPL-MCNC: 46 MG/DL (ref 40–75)
HDLC SERPL: 34.1 % (ref 20–50)
HGB BLD-MCNC: 13.4 G/DL (ref 12–16)
HGB BLD-MCNC: 13.4 G/DL (ref 12–16)
IGA SERPL-MCNC: 424 MG/DL (ref 40–350)
IGG SERPL-MCNC: 590 MG/DL (ref 650–1600)
IGM SERPL-MCNC: 26 MG/DL (ref 50–300)
IMM GRANULOCYTES # BLD AUTO: 0.01 K/UL (ref 0–0.04)
IMM GRANULOCYTES NFR BLD AUTO: 0.4 % (ref 0–0.5)
LDLC SERPL CALC-MCNC: 77 MG/DL (ref 63–159)
LYMPHOCYTES # BLD AUTO: 1.1 K/UL (ref 1–4.8)
LYMPHOCYTES NFR BLD: 39.4 % (ref 18–48)
MCH RBC QN AUTO: 30.7 PG (ref 27–31)
MCH RBC QN AUTO: 30.7 PG (ref 27–31)
MCHC RBC AUTO-ENTMCNC: 32.7 G/DL (ref 32–36)
MCHC RBC AUTO-ENTMCNC: 32.7 G/DL (ref 32–36)
MCV RBC AUTO: 94 FL (ref 82–98)
MCV RBC AUTO: 94 FL (ref 82–98)
MONOCYTES # BLD AUTO: 0.2 K/UL (ref 0.3–1)
MONOCYTES NFR BLD: 7.9 % (ref 4–15)
NEUTROPHILS # BLD AUTO: 1.4 K/UL (ref 1.8–7.7)
NEUTROPHILS NFR BLD: 48.7 % (ref 38–73)
NONHDLC SERPL-MCNC: 89 MG/DL
NRBC BLD-RTO: 0 /100 WBC
PLATELET # BLD AUTO: 202 K/UL (ref 150–450)
PLATELET # BLD AUTO: 202 K/UL (ref 150–450)
PMV BLD AUTO: 10.8 FL (ref 9.2–12.9)
PMV BLD AUTO: 10.8 FL (ref 9.2–12.9)
POTASSIUM SERPL-SCNC: 3.6 MMOL/L (ref 3.5–5.1)
POTASSIUM SERPL-SCNC: 3.6 MMOL/L (ref 3.5–5.1)
PROT SERPL-MCNC: 6.6 G/DL (ref 6–8.4)
PROT SERPL-MCNC: 6.6 G/DL (ref 6–8.4)
RBC # BLD AUTO: 4.36 M/UL (ref 4–5.4)
RBC # BLD AUTO: 4.36 M/UL (ref 4–5.4)
SODIUM SERPL-SCNC: 138 MMOL/L (ref 136–145)
SODIUM SERPL-SCNC: 138 MMOL/L (ref 136–145)
TRIGL SERPL-MCNC: 60 MG/DL (ref 30–150)
TSH SERPL DL<=0.005 MIU/L-ACNC: 1.67 UIU/ML (ref 0.4–4)
WBC # BLD AUTO: 2.79 K/UL (ref 3.9–12.7)
WBC # BLD AUTO: 2.79 K/UL (ref 3.9–12.7)

## 2023-02-08 PROCEDURE — 83521 IG LIGHT CHAINS FREE EACH: CPT | Mod: 59 | Performed by: PHYSICIAN ASSISTANT

## 2023-02-08 PROCEDURE — 36415 COLL VENOUS BLD VENIPUNCTURE: CPT | Performed by: PHYSICIAN ASSISTANT

## 2023-02-08 PROCEDURE — 86334 IMMUNOFIX E-PHORESIS SERUM: CPT | Mod: 26,,, | Performed by: PATHOLOGY

## 2023-02-08 PROCEDURE — 82306 VITAMIN D 25 HYDROXY: CPT | Performed by: FAMILY MEDICINE

## 2023-02-08 PROCEDURE — 86334 IMMUNOFIX E-PHORESIS SERUM: CPT | Performed by: PHYSICIAN ASSISTANT

## 2023-02-08 PROCEDURE — 80053 COMPREHEN METABOLIC PANEL: CPT | Performed by: PHYSICIAN ASSISTANT

## 2023-02-08 PROCEDURE — 85025 COMPLETE CBC W/AUTO DIFF WBC: CPT | Performed by: PHYSICIAN ASSISTANT

## 2023-02-08 PROCEDURE — 83036 HEMOGLOBIN GLYCOSYLATED A1C: CPT | Performed by: FAMILY MEDICINE

## 2023-02-08 PROCEDURE — 86334 PATHOLOGIST INTERPRETATION IFE: ICD-10-PCS | Mod: 26,,, | Performed by: PATHOLOGY

## 2023-02-08 PROCEDURE — 82784 ASSAY IGA/IGD/IGG/IGM EACH: CPT | Performed by: PHYSICIAN ASSISTANT

## 2023-02-08 PROCEDURE — 84165 PROTEIN E-PHORESIS SERUM: CPT | Mod: 26,,, | Performed by: PATHOLOGY

## 2023-02-08 PROCEDURE — 80061 LIPID PANEL: CPT | Performed by: FAMILY MEDICINE

## 2023-02-08 PROCEDURE — 84443 ASSAY THYROID STIM HORMONE: CPT | Performed by: FAMILY MEDICINE

## 2023-02-08 PROCEDURE — 84165 PATHOLOGIST INTERPRETATION SPE: ICD-10-PCS | Mod: 26,,, | Performed by: PATHOLOGY

## 2023-02-08 PROCEDURE — 84165 PROTEIN E-PHORESIS SERUM: CPT | Performed by: PHYSICIAN ASSISTANT

## 2023-02-09 LAB
ALBUMIN SERPL ELPH-MCNC: 4.12 G/DL (ref 3.35–5.55)
ALPHA1 GLOB SERPL ELPH-MCNC: 0.24 G/DL (ref 0.17–0.41)
ALPHA2 GLOB SERPL ELPH-MCNC: 0.57 G/DL (ref 0.43–0.99)
B-GLOBULIN SERPL ELPH-MCNC: 0.82 G/DL (ref 0.5–1.1)
GAMMA GLOB SERPL ELPH-MCNC: 0.55 G/DL (ref 0.67–1.58)
INTERPRETATION SERPL IFE-IMP: NORMAL
KAPPA LC SER QL IA: 1.34 MG/DL (ref 0.33–1.94)
KAPPA LC/LAMBDA SER IA: 1.47 (ref 0.26–1.65)
LAMBDA LC SER QL IA: 0.91 MG/DL (ref 0.57–2.63)
PATHOLOGIST INTERPRETATION IFE: NORMAL
PATHOLOGIST INTERPRETATION SPE: NORMAL
PROT SERPL-MCNC: 6.3 G/DL (ref 6–8.4)

## 2023-02-14 ENCOUNTER — OFFICE VISIT (OUTPATIENT)
Dept: HEMATOLOGY/ONCOLOGY | Facility: CLINIC | Age: 58
End: 2023-02-14
Payer: COMMERCIAL

## 2023-02-14 DIAGNOSIS — R77.8 ABNORMAL SPEP: Primary | ICD-10-CM

## 2023-02-14 PROCEDURE — 3044F HG A1C LEVEL LT 7.0%: CPT | Mod: CPTII,95,, | Performed by: INTERNAL MEDICINE

## 2023-02-14 PROCEDURE — 99214 PR OFFICE/OUTPT VISIT, EST, LEVL IV, 30-39 MIN: ICD-10-PCS | Mod: 95,,, | Performed by: INTERNAL MEDICINE

## 2023-02-14 PROCEDURE — 99214 OFFICE O/P EST MOD 30 MIN: CPT | Mod: 95,,, | Performed by: INTERNAL MEDICINE

## 2023-02-14 PROCEDURE — 3044F PR MOST RECENT HEMOGLOBIN A1C LEVEL <7.0%: ICD-10-PCS | Mod: CPTII,95,, | Performed by: INTERNAL MEDICINE

## 2023-02-14 NOTE — PROGRESS NOTES
Route Chart for Scheduling    BMT Chart Routing      Follow up with physician 1 year.   Follow up with SHERIE    Provider visit type    Infusion scheduling note    Injection scheduling note    Labs CBC, CMP, free light chains and SPEP   Lab interval:     Imaging    Pharmacy appointment    Other referrals

## 2023-02-14 NOTE — PROGRESS NOTES
The patient location is: home  The chief complaint leading to consultation is: abnormal lab    Visit type: audiovisual    Face to Face time with patient: 10 minutes  30 minutes of total time spent on the encounter, which includes face to face time and non-face to face time preparing to see the patient (eg, review of tests), Obtaining and/or reviewing separately obtained history, Documenting clinical information in the electronic or other health record, Independently interpreting results (not separately reported) and communicating results to the patient/family/caregiver, or Care coordination (not separately reported).         Each patient to whom he or she provides medical services by telemedicine is:  (1) informed of the relationship between the physician and patient and the respective role of any other health care provider with respect to management of the patient; and (2) notified that he or she may decline to receive medical services by telemedicine and may withdraw from such care at any time.    Notes: see below    Subjective:       Patient ID: Mae Gregory is a 57 y.o. female.    Chief Complaint: No chief complaint on file.    HPI     11/28/2021  Referred from Rheumatology for abnormal SPEP of 0.3 grams/dL in setting of evaluation for Raynaud's. No CRAB criteria at review of basic labs from 2020. Mild elevation in IgA.    2/14/2023  Routine follow-up with updated paraprotein labs. No acute interval events.  CBC and CMP remain normal. M protein 0.36 and 0.12 grams, stable from last check.  Free light chains are normal. PET 1/2022 negative for mass or bone lesions.   Screening mammogram 11/2022 normal.    Review of Systems   Constitutional:  Negative for appetite change and unexpected weight change.   HENT:  Negative for mouth sores.    Respiratory:  Negative for cough and shortness of breath.    Cardiovascular:  Negative for chest pain.   Gastrointestinal:  Negative for abdominal pain and diarrhea.    Genitourinary:  Negative for frequency.   Musculoskeletal:  Negative for back pain.   Integumentary:  Negative for rash.   Neurological:  Negative for headaches.   Hematological:  Negative for adenopathy.   Psychiatric/Behavioral:  The patient is not nervous/anxious.        Objective:    No exam due to telehealth visit.  Physical Exam    Assessment:       Problem List Items Addressed This Visit    None        Plan:       MGUS- stable by CBC, CMP, SPEP and FLC  Repeat in  1 year

## 2023-02-26 ENCOUNTER — PATIENT MESSAGE (OUTPATIENT)
Dept: FAMILY MEDICINE | Facility: CLINIC | Age: 58
End: 2023-02-26
Payer: COMMERCIAL

## 2023-02-27 RX ORDER — BUPROPION HYDROCHLORIDE 150 MG/1
150 TABLET ORAL DAILY
Qty: 30 TABLET | Refills: 0 | Status: SHIPPED | OUTPATIENT
Start: 2023-02-27 | End: 2023-05-15

## 2023-02-27 NOTE — TELEPHONE ENCOUNTER
No new care gaps identified.  [facility] Embedded Care Gaps. Reference number: [id number]. 2/27/2023   8:09:41 AM CST

## 2023-04-05 ENCOUNTER — TELEPHONE (OUTPATIENT)
Dept: SPEECH THERAPY | Facility: HOSPITAL | Age: 58
End: 2023-04-05
Payer: COMMERCIAL

## 2023-04-05 ENCOUNTER — PATIENT MESSAGE (OUTPATIENT)
Dept: OTOLARYNGOLOGY | Facility: CLINIC | Age: 58
End: 2023-04-05

## 2023-04-05 ENCOUNTER — OFFICE VISIT (OUTPATIENT)
Dept: OTOLARYNGOLOGY | Facility: CLINIC | Age: 58
End: 2023-04-05
Payer: COMMERCIAL

## 2023-04-05 VITALS — DIASTOLIC BLOOD PRESSURE: 83 MMHG | HEART RATE: 87 BPM | SYSTOLIC BLOOD PRESSURE: 128 MMHG

## 2023-04-05 DIAGNOSIS — R51.9 LEFT-SIDED FACE PAIN: Primary | ICD-10-CM

## 2023-04-05 DIAGNOSIS — R13.12 OROPHARYNGEAL DYSPHAGIA: ICD-10-CM

## 2023-04-05 DIAGNOSIS — H93.12 TINNITUS, LEFT: ICD-10-CM

## 2023-04-05 DIAGNOSIS — G51.0 UNILATERAL FACIAL PARESIS: ICD-10-CM

## 2023-04-05 DIAGNOSIS — H91.8X3 ASYMMETRICAL HEARING LOSS: ICD-10-CM

## 2023-04-05 PROCEDURE — 1159F PR MEDICATION LIST DOCUMENTED IN MEDICAL RECORD: ICD-10-PCS | Mod: CPTII,S$GLB,, | Performed by: OTOLARYNGOLOGY

## 2023-04-05 PROCEDURE — 99999 PR PBB SHADOW E&M-EST. PATIENT-LVL IV: CPT | Mod: PBBFAC,,, | Performed by: OTOLARYNGOLOGY

## 2023-04-05 PROCEDURE — 1159F MED LIST DOCD IN RCRD: CPT | Mod: CPTII,S$GLB,, | Performed by: OTOLARYNGOLOGY

## 2023-04-05 PROCEDURE — 99205 PR OFFICE/OUTPT VISIT, NEW, LEVL V, 60-74 MIN: ICD-10-PCS | Mod: S$GLB,,, | Performed by: OTOLARYNGOLOGY

## 2023-04-05 PROCEDURE — 3044F PR MOST RECENT HEMOGLOBIN A1C LEVEL <7.0%: ICD-10-PCS | Mod: CPTII,S$GLB,, | Performed by: OTOLARYNGOLOGY

## 2023-04-05 PROCEDURE — 3074F SYST BP LT 130 MM HG: CPT | Mod: CPTII,S$GLB,, | Performed by: OTOLARYNGOLOGY

## 2023-04-05 PROCEDURE — 3044F HG A1C LEVEL LT 7.0%: CPT | Mod: CPTII,S$GLB,, | Performed by: OTOLARYNGOLOGY

## 2023-04-05 PROCEDURE — 3079F DIAST BP 80-89 MM HG: CPT | Mod: CPTII,S$GLB,, | Performed by: OTOLARYNGOLOGY

## 2023-04-05 PROCEDURE — 99999 PR PBB SHADOW E&M-EST. PATIENT-LVL IV: ICD-10-PCS | Mod: PBBFAC,,, | Performed by: OTOLARYNGOLOGY

## 2023-04-05 PROCEDURE — 99205 OFFICE O/P NEW HI 60 MIN: CPT | Mod: S$GLB,,, | Performed by: OTOLARYNGOLOGY

## 2023-04-05 PROCEDURE — 1160F RVW MEDS BY RX/DR IN RCRD: CPT | Mod: CPTII,S$GLB,, | Performed by: OTOLARYNGOLOGY

## 2023-04-05 PROCEDURE — 1160F PR REVIEW ALL MEDS BY PRESCRIBER/CLIN PHARMACIST DOCUMENTED: ICD-10-PCS | Mod: CPTII,S$GLB,, | Performed by: OTOLARYNGOLOGY

## 2023-04-05 PROCEDURE — 3074F PR MOST RECENT SYSTOLIC BLOOD PRESSURE < 130 MM HG: ICD-10-PCS | Mod: CPTII,S$GLB,, | Performed by: OTOLARYNGOLOGY

## 2023-04-05 PROCEDURE — 3079F PR MOST RECENT DIASTOLIC BLOOD PRESSURE 80-89 MM HG: ICD-10-PCS | Mod: CPTII,S$GLB,, | Performed by: OTOLARYNGOLOGY

## 2023-04-05 NOTE — PROGRESS NOTES
57-year-old female who was referred by Dr. Garcia for evaluation of reported left eustachian dysfunction.  Upon arrival the patient reports her issues as follows: Left cheek pain that extends around her ear and into her neck.  She relates this pain being most persistent for the past 6 months.  About that time she felt as if she had a stroke that involved the left side  of her face which did not work as well.  Since that time she has felt swelling on the left side of her face even though it is hard to appreciate when looking at herself in the mirror.  She feels her face movement has improved but is still not what it was before 6 months ago.  She feels her lip and cheek looked different.  She tries to work out the muscles of her face.  Some history of swallowing issues but for the past 6 months she has had trouble swallowing and reports nasopharyngeal reflux with certain foods.  She has had no facial or head trauma or throat trauma or surgery around that time.    Since about 2014 she has worn hearing aids.  She has seen a neuro otologist when she lived in [place].  In about 2010 she was diagnosed with Meniere's disease.  She has episodes of vertigo.  She has hearing loss.  She is not able to wear her left hearing aid due to the pain on that side.  Her left hearing is worse than her right.  She recalls having hearing test but has been many years.  She is frustrated by her hearing loss.  She has not had any recent imaging of her head or brain.  She had a period that she was without insurance but that does have insurance and is working and is frustrated by her problems are related to investigate.    She has been diagnosed with MGUS.  Hx of left cervical dystonia and migraines. She has received botox for her neck and migraine protocol.    PMHx, PSHx, Meds, Allergies, SocHx, FamHx reviewed in EPIC    Review of Systems     Constitutional: Negative for appetite change, chills, fatigue, fever and unexpected weight loss.       HENT: Positive for ear pain, hearing loss, sinus pressure and trouble swallowing.  Negative for ear discharge, postnasal drip, runny nose, sinus infection, stuffy nose and voice change.      Eyes:  Positive for photophobia. Negative for eye drainage and eye itching.     Respiratory:  Negative for cough, shortness of breath, sleep apnea, snoring and wheezing.      Cardiovascular:  Negative for chest pain, foot swelling, irregular heartbeat and swollen veins.     Gastrointestinal:  Positive for diarrhea. Negative for abdominal pain, acid reflux and heartburn.     Genitourinary: Negative for difficulty urinating, sexual problems and frequent urination.     Musc: Positive for back pain and neck pain. Negative for aching joints.     Skin: Negative for rash.     Allergy: Negative for food allergies and seasonal allergies.     Endocrine: Negative for cold intolerance and heat intolerance.      Neurological: Positive for dizziness, headaches and light-headedness.     Hematologic: Positive for swollen glands.     Psychiatric: Negative for decreased concentration, depression, nervous/anxious and sleep disturbance.          PE: /83   Pulse 87    Gen: female, well nourished, well developed, NAD, cooperative, good historian  Ears:  EAC clear & TM translucent with normal bony landmarks bilaterally  Nose: external nose wnl, nasal septum near midline anteriorly, inferior turbinates mild edema, pink moist mucosa, no visible purulence or polyps  OC/OP:  MMM, tongue protrudes with tip slightly to right, no obvious fasciculations, palate raises symmetrically, tonsils symmetric and small, no erythema or exudate, FOM and BOT soft  Neck: supple, TTP left upper SCM, posterior muscles on left, no LAD or masses  Face:  mild asymmetric facial appearance with less noticeable melolabial fold on right and slight asymmetry with decrease movement of left lip commisure and left eyebrow, TTP L cheek & ant to left TMJ and along left  mandible, no step offs, no erythema or flushing, no fluctuance  Respiratory: Breathing comfortably without retractions  Skin: facial skin intact without visible lesions or flushing  Lymph: no neck lympadenopathy  Neuro:  facial movement symmetric, speech fluid, gait stable, tongue protrudes midline  Psych: alert & oriented x 3, reasonable, normal affect    CT Head - images reviewed and report from 2018 - no obvious mass or sinus inflammation    Recent labs:  Feb 2023 CMP - low alk phos, lowCO2 (18) mild elevation of Cl (112)  Feb 2023 CBC - WBC low (2.79)  Feb 2023 - IG - IgG low (590), IgA high (424), IgM low (26)  Feb 2023 - protein electrophoresis - low gamm (0.55)    12/2021 - MRI Cerv to thor spine obtained to r/o plasmacytoma    Impression:   1. Left-sided face pain  Ambulatory referral/consult to ENT    MRI Brain W WO Contrast      2. Oropharyngeal dysphagia  Fl Modified Barium Swallow Speech    SLP video swallow    nasopharyngeal reflux      3. Unilateral facial paresis  MRI Brain W WO Contrast    left      4. Asymmetrical hearing loss  MRI Brain W WO Contrast    Comprehensive audiogram    by history      5. Tinnitus, left  MRI Brain W WO Contrast    Comprehensive audiogram          Discussion and Plan:    Discussed complicated medical history.     For newer neurologic symptoms and left sided face/ head pain with facial weakness on left and dysphagia, recommend:  (1) Modified Barium Swallow Study (MBSS) ordered  (2) MRI Brain with and w/o contrast  (3) Audiogram   (4) Follow up with above results    Note - given Botox treatments - some asymmetry may be due to that but by patient history her symptoms seemed to start 6 months ago and were not related to a specific treatment.    Continue with treatment for cervical pain issues. If have an old audiogram available please bring to follow up appointment.    Consider trial of Voltaren cream to see if that helps with pain.    Message or call with problems or  questions. Return to clinic for worsening symptoms.    Parts or all of this note were created by voice recognition software; typographical errors in translating may be present.

## 2023-04-05 NOTE — TELEPHONE ENCOUNTER
Joseph pt to schedule mbss, informed of next available at the end of the month she requested something sooner. Pt was transferred to [facility] for sooner appt. Scheduled 4/12

## 2023-04-05 NOTE — PATIENT INSTRUCTIONS
Discussed complicated medical history.     For newer neurologic symptoms and left sided face/ head pain with facial weakness on left and dysphagia, recommend:  (1) Modified Barium Swallow Study (MBSS) ordered  (2) MRI Brain with and w/o contrast  (3) Audiogram   (4) Follow up with above results    Continue with treatment for cervical pain issues. If have an old audiogram available please bring to follow up appointment.    Consider trial of Voltaren cream to see if that helps with pain.    Message or call with problems or questions. Return to clinic for worsening symptoms.

## 2023-04-06 ENCOUNTER — HOSPITAL ENCOUNTER (OUTPATIENT)
Dept: RADIOLOGY | Facility: HOSPITAL | Age: 58
Discharge: HOME | End: 2023-04-06
Attending: OTOLARYNGOLOGY
Payer: COMMERCIAL

## 2023-04-06 DIAGNOSIS — H91.8X3 ASYMMETRICAL HEARING LOSS: ICD-10-CM

## 2023-04-06 DIAGNOSIS — G51.0 UNILATERAL FACIAL PARESIS: ICD-10-CM

## 2023-04-06 DIAGNOSIS — R51.9 LEFT-SIDED FACE PAIN: ICD-10-CM

## 2023-04-06 DIAGNOSIS — H93.12 TINNITUS, LEFT: ICD-10-CM

## 2023-04-06 PROCEDURE — 70553 MRI BRAIN W WO CONTRAST: ICD-10-PCS | Mod: 26,,, | Performed by: RADIOLOGY

## 2023-04-06 PROCEDURE — A9585 GADOBUTROL INJECTION: HCPCS | Performed by: OTOLARYNGOLOGY

## 2023-04-06 PROCEDURE — 25500020 PHARM REV CODE 255: Performed by: OTOLARYNGOLOGY

## 2023-04-06 PROCEDURE — 70553 MRI BRAIN STEM W/O & W/DYE: CPT | Mod: TC

## 2023-04-06 PROCEDURE — 70553 MRI BRAIN STEM W/O & W/DYE: CPT | Mod: 26,,, | Performed by: RADIOLOGY

## 2023-04-06 RX ORDER — GADOBUTROL 604.72 MG/ML
6 INJECTION INTRAVENOUS
Status: COMPLETED | OUTPATIENT
Start: 2023-04-06 | End: 2023-04-06

## 2023-04-06 RX ADMIN — GADOBUTROL 6 ML: 604.72 INJECTION INTRAVENOUS at 08:04

## 2023-04-12 ENCOUNTER — HOSPITAL ENCOUNTER (OUTPATIENT)
Dept: RADIOLOGY | Facility: OTHER | Age: 58
Discharge: HOME | End: 2023-04-12
Attending: OTOLARYNGOLOGY
Payer: COMMERCIAL

## 2023-04-12 ENCOUNTER — PATIENT MESSAGE (OUTPATIENT)
Dept: FAMILY MEDICINE | Facility: CLINIC | Age: 58
End: 2023-04-12
Payer: COMMERCIAL

## 2023-04-12 DIAGNOSIS — R13.12 OROPHARYNGEAL DYSPHAGIA: ICD-10-CM

## 2023-04-12 PROCEDURE — 92611 MOTION FLUOROSCOPY/SWALLOW: CPT

## 2023-04-12 PROCEDURE — 74230 X-RAY XM SWLNG FUNCJ C+: CPT | Mod: TC

## 2023-04-12 PROCEDURE — 25500020 PHARM REV CODE 255: Performed by: OTOLARYNGOLOGY

## 2023-04-12 PROCEDURE — 74230 X-RAY XM SWLNG FUNCJ C+: CPT | Mod: 26,,, | Performed by: RADIOLOGY

## 2023-04-12 PROCEDURE — A9698 NON-RAD CONTRAST MATERIALNOC: HCPCS | Performed by: OTOLARYNGOLOGY

## 2023-04-12 PROCEDURE — 74230 FL MODIFIED BARIUM SWALLOW SPEECH STUDY: ICD-10-PCS | Mod: 26,,, | Performed by: RADIOLOGY

## 2023-04-12 RX ADMIN — BARIUM SULFATE 40 ML: 0.81 POWDER, FOR SUSPENSION ORAL at 09:04

## 2023-04-12 NOTE — TELEPHONE ENCOUNTER
Good Afternoon,  Your message has been received and forwarded to .  You will be contacted with more information.  Thank you for choosing [facility].

## 2023-04-12 NOTE — PROCEDURES
Modified Barium Swallow    Patient Name:  Mae Gregory   MRN:  [id number]      Recommendations:     Recommendations:                General Recommendations:    SLP follow up for OP tx    Diet recommendations: continue regular diet with thin liquids    Swallow strategies: alternate solids/liquids each bite/sip, effortful swallow intermittently throughout meal to assist with vallecular clearance    General Precautions: Standard,        Referral     Reason for Referral  Patient was referred for a Modified Barium Swallow Study to assess the efficiency of her swallow function, rule out aspiration and make recommendations regarding safe dietary consistencies, effective compensatory strategies, and safe eating environment.     Patient reports frequent sensation of nasal regurgitation of PO, upon further discussion pt reports she utilizes nasal saline sprays following a meal with bits of solids noted to come up. Pt reports she frequently needs to drink while she is eating. She reports dry mouth and subsequent difficulty with speech.     Diagnosis: <principal problem not specified>       History:     Past Medical History:   Diagnosis Date    DDD (degenerative disc disease), cervical 3/15/2017    DDD (degenerative disc disease), lumbar 3/15/2017    Glaucoma 3/7/2018    Migraine 3/15/2017    Primary insomnia 3/15/2017       Objective:     Visualization  Lateral view  A/p view     Oral Peripheral Examination   Symmetric face at rest and during speech/swallow tasks  Labial retraction at bilateral corners is adequate  Mild suspected xerostomia    Consistencies Assessed  Thin liquid varibar barium via 5cc and 10cc volume cupsips self-administered, unmeasured single sips via straw  Puree 1 tsp varibar pudding barium  Solids 2 bites of cecilia cracker coated in varibar pudding barium    Oral Preparation/Oral Phase  Oral phase was within functional limits, there was adequate oral acceptance of bolus, functional bolus stripping from  utensils. Functional rotary chew pattern of mastication with no significant oral residues post intake  There was adequate posterior lingual control    Pharyngeal Phase   Pharyngeal swallow was further evaluated utilizing the analysis of Swallowing Physiology: Events, Kinematics & Timing for Use in Clinical Practice (ASPEKT-C) method of evaluation. This method of evaluation further assesses the swallowing safety and efficiency utilizing measurements of timing and pharyngeal residues as well as pharyngeal area at maximum pharyngeal constriction (PhAMPC) as indicated.     THIN LIQUID  Swallow reaction time (defined as time from the bolus passing the ramus of the mandible until hyoid burst) was measured at 0-33.3ms   This is indicative of a timely swallow reaction time (WNL = 212.12ms (±579.54) for thin liquids)   Time to LVC per ASPEKT-C measurements = ranged from 66.6-299.7ms with an average of 140.75ms across thin liquid trials. WFL time to LVC for thin liquids =(<167ms)  This is indicative of  one instance of prolonged  onset of laryngeal vestibule closure, however, grossly timely  There was grossly functional duration of LVC at an average of 482.5ms across trials (WNL =603.70 (±272.53))  Pharyngeal area at max constriction measured 0-2.24% of total pharyngeal space- (WFL = <2.7%) indicative of adequate base of tongue retraction/pharyngeal stripping wave with complete pharyngeal obliteration  Resultant residues measuring 0-0.84%  (Normative amount of residues = <1.7%)  No airway invasion noted    PUREED SOLIDS  Swallow reaction time (defined as time from the bolus passing the ramus of the mandible until hyoid burst) was measured at 99.9 ms   This is indicative of a timely swallow reaction time (WNL = 827.97ms (±1448.47) for pureed solids)   Pharyngeal area at max constriction measured up to 1.25% of total pharyngeal space- (WFL = <1.5%) indicative of adequate base of tongue retraction/pharyngeal stripping wave with  complete pharyngeal obliteration  Resultant residues measuring from 1.44% of pharynx up to (Normative amount of residues = <1.4%)  Residues noted primarily at vallecular space  Airway invasion did not occur with pureed solids     CHEWABLE SOLIDS  Piecemeal swallow initiation noted with chewables  Increased pharyngeal area at max constriction and subsequent inc in pharyngeal residues, again primarily at the area of the vallecular sapce  Pharyngeal area at max constriction measured up to 1.36% of total pharyngeal space-   Resultant residues measuring from 1.83% of pharynx   Norms have not yet been established for chewable solids  Residues decreased though did not clear with a cued effortful swallow and liquid wash  Airway invasion did not occur with chewable solids    Cervical Esophageal Phase  UES appeared to accommodate all bolus types without stasis or retrograde movement observed   Cervical esophagus was clear following each bolus    Assessment:     Impressions   There was adequate oral function with functional bolus manipulation and mastication  Grossly timely and coordinated swallow with largely coordinated time to complete laryngeal vestibule closure, timely swallow reaction time  Intermittently reduced complete base of tongue retraction with subsequently reduced epiglottic retroflexion and resultant vallecular residues- these were subjectively mild, relatively within normal limits when measured via ASPEKT-C measurements, a few instances of slightly out of range with ASPEKT-C guidelines  Residues were decreased, though, not completely cleared when pt instructed to utilize a thin liquid wash and effortful swallow  A chin tuck was trailed which did not improve residues  There was otherwise functional hyolaryngeal elevation/excursion, velar elevation, symmetric pharyngeal stripping wave when turned a/p, and adequate UES relaxation      Prognosis: Good    Education  Results were discussed with patient. Results were  discussed with Medical Team who was in agreement with plan.       Plan:   OP SLP tx to address exercises which may assist with base of tongue retraction and pharyngeal stripping wave to reduce vallecular residues during intake    Time Tracking:   SLP Treatment Date:   23  Speech Start Time:  905  Speech Stop Time:  1000     Speech Total Time (min):  55 min    2023    Standard norms for ultrathin and pudding barium above as studied by Ngoc et al 2018.     gNoc CUELLAR, Montana KL, Mike E, Andrew AK, Patel F, Julianna L, Ibeth A, Roslyn A, Luiz A. Swallowing Kinematic Differences Across Frozen, Mixed, and Ultrathin Liquid Boluses in Healthy Adults: Age, Sex, and Normal Variability. J Speech Lang Hear Res. 2018 ;61(7):0293-7226. doi: 10.1044/2287_BKXMJ-P-. PMID: 99625521; PMCID: QLO4991721.    Measurements re: pharyngeal area at max constriction, pharyngeal stasis norms, and time to LVC norms as studiet by Nghia et al., 2019.    Nghia CM, Cristino M, Eduarda RANDOLPH, Alis BT, Mayur AM, Franchesca CARVERV, Melvi S, Magdalena MS, Neeraj TJ, Colby AA, Aline TS. Reference Values for Healthy Swallowing Across the Range From Thin to Extremely Thick Liquids. J Speech Lang Hear Res. 2019 May 21;62(5):9020-0094. doi: 10.1044/7347_IKLEV-X-. PMID: 17129364; PMCID: [id number].

## 2023-04-13 ENCOUNTER — PROCEDURE VISIT (OUTPATIENT)
Dept: NEUROLOGY | Facility: CLINIC | Age: 58
End: 2023-04-13
Payer: COMMERCIAL

## 2023-04-13 ENCOUNTER — TELEPHONE (OUTPATIENT)
Dept: NEUROLOGY | Facility: CLINIC | Age: 58
End: 2023-04-13
Payer: COMMERCIAL

## 2023-04-13 ENCOUNTER — PATIENT MESSAGE (OUTPATIENT)
Dept: FAMILY MEDICINE | Facility: CLINIC | Age: 58
End: 2023-04-13
Payer: COMMERCIAL

## 2023-04-13 VITALS
HEART RATE: 80 BPM | HEIGHT: 65 IN | BODY MASS INDEX: 20.57 KG/M2 | DIASTOLIC BLOOD PRESSURE: 80 MMHG | SYSTOLIC BLOOD PRESSURE: 118 MMHG | WEIGHT: 123.44 LBS

## 2023-04-13 DIAGNOSIS — G52.1 GLOSSOPHARYNGEAL NEURALGIA: Primary | ICD-10-CM

## 2023-04-13 DIAGNOSIS — G43.109 MIGRAINE WITH AURA AND WITHOUT STATUS MIGRAINOSUS, NOT INTRACTABLE: ICD-10-CM

## 2023-04-13 DIAGNOSIS — I67.1 ANEURYSM OF INTRACRANIAL PORTION OF INTERNAL CAROTID ARTERY: ICD-10-CM

## 2023-04-13 DIAGNOSIS — G43.709 CHRONIC MIGRAINE WITHOUT AURA WITHOUT STATUS MIGRAINOSUS, NOT INTRACTABLE: ICD-10-CM

## 2023-04-13 PROCEDURE — 64615 CHEMODENERV MUSC MIGRAINE: CPT | Mod: S$GLB,,, | Performed by: PSYCHIATRY & NEUROLOGY

## 2023-04-13 PROCEDURE — 64615 PR CHEMODENERVATION OF MUSCLE FOR CHRONIC MIGRAINE: ICD-10-PCS | Mod: S$GLB,,, | Performed by: PSYCHIATRY & NEUROLOGY

## 2023-04-13 RX ORDER — PREGABALIN 75 MG/1
75 CAPSULE ORAL 2 TIMES DAILY
Qty: 60 CAPSULE | Refills: 6 | Status: SHIPPED | OUTPATIENT
Start: 2023-04-13 | End: 2023-06-29 | Stop reason: CLARIF

## 2023-04-13 NOTE — TELEPHONE ENCOUNTER
Called this patient to schedule their next botox appointment with Dr. Victoria on 6/29/23 at 4:00 pm

## 2023-04-14 ENCOUNTER — PATIENT MESSAGE (OUTPATIENT)
Dept: OTOLARYNGOLOGY | Facility: CLINIC | Age: 58
End: 2023-04-14
Payer: COMMERCIAL

## 2023-04-14 ENCOUNTER — TELEPHONE (OUTPATIENT)
Dept: OTOLARYNGOLOGY | Facility: CLINIC | Age: 58
End: 2023-04-14
Payer: COMMERCIAL

## 2023-04-14 DIAGNOSIS — R13.12 OROPHARYNGEAL DYSPHAGIA: Primary | ICD-10-CM

## 2023-04-14 NOTE — TELEPHONE ENCOUNTER
Messaged with SLP who recommended OP swallowing therapy which has been ordered. Her note was reviewed.

## 2023-04-14 NOTE — TELEPHONE ENCOUNTER
Called and notified of abnormal pap.  Please schedule colpo     H&P reviewed. The patient was examined and there are no changes to the H&P.

## 2023-04-14 NOTE — PROCEDURES
Lj Wall presents to the neurology clinic for follow-up.  She reports significant improvement in her migraine frequency since initiation of Botox therapy.  Previously the migraines were 2-4 days per week.  Since Botox the frequency has reduced to 1-2 migraine days per week.  She also reports a wearing off effect which starts typically around 10 weeks following a round of Botox injections.  She continues to take topiramate and Nurtec on a regular basis.    Today she also reports a 6 month history of left-sided facial pain.  This started approximately 6 months ago.  She experienced sudden onset of thunderclap type of pain on the left side of her head which was excruciating and lasted a few seconds.  Subsequently she began experiencing intermittent pains on the left side predominant in the cheek region and radiating to the ear canal and the mastoid region.  Sometimes the pain refers to the occipital region.  She reports a very low-grade dull ache in this region on a continuous basis.  In addition she reports flare-ups of sharp stabbing pain.  These flare-ups can occur up to 5 times a week lasting several hours.  In addition the flare-ups can also occur lasting a few seconds.  There is no recognized triggers for these flare-ups.  She also reports slight sagging of the left side her face and somewhat atypical fatigue sensation of her tongue after prolonged speaking.  Otherwise she denies any weakness in her tongue or pain in the tongue or teeth or gums.  Sometimes she will appreciate a slight difference in the touch sensation on the left side of her face when compared to the right side of her face.  She also reports some trouble with swallowing were in the food will enter the nasopharynx.  Sometimes she has trouble enunciating certain words.  All these symptoms started approximately 6 months ago.  She is tried taking NSAIDs without any relief.    Prior note:   Overall doing well.   HA is much better   Using less  pain meds   Using Nurtec   Feels L jaw line paresthesias     Pt of Dr. Kody SHI:  Headaches last hours with photophobia, phonophobia, + nausea.  Has visual aura.  Often L cervico-occipital.  Bilateral anteriorly.   She has been seeing Dr. Maynard in [place] but moved back to the [place] area several years ago.  She had a remote, nl MRI brain in [place].      Freq: 30/30 and 20/30     Last Botox June 30, 2022 and not as effective as usual.  Usually only 2-3 HAs/month but now having daily HA.    BOTOX #1 10/18/22 - helped    BOTOX #2 1/12/23 - helped     Abortives:              Effective: Nurtec > Imitrex              Ineffective: ketamine/lidocaine  PRN flexeril      Prophylactics:             Effective: Botox, topiramate 50mg qhs (bid sedation), trazodone    MRI brain was reviewed in detail with the patient.  Impression:     Mri Iac: Tortuous left posteroinferior cerebellar artery which extends to abut and slightly posterior displaced the proximal cisternal left glossopharyngeal nerve.  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     MRI brain: Unremarkable MRI brain as detailed above specifically without evidence for hydrocephalus or intracranial enhancing lesion     Questioned partially empty sella.  Intracranial hypertension to be included in differential in the appropriate clinical setting, clinical correlation advised.    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  3, trial of Lyrica 75 mg twice daily  4, pending audiogram  5, BOTOX was performed as an indicated therapy for intractable chronic migraine headaches given that the patient failed > 3  prophylactic medications    Botulinum Toxin Injection Procedure   Pre-operative diagnosis: Chronic migraine    Post-operative diagnosis: Same   Procedure: Chemical neurolysis   After risks and benefits were explained including bleeding, infection, worsening of pain, damage to the areas being injected, weakness of muscles, loss of muscle control, dysphagia if injecting the head or neck, facial droop if injecting the facial area, painful injection, allergic or other reaction to the medications being injected, and the failure of the procedure to help the problem, a signed consent was obtained.   The patient was placed in a comfortable area and the sites to be treated were identified.The area to be treated was prepped three times with alcohol and the alcohol allowed to dry. Next, a 30 gauge needle was used to inject the medication in the area to be treated.   Area(s) injected:   Total Botox used: 160 Units   Botox wastage: 40 Units   Injection sites:    muscle bilaterally ( a total of 10 units divided into 2 sites)   Procerus muscle (5 units)   Frontalis muscle bilaterally (a total of 20 units divided into 4 sites)   Temporalis muscle bilaterally (a total of 40 units divided into 8 sites)   Occipitalis muscle bilaterally (a total of 30 units divided into 6 sites)   Cervical paraspinal muscles (a total of 20 units divided into 4 sites)   Trapezius muscle bilaterally (a total of 30 units divided into 6 sites)   Mastoid 5 units on L     Complications: none   RTC for the next Botox injection: 3 months

## 2023-04-16 ENCOUNTER — PATIENT MESSAGE (OUTPATIENT)
Dept: NEUROLOGY | Facility: CLINIC | Age: 58
End: 2023-04-16
Payer: COMMERCIAL

## 2023-04-17 ENCOUNTER — TELEPHONE (OUTPATIENT)
Dept: NEUROSURGERY | Facility: CLINIC | Age: 58
End: 2023-04-17
Payer: COMMERCIAL

## 2023-04-17 NOTE — TELEPHONE ENCOUNTER
----- Message from Joseph Slater sent at 4/17/2023  4:17 PM CDT -----  Regarding: appt; asking to be seen soon  Contact: PT @ [phone]  Pt is calling to speak to someone in the office; asking for a sooner appt than what they are scheduled for. Pt is already on the wait list; no available appts in Epic that are coming up soon. Pt is asking to speak to someone in the office. Please call to advise. Thanks.    Reason for sooner appt: Pt states that her father passed away with the same symptoms; family is concerned and ask that she be seen sooner.     When is the first available appointment? 5/22/2023    Communication Preference: PT @ [phone]    Additional Information: n/a

## 2023-04-18 ENCOUNTER — OFFICE VISIT (OUTPATIENT)
Dept: NEUROSURGERY | Facility: CLINIC | Age: 58
End: 2023-04-18
Payer: COMMERCIAL

## 2023-04-18 ENCOUNTER — TELEPHONE (OUTPATIENT)
Dept: NEUROSURGERY | Facility: CLINIC | Age: 58
End: 2023-04-18
Payer: COMMERCIAL

## 2023-04-18 DIAGNOSIS — I67.1 CEREBRAL ANEURYSM, NONRUPTURED: Primary | ICD-10-CM

## 2023-04-18 DIAGNOSIS — I67.1 ANEURYSM OF INTRACRANIAL PORTION OF INTERNAL CAROTID ARTERY: ICD-10-CM

## 2023-04-18 PROCEDURE — 99205 OFFICE O/P NEW HI 60 MIN: CPT | Mod: 95,,, | Performed by: NEUROLOGICAL SURGERY

## 2023-04-18 NOTE — PROGRESS NOTES
Neurosurgery  History & Physical    SUBJECTIVE:     Chief Complaint:  Nerve compression    History of Present Illness:  Mae Gregory, is a 57-year-old female referred to me by my Neurology colleague Dr. Danny Garcia he had been seeing her for a long history of migraine.  She noted approximately 6 months ago that she had some new onset left-sided facial pain.  She did say that she had some onset of pain which felt somewhat thunderclap in nature but she is unsure.  She said subsequently she would began to have experiencing pain in the left side predominantly in the cheek which radiates to the ear and the mastoid region.  She sits sometimes he has some referred pain in the left occipital region all way on the left side.  She notes the pain is low-grade in nature continuously but then has spikes in flare ups.  These flare-ups tend to be sharp stabbing pain that can occur multiple times a weeks up to 5 times lasting several hours.  It can sometimes last a few seconds.      She said sometimes there may be some triggers but are less clearly defined sometimes touching on the shoulder can can exacerbate these symptoms.  She said she does have to lift certain way in order to reduce the irritation.  She also complains of difficulty with swallowing and feels like sometimes gets in her nose.  She does have some difficulty with enunciation she said she also notes that she feels her tongue gets fatigued after speaking for about 10 minutes.  And again she noticed these symptoms approximately 6 months ago and she feels like they have gotten worst.  She did feel like about 6 months ago she had a stroke where she had some slumping of the left side of her face which has improved.  She does not notice any asymmetry in her smile at this point.  No significant past medical history according to patient.  Recorded electronic medical history, does include degenerative disc disease in the cervical and lumbar spine migraine  headache.    Current medication she is on Ambien, trazodone, Topamax, Wellbutrin, bupropion, amlodipine, finasteride.  She denies taking any antiplatelet or anticoagulation therapy.  She notes taking some over-the-counter vitamin supplements as well as MCT oil.  Does of note have a past family history of subarachnoid hemorrhage and father who  of an aneurysm in his 40s, no history of smoking, no other known aneurysms in her family.  She also has a history of Raynaud's syndrome.  This is a virtual audio visual visit      Review of patient's allergies indicates:  No Known Allergies    Current Outpatient Medications   Medication Sig Dispense Refill    albuterol (PROVENTIL/VENTOLIN HFA) 90 mcg/actuation inhaler TAKE 1 2 PUFF(S) (INHALATION) EVERY 4 HOURS PRN   WHEEZING 18 g 12    amLODIPine (NORVASC) 5 MG tablet Take 1 tablet (5 mg total) by mouth once daily. (Patient not taking: Reported on 2023) 90 tablet 3    buPROPion (WELLBUTRIN XL) 150 MG TB24 tablet Take 1 tablet (150 mg total) by mouth once daily. 30 tablet 0    cetirizine (ZYRTEC) 10 MG tablet Take 10 mg by mouth once daily.      COCONUT OIL ORAL Take by mouth.      estradiol-norethindrone (ACTIVELLA) 1-0.5 mg per tablet TAKE 1 TABLET BY MOUTH EVERY DAY 28 tablet 12    finasteride (PROSCAR) 5 mg tablet Take 1 tablet (5 mg total) by mouth once daily. 30 tablet 11    multivitamin with minerals tablet Take 1 tablet by mouth once daily.      oxybutynin (DITROPAN) 5 MG Tab TAKE ONE TABLET BY MOUTH TWICE DAILY 60 tablet 2    pregabalin (LYRICA) 75 MG capsule Take 1 capsule (75 mg total) by mouth 2 (two) times daily. 60 capsule 6    sumatriptan (IMITREX) 100 MG tablet Take 1 tablet (100 mg total) by mouth every 2 (two) hours as needed for Migraine. 40 tablet 1    topiramate (TOPAMAX) 50 MG tablet TAKE 1 TABLET BY MOUTH TWICE A  tablet 3    traZODone (DESYREL) 150 MG tablet Take 1 tablet (150 mg total) by mouth nightly as needed for Insomnia. 90 tablet  3    valACYclovir (VALTREX) 1000 MG tablet Take 2 tablets (2,000 mg total) by mouth every 12 (twelve) hours. For cold sore for 1 day 30 tablet 11    zolpidem (AMBIEN) 5 MG Tab Take 1 tablet (5 mg total) by mouth nightly as needed. 30 tablet 5     No current facility-administered medications for this visit.       Past Medical History:   Diagnosis Date    DDD (degenerative disc disease), cervical 3/15/2017    DDD (degenerative disc disease), lumbar 3/15/2017    Glaucoma 3/7/2018    Migraine 3/15/2017    Primary insomnia 3/15/2017     Past Surgical History:   Procedure Laterality Date    AUGMENTATION OF BREAST      BREAST SURGERY       SECTION   and 2001    x2    COLON SURGERY  10/15/2018    colon resection      Family History       Problem Relation (Age of Onset)    Hypertension Father, Mother, Brother    Stroke Maternal Grandfather          Social History     Socioeconomic History    Marital status:    Tobacco Use    Smoking status: Never    Smokeless tobacco: Never   Substance and Sexual Activity    Alcohol use: No    Drug use: No    Sexual activity: Yes     Partners: Male     Comment: ablation       Review of Systems    OBJECTIVE:     Vital Signs     There is no height or weight on file to calculate BMI.      Neurosurgery Physical Exam  On virtual audio visual visit   Head is normocephalic atraumatic   Neck is grossly supple  No appreciable labored breathing   Admitting appears to be nontender   Patient is able to move extremities     On virtual audio visual visit the patient's speech is fluent, goal directed without any noted dysarthria or aphasia   Unable to evaluate pupils on virtual audio visual visit   Face is symmetric   Tongue is midline  The palate appears to elevate symmetrically  Hearing appears to be intact on virtual audio visual visit to voice.  But patient does note decrease in hearing in the left ear verses the right.    Patient able to move both upper and lower extremities  without any gross motor asymmetry on virtual audio visual visit     Diagnostic Results:  I personally reviewed patient's Diagnostic Imaging.    Tortuous left posteroinferior cerebellar artery which extends to abut and slightly posterior displaced the proximal cisternal left glossopharyngeal nerve.       ASSESSMENT/PLAN:     57-year-old female history of migraine headache, atypical facial pain verses vascular compression of the glossopharyngeal nerve.      1. No clear focal deficits on virtual audio visual visit     2. Tortuous left posterior inferior cerebellar artery which appears to extend into a but in the slightly posteriorly displaced the proximal cisternal portion of the left glossopharyngeal nerve.    3. Had a very long discussion with the patient regarding her current symptomatology.  Also had a discussion with Dr. Garcia as well.  Given the patient may have some component of vascular compression of the glossopharyngeal nerve, symptomatology is atypical, as well as an uncommon presentation of glossopharyngeal nerve compression.  There is some report that potentially some change in symptomatology after oral steroids, suggestion that there may be some component of inflammatory component as well.  As result I would recommend MRA with vessel wall imaging.  Would also recommend an audiogram given the changes that she noted as well.  I noted the patient as well as Dr. Garcia that it may not be unreasonable to propose a vascular decompression of the glossopharyngeal nerve.  I did note that this is not a common presentation for this particular pathology that additional diagnostic tests may be of some utility.  Answered all the patient's questions her satisfaction.    Thank you so very much for allowing me to participate in the care of this patient.  Please feel free to call any questions, comments, or concerns.    Addis Mclean MD,MSc  Department of Neurosurgery   Department of Radiology  Department of  Neurology  [facility]    [facility] [employer]   [employer] [facility] [employer]      Audiogram   MRA VWI    Note dictated with voice recognition software, please excuse any grammatical errors.

## 2023-04-19 ENCOUNTER — CLINICAL SUPPORT (OUTPATIENT)
Dept: AUDIOLOGY | Facility: CLINIC | Age: 58
End: 2023-04-19
Payer: COMMERCIAL

## 2023-04-19 ENCOUNTER — PATIENT MESSAGE (OUTPATIENT)
Dept: OTOLARYNGOLOGY | Facility: CLINIC | Age: 58
End: 2023-04-19
Payer: COMMERCIAL

## 2023-04-19 DIAGNOSIS — H90.3 SENSORINEURAL HEARING LOSS, BILATERAL: Primary | ICD-10-CM

## 2023-04-19 PROCEDURE — 92557 PR COMPREHENSIVE HEARING TEST: ICD-10-PCS | Mod: S$GLB,,, | Performed by: AUDIOLOGIST

## 2023-04-19 PROCEDURE — 99999 PR PBB SHADOW E&M-EST. PATIENT-LVL I: ICD-10-PCS | Mod: PBBFAC,,, | Performed by: AUDIOLOGIST

## 2023-04-19 PROCEDURE — 99999 PR PBB SHADOW E&M-EST. PATIENT-LVL I: CPT | Mod: PBBFAC,,, | Performed by: AUDIOLOGIST

## 2023-04-19 PROCEDURE — 92567 TYMPANOMETRY: CPT | Mod: S$GLB,,, | Performed by: AUDIOLOGIST

## 2023-04-19 PROCEDURE — 92567 PR TYMPA2METRY: ICD-10-PCS | Mod: S$GLB,,, | Performed by: AUDIOLOGIST

## 2023-04-19 PROCEDURE — 92557 COMPREHENSIVE HEARING TEST: CPT | Mod: S$GLB,,, | Performed by: AUDIOLOGIST

## 2023-04-19 NOTE — PROGRESS NOTES
Audiologic Evaluation 4/19/2023:       Mae Gregory, a 57 y.o. female, was seen today in the clinic for an audiologic evaluation for hearing loss.  Ms. Gregory reported a history of bilateral hearing loss that is worse in the left ear. Ms. Gregory also reported bilateral tinnitus that is louder on the left side. 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 Ms. Gregory reported she has hearing aids, but has not worn them consistently in the past. Ms. Gregory also noted imbalance.    Tympanometry revealed Type A tympanogram in the right ear and Type A tympanogram in the left ear. Audiogram results revealed normal sloping to severe sensorineural hearing loss in the right ear and normal sloping to severe sensorineural hearing loss in the left ear.  Speech reception thresholds were noted at 20 dB in the right ear and 20 dB in the left ear.  Speech discrimination scores were 88% in the right ear and 92% in the left ear.    Recommendations:  Otologic evaluation  Hearing aid consultation with medical clearance  Annual audiogram  Hearing protection when in noise

## 2023-04-20 ENCOUNTER — TELEPHONE (OUTPATIENT)
Dept: NEUROSURGERY | Facility: CLINIC | Age: 58
End: 2023-04-20
Payer: COMMERCIAL

## 2023-04-25 ENCOUNTER — OFFICE VISIT (OUTPATIENT)
Dept: NEUROSURGERY | Facility: CLINIC | Age: 58
End: 2023-04-25
Payer: COMMERCIAL

## 2023-04-25 ENCOUNTER — PATIENT MESSAGE (OUTPATIENT)
Dept: FAMILY MEDICINE | Facility: CLINIC | Age: 58
End: 2023-04-25
Payer: COMMERCIAL

## 2023-04-25 DIAGNOSIS — F51.01 PRIMARY INSOMNIA: ICD-10-CM

## 2023-04-25 DIAGNOSIS — G52.1: Primary | ICD-10-CM

## 2023-04-25 PROCEDURE — 3044F HG A1C LEVEL LT 7.0%: CPT | Mod: CPTII,S$GLB,, | Performed by: NEUROLOGICAL SURGERY

## 2023-04-25 PROCEDURE — 99999 PR PBB SHADOW E&M-EST. PATIENT-LVL III: ICD-10-PCS | Mod: PBBFAC,,, | Performed by: NEUROLOGICAL SURGERY

## 2023-04-25 PROCEDURE — 99999 PR PBB SHADOW E&M-EST. PATIENT-LVL III: CPT | Mod: PBBFAC,,, | Performed by: NEUROLOGICAL SURGERY

## 2023-04-25 PROCEDURE — 3044F PR MOST RECENT HEMOGLOBIN A1C LEVEL <7.0%: ICD-10-PCS | Mod: CPTII,S$GLB,, | Performed by: NEUROLOGICAL SURGERY

## 2023-04-25 PROCEDURE — 1159F PR MEDICATION LIST DOCUMENTED IN MEDICAL RECORD: ICD-10-PCS | Mod: CPTII,S$GLB,, | Performed by: NEUROLOGICAL SURGERY

## 2023-04-25 PROCEDURE — 99214 PR OFFICE/OUTPT VISIT, EST, LEVL IV, 30-39 MIN: ICD-10-PCS | Mod: S$GLB,,, | Performed by: NEUROLOGICAL SURGERY

## 2023-04-25 PROCEDURE — 99214 OFFICE O/P EST MOD 30 MIN: CPT | Mod: S$GLB,,, | Performed by: NEUROLOGICAL SURGERY

## 2023-04-25 PROCEDURE — 1159F MED LIST DOCD IN RCRD: CPT | Mod: CPTII,S$GLB,, | Performed by: NEUROLOGICAL SURGERY

## 2023-04-25 RX ORDER — CYCLOBENZAPRINE HCL 10 MG
10 TABLET ORAL 3 TIMES DAILY
COMMUNITY
Start: 2023-03-28 | End: 2023-08-17

## 2023-04-25 RX ORDER — AZITHROMYCIN 250 MG/1
TABLET, FILM COATED ORAL
COMMUNITY
Start: 2022-12-11 | End: 2023-06-29 | Stop reason: CLARIF

## 2023-04-25 RX ORDER — AZELASTINE 1 MG/ML
2 SPRAY, METERED NASAL 2 TIMES DAILY
COMMUNITY
Start: 2022-12-20 | End: 2023-06-29 | Stop reason: CLARIF

## 2023-04-25 RX ORDER — BENZONATATE 100 MG/1
100 CAPSULE ORAL
COMMUNITY
Start: 2022-12-11 | End: 2023-06-29 | Stop reason: CLARIF

## 2023-04-25 RX ORDER — CARBAMAZEPINE 100 MG/1
100 TABLET, EXTENDED RELEASE ORAL 2 TIMES DAILY
Qty: 60 TABLET | Refills: 11 | Status: SHIPPED | OUTPATIENT
Start: 2023-04-25 | End: 2023-10-11 | Stop reason: CLARIF

## 2023-04-25 RX ORDER — FLUTICASONE PROPIONATE 50 MCG
2 SPRAY, SUSPENSION (ML) NASAL DAILY PRN
COMMUNITY
Start: 2022-12-11 | End: 2023-06-29 | Stop reason: CLARIF

## 2023-04-25 RX ORDER — RIMEGEPANT SULFATE 75 MG/75MG
75 TABLET, ORALLY DISINTEGRATING ORAL
COMMUNITY
Start: 2023-02-27 | End: 2023-08-09

## 2023-04-25 RX ORDER — LIDOCAINE HYDROCHLORIDE 20 MG/ML
10 SOLUTION ORAL; TOPICAL EVERY 6 HOURS PRN
COMMUNITY
Start: 2022-12-20 | End: 2023-06-29 | Stop reason: CLARIF

## 2023-04-25 NOTE — TELEPHONE ENCOUNTER
No new care gaps identified.  [facility] Embedded Care Gaps. Reference number: [id number]. 4/25/2023   10:07:51 AM SAHARA

## 2023-04-26 RX ORDER — ZOLPIDEM TARTRATE 5 MG/1
5 TABLET ORAL NIGHTLY PRN
Qty: 30 TABLET | Refills: 5 | Status: SHIPPED | OUTPATIENT
Start: 2023-04-26 | End: 2023-05-15 | Stop reason: SDUPTHER

## 2023-05-08 NOTE — PROGRESS NOTES
Neurosurgery  Established Patient    SUBJECTIVE:     History of Present Illness:  Patient is here to see me for a 2nd opinion.  Patient recently seen my partner Dr. Mclean on 04/18/2023.  Patient was being seen for left-sided facial pain that has gotten worse over the last 6 months.  Patient denies any trauma.  Patient denies precipitating event.  Patient has tried Lyrica without any significant we patient has had Topamax without significant relief.  Patient may have had slight improvement with steroids in the past.  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    Review of patient's allergies indicates:  No Known Allergies    Current Outpatient Medications   Medication Sig Dispense Refill    cetirizine (ZYRTEC) 10 MG tablet Take 10 mg by mouth once daily.      COCONUT OIL ORAL Take by mouth.      cyclobenzaprine (FLEXERIL) 10 MG tablet Take 10 mg by mouth 3 (three) times daily.      estradiol-norethindrone (ACTIVELLA) 1-0.5 mg per tablet TAKE 1 TABLET BY MOUTH EVERY DAY 28 tablet 12    finasteride (PROSCAR) 5 mg tablet Take 1 tablet (5 mg total) by mouth once daily. 30 tablet 11    multivitamin with minerals tablet Take 1 tablet by mouth once daily.      NURTEC 75 mg odt Take 75 mg by mouth.      oxybutynin (DITROPAN) 5 MG Tab TAKE ONE TABLET BY MOUTH TWICE DAILY 60 tablet 2    pregabalin (LYRICA) 75 MG capsule Take 1 capsule (75 mg total) by mouth 2 (two) times daily. 60 capsule 6    topiramate (TOPAMAX) 50 MG tablet TAKE 1 TABLET BY MOUTH TWICE A  tablet 3    traZODone (DESYREL) 150 MG tablet Take 1 tablet (150 mg total) by mouth nightly as needed for Insomnia. 90 tablet 3    albuterol (PROVENTIL/VENTOLIN HFA) 90 mcg/actuation inhaler TAKE 1 2 PUFF(S) (INHALATION) EVERY 4 HOURS PRN   WHEEZING (Patient not taking: Reported on 4/25/2023) 18 g 12    amLODIPine (NORVASC) 5 MG tablet Take 1 tablet (5 mg total) by mouth once daily.  (Patient not taking: Reported on 2023) 90 tablet 3    azelastine (ASTELIN) 137 mcg (0.1 %) nasal spray 2 sprays 2 (two) times daily.      azithromycin (Z-JEREMY) 250 MG tablet TAKE 2 TABLETS BY MOUTH TODAY, THEN TAKE 1 TABLET DAILY FOR 4 DAYS      benzonatate (TESSALON) 100 MG capsule Take 100 mg by mouth.      buPROPion (WELLBUTRIN XL) 150 MG TB24 tablet Take 1 tablet (150 mg total) by mouth once daily. 30 tablet 0    carBAMazepine (TEGRETOL XR) 100 MG 12 hr tablet Take 1 tablet (100 mg total) by mouth 2 (two) times daily. 60 tablet 11    fluticasone propionate (FLONASE) 50 mcg/actuation nasal spray 2 sprays by Each Nostril route daily as needed.      LIDOCAINE VISCOUS 2 % solution Take 10 mLs by mouth every 6 (six) hours as needed.      sumatriptan (IMITREX) 100 MG tablet Take 1 tablet (100 mg total) by mouth every 2 (two) hours as needed for Migraine. (Patient not taking: Reported on 2023) 40 tablet 1    valACYclovir (VALTREX) 1000 MG tablet Take 2 tablets (2,000 mg total) by mouth every 12 (twelve) hours. For cold sore for 1 day (Patient not taking: Reported on 2023) 30 tablet 11    zolpidem (AMBIEN) 5 MG Tab Take 1 tablet (5 mg total) by mouth nightly as needed (insomnia). 30 tablet 5     No current facility-administered medications for this visit.       Past Medical History:   Diagnosis Date    DDD (degenerative disc disease), cervical 3/15/2017    DDD (degenerative disc disease), lumbar 3/15/2017    Glaucoma 3/7/2018    Migraine 3/15/2017    Primary insomnia 3/15/2017     Past Surgical History:   Procedure Laterality Date    AUGMENTATION OF BREAST      BREAST SURGERY       SECTION   and 2001    x2    COLON SURGERY  10/15/2018    colon resection      Family History       Problem Relation (Age of Onset)    Hypertension Father, Mother, Brother    Stroke Maternal Grandfather          Social History     Socioeconomic History    Marital status:    Tobacco Use    Smoking status: Never     Smokeless tobacco: Never   Substance and Sexual Activity    Alcohol use: No    Drug use: No    Sexual activity: Yes     Partners: Male     Comment: ablation       Review of Systems    OBJECTIVE:     Vital Signs  Pain Score:   5  There is no height or weight on file to calculate BMI.    Neurosurgery Physical Exam      Diagnostic Results:     CLINICAL HISTORY:  Headache, new or worsening (Age >= 50y);Left sided face pain, left tinnitus, left sided facial paresis (asymmetry), c/o 6 months of dysphagia since facial paresis noted; Headache, unspecified     TECHNIQUE:  Sagittal T1, axial T2, axial flair, axial diffusion and gradient imaging of the whole brain without contrast. Additional thin section imaging of the IACs was performed using coronal T1, axial 3-D spoiled gradient, without contrast. Subsequently axial T1 postcontrast imaging of the whole brain and 3D sagittal MP rage postcontrast imaging of the whole brain were performed with axial and coronal thin section T1 postcontrast fat sat imaging through the IAC's. Six ml of  Gadavist contrast was injected intravenously     COMPARISON:  CT head 10/30/2018     FINDINGS:  MRI brain: The brain parenchyma is normal in signal and contour.  The ventricles are normal in size without hydrocephalus.  There is no abnormal parenchymal susceptibility to suggest parenchymal hemorrhage.  No restricted diffusion to suggest acute or recent infarction.  Major intracranial skull base T2 flow voids are present.  There is no abnormal parenchymal enhancement.  Questioned partially empty sella.     IACs: There is normal fluid signal filling the internal auditory canals, vestibule, cochlea, and semicircular canals.  No significant mass effect on the cisternal or intracanalicular portions of the seventh/eighth nerve complexes bilaterally. No abnormal enhancement along the cisternal or intracanalicular portions of the seventh /eighth nerve complexes bilaterally.     There is tortuous left  posteroinferior cerebellar artery which abuts the left STIR sternal portion of the glossopharyngeal nerve which in light of history concerning for possible neurovascular compression syndrome.  Clinical correlation advised.     This report was flagged in Epic as abnormal.     Impression:     Mri Iac: Tortuous left posteroinferior cerebellar artery which extends to abut and slightly posterior displaced the proximal cisternal left glossopharyngeal nerve.  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     MRI brain: Unremarkable MRI brain as detailed above specifically without evidence for hydrocephalus or intracranial enhancing lesion     Questioned partially empty sella.  Intracranial hypertension to be included in differential in the appropriate clinical setting, clinical correlation advised.       ASSESSMENT/PLAN:     Overall complicated case which there is some vessel compression on the left glossopharyngeal.  Her symptoms were more trigeminal neuralgia do not see any compression around the 5th nerve.  Possible rare of the referred pain from the glossopharyngeal.  My partner wanted to get an MRA with vessel wall imaging which I think is not unreasonable we will proceed with the MRA vessel imaging I would like to try her on Tegretol to see how it works.  We will plan to get these things done and see her back about 4-6 weeks.        Note dictated with voice recognition software, please excuse any grammatical errors.

## 2023-05-09 ENCOUNTER — PATIENT MESSAGE (OUTPATIENT)
Dept: NEUROSURGERY | Facility: CLINIC | Age: 58
End: 2023-05-09
Payer: COMMERCIAL

## 2023-05-11 ENCOUNTER — PATIENT MESSAGE (OUTPATIENT)
Dept: FAMILY MEDICINE | Facility: CLINIC | Age: 58
End: 2023-05-11
Payer: COMMERCIAL

## 2023-05-11 DIAGNOSIS — B00.1 FEVER BLISTER: ICD-10-CM

## 2023-05-11 RX ORDER — VALACYCLOVIR HYDROCHLORIDE 1 G/1
2000 TABLET, FILM COATED ORAL EVERY 12 HOURS
Qty: 30 TABLET | Refills: 11 | Status: SHIPPED | OUTPATIENT
Start: 2023-05-11 | End: 2023-08-09

## 2023-05-11 NOTE — TELEPHONE ENCOUNTER
No care due was identified.  [facility] Embedded Care Due Messages. Reference number: [id number].   5/11/2023 7:55:46 AM CDT

## 2023-05-12 ENCOUNTER — HOSPITAL ENCOUNTER (OUTPATIENT)
Dept: RADIOLOGY | Facility: HOSPITAL | Age: 58
Discharge: HOME | End: 2023-05-12
Attending: NEUROLOGICAL SURGERY
Payer: COMMERCIAL

## 2023-05-12 ENCOUNTER — PATIENT MESSAGE (OUTPATIENT)
Dept: FAMILY MEDICINE | Facility: CLINIC | Age: 58
End: 2023-05-12
Payer: COMMERCIAL

## 2023-05-12 DIAGNOSIS — F51.01 PRIMARY INSOMNIA: ICD-10-CM

## 2023-05-12 DIAGNOSIS — I67.1 CEREBRAL ANEURYSM, NONRUPTURED: ICD-10-CM

## 2023-05-12 PROCEDURE — 70546 MR ANGIOGRAPH HEAD W/O&W/DYE: CPT | Mod: TC

## 2023-05-12 PROCEDURE — 25500020 PHARM REV CODE 255: Performed by: NEUROLOGICAL SURGERY

## 2023-05-12 PROCEDURE — 70546 MR ANGIOGRAPH HEAD W/O&W/DYE: CPT | Mod: 26,,, | Performed by: RADIOLOGY

## 2023-05-12 PROCEDURE — A9585 GADOBUTROL INJECTION: HCPCS | Performed by: NEUROLOGICAL SURGERY

## 2023-05-12 PROCEDURE — 70546 MRA BRAIN WITH AND WITHOUT: ICD-10-PCS | Mod: 26,,, | Performed by: RADIOLOGY

## 2023-05-12 RX ORDER — GADOBUTROL 604.72 MG/ML
6 INJECTION INTRAVENOUS
Status: COMPLETED | OUTPATIENT
Start: 2023-05-12 | End: 2023-05-12

## 2023-05-12 RX ADMIN — GADOBUTROL 6 ML: 604.72 INJECTION INTRAVENOUS at 09:05

## 2023-05-15 ENCOUNTER — OFFICE VISIT (OUTPATIENT)
Dept: FAMILY MEDICINE | Facility: CLINIC | Age: 58
End: 2023-05-15
Payer: COMMERCIAL

## 2023-05-15 DIAGNOSIS — F51.01 PRIMARY INSOMNIA: Primary | ICD-10-CM

## 2023-05-15 DIAGNOSIS — F41.8 ANXIETY WITH DEPRESSION: ICD-10-CM

## 2023-05-15 PROCEDURE — 1159F MED LIST DOCD IN RCRD: CPT | Mod: CPTII,95,, | Performed by: FAMILY MEDICINE

## 2023-05-15 PROCEDURE — 1159F PR MEDICATION LIST DOCUMENTED IN MEDICAL RECORD: ICD-10-PCS | Mod: CPTII,95,, | Performed by: FAMILY MEDICINE

## 2023-05-15 PROCEDURE — 3044F PR MOST RECENT HEMOGLOBIN A1C LEVEL <7.0%: ICD-10-PCS | Mod: CPTII,95,, | Performed by: FAMILY MEDICINE

## 2023-05-15 PROCEDURE — 99214 PR OFFICE/OUTPT VISIT, EST, LEVL IV, 30-39 MIN: ICD-10-PCS | Mod: 95,,, | Performed by: FAMILY MEDICINE

## 2023-05-15 PROCEDURE — 99214 OFFICE O/P EST MOD 30 MIN: CPT | Mod: 95,,, | Performed by: FAMILY MEDICINE

## 2023-05-15 PROCEDURE — 1160F PR REVIEW ALL MEDS BY PRESCRIBER/CLIN PHARMACIST DOCUMENTED: ICD-10-PCS | Mod: CPTII,95,, | Performed by: FAMILY MEDICINE

## 2023-05-15 PROCEDURE — 3044F HG A1C LEVEL LT 7.0%: CPT | Mod: CPTII,95,, | Performed by: FAMILY MEDICINE

## 2023-05-15 PROCEDURE — 1160F RVW MEDS BY RX/DR IN RCRD: CPT | Mod: CPTII,95,, | Performed by: FAMILY MEDICINE

## 2023-05-15 RX ORDER — ZOLPIDEM TARTRATE 5 MG/1
5 TABLET ORAL NIGHTLY PRN
Qty: 30 TABLET | Refills: 5 | Status: SHIPPED | OUTPATIENT
Start: 2023-05-24 | End: 2023-10-10 | Stop reason: SDUPTHER

## 2023-05-15 RX ORDER — OXYBUTYNIN CHLORIDE 5 MG/1
TABLET ORAL
Qty: 60 TABLET | Refills: 0 | Status: SHIPPED | OUTPATIENT
Start: 2023-05-15 | End: 2023-07-17

## 2023-05-15 RX ORDER — ZOLPIDEM TARTRATE 5 MG/1
5 TABLET ORAL NIGHTLY PRN
Qty: 30 TABLET | Refills: 5 | Status: CANCELLED | OUTPATIENT
Start: 2023-05-15 | End: 2023-11-13

## 2023-05-15 NOTE — TELEPHONE ENCOUNTER
No care due was identified.  [facility] Embedded Care Due Messages. Reference number: [id number].   5/15/2023 6:55:33 AM CDT

## 2023-05-15 NOTE — PROGRESS NOTES
The patient location is: home  The chief complaint leading to consultation is: insomnia med refill    Visit type: Virtual visit with synchronous audio and video  Total time spent with patient: 16 minutes  Each patient to whom he or she provides medical services by telemedicine is:  (1) informed of the relationship between the physician and patient and the respective role of any other health care provider with respect to management of the patient; and (2) notified that he or she may decline to receive medical services by telemedicine and may withdraw from such care at any time.         Office Visit    Patient Name: Mae Gregory    : 1965  MRN: [id number]      Assessment/Plan:  Mae Gregory is a 57 y.o. female who presents today for :    -Insomnia  -     zolpidem (AMBIEN) 5 MG Tab; Take 1 tablet (5 mg total) by mouth nightly as needed (insomnia).  Dispense: 30 tablet; Refill: 5  -stable, continue current regimen, reviewed potential side effects associated with medication.    -Anxiety with depression - controlled off Wellbutrin since 2023  -stable, asymptomatic        Follow up PRN    This note was created by combination of typed  and MModal dictation.  Transcription errors may be present.  If there are any questions, please contact me.      ----------------------------------------------------------------------------------------------------------------------      HPI:  Patient Care Team:  Rajiv Garcia MD as PCP - General (Family Medicine)  Tavo Boateng MD (Inactive) as Consulting Physician (General Surgery)  May De La Torre MA (Inactive)  May De La Torre MA (Inactive) as Care Coordinator  Sukumar Gates MD as Consulting Physician (Neurology)    Mae is a 57 y.o. female with      Patient Active Problem List   Diagnosis    Migraine    DDD (degenerative disc disease), cervical    DDD (degenerative disc disease), lumbar    -Insomnia    -Meniere's disease of both ears     Sensorineural hearing loss (SNHL) of both ears    Hair loss    Glaucoma    -Sleepwalking - controlled on PRN Trazodone    -Dystonia - follows Neurology regularly for Botox injections    Pain    -Raynaud's phenomenon - follows Rheumatology - on Norvasc 2.5mg    -Cervical myofascial pain syndrome - follows Neurology for injections    -Anxiety with depression - controlled off Wellbutrin since 4/2023       Patient presents today for f/u of:  insomnia med refill      She is doing well overall on Ambien, which she takes as needed to help with sleep. She does not operate any heavy machinery. No prior history overdose, or excessive alcohol intake. She also denies any adverse side effects with taking Ambien. She states that she has been able to wean herself off of the Wellbutrin the past month, no withdrawal Sx, and she feels that her anxiety and depression is controll off of medication. Otherwise, no other acute issues during this visit.      Additional ROS    Negative review of system    except per HPI             Patient Active Problem List   Diagnosis    Migraine    DDD (degenerative disc disease), cervical    DDD (degenerative disc disease), lumbar    -Insomnia    -Meniere's disease of both ears    Sensorineural hearing loss (SNHL) of both ears    Hair loss    Glaucoma    -Sleepwalking - controlled on PRN Trazodone    -Dystonia - follows Neurology regularly for Botox injections    Pain    -Raynaud's phenomenon - follows Rheumatology - on Norvasc 2.5mg    -Cervical myofascial pain syndrome - follows Neurology for injections    -Anxiety with depression - controlled off Wellbutrin since 4/2023       Current Medications  Medications reviewed/updated.     Current Outpatient Medications on File Prior to Visit   Medication Sig Dispense Refill    albuterol (PROVENTIL/VENTOLIN HFA) 90 mcg/actuation inhaler TAKE 1 2 PUFF(S) (INHALATION) EVERY 4 HOURS PRN   WHEEZING (Patient not taking: Reported on 4/25/2023) 18 g 12    azelastine  (ASTELIN) 137 mcg (0.1 %) nasal spray 2 sprays 2 (two) times daily.      azithromycin (Z-JEREMY) 250 MG tablet TAKE 2 TABLETS BY MOUTH TODAY, THEN TAKE 1 TABLET DAILY FOR 4 DAYS      benzonatate (TESSALON) 100 MG capsule Take 100 mg by mouth.      carBAMazepine (TEGRETOL XR) 100 MG 12 hr tablet Take 1 tablet (100 mg total) by mouth 2 (two) times daily. 60 tablet 11    cetirizine (ZYRTEC) 10 MG tablet Take 10 mg by mouth once daily.      COCONUT OIL ORAL Take by mouth.      cyclobenzaprine (FLEXERIL) 10 MG tablet Take 10 mg by mouth 3 (three) times daily.      estradiol-norethindrone (ACTIVELLA) 1-0.5 mg per tablet TAKE 1 TABLET BY MOUTH EVERY DAY 28 tablet 12    finasteride (PROSCAR) 5 mg tablet Take 1 tablet (5 mg total) by mouth once daily. 30 tablet 11    fluticasone propionate (FLONASE) 50 mcg/actuation nasal spray 2 sprays by Each Nostril route daily as needed.      LIDOCAINE VISCOUS 2 % solution Take 10 mLs by mouth every 6 (six) hours as needed.      multivitamin with minerals tablet Take 1 tablet by mouth once daily.      NURTEC 75 mg odt Take 75 mg by mouth.      oxybutynin (DITROPAN) 5 MG Tab Take one tablet by mouth twice daily 60 tablet 0    pregabalin (LYRICA) 75 MG capsule Take 1 capsule (75 mg total) by mouth 2 (two) times daily. 60 capsule 6    sumatriptan (IMITREX) 100 MG tablet Take 1 tablet (100 mg total) by mouth every 2 (two) hours as needed for Migraine. (Patient not taking: Reported on 4/25/2023) 40 tablet 1    topiramate (TOPAMAX) 50 MG tablet TAKE 1 TABLET BY MOUTH TWICE A  tablet 3    traZODone (DESYREL) 150 MG tablet Take 1 tablet (150 mg total) by mouth nightly as needed for Insomnia. 90 tablet 3    valACYclovir (VALTREX) 1000 MG tablet Take 2 tablets (2,000 mg total) by mouth every 12 (twelve) hours. For cold sore 30 tablet 11    [DISCONTINUED] amLODIPine (NORVASC) 5 MG tablet Take 1 tablet (5 mg total) by mouth once daily. (Patient not taking: Reported on 4/13/2023) 90 tablet 3     [DISCONTINUED] buPROPion (WELLBUTRIN XL) 150 MG TB24 tablet Take 1 tablet (150 mg total) by mouth once daily. 30 tablet 0    [DISCONTINUED] oxybutynin (DITROPAN) 5 MG Tab TAKE ONE TABLET BY MOUTH TWICE DAILY 60 tablet 2    [DISCONTINUED] zolpidem (AMBIEN) 5 MG Tab Take 1 tablet (5 mg total) by mouth nightly as needed (insomnia). 30 tablet 5     No current facility-administered medications on file prior to visit.           Past Surgical History:   Procedure Laterality Date    AUGMENTATION OF BREAST      BREAST SURGERY       SECTION   and 2001    x2    COLON SURGERY  10/15/2018    colon resection        Family History   Problem Relation Age of Onset    Stroke Maternal Grandfather     Hypertension Father     Hypertension Mother     Hypertension Brother     Ovarian cancer Neg Hx     Cancer Neg Hx        Social History     Socioeconomic History    Marital status:    Tobacco Use    Smoking status: Never    Smokeless tobacco: Never   Substance and Sexual Activity    Alcohol use: No    Drug use: No    Sexual activity: Yes     Partners: Male     Comment: ablation             Allergies   Review of patient's allergies indicates:  No Known Allergies          Review of Systems  See HPI        Physical Exam  There were no vitals taken for this visit.    GEN: NAD

## 2023-05-29 ENCOUNTER — PATIENT MESSAGE (OUTPATIENT)
Dept: NEUROSURGERY | Facility: CLINIC | Age: 58
End: 2023-05-29
Payer: COMMERCIAL

## 2023-05-29 ENCOUNTER — TELEPHONE (OUTPATIENT)
Dept: NEUROSURGERY | Facility: CLINIC | Age: 58
End: 2023-05-29
Payer: COMMERCIAL

## 2023-05-29 NOTE — TELEPHONE ENCOUNTER
Called pt. Informed pt clinic has to be canceled. Will reschedule pt. Pt. Verbalized understanding.

## 2023-05-31 ENCOUNTER — OFFICE VISIT (OUTPATIENT)
Dept: NEUROSURGERY | Facility: CLINIC | Age: 58
End: 2023-05-31
Payer: COMMERCIAL

## 2023-05-31 ENCOUNTER — TELEPHONE (OUTPATIENT)
Dept: NEUROSURGERY | Facility: CLINIC | Age: 58
End: 2023-05-31
Payer: COMMERCIAL

## 2023-05-31 ENCOUNTER — PATIENT MESSAGE (OUTPATIENT)
Dept: NEUROSURGERY | Facility: CLINIC | Age: 58
End: 2023-05-31

## 2023-05-31 DIAGNOSIS — G52.1: Primary | ICD-10-CM

## 2023-05-31 PROCEDURE — 3044F PR MOST RECENT HEMOGLOBIN A1C LEVEL <7.0%: ICD-10-PCS | Mod: CPTII,95,, | Performed by: PHYSICIAN ASSISTANT

## 2023-05-31 PROCEDURE — 99214 PR OFFICE/OUTPT VISIT, EST, LEVL IV, 30-39 MIN: ICD-10-PCS | Mod: 95,,, | Performed by: PHYSICIAN ASSISTANT

## 2023-05-31 PROCEDURE — 99214 OFFICE O/P EST MOD 30 MIN: CPT | Mod: 95,,, | Performed by: PHYSICIAN ASSISTANT

## 2023-05-31 PROCEDURE — 3044F HG A1C LEVEL LT 7.0%: CPT | Mod: CPTII,95,, | Performed by: PHYSICIAN ASSISTANT

## 2023-05-31 NOTE — PROGRESS NOTES
Neurosurgery  Established Patient    SUBJECTIVE:   The patient location is: [place]   The chief complaint leading to consultation is: follow up     Visit type: audiovisual    Face to Face time with patient: 20  20 minutes of total time spent on the encounter, which includes face to face time and non-face to face time preparing to see the patient (eg, review of tests), Obtaining and/or reviewing separately obtained history, Documenting clinical information in the electronic or other health record, Independently interpreting results (not separately reported) and communicating results to the patient/family/caregiver, or Care coordination (not separately reported).   Each patient to whom he or she provides medical services by telemedicine is:  (1) informed of the relationship between the physician and patient and the respective role of any other health care provider with respect to management of the patient; and (2) notified that he or she may decline to receive medical services by telemedicine and may withdraw from such care at any time.    History of Present Illness:  Patient is here to see me for a 2nd opinion.  Patient recently seen my partner Dr. Mclean on 04/18/2023.  Patient was being seen for left-sided facial pain that has gotten worse over the last 6 months.  Patient denies any trauma.  Patient denies precipitating event.  Patient has tried Lyrica without any significant we patient has had Topamax without significant relief.  Patient may have had slight improvement with steroids in the past.  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    Interval History:  Pt presents via virtual visit today for follow up and MRA results. She denies any new symptoms since her last visit with Dr. Hudson. She continues to describe left sided facial pain into her forehead/cheek as well as into the left side of her neck. She states it feels like her  "throat is sore and that her "glands are swollen" but does note some improvement with the Tegretol. She also continues to note difficulty with annunciation and with swallowing. She had a swallow study done which showed reduced complete base of tongue retraction with reduced epiglottic retroflexion. Regular diet with thin liquids and OP SLP was recommended. She denies choking. She is scheduled for audiogram in the next few weeks.     Review of patient's allergies indicates:  No Known Allergies    Current Outpatient Medications   Medication Sig Dispense Refill    albuterol (PROVENTIL/VENTOLIN HFA) 90 mcg/actuation inhaler TAKE 1 2 PUFF(S) (INHALATION) EVERY 4 HOURS PRN   WHEEZING (Patient not taking: Reported on 4/25/2023) 18 g 12    azelastine (ASTELIN) 137 mcg (0.1 %) nasal spray 2 sprays 2 (two) times daily.      azithromycin (Z-JEREMY) 250 MG tablet TAKE 2 TABLETS BY MOUTH TODAY, THEN TAKE 1 TABLET DAILY FOR 4 DAYS      benzonatate (TESSALON) 100 MG capsule Take 100 mg by mouth.      carBAMazepine (TEGRETOL XR) 100 MG 12 hr tablet Take 1 tablet (100 mg total) by mouth 2 (two) times daily. 60 tablet 11    cetirizine (ZYRTEC) 10 MG tablet Take 10 mg by mouth once daily.      COCONUT OIL ORAL Take by mouth.      cyclobenzaprine (FLEXERIL) 10 MG tablet Take 10 mg by mouth 3 (three) times daily.      estradiol-norethindrone (ACTIVELLA) 1-0.5 mg per tablet TAKE 1 TABLET BY MOUTH EVERY DAY 28 tablet 12    finasteride (PROSCAR) 5 mg tablet Take 1 tablet (5 mg total) by mouth once daily. 30 tablet 11    fluticasone propionate (FLONASE) 50 mcg/actuation nasal spray 2 sprays by Each Nostril route daily as needed.      LIDOCAINE VISCOUS 2 % solution Take 10 mLs by mouth every 6 (six) hours as needed.      multivitamin with minerals tablet Take 1 tablet by mouth once daily.      NURTEC 75 mg odt Take 75 mg by mouth.      oxybutynin (DITROPAN) 5 MG Tab Take one tablet by mouth twice daily 60 tablet 0    pregabalin (LYRICA) 75 MG " capsule Take 1 capsule (75 mg total) by mouth 2 (two) times daily. 60 capsule 6    sumatriptan (IMITREX) 100 MG tablet Take 1 tablet (100 mg total) by mouth every 2 (two) hours as needed for Migraine. (Patient not taking: Reported on 2023) 40 tablet 1    topiramate (TOPAMAX) 50 MG tablet TAKE 1 TABLET BY MOUTH TWICE A  tablet 3    traZODone (DESYREL) 150 MG tablet Take 1 tablet (150 mg total) by mouth nightly as needed for Insomnia. 90 tablet 3    valACYclovir (VALTREX) 1000 MG tablet Take 2 tablets (2,000 mg total) by mouth every 12 (twelve) hours. For cold sore 30 tablet 11    zolpidem (AMBIEN) 5 MG Tab Take 1 tablet (5 mg total) by mouth nightly as needed (insomnia). 30 tablet 5     No current facility-administered medications for this visit.       Past Medical History:   Diagnosis Date    DDD (degenerative disc disease), cervical 3/15/2017    DDD (degenerative disc disease), lumbar 3/15/2017    Glaucoma 3/7/2018    Migraine 3/15/2017    Primary insomnia 3/15/2017     Past Surgical History:   Procedure Laterality Date    AUGMENTATION OF BREAST      BREAST SURGERY       SECTION   and 2001    x2    COLON SURGERY  10/15/2018    colon resection      Family History       Problem Relation (Age of Onset)    Hypertension Father, Mother, Brother    Stroke Maternal Grandfather          Social History     Socioeconomic History    Marital status:    Tobacco Use    Smoking status: Never    Smokeless tobacco: Never   Substance and Sexual Activity    Alcohol use: No    Drug use: No    Sexual activity: Yes     Partners: Male     Comment: ablation       Review of Systems    OBJECTIVE:     Vital Signs     There is no height or weight on file to calculate BMI.    Neurosurgery Physical Exam  General: well developed, well nourished, no distress.   Head: normocephalic, atraumatic  Neurologic: Alert and oriented. Thought content appropriate.  GCS: Motor: 6/Verbal: 5/Eyes: 4 GCS Total: 15  Mental Status:  Awake, Alert, Oriented x3  Cranial nerves: face symmetric, tongue midline, CN II-XII grossly intact. Unable to assess palate elevation  Eyes: pupils equal, round, reactive to light with accomodation, EOMI.   Sensory: intact to light touch throughout  Motor Strength:Moves all extremities spontaneously with good tone.  No abnormal movements seen. Symmetric shoulder shrug   DTR's - 2 + and symmetric in UE and LE  Pronator Drift: no drift noted  Finger-to-nose: Intact bilaterally  Cervical ROM: full      Diagnostic Results:   MRA Brain with and without contrast dated 5/12/23 reviewed and shows marked tortuosity of the left PICA causing compression of the left sided CN IX, X, XI extending into the jugular foramen.        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  Her facial pain is not quite consistent with glossopharyngeal neuralgia however she does have some reported throat pain as well as dysphasia.  She has had some noted improvement in her pain with Tegretol but states the pain is still limiting her.  Her case was discussed with Dr. Hudson who feels she is a candidate for microvascular decompression of the left glossopharyngeal nerve.  Ms. Gregory would like to meet with Dr. Hudson prior to surgery.  We will arrange a follow-up with him in the next few weeks to discuss the risks benefits alternatives of the procedure.  She was encouraged to call us with any questions or concerns prior to follow-up.      Geraldine Monae PA-C  Neurosurgery          Note dictated with voice recognition software, please excuse any grammatical errors.

## 2023-05-31 NOTE — TELEPHONE ENCOUNTER
Called - no answer. Left VM informing pt of scheduled date and time for in-person appt with Dr. Hudson.   Also sent appt letter in mail and will send portal message

## 2023-06-08 ENCOUNTER — PATIENT MESSAGE (OUTPATIENT)
Dept: OTOLARYNGOLOGY | Facility: CLINIC | Age: 58
End: 2023-06-08
Payer: COMMERCIAL

## 2023-06-09 ENCOUNTER — DOCUMENTATION ONLY (OUTPATIENT)
Dept: AUDIOLOGY | Facility: CLINIC | Age: 58
End: 2023-06-09
Payer: COMMERCIAL

## 2023-06-09 NOTE — PROGRESS NOTES
I returned Ms. Gregory call about hearing aids. Ms. Gregory was told by [employer] that I was an in-network provider for hearing aids. I counseled Ms. Gregory that [facility] is not in network with any insurances for hearing aids currently and encouraged her to schedule a hearing aid consultation with an in-network audiologist. Ms. Gregory expressed understanding.

## 2023-06-12 ENCOUNTER — PATIENT MESSAGE (OUTPATIENT)
Dept: NEUROSURGERY | Facility: CLINIC | Age: 58
End: 2023-06-12
Payer: COMMERCIAL

## 2023-06-19 ENCOUNTER — PATIENT MESSAGE (OUTPATIENT)
Dept: OTOLARYNGOLOGY | Facility: CLINIC | Age: 58
End: 2023-06-19
Payer: COMMERCIAL

## 2023-06-21 NOTE — TELEPHONE ENCOUNTER
Will provide patient with a written prescription as requested. Previously provided her a portal note recommending hearing aids.

## 2023-06-26 ENCOUNTER — PROCEDURE VISIT (OUTPATIENT)
Dept: NEUROLOGY | Facility: CLINIC | Age: 58
End: 2023-06-26
Payer: COMMERCIAL

## 2023-06-26 DIAGNOSIS — G24.9 DYSTONIA: Primary | ICD-10-CM

## 2023-06-26 PROCEDURE — 96372 PR INJECTION,THERAP/PROPH/DIAG2ST, IM OR SUBCUT: ICD-10-PCS | Mod: 59,S$GLB,, | Performed by: PSYCHIATRY & NEUROLOGY

## 2023-06-26 PROCEDURE — 64615 PR CHEMODENERVATION OF MUSCLE FOR CHRONIC MIGRAINE: ICD-10-PCS | Mod: S$GLB,,, | Performed by: PSYCHIATRY & NEUROLOGY

## 2023-06-26 PROCEDURE — 64615 CHEMODENERV MUSC MIGRAINE: CPT | Mod: S$GLB,,, | Performed by: PSYCHIATRY & NEUROLOGY

## 2023-06-26 PROCEDURE — 96372 THER/PROPH/DIAG INJ SC/IM: CPT | Mod: 59,S$GLB,, | Performed by: PSYCHIATRY & NEUROLOGY

## 2023-06-26 RX ORDER — KETOROLAC TROMETHAMINE 30 MG/ML
60 INJECTION, SOLUTION INTRAMUSCULAR; INTRAVENOUS
Status: COMPLETED | OUTPATIENT
Start: 2023-06-26 | End: 2023-06-26

## 2023-06-26 RX ORDER — DIAZEPAM 5 MG/1
5 TABLET ORAL EVERY 6 HOURS PRN
Qty: 12 TABLET | Refills: 3 | Status: SHIPPED | OUTPATIENT
Start: 2023-06-26 | End: 2023-12-27

## 2023-06-26 RX ADMIN — KETOROLAC TROMETHAMINE 60 MG: 30 INJECTION, SOLUTION INTRAMUSCULAR; INTRAVENOUS at 04:06

## 2023-06-26 NOTE — PROCEDURES
Procedures    Mae presents for follow-up in neurology clinic.  Since her last visit she reports mild improvement in her overall symptomatology.  She reports recently starting Tegretol which has helped with the pain intensity.  Pain intensity.  She continues to have a variable frequency of episodes characterized by severe sharp stabbing pain in the neck behind the jaw which radiates to the left occipital region.  These episodes can last up to several minutes.  She can go days without any episodes.  Sometimes she has multiple episodes in 1 day.  She is not able to identify any triggers.    She reports an ongoing flare that started 2 days ago.  She is tearful during this encounter.    Prior note:   Mae presents to the neurology clinic for follow-up.  She reports significant improvement in her migraine frequency since initiation of Botox therapy.  Previously the migraines were 2-4 days per week.  Since Botox the frequency has reduced to 1-2 migraine days per week.  She also reports a wearing off effect which starts typically around 10 weeks following a round of Botox injections.  She continues to take topiramate and Nurtec on a regular basis.    Today she also reports a 6 month history of left-sided facial pain.  This started approximately 6 months ago.  She experienced sudden onset of thunderclap type of pain on the left side of her head which was excruciating and lasted a few seconds.  Subsequently she began experiencing intermittent pains on the left side predominant in the cheek region and radiating to the ear canal and the mastoid region.  Sometimes the pain refers to the occipital region.  She reports a very low-grade dull ache in this region on a continuous basis.  In addition she reports flare-ups of sharp stabbing pain.  These flare-ups can occur up to 5 times a week lasting several hours.  In addition the flare-ups can also occur lasting a few seconds.  There is no recognized triggers for these flare-ups.   She also reports slight sagging of the left side her face and somewhat atypical fatigue sensation of her tongue after prolonged speaking.  Otherwise she denies any weakness in her tongue or pain in the tongue or teeth or gums.  Sometimes she will appreciate a slight difference in the touch sensation on the left side of her face when compared to the right side of her face.  She also reports some trouble with swallowing were in the food will enter the nasopharynx.  Sometimes she has trouble enunciating certain words.  All these symptoms started approximately 6 months ago.  She is tried taking NSAIDs without any relief.    Prior note:   Overall doing well.   HA is much better   Using less pain meds   Using Nurtec   Feels L jaw line paresthesias     Pt of Dr. Kody SHI:  Headaches last hours with photophobia, phonophobia, + nausea.  Has visual aura.  Often L cervico-occipital.  Bilateral anteriorly.   She has been seeing Dr. Maynard in [place] but moved back to the [place] area several years ago.  She had a remote, nl MRI brain in [place].      Freq: 30/30 and 20/30     Last Botox June 30, 2022 and not as effective as usual.  Usually only 2-3 HAs/month but now having daily HA.    BOTOX #1 10/18/22 - helped    BOTOX #2 1/12/23 - helped    BOTOX #3 4/13/23 - helped     Abortives:              Effective: Nurtec > Imitrex              Ineffective: ketamine/lidocaine  PRN flexeril      Prophylactics:             Effective: Botox, topiramate 50mg qhs (bid sedation), trazodone    MRI brain was reviewed in detail with the patient.  Impression:     Mri Iac: Tortuous left posteroinferior cerebellar artery which extends to abut and slightly posterior displaced the proximal cisternal left glossopharyngeal nerve.  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     MRI brain: Unremarkable MRI brain as  detailed above specifically without evidence for hydrocephalus or intracranial enhancing lesion     Questioned partially empty sella.  Intracranial hypertension to be included in differential in the appropriate clinical setting, clinical correlation advised.    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  3, PRN valium 5 mg - prescribed   4, pending audiogram    BOTOX treatment response:   Prior to initiating BOTOX, the patient had  28  migraine days per month on average. This meets criteria for chronic migraine.   After starting BOTOX, the patient experienced a reduction in  21  days per month   After starting BOTOX, the patient experienced a reduction in > 100 hours of migraine symptoms per month   After starting BOTOX, the patient experienced a 50% reduction in burden of migraine days per month   Based on this information, BOTOX is medically necessary for the management of the patient's chronic migraine.     BOTOX Injection intervals:   Patient reports a 'wearing off effect' prior to the subsequent BOTOX injections at 12 weeks. This occurs at week 10-11  Symptoms of this a 'wearing off effect' include - worsening of migraine headache frequency and intensity   Medications used during the 'wearing off effect' period include nurtec, imitrex    Based on this information, it is medically necessary for the patient to receive BOTOX therapy at an interval of every 10 weeks for the management of chronic migraine.    5, BOTOX was performed as an indicated therapy for intractable chronic migraine headaches given that the patient failed > 3  prophylactic medications    Botulinum Toxin Injection Procedure   Pre-operative diagnosis: Chronic migraine   Post-operative diagnosis: Same   Procedure: Chemical neurolysis   After risks and benefits were explained including bleeding, infection, worsening of pain, damage to the areas  being injected, weakness of muscles, loss of muscle control, dysphagia if injecting the head or neck, facial droop if injecting the facial area, painful injection, allergic or other reaction to the medications being injected, and the failure of the procedure to help the problem, a signed consent was obtained.   The patient was placed in a comfortable area and the sites to be treated were identified.The area to be treated was prepped three times with alcohol and the alcohol allowed to dry. Next, a 30 gauge needle was used to inject the medication in the area to be treated.   Area(s) injected:   Total Botox used: 160 Units   Botox wastage: 40 Units   Injection sites:    muscle bilaterally ( a total of 10 units divided into 2 sites)   Procerus muscle (5 units)   Frontalis muscle bilaterally (a total of 20 units divided into 4 sites)   Temporalis muscle bilaterally (a total of 40 units divided into 8 sites)   Occipitalis muscle bilaterally (a total of 30 units divided into 6 sites)   Cervical paraspinal muscles (a total of 20 units divided into 4 sites)   Trapezius muscle bilaterally (a total of 30 units divided into 6 sites)   Mastoid 5 units on L     Toradol 60 mg IM today for severe pain       Complications: none   RTC for the next Botox injection:  10 weeks

## 2023-06-28 ENCOUNTER — PATIENT MESSAGE (OUTPATIENT)
Dept: NEUROSURGERY | Facility: CLINIC | Age: 58
End: 2023-06-28
Payer: COMMERCIAL

## 2023-06-28 DIAGNOSIS — G52.1: Primary | ICD-10-CM

## 2023-06-29 ENCOUNTER — TELEPHONE (OUTPATIENT)
Dept: FAMILY MEDICINE | Facility: CLINIC | Age: 58
End: 2023-06-29
Payer: COMMERCIAL

## 2023-06-29 ENCOUNTER — PATIENT MESSAGE (OUTPATIENT)
Dept: FAMILY MEDICINE | Facility: CLINIC | Age: 58
End: 2023-06-29
Payer: COMMERCIAL

## 2023-06-29 DIAGNOSIS — Z01.818 PREOPERATIVE TESTING: Primary | ICD-10-CM

## 2023-06-29 NOTE — PRE-PROCEDURE INSTRUCTIONS
Patient stated has not had any problem with anesthesia in the past. Will need medical clearance from your PCP,Dr. Rajiv Garcia. She will make an appt. Will need poc appt and labs.  Our  will call to set up these appts.     Preop instructions given. Hold aspirin, aspirin containing products, nsaids(aleve, advil, motrin, ibuprofen, naprosyn, naproxen, voltaren, diclofenac), vitamins( Multivitamin) and supplements ( coconut oil)one week prior to surgery.     May take Tylenol.( Sent to [facility] portal)  Verbalizes understanding.

## 2023-06-29 NOTE — ANESTHESIA PAT ROS NOTE
7/24/2023  Mae Gregory is a 58 y.o., female, progressive worsening left-sided facial, jaw, ear pain with associated dysphagia consistent with glossopharyngeal neuralgia and found to have a pretty tortuous posterior inferior cerebellar artery with compression of the nerve. Swallow study confirms some glossopharyngeal dysfunction. Presents today for Anesthesia Visit and Preop Instruction.      Pre-op Assessment    I have reviewed the Patient Summary Reports.     I have reviewed the Nursing Notes. I have reviewed the NPO Status.   I have reviewed the Medications.     Review of Systems  Anesthesia Hx:  No problems with previous Anesthesia               Denies Personal Hx of Anesthesia complications.                    Social:  Non-Smoker, No Alcohol Use       Hematology/Oncology:  Hematology Normal   Oncology Normal                                   EENT/Dental:   Eyes:              Glaucoma,   Ears General/Symptom(s) Hearing Impairment:     Sensorineural hearing loss (SNHL) of both ears,     Meniere's disease of both ears  Nasal Problems:  Trauma: 1983 rhinoplasty. 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  Denies Hypertension.       Denies Angina.     no hyperlipidemia  Denies DELANEY.    Functional Capacity 4 METS                         Pulmonary:    Asthma mild  Denies Shortness of breath.   Denies Sleep Apnea.                Renal/:     Overactive bladder             Hepatic/GI:      Denies GERD. Denies Liver Disease.  Denies Hepatitis. 10/15/2018  Colon surgery -RESECTION--TWISTED COLON    DIET: NO SUGAR, NO VEGETABLES, MEAT BASED DIET          Musculoskeletal:     Raynaud's phenomenon - follows Rheumatology - on  Norvasc 2.5mg Musculoskeletal General/Symptoms: low back pain, muscle pain, localized.  DDD (degenerative disc disease), cervical  DDD (degenerative disc disease), lumbar    -Cervical myofascial pain syndrome-follows with Rheumatology             OB/GYN/PEDS:   section           Neurological:    Neuromuscular Disease,  Headaches Denies Seizures.    PAIN impairing speech and conversation Denies Dx of Headaches Pain , onset is recent  , quality of sharp, penetrating        Cognitive Impairment                Intracranial Aneurysm    Glossopharyngeal Nerve Neurovascular Compression Syndrome      2023 MRI-Cerebral aneurysm, follow-up;  Cerebral aneurysm, nonruptured    Mri Iac: Tortuous left posteroinferior cerebellar artery which extends to abut and slightly posterior displaced the proximal cisternal left glossopharyngeal nerve.   Possible left glossopharyngeal nerve neurovascular compression syndrome    Headache, new or worsening (Age >= 50y);Left sided face pain, left tinnitus, left sided facial paresis (asymmetry), c/o 6 months of dysphagia since facial paresis noted; Headache, unspecified       Neuromuscular Disease CERVICAL MYOFACIAL SYNDROME  Endocrine:  Denies Diabetes. Denies Hypothyroidism.          Dermatological:  Hair loss--ALOPECIA      Augmentation of breast       Psych:  Psychiatric History anxiety  -Sleepwalking - controlled on PRN Trazodone             Physical Exam  General: Well nourished and Oriented    Airway:  Mouth Opening: Normal  Tongue: Normal  Neck ROM: Normal ROM  DIFFICULTY SWALLOWING      Dental:  Caps / Implants, Intact    Chest/Lungs:  Normal Respiratory Rate, Clear to auscultation    Heart:  Rate: Normal  Rhythm: Regular Rhythm  Sounds: Normal        Anesthesia Assessment: Preoperative EQUATION    Planned Procedure: Procedure(s) (LRB):  CRANIOTOMY for microvascular decompression (Right)  Requested Anesthesia Type:General  Surgeon: Nils Hudson MD  Service: Neurosurgery  Known  or anticipated Date of Surgery:7/31/2023    Surgeon notes: reviewed    Electronic QUestionnaire Assessment completed via nurse interview with patient.        Triage considerations:       Previous anesthesia records:No problems and Not available    Last PCP note: within 3 months , within [facility]   Subspecialty notes: ENT, Hematology/Oncology, Neurology, Neurosurgery, OB/GYN    Other important co-morbidities: PER Epic:  GLOSSOPHARYNGEAL NERVE DISEASE OR SYNDROME, MIGRAINES, GLAUCOMA, RAYNAUD'S PHENOMENON       Tests already available:  Available tests,  within 3 months , 3-6 months ago , within [facility] .   5/12/2023 MRA BRAIN W & W/O CONTRAST, 4/6/2023 MRI BRAIN W W/O CONTRAST          Instructions given. (See in Nurse's note)    Optimization:  Anesthesia Preop Clinic Assessment  Indicated    Medical Opinion Indicated          Plan:    Testing:  CBC, CMP, PT/INR, T&C, and T&S   Pre-anesthesia  visit       Visit focus: concerns in complex and/or prolonged anesthesia     Consultation:Patient's PCP for re-evaluation     Patient  has previously scheduled Medical Appointment:7/18 DR AYON    Navigation: Tests Scheduled. TBD             Consults scheduled.TBD             Results will be tracked by Preop Clinic.    MEDICAL CLEARANCE  7/13 Medical clearance given by Dr. Rajiv Garcia on 7/13:  Patient is optimized for surgical procedure from primary care standpoint and is at low cardiac risk. - Patient has functional capacity of >4 METs. May proceed with planned procedure. Continue current medication regimen   Divina Colon RN BSN

## 2023-06-29 NOTE — TELEPHONE ENCOUNTER
----- Message from Divina Colon RN sent at 6/29/2023 12:17 PM CDT -----  Patient is scheduled for Craniotomy for microvascular decompression on 7/31 with Dr. Hudson. ( Ap[proximately 270 minutes of general anesthesia) She will need medical clearance. Please schedule a preop clearance appt.  Thanks!      Pt already has a 7/13 appt for pre-op.

## 2023-06-30 ENCOUNTER — TELEPHONE (OUTPATIENT)
Dept: PREADMISSION TESTING | Facility: HOSPITAL | Age: 58
End: 2023-06-30
Payer: COMMERCIAL

## 2023-06-30 ENCOUNTER — TELEPHONE (OUTPATIENT)
Dept: FAMILY MEDICINE | Facility: CLINIC | Age: 58
End: 2023-06-30
Payer: COMMERCIAL

## 2023-06-30 NOTE — TELEPHONE ENCOUNTER
----- Message from Colin Leggett MD sent at 6/29/2023  6:20 PM CDT -----  Looks like patient needs a preop appointment with PCP.  Please check if that needs to be set or already set  ----- Message -----  From: Divina Colon RN  Sent: 6/29/2023  12:20 PM CDT  To: Divina Colon RN, Rajiv Garcia MD, #    Patient is scheduled for Craniotomy for microvascular decompression on 7/31 with Dr. Hudson. ( Ap[proximately 270 minutes of general anesthesia) She will need medical clearance. Please schedule a preop clearance appt.  Thanks!

## 2023-07-10 ENCOUNTER — TELEPHONE (OUTPATIENT)
Dept: NEUROSURGERY | Facility: CLINIC | Age: 58
End: 2023-07-10
Payer: COMMERCIAL

## 2023-07-10 NOTE — TELEPHONE ENCOUNTER
----- Message from Tamera Patino sent at 7/10/2023 12:53 PM CDT -----  Type:  Patient Returning Call    Who Called: pt   Who Left Message for Patient: pt  Does the patient know what this is regarding?:right eye want open all the way discomfort  need a call about back   Would the patient rather a call back or a response via [facility]?  Call   Best Call Back Number:[phone]  Additional Information:  call back

## 2023-07-10 NOTE — TELEPHONE ENCOUNTER
----- Message from Loyda Childers sent at 7/10/2023  2:22 PM CDT -----  Regarding: Missed call  Contact: [phone] work  Calling in regards to missed call from office. Please call and discuss.

## 2023-07-10 NOTE — TELEPHONE ENCOUNTER
Spoke with Mrs. Gregory, she has c/o right eye not opening up all the way, vision is a little blurry, and a little discomfort. No redness, swelling, or drainage. We are seeing her for glossopharyngeal neuralgia left side. Spoke with Geraldine Monae PA-C, who advised this issue is not related to what we are seeing and surgery is scheduled for. Advised to see either PCP or ophthalmologist. Patient verbalized understanding.

## 2023-07-11 ENCOUNTER — PATIENT MESSAGE (OUTPATIENT)
Dept: FAMILY MEDICINE | Facility: CLINIC | Age: 58
End: 2023-07-11
Payer: COMMERCIAL

## 2023-07-11 NOTE — TELEPHONE ENCOUNTER
Please advise,    Dear Dr. Garcia, as of Sunday morning my right  eye will not open completely. The lid only opens half way. There isnt any mucus or infection. There is discomfort with the eye and my vision is not as clear as it was before. I contacted my Neurologist and they said to contact you.   Thank you,  Mae.

## 2023-07-13 ENCOUNTER — OFFICE VISIT (OUTPATIENT)
Dept: FAMILY MEDICINE | Facility: CLINIC | Age: 58
End: 2023-07-13
Payer: COMMERCIAL

## 2023-07-13 VITALS
HEIGHT: 65 IN | WEIGHT: 123 LBS | SYSTOLIC BLOOD PRESSURE: 118 MMHG | BODY MASS INDEX: 20.49 KG/M2 | DIASTOLIC BLOOD PRESSURE: 70 MMHG | TEMPERATURE: 98 F | OXYGEN SATURATION: 99 % | HEART RATE: 94 BPM

## 2023-07-13 DIAGNOSIS — Z01.818 PREOPERATIVE EXAMINATION: Primary | ICD-10-CM

## 2023-07-13 DIAGNOSIS — M79.18 CERVICAL MYOFASCIAL PAIN SYNDROME: ICD-10-CM

## 2023-07-13 DIAGNOSIS — G52.1: ICD-10-CM

## 2023-07-13 PROCEDURE — 3074F PR MOST RECENT SYSTOLIC BLOOD PRESSURE < 130 MM HG: ICD-10-PCS | Mod: CPTII,S$GLB,, | Performed by: FAMILY MEDICINE

## 2023-07-13 PROCEDURE — 1159F MED LIST DOCD IN RCRD: CPT | Mod: CPTII,S$GLB,, | Performed by: FAMILY MEDICINE

## 2023-07-13 PROCEDURE — 3074F SYST BP LT 130 MM HG: CPT | Mod: CPTII,S$GLB,, | Performed by: FAMILY MEDICINE

## 2023-07-13 PROCEDURE — 3008F BODY MASS INDEX DOCD: CPT | Mod: CPTII,S$GLB,, | Performed by: FAMILY MEDICINE

## 2023-07-13 PROCEDURE — 93005 ELECTROCARDIOGRAM TRACING: CPT | Mod: S$GLB,,, | Performed by: FAMILY MEDICINE

## 2023-07-13 PROCEDURE — 93010 ELECTROCARDIOGRAM REPORT: CPT | Mod: S$GLB,,, | Performed by: INTERNAL MEDICINE

## 2023-07-13 PROCEDURE — 99999 PR PBB SHADOW E&M-EST. PATIENT-LVL IV: ICD-10-PCS | Mod: PBBFAC,,, | Performed by: FAMILY MEDICINE

## 2023-07-13 PROCEDURE — 93005 EKG 12-LEAD: ICD-10-PCS | Mod: S$GLB,,, | Performed by: FAMILY MEDICINE

## 2023-07-13 PROCEDURE — 99999 PR PBB SHADOW E&M-EST. PATIENT-LVL IV: CPT | Mod: PBBFAC,,, | Performed by: FAMILY MEDICINE

## 2023-07-13 PROCEDURE — 99215 OFFICE O/P EST HI 40 MIN: CPT | Mod: S$GLB,,, | Performed by: FAMILY MEDICINE

## 2023-07-13 PROCEDURE — 3044F PR MOST RECENT HEMOGLOBIN A1C LEVEL <7.0%: ICD-10-PCS | Mod: CPTII,S$GLB,, | Performed by: FAMILY MEDICINE

## 2023-07-13 PROCEDURE — 3078F DIAST BP <80 MM HG: CPT | Mod: CPTII,S$GLB,, | Performed by: FAMILY MEDICINE

## 2023-07-13 PROCEDURE — 99215 PR OFFICE/OUTPT VISIT, EST, LEVL V, 40-54 MIN: ICD-10-PCS | Mod: S$GLB,,, | Performed by: FAMILY MEDICINE

## 2023-07-13 PROCEDURE — 1160F RVW MEDS BY RX/DR IN RCRD: CPT | Mod: CPTII,S$GLB,, | Performed by: FAMILY MEDICINE

## 2023-07-13 PROCEDURE — 3008F PR BODY MASS INDEX (BMI) DOCUMENTED: ICD-10-PCS | Mod: CPTII,S$GLB,, | Performed by: FAMILY MEDICINE

## 2023-07-13 PROCEDURE — 93010 EKG 12-LEAD: ICD-10-PCS | Mod: S$GLB,,, | Performed by: INTERNAL MEDICINE

## 2023-07-13 PROCEDURE — 1160F PR REVIEW ALL MEDS BY PRESCRIBER/CLIN PHARMACIST DOCUMENTED: ICD-10-PCS | Mod: CPTII,S$GLB,, | Performed by: FAMILY MEDICINE

## 2023-07-13 PROCEDURE — 1159F PR MEDICATION LIST DOCUMENTED IN MEDICAL RECORD: ICD-10-PCS | Mod: CPTII,S$GLB,, | Performed by: FAMILY MEDICINE

## 2023-07-13 PROCEDURE — 3078F PR MOST RECENT DIASTOLIC BLOOD PRESSURE < 80 MM HG: ICD-10-PCS | Mod: CPTII,S$GLB,, | Performed by: FAMILY MEDICINE

## 2023-07-13 PROCEDURE — 3044F HG A1C LEVEL LT 7.0%: CPT | Mod: CPTII,S$GLB,, | Performed by: FAMILY MEDICINE

## 2023-07-13 NOTE — PROGRESS NOTES
"  Physical Exam  /70   Pulse 94   Temp 97.9 °F (36.6 °C) (Oral)   Ht 5' 5" (1.651 m)   Wt 55.8 kg (123 lb 0.3 oz)   SpO2 99%   BMI 20.47 kg/m²      Office Visit    Patient Name: Mae Gregory    : 1965  MRN: [id number]      Assessment/Plan:  Mae Gregory is a 58 y.o. female who presents today for :    Preoperative examination  Left  Glossopharyngeal nerve disease or syndrome  -Cervical myofascial pain syndrome - follows Neurology for injections  Patient is optimized for surgical procedure from primary care standpoint and is at low cardiac risk. - Patient has functional capacity of >4 METs. May proceed with planned procedure. Continue current medication regimen          F/u PRN        This note was created by combination of typed  and MModal dictation.  Transcription errors may be present.  If there are any questions, please contact me.        ----------------------------------------------------------------------------------------------------------------------      HPI:  Patient Care Team:  Rajiv Garcia MD as PCP - General (Family Medicine)  Tavo Boateng MD (Inactive) as Consulting Physician (General Surgery)  May De La Torre MA (Inactive)  May De La Torre MA (Inactive) as Care Coordinator  Sukumar Gates MD as Consulting Physician (Neurology)    Mae is a 58 y.o. female with      Patient Active Problem List   Diagnosis    Migraine    DDD (degenerative disc disease), cervical    DDD (degenerative disc disease), lumbar    -Insomnia    -Meniere's disease of both ears    Sensorineural hearing loss (SNHL) of both ears    Hair loss    Glaucoma    -Sleepwalking - controlled on PRN Trazodone    -Dystonia - follows Neurology regularly for Botox injections    Pain    -Raynaud's phenomenon - follows Rheumatology - on Norvasc 2.5mg    -Cervical myofascial pain syndrome - follows Neurology for injections    -Anxiety with depression - controlled off Wellbutrin since 2023 " "      Mae presents today for:  Pre-op Exam    Procedure to be performed: craniotomy for microvascular decompression of R glossopharyngeal 07/31  Patient is not a smoker and currently does not take any blood-thinning medications. She states that is able to climb several flights of stairs daily for work without getting CP/SOB/DELANEY/PND/dizziness/presyncope or syncope/orthopnea or lower extremity edema/palpitations/claudication.        Summary of Neurosurgery [facility] 5/31/23  Pt presents via virtual visit today for follow up and MRA results. She denies any new symptoms since her last visit with Dr. Hudson. She continues to describe left sided facial pain into her forehead/cheek as well as into the left side of her neck. She states it feels like her throat is sore and that her "glands are swollen" but does note some improvement with the Tegretol. She also continues to note difficulty with annunciation and with swallowing. She had a swallow study done which showed reduced complete base of tongue retraction with reduced epiglottic retroflexion. Regular diet with thin liquids and OP SLP was recommended. She denies choking. She is scheduled for audiogram in the next few weeks.         Additional ROS    No F/C/wt changes/fatigue  No CP/SOB/palpitations/swelling  No cough/wheezing/SOB  No nausea/vomiting/abd pain  No muscle aches/joint pain   No rashes  No anxiety/depression  No polyuria/polydipsia/fatigue  No bleeding/bruising  No rash          Patient Active Problem List   Diagnosis    Migraine    DDD (degenerative disc disease), cervical    DDD (degenerative disc disease), lumbar    -Insomnia    -Meniere's disease of both ears    Sensorineural hearing loss (SNHL) of both ears    Hair loss    Glaucoma    -Sleepwalking - controlled on PRN Trazodone    -Dystonia - follows Neurology regularly for Botox injections    Pain    -Raynaud's phenomenon - follows Rheumatology - on Norvasc 2.5mg    -Cervical myofascial pain syndrome - follows " Neurology for injections    -Anxiety with depression - controlled off Wellbutrin since 4/2023       Current Medications  Medications reviewed and updated.       Current Outpatient Medications:     carBAMazepine (TEGRETOL XR) 100 MG 12 hr tablet, Take 1 tablet (100 mg total) by mouth 2 (two) times daily., Disp: 60 tablet, Rfl: 11    cetirizine (ZYRTEC) 10 MG tablet, Take 10 mg by mouth once daily., Disp: , Rfl:     COCONUT OIL ORAL, Take by mouth., Disp: , Rfl:     cyclobenzaprine (FLEXERIL) 10 MG tablet, Take 10 mg by mouth 3 (three) times daily., Disp: , Rfl:     diazePAM (VALIUM) 5 MG tablet, Take 1 tablet (5 mg total) by mouth every 6 (six) hours as needed for Anxiety (neck pain)., Disp: 12 tablet, Rfl: 3    estradiol-norethindrone (ACTIVELLA) 1-0.5 mg per tablet, TAKE 1 TABLET BY MOUTH EVERY DAY, Disp: 28 tablet, Rfl: 12    finasteride (PROSCAR) 5 mg tablet, Take 1 tablet (5 mg total) by mouth once daily., Disp: 30 tablet, Rfl: 11    multivitamin with minerals tablet, Take 1 tablet by mouth once daily., Disp: , Rfl:     NURTEC 75 mg odt, Take 75 mg by mouth as needed., Disp: , Rfl:     oxybutynin (DITROPAN) 5 MG Tab, Take one tablet by mouth twice daily, Disp: 60 tablet, Rfl: 0    sumatriptan (IMITREX) 100 MG tablet, Take 1 tablet (100 mg total) by mouth every 2 (two) hours as needed for Migraine., Disp: 40 tablet, Rfl: 1    topiramate (TOPAMAX) 50 MG tablet, TAKE 1 TABLET BY MOUTH TWICE A DAY, Disp: 180 tablet, Rfl: 3    traZODone (DESYREL) 150 MG tablet, Take 1 tablet (150 mg total) by mouth nightly as needed for Insomnia., Disp: 90 tablet, Rfl: 3    valACYclovir (VALTREX) 1000 MG tablet, Take 2 tablets (2,000 mg total) by mouth every 12 (twelve) hours. For cold sore, Disp: 30 tablet, Rfl: 11    zolpidem (AMBIEN) 5 MG Tab, Take 1 tablet (5 mg total) by mouth nightly as needed (insomnia)., Disp: 30 tablet, Rfl: 5    albuterol (PROVENTIL/VENTOLIN HFA) 90 mcg/actuation inhaler, TAKE 1 2 PUFF(S) (INHALATION) EVERY  "4 HOURS PRN   WHEEZING (Patient not taking: Reported on 2023), Disp: 18 g, Rfl: 12    Past Surgical History:   Procedure Laterality Date    AUGMENTATION OF BREAST      BREAST SURGERY       SECTION   and 2001    x2    COLON SURGERY  10/15/2018    colon resection        Family History   Problem Relation Age of Onset    Stroke Maternal Grandfather     Hypertension Father     Hypertension Mother     Hypertension Brother     Ovarian cancer Neg Hx     Cancer Neg Hx        Social History     Socioeconomic History    Marital status:    Tobacco Use    Smoking status: Never    Smokeless tobacco: Never   Substance and Sexual Activity    Alcohol use: No    Drug use: No    Sexual activity: Yes     Partners: Male     Comment: ablation           Allergies   Review of patient's allergies indicates:  No Known Allergies          Review of Systems  See HPI      [unfilled]  /70   Pulse 94   Temp 97.9 °F (36.6 °C) (Oral)   Ht 5' 5" (1.651 m)   Wt 55.8 kg (123 lb 0.3 oz)   SpO2 99%   BMI 20.47 kg/m²     GEN: NAD, well developed, pleasant, well nourished  HEENT: NCAT, PERRLA, EOMI, sclera clear, anicteric, O/P clear, MMM with no lesions  NECK: normal, supple with midline trachea, no LAD, no thyromegaly  LUNGS: CTAB, no w/r/r, no increased work of breathing   HEART: RRR, normal S1 and S2, no m/r/g, no edema  ABD: s/nt/nd, NABS  SKIN: normal turgor, no rashes  PSYCH: AOx3, appropriate mood and affect  MSK: warm/well perfused, normal ROM in all extremities, no c/c/e.          "

## 2023-07-16 DIAGNOSIS — G43.909 MIGRAINE WITHOUT STATUS MIGRAINOSUS, NOT INTRACTABLE, UNSPECIFIED MIGRAINE TYPE: ICD-10-CM

## 2023-07-17 RX ORDER — OXYBUTYNIN CHLORIDE 5 MG/1
5 TABLET ORAL 2 TIMES DAILY
Qty: 180 TABLET | Refills: 1 | Status: SHIPPED | OUTPATIENT
Start: 2023-07-17 | End: 2023-10-11 | Stop reason: CLARIF

## 2023-07-17 NOTE — TELEPHONE ENCOUNTER
Refill Decision Note   Mae Gregory  is requesting a refill authorization.  Brief Assessment and Rationale for Refill:  Approve     Medication Therapy Plan:       Medication Reconciliation Completed: No    Comments:     No Care Gaps recommended.     Note composed:11:56 AM 07/17/2023

## 2023-07-18 ENCOUNTER — OFFICE VISIT (OUTPATIENT)
Dept: NEUROSURGERY | Facility: CLINIC | Age: 58
End: 2023-07-18
Payer: COMMERCIAL

## 2023-07-18 DIAGNOSIS — G52.1: Primary | ICD-10-CM

## 2023-07-18 PROCEDURE — 99214 PR OFFICE/OUTPT VISIT, EST, LEVL IV, 30-39 MIN: ICD-10-PCS | Mod: S$GLB,,, | Performed by: NEUROLOGICAL SURGERY

## 2023-07-18 PROCEDURE — 1159F MED LIST DOCD IN RCRD: CPT | Mod: CPTII,S$GLB,, | Performed by: NEUROLOGICAL SURGERY

## 2023-07-18 PROCEDURE — 99999 PR PBB SHADOW E&M-EST. PATIENT-LVL III: CPT | Mod: PBBFAC,,, | Performed by: NEUROLOGICAL SURGERY

## 2023-07-18 PROCEDURE — 1159F PR MEDICATION LIST DOCUMENTED IN MEDICAL RECORD: ICD-10-PCS | Mod: CPTII,S$GLB,, | Performed by: NEUROLOGICAL SURGERY

## 2023-07-18 PROCEDURE — 3044F HG A1C LEVEL LT 7.0%: CPT | Mod: CPTII,S$GLB,, | Performed by: NEUROLOGICAL SURGERY

## 2023-07-18 PROCEDURE — 99214 OFFICE O/P EST MOD 30 MIN: CPT | Mod: S$GLB,,, | Performed by: NEUROLOGICAL SURGERY

## 2023-07-18 PROCEDURE — 99999 PR PBB SHADOW E&M-EST. PATIENT-LVL III: ICD-10-PCS | Mod: PBBFAC,,, | Performed by: NEUROLOGICAL SURGERY

## 2023-07-18 PROCEDURE — 3044F PR MOST RECENT HEMOGLOBIN A1C LEVEL <7.0%: ICD-10-PCS | Mod: CPTII,S$GLB,, | Performed by: NEUROLOGICAL SURGERY

## 2023-07-18 NOTE — PROGRESS NOTES
Neurosurgery  Established Patient    SUBJECTIVE:     History of Present Illness:  Patient comes back to see me in follow-up after her most recent evaluation by our physician assistant on 05/31/2023.  This is a 50-year-old female progressive worsening left-sided facial, jaw, ear pain with associated dysphagia consistent with glossopharyngeal neuralgia.  Patient was worked up and found to have a pretty tortuous posterior inferior cerebellar artery with compression of the nerve.  Patient was seen by my partners but wanted to get under the opinion for myself.  We reconfirmed the diagnosis and the recommendation for surgical intervention.  Patient wants to proceed with surgery with us.  Since last visit patient continues to have fairly significant symptoms.  She would failed for different medications at this point including Lyrica, Topamax and Tegretol as well as steroids.  Swallow study does confirm some glossopharyngeal dysfunction.    Review of patient's allergies indicates:  No Known Allergies    Current Outpatient Medications   Medication Sig Dispense Refill    albuterol (PROVENTIL/VENTOLIN HFA) 90 mcg/actuation inhaler TAKE 1 2 PUFF(S) (INHALATION) EVERY 4 HOURS PRN   WHEEZING 18 g 12    carBAMazepine (TEGRETOL XR) 100 MG 12 hr tablet Take 1 tablet (100 mg total) by mouth 2 (two) times daily. 60 tablet 11    cetirizine (ZYRTEC) 10 MG tablet Take 10 mg by mouth once daily.      COCONUT OIL ORAL Take by mouth.      cyclobenzaprine (FLEXERIL) 10 MG tablet Take 10 mg by mouth 3 (three) times daily.      diazePAM (VALIUM) 5 MG tablet Take 1 tablet (5 mg total) by mouth every 6 (six) hours as needed for Anxiety (neck pain). 12 tablet 3    estradiol-norethindrone (ACTIVELLA) 1-0.5 mg per tablet TAKE 1 TABLET BY MOUTH EVERY DAY 28 tablet 12    finasteride (PROSCAR) 5 mg tablet Take 1 tablet (5 mg total) by mouth once daily. 30 tablet 11    NURTEC 75 mg odt Take 75 mg by mouth as needed.      oxybutynin (DITROPAN) 5 MG Tab  Take 1 tablet (5 mg total) by mouth 2 (two) times daily. 180 tablet 1    sumatriptan (IMITREX) 100 MG tablet Take 1 tablet (100 mg total) by mouth every 2 (two) hours as needed for Migraine. 40 tablet 1    topiramate (TOPAMAX) 50 MG tablet TAKE 1 TABLET BY MOUTH TWICE A  tablet 3    traZODone (DESYREL) 150 MG tablet Take 1 tablet (150 mg total) by mouth nightly as needed for Insomnia. 90 tablet 3    zolpidem (AMBIEN) 5 MG Tab Take 1 tablet (5 mg total) by mouth nightly as needed (insomnia). 30 tablet 5    multivitamin with minerals tablet Take 1 tablet by mouth once daily.      valACYclovir (VALTREX) 1000 MG tablet Take 2 tablets (2,000 mg total) by mouth every 12 (twelve) hours. For cold sore (Patient not taking: Reported on 2023) 30 tablet 11     No current facility-administered medications for this visit.       Past Medical History:   Diagnosis Date    DDD (degenerative disc disease), cervical 3/15/2017    DDD (degenerative disc disease), lumbar 3/15/2017    Glaucoma 3/7/2018    Migraine 3/15/2017    Primary insomnia 3/15/2017     Past Surgical History:   Procedure Laterality Date    AUGMENTATION OF BREAST      BREAST SURGERY       SECTION   and 2001    x2    COLON SURGERY  10/15/2018    colon resection      Family History       Problem Relation (Age of Onset)    Hypertension Father, Mother, Brother    Stroke Maternal Grandfather          Social History     Socioeconomic History    Marital status:    Tobacco Use    Smoking status: Never    Smokeless tobacco: Never   Substance and Sexual Activity    Alcohol use: No    Drug use: No    Sexual activity: Yes     Partners: Male     Comment: ablation       Review of Systems    OBJECTIVE:     Vital Signs  Pain Score:   2  There is no height or weight on file to calculate BMI.    Neurosurgery Physical Exam      Diagnostic Results:  Cerebral aneurysm, follow-up;  Cerebral aneurysm, nonruptured     TECHNIQUE:  3D time-of-flight noncontrast MR  arteriogram performed with multiple MIP reconstructions.  Additional volumetric axial T1 space pre and postcontrast images centered through the region the urzurs-em-Qlrrum via vessel wall imaging protocol.  Six mL Gadavist intravenous contrast was utilized.  Coronal and sagittal MPR reconstructions were performed.     COMPARISON:  None     FINDINGS:  There is no evidence of major branch stenosis/occlusion at the nyqjva-kg-Hlbaap.  The right A1 segment is developmentally aplastic.  Fullness at the right carotid terminus is identified.  No other evidence of aneurysm.     There is marked tortuosity of the left PICA again noted encroaching on the cranial nerves extending towards the jugular foramen.     No evidence of abnormal vascular wall postcontrast enhancement.     On vessel wall imaging, no definite abnormal enhancement of the vessel walls in the posterior circulation is identified on limited evaluation with peripheral enhancement of the distal intracranial vertebral arteries thought within normal variation.     Impression:     There is fullness at the right carotid terminus near the expected origin of the aplastic right A1 segment without discrete aneurysm.  Continued follow-up to ensure stability as clinically warranted.     Tortuosity of the left PICA encroaching on the root entry zone of left-sided nerve roots extending to the jugular foramen.       ASSESSMENT/PLAN:       A 58-year-old female with refractory last saw pharyngeal neuralgia from a tortuous posterior inferior cerebellar artery compression on the left.  Patient has failed medical conservative management.  This stage is impairing her daily living in impacting her ability to eat.  I think patient would benefit from a microvascular decompression.    I have discussed the risks/benefits, indications, and alternatives for the proposed procedure in detail. I have answered all of their questions and patient wish to proceed with surgery. We will schedule  patient.         Note dictated with voice recognition software, please excuse any grammatical errors.

## 2023-07-18 NOTE — H&P (VIEW-ONLY)
Neurosurgery  Established Patient    SUBJECTIVE:     History of Present Illness:  Patient comes back to see me in follow-up after her most recent evaluation by our physician assistant on 05/31/2023.  This is a 50-year-old female progressive worsening left-sided facial, jaw, ear pain with associated dysphagia consistent with glossopharyngeal neuralgia.  Patient was worked up and found to have a pretty tortuous posterior inferior cerebellar artery with compression of the nerve.  Patient was seen by my partners but wanted to get under the opinion for myself.  We reconfirmed the diagnosis and the recommendation for surgical intervention.  Patient wants to proceed with surgery with us.  Since last visit patient continues to have fairly significant symptoms.  She would failed for different medications at this point including Lyrica, Topamax and Tegretol as well as steroids.  Swallow study does confirm some glossopharyngeal dysfunction.    Review of patient's allergies indicates:  No Known Allergies    Current Outpatient Medications   Medication Sig Dispense Refill    albuterol (PROVENTIL/VENTOLIN HFA) 90 mcg/actuation inhaler TAKE 1 2 PUFF(S) (INHALATION) EVERY 4 HOURS PRN   WHEEZING 18 g 12    carBAMazepine (TEGRETOL XR) 100 MG 12 hr tablet Take 1 tablet (100 mg total) by mouth 2 (two) times daily. 60 tablet 11    cetirizine (ZYRTEC) 10 MG tablet Take 10 mg by mouth once daily.      COCONUT OIL ORAL Take by mouth.      cyclobenzaprine (FLEXERIL) 10 MG tablet Take 10 mg by mouth 3 (three) times daily.      diazePAM (VALIUM) 5 MG tablet Take 1 tablet (5 mg total) by mouth every 6 (six) hours as needed for Anxiety (neck pain). 12 tablet 3    estradiol-norethindrone (ACTIVELLA) 1-0.5 mg per tablet TAKE 1 TABLET BY MOUTH EVERY DAY 28 tablet 12    finasteride (PROSCAR) 5 mg tablet Take 1 tablet (5 mg total) by mouth once daily. 30 tablet 11    NURTEC 75 mg odt Take 75 mg by mouth as needed.      oxybutynin (DITROPAN) 5 MG Tab  Take 1 tablet (5 mg total) by mouth 2 (two) times daily. 180 tablet 1    sumatriptan (IMITREX) 100 MG tablet Take 1 tablet (100 mg total) by mouth every 2 (two) hours as needed for Migraine. 40 tablet 1    topiramate (TOPAMAX) 50 MG tablet TAKE 1 TABLET BY MOUTH TWICE A  tablet 3    traZODone (DESYREL) 150 MG tablet Take 1 tablet (150 mg total) by mouth nightly as needed for Insomnia. 90 tablet 3    zolpidem (AMBIEN) 5 MG Tab Take 1 tablet (5 mg total) by mouth nightly as needed (insomnia). 30 tablet 5    multivitamin with minerals tablet Take 1 tablet by mouth once daily.      valACYclovir (VALTREX) 1000 MG tablet Take 2 tablets (2,000 mg total) by mouth every 12 (twelve) hours. For cold sore (Patient not taking: Reported on 2023) 30 tablet 11     No current facility-administered medications for this visit.       Past Medical History:   Diagnosis Date    DDD (degenerative disc disease), cervical 3/15/2017    DDD (degenerative disc disease), lumbar 3/15/2017    Glaucoma 3/7/2018    Migraine 3/15/2017    Primary insomnia 3/15/2017     Past Surgical History:   Procedure Laterality Date    AUGMENTATION OF BREAST      BREAST SURGERY       SECTION   and 2001    x2    COLON SURGERY  10/15/2018    colon resection      Family History       Problem Relation (Age of Onset)    Hypertension Father, Mother, Brother    Stroke Maternal Grandfather          Social History     Socioeconomic History    Marital status:    Tobacco Use    Smoking status: Never    Smokeless tobacco: Never   Substance and Sexual Activity    Alcohol use: No    Drug use: No    Sexual activity: Yes     Partners: Male     Comment: ablation       Review of Systems    OBJECTIVE:     Vital Signs  Pain Score:   2  There is no height or weight on file to calculate BMI.    Neurosurgery Physical Exam      Diagnostic Results:  Cerebral aneurysm, follow-up;  Cerebral aneurysm, nonruptured     TECHNIQUE:  3D time-of-flight noncontrast MR  arteriogram performed with multiple MIP reconstructions.  Additional volumetric axial T1 space pre and postcontrast images centered through the region the svasoe-jc-Adwwyk via vessel wall imaging protocol.  Six mL Gadavist intravenous contrast was utilized.  Coronal and sagittal MPR reconstructions were performed.     COMPARISON:  None     FINDINGS:  There is no evidence of major branch stenosis/occlusion at the semnos-is-Odreow.  The right A1 segment is developmentally aplastic.  Fullness at the right carotid terminus is identified.  No other evidence of aneurysm.     There is marked tortuosity of the left PICA again noted encroaching on the cranial nerves extending towards the jugular foramen.     No evidence of abnormal vascular wall postcontrast enhancement.     On vessel wall imaging, no definite abnormal enhancement of the vessel walls in the posterior circulation is identified on limited evaluation with peripheral enhancement of the distal intracranial vertebral arteries thought within normal variation.     Impression:     There is fullness at the right carotid terminus near the expected origin of the aplastic right A1 segment without discrete aneurysm.  Continued follow-up to ensure stability as clinically warranted.     Tortuosity of the left PICA encroaching on the root entry zone of left-sided nerve roots extending to the jugular foramen.       ASSESSMENT/PLAN:       A 58-year-old female with refractory last saw pharyngeal neuralgia from a tortuous posterior inferior cerebellar artery compression on the left.  Patient has failed medical conservative management.  This stage is impairing her daily living in impacting her ability to eat.  I think patient would benefit from a microvascular decompression.    I have discussed the risks/benefits, indications, and alternatives for the proposed procedure in detail. I have answered all of their questions and patient wish to proceed with surgery. We will schedule  patient.         Note dictated with voice recognition software, please excuse any grammatical errors.

## 2023-07-20 RX ORDER — TOPIRAMATE 50 MG/1
TABLET, FILM COATED ORAL
Qty: 180 TABLET | Refills: 3 | Status: SHIPPED | OUTPATIENT
Start: 2023-07-20

## 2023-07-21 ENCOUNTER — TELEPHONE (OUTPATIENT)
Dept: FAMILY MEDICINE | Facility: CLINIC | Age: 58
End: 2023-07-21
Payer: COMMERCIAL

## 2023-07-21 NOTE — DISCHARGE INSTRUCTIONS
Your surgery has been scheduled for:_____7/31/23_____________________________________    You should report to:  ____[facility], located on the [place] side of the first floor of the           [facility] ([phone])  _x___[facility], located on the [facility] side of the            Second floor of the [facility] ([phone])  ____3rd Floor [facility] located on the [facility] side of the [facility] [phone]  ____[facility]: located at [address] [facility] [place] [address]. [address].     Please Note   Tell your doctor if you take Aspirin, products containing Aspirin, herbal medications  or blood thinners, such as Coumadin, Ticlid, or Plavix.  (Consult your provider regarding holding or stopping before surgery).  Arrange for someone to drive you home following surgery.  You will not be allowed to leave the surgical facility alone or drive yourself home following sedation and anesthesia.    Before Surgery  Stop taking all herbal medications, vitamins, and supplements 7 days prior to surgery  No Motrin/Advil (Ibuprofen) 7 days before surgery  No Aleve (Naproxen) 7 days before surgery  Stop Taking Asprin, products containing Asprin ___7__days before surgery  Stop taking blood thinners_______days before surgery  No Goody's/BC  Powder 7 days before surgery  Refrain from drinking alcoholic beverages for 24hours before and after surgery  Stop or limit smoking __7_______days before surgery  You may take Tylenol for pain    Night before Surgery  Do not eat or drink after midnight  Take a shower or bath (shower is recommended).  Bathe with Hibiclens soap or an antibacterial soap from the neck down.  If not supplied by your surgeon, hibiclens soap will need to be purchased over the counter in pharmacy.  Rinse soap off thoroughly.  Shampoo your hair with your regular shampoo    The Day of  Surgery  Take another bath or shower with hibiclens or any antibacterial soap, to reduce the chance of infection.  Take heart and blood pressure medications with a small sip of water, as advised by the perioperative team.  Do not take fluid pills  You may brush your teeth and rinse your mouth, but do not swall any additional water.   Do not apply perfumes, powder, body lotions or deodorant on the day of surgery.  Nail polish should be removed.  Do not wear makeup or moisturizer  Wear comfortable clothes, such as a button front shirt and loose fitting pants.  Leave all jewelry, including body piercings, and valuables at home.    Bring any devices you will neeed after surgery such as crutches or canes.  If you have sleep apnea, please bring your CPAP machine  In the event that your physical condition changes including the onset of a cold or respiratory illness, or if you have to delay or cancel your surgery, please notify your surgeon.     Anesthesia: General Anesthesia     You are watched continuously during your procedure by your anesthesia provider.     Youre due to have surgery. During surgery, youll be given medicine called anesthesia or anesthetic. This will keep you comfortable and pain-free. Your anesthesia provider will use general anesthesia.  What is general anesthesia?  General anesthesia puts you into a state like deep sleep. It goes into the bloodstream (IV anesthetics), into the lungs (gas anesthetics), or both. You feel nothing during the procedure. You will not remember it. During the procedure, the anesthesia provider monitors you continuously. He or she checks your heart rate and rhythm, blood pressure, breathing, and blood oxygen.  IV anesthetics. IV anesthetics are given through an IV line in your arm. Theyre often given first. This is so you are asleep before a gas anesthetic is started. Some kinds of IV anesthetics relieve pain. Others relax you. Your doctor will decide which kind is best in  your case.  Gas anesthetics. Gas anesthetics are breathed into the lungs. They are often used to keep you asleep. They can be given through a facemask or a tube placed in your larynx or trachea (breathing tube).  If you have a facemask, your anesthesia provider will most likely place it over your nose and mouth while youre still awake. Youll breathe oxygen through the mask as your IV anesthetic is started. Gas anesthetic may be added through the mask.  If you have a tube in the larynx or trachea, it will be inserted into your throat after youre asleep.  Anesthesia tools and medicines  You will likely have:  IV anesthetics. These are put into an IV line into your bloodstream.  Gas anesthetics. You breathe these anesthetics into your lungs, where they pass into your bloodstream.  Pulse oximeter. This is a small clip that is attached to the end of your finger. This measures your blood oxygen level.  Electrocardiography leads (electrodes). These are small sticky pads that are placed on your chest. They record your heart rate and rhythm.  Blood pressure cuff. This reads your blood pressure.  Risks and possible complications  General anesthesia has some risks. These include:  Breathing problems  Nausea and vomiting  Sore throat or hoarseness (usually temporary)  Allergic reaction to the anesthetic  Irregular heartbeat (rare)  Cardiac arrest (rare)   Anesthesia safety  Follow all instructions you are given for how long not to eat or drink before your procedure.  Be sure your doctor knows what medicines and drugs you take. This includes over-the-counter medicines, herbs, supplements, alcohol or other drugs. You will be asked when those were last taken.  Have an adult family member or friend drive you home after the procedure.  For the first 24 hours after your surgery:  Do not drive or use heavy equipment.  Do not make important decisions or sign legal documents. If important decisions or signing legal documents is  necessary during the first 24 hours after surgery, have a trusted family member or spouse act on your behalf.  Avoid alcohol.  Have a responsible adult stay with you. He or she can watch for problems and help keep you safe.  Date Last Reviewed: 12/1/2016 © 2000-2017 [employer]. [address] [place] [address], [place] [address]. All rights reserved. This information is not intended as a substitute for professional medical care. Always follow your healthcare professional's instructions.

## 2023-07-21 NOTE — TELEPHONE ENCOUNTER
Cinthia Bradshaw from [facility] dept for Dr. Garcia to place the orders for a EKG that was in muse. The iorders was placed.

## 2023-07-24 ENCOUNTER — HOSPITAL ENCOUNTER (OUTPATIENT)
Dept: PREADMISSION TESTING | Facility: HOSPITAL | Age: 58
Discharge: HOME | End: 2023-07-24
Attending: NEUROLOGICAL SURGERY
Payer: COMMERCIAL

## 2023-07-24 VITALS
DIASTOLIC BLOOD PRESSURE: 88 MMHG | HEIGHT: 65 IN | SYSTOLIC BLOOD PRESSURE: 143 MMHG | HEART RATE: 89 BPM | WEIGHT: 122 LBS | BODY MASS INDEX: 20.33 KG/M2 | TEMPERATURE: 98 F | OXYGEN SATURATION: 100 %

## 2023-07-26 ENCOUNTER — PATIENT MESSAGE (OUTPATIENT)
Dept: NEUROSURGERY | Facility: CLINIC | Age: 58
End: 2023-07-26
Payer: COMMERCIAL

## 2023-07-30 ENCOUNTER — ANESTHESIA EVENT (OUTPATIENT)
Dept: SURGERY | Facility: HOSPITAL | Age: 58
DRG: 042 | End: 2023-07-30
Payer: COMMERCIAL

## 2023-07-30 RX ORDER — HYDROMORPHONE HYDROCHLORIDE 1 MG/ML
0.2 INJECTION, SOLUTION INTRAMUSCULAR; INTRAVENOUS; SUBCUTANEOUS EVERY 5 MIN PRN
Status: CANCELLED | OUTPATIENT
Start: 2023-07-30

## 2023-07-30 RX ORDER — SODIUM CHLORIDE 0.9 % (FLUSH) 0.9 %
10 SYRINGE (ML) INJECTION
Status: CANCELLED | OUTPATIENT
Start: 2023-07-30

## 2023-07-30 NOTE — ANESTHESIA PREPROCEDURE EVALUATION
[facility]  Anesthesia Pre-Operative Evaluation         Patient Name: Mae Gregory  YOB: 1965  MRN: [id number]    SUBJECTIVE:     Pre-operative evaluation for Procedure(s) (LRB):  CRANIOTOMY for microvascular decompression NEURO MONITORING (Right)     07/30/2023    Mae Gregory is a 58 y.o. female w/ a significant PMHx of cervical DDD, dystonia (botox with neurology), and worsening left-sided facial, jaw, ear pain with associated dysphagia consistent with glossopharyngeal neuralgia.  Patient was worked up and found to have a pretty tortuous posterior inferior cerebellar artery with compression of the nerve.    Patient Active Problem List   Diagnosis    Migraine    DDD (degenerative disc disease), cervical    DDD (degenerative disc disease), lumbar    -Insomnia    -Meniere's disease of both ears    Sensorineural hearing loss (SNHL) of both ears    Hair loss    Glaucoma    -Sleepwalking - controlled on PRN Trazodone    -Dystonia - follows Neurology regularly for Botox injections    Pain    -Raynaud's phenomenon - follows Rheumatology - on Norvasc 2.5mg    -Cervical myofascial pain syndrome - follows Neurology for injections    -Anxiety with depression - controlled off Wellbutrin since 4/2023       Review of patient's allergies indicates:  No Known Allergies    Current Medications:  Current Outpatient Medications   Medication Instructions    albuterol (PROVENTIL/VENTOLIN HFA) 90 mcg/actuation inhaler TAKE 1 2 PUFF(S) (INHALATION) EVERY 4 HOURS PRN   WHEEZING    carBAMazepine (TEGRETOL XR) 100 mg, Oral, 2 times daily    cetirizine (ZYRTEC) 10 mg, Oral, Daily    COCONUT OIL ORAL Oral    cyclobenzaprine (FLEXERIL) 10 mg, Oral, 3 times daily    diazePAM (VALIUM) 5 mg, Oral, Every 6 hours PRN    estradiol-norethindrone (ACTIVELLA) 1-0.5 mg per tablet TAKE 1 TABLET BY MOUTH EVERY DAY    finasteride (PROSCAR) 5 mg, Oral, Daily    multivitamin with minerals tablet 1  tablet, Oral, Daily    NURTEC 75 mg, Oral, As needed (PRN)    oxybutynin (DITROPAN) 5 mg, Oral, 2 times daily    sumatriptan (IMITREX) 100 mg, Oral, Every 2 hours PRN    topiramate (TOPAMAX) 50 MG tablet TAKE 1 TABLET BY MOUTH TWICE A DAY    traZODone (DESYREL) 150 mg, Oral, Nightly PRN    valACYclovir (VALTREX) 2,000 mg, Oral, Every 12 hours, For cold sore    zolpidem (AMBIEN) 5 mg, Oral, Nightly PRN       Past Surgical History:   Procedure Laterality Date    AUGMENTATION OF BREAST      BREAST SURGERY       SECTION   and 2001    x2    COLON SURGERY  10/15/2018    colon resection          Social History     Substance and Sexual Activity   Drug Use No     Alcohol Use: Unknown (2020)    AUDIT-C     Frequency of Alcohol Consumption: Not on file     Average Number of Drinks: Not on file     Frequency of Binge Drinking: Never     Tobacco Use: Low Risk  (2023)    Patient History     Smoking Tobacco Use: Never     Smokeless Tobacco Use: Never     Passive Exposure: Not on file       OBJECTIVE:     Vital Signs Range (Last 24H):         Significant Labs:  Lab Results   Component Value Date    WBC 3.36 (L) 2023    HGB 15.6 2023    HCT 47.5 2023     2023    INR 1.0 2023       Lab Results   Component Value Date     2023    K 4.0 2023     2023    CREATININE 0.7 2023    BUN 7 2023    CO2 20 (L) 2023       Lab Results   Component Value Date    TSH 1.674 2023    HGBA1C 4.9 2023       EKG:   Results for orders placed or performed in visit on 23   EKG 12-lead    Collection Time: 23 10:04 AM    Narrative    Test Reason : Z01.818,    Vent. Rate : 077 BPM     Atrial Rate : 077 BPM     P-R Int : 184 ms          QRS Dur : 074 ms      QT Int : 410 ms       P-R-T Axes : 063 054 071 degrees     QTc Int : 463 ms    Normal sinus rhythm  Normal ECG  No previous ECGs available  Confirmed by Guerrero LEZAMA,  Ryann AUSTIN ([id number]) on 7/22/2023 9:07:34 PM    Referred By: MD JUSTIN SIERRA           Confirmed By:Ryann Masters MD       ASSESSMENT/PLAN:         Pre-op Assessment    I have reviewed the Patient Summary Reports.     I have reviewed the Nursing Notes. I have reviewed the NPO Status.      Review of Systems      Physical Exam  General: Well nourished    Airway:  Mallampati: II   Neck ROM: Normal ROM    Dental:  Intact        Anesthesia Plan  Type of Anesthesia, risks & benefits discussed:    Anesthesia Type: Gen ETT  Intra-op Monitoring Plan: Standard ASA Monitors and Art Line  Post Op Pain Control Plan: multimodal analgesia and IV/PO Opioids PRN  Induction:  IV  Airway Plan: Video and Direct  Informed Consent: Informed consent signed with the Patient and all parties understand the risks and agree with anesthesia plan.  All questions answered.   ASA Score: 2  Day of Surgery Review of History & Physical: H&P Update referred to the surgeon/provider.    Ready For Surgery From Anesthesia Perspective.     .

## 2023-07-31 ENCOUNTER — HOSPITAL ENCOUNTER (INPATIENT)
Facility: HOSPITAL | Age: 58
LOS: 2 days | Discharge: HOME | DRG: 042 | End: 2023-08-02
Attending: NEUROLOGICAL SURGERY | Admitting: NEUROLOGICAL SURGERY
Payer: COMMERCIAL

## 2023-07-31 ENCOUNTER — ANESTHESIA (OUTPATIENT)
Dept: SURGERY | Facility: HOSPITAL | Age: 58
DRG: 042 | End: 2023-07-31
Payer: COMMERCIAL

## 2023-07-31 DIAGNOSIS — Z01.818 PREOPERATIVE TESTING: ICD-10-CM

## 2023-07-31 DIAGNOSIS — G58.9 NERVE COMPRESSION SYNDROME: ICD-10-CM

## 2023-07-31 DIAGNOSIS — G52.1 GLOSSOPHARYNGEAL NERVE DISEASE: Primary | Chronic | ICD-10-CM

## 2023-07-31 LAB
ABO + RH BLD: NORMAL
BLD GP AB SCN CELLS X3 SERPL QL: NORMAL
SPECIMEN OUTDATE: NORMAL

## 2023-07-31 PROCEDURE — 25000003 PHARM REV CODE 250: Performed by: PHYSICIAN ASSISTANT

## 2023-07-31 PROCEDURE — D9220A PRA ANESTHESIA: Mod: ,,, | Performed by: STUDENT IN AN ORGANIZED HEALTH CARE EDUCATION/TRAINING PROGRAM

## 2023-07-31 PROCEDURE — 61781 PR STEREOTACTIC COMP ASSIST PROC,CRANIAL,INTRADURAL: ICD-10-PCS | Mod: ,,, | Performed by: NEUROLOGICAL SURGERY

## 2023-07-31 PROCEDURE — 25000003 PHARM REV CODE 250: Performed by: NEUROLOGICAL SURGERY

## 2023-07-31 PROCEDURE — 99291 PR CRITICAL CARE, E/M 30-74 MINUTES: ICD-10-PCS | Mod: ,,, | Performed by: INTERNAL MEDICINE

## 2023-07-31 PROCEDURE — C1713 ANCHOR/SCREW BN/BN,TIS/BN: HCPCS | Performed by: NEUROLOGICAL SURGERY

## 2023-07-31 PROCEDURE — 27201423 OPTIME MED/SURG SUP & DEVICES STERILE SUPPLY: Performed by: NEUROLOGICAL SURGERY

## 2023-07-31 PROCEDURE — 25000003 PHARM REV CODE 250: Performed by: NURSE PRACTITIONER

## 2023-07-31 PROCEDURE — 37000008 HC ANESTHESIA 1ST 15 MINUTES: Performed by: NEUROLOGICAL SURGERY

## 2023-07-31 PROCEDURE — 25000003 PHARM REV CODE 250

## 2023-07-31 PROCEDURE — 63600175 PHARM REV CODE 636 W HCPCS: Performed by: NURSE PRACTITIONER

## 2023-07-31 PROCEDURE — 25000003 PHARM REV CODE 250: Performed by: STUDENT IN AN ORGANIZED HEALTH CARE EDUCATION/TRAINING PROGRAM

## 2023-07-31 PROCEDURE — 64716 PR REVISION OF CRANIAL NERVE: ICD-10-PCS | Mod: 62,,, | Performed by: NEUROLOGICAL SURGERY

## 2023-07-31 PROCEDURE — 63600175 PHARM REV CODE 636 W HCPCS: Performed by: STUDENT IN AN ORGANIZED HEALTH CARE EDUCATION/TRAINING PROGRAM

## 2023-07-31 PROCEDURE — 37000009 HC ANESTHESIA EA ADD 15 MINS: Performed by: NEUROLOGICAL SURGERY

## 2023-07-31 PROCEDURE — 36620 INSERTION CATHETER ARTERY: CPT | Mod: ,,, | Performed by: STUDENT IN AN ORGANIZED HEALTH CARE EDUCATION/TRAINING PROGRAM

## 2023-07-31 PROCEDURE — 86920 COMPATIBILITY TEST SPIN: CPT | Performed by: ANESTHESIOLOGY

## 2023-07-31 PROCEDURE — 36000711: Performed by: NEUROLOGICAL SURGERY

## 2023-07-31 PROCEDURE — 25000003 PHARM REV CODE 250: Performed by: NURSE ANESTHETIST, CERTIFIED REGISTERED

## 2023-07-31 PROCEDURE — C1762 CONN TISS, HUMAN(INC FASCIA): HCPCS | Performed by: NEUROLOGICAL SURGERY

## 2023-07-31 PROCEDURE — 86900 BLOOD TYPING SEROLOGIC ABO: CPT | Performed by: STUDENT IN AN ORGANIZED HEALTH CARE EDUCATION/TRAINING PROGRAM

## 2023-07-31 PROCEDURE — D9220A PRA ANESTHESIA: ICD-10-PCS | Mod: ,,, | Performed by: STUDENT IN AN ORGANIZED HEALTH CARE EDUCATION/TRAINING PROGRAM

## 2023-07-31 PROCEDURE — C1768 GRAFT, VASCULAR: HCPCS | Performed by: NEUROLOGICAL SURGERY

## 2023-07-31 PROCEDURE — 36620 ARTERIAL: ICD-10-PCS | Mod: ,,, | Performed by: STUDENT IN AN ORGANIZED HEALTH CARE EDUCATION/TRAINING PROGRAM

## 2023-07-31 PROCEDURE — 27201037 HC PRESSURE MONITORING SET UP

## 2023-07-31 PROCEDURE — 63600175 PHARM REV CODE 636 W HCPCS: Performed by: NEUROLOGICAL SURGERY

## 2023-07-31 PROCEDURE — 63600175 PHARM REV CODE 636 W HCPCS

## 2023-07-31 PROCEDURE — 64716 REVISION OF CRANIAL NERVE: CPT | Mod: 62,,, | Performed by: NEUROLOGICAL SURGERY

## 2023-07-31 PROCEDURE — 99291 CRITICAL CARE FIRST HOUR: CPT | Mod: ,,, | Performed by: INTERNAL MEDICINE

## 2023-07-31 PROCEDURE — 36000710: Performed by: NEUROLOGICAL SURGERY

## 2023-07-31 PROCEDURE — 61781 SCAN PROC CRANIAL INTRA: CPT | Mod: ,,, | Performed by: NEUROLOGICAL SURGERY

## 2023-07-31 PROCEDURE — 20000000 HC ICU ROOM

## 2023-07-31 DEVICE — SCREW UN3 AXS SD 1.5X4MM: Type: IMPLANTABLE DEVICE | Site: CRANIAL | Status: FUNCTIONAL

## 2023-07-31 DEVICE — PLATE BONE 2X2 HOLE SM BOX: Type: IMPLANTABLE DEVICE | Site: CRANIAL | Status: FUNCTIONAL

## 2023-07-31 DEVICE — DURAMATRIX SUTURABLE 2X2: Type: IMPLANTABLE DEVICE | Site: CRANIAL | Status: FUNCTIONAL

## 2023-07-31 DEVICE — FELT TEFLON 1INX6IN: Type: IMPLANTABLE DEVICE | Site: CRANIAL | Status: FUNCTIONAL

## 2023-07-31 DEVICE — COVER UN3 BURRHOLE 14MM TAB: Type: IMPLANTABLE DEVICE | Site: CRANIAL | Status: FUNCTIONAL

## 2023-07-31 RX ORDER — FENTANYL CITRATE 50 UG/ML
INJECTION, SOLUTION INTRAMUSCULAR; INTRAVENOUS
Status: DISPENSED
Start: 2023-07-31 | End: 2023-08-01

## 2023-07-31 RX ORDER — FENTANYL CITRATE 50 UG/ML
25 INJECTION, SOLUTION INTRAMUSCULAR; INTRAVENOUS ONCE
Status: COMPLETED | OUTPATIENT
Start: 2023-07-31 | End: 2023-07-31

## 2023-07-31 RX ORDER — MANNITOL 20 G/100ML
INJECTION, SOLUTION INTRAVENOUS
Status: DISCONTINUED | OUTPATIENT
Start: 2023-07-31 | End: 2023-07-31

## 2023-07-31 RX ORDER — ZOLPIDEM TARTRATE 5 MG/1
5 TABLET ORAL NIGHTLY PRN
Status: DISCONTINUED | OUTPATIENT
Start: 2023-07-31 | End: 2023-08-02 | Stop reason: HOSPADM

## 2023-07-31 RX ORDER — ACETAMINOPHEN 10 MG/ML
INJECTION, SOLUTION INTRAVENOUS
Status: DISCONTINUED | OUTPATIENT
Start: 2023-07-31 | End: 2023-07-31

## 2023-07-31 RX ORDER — NICARDIPINE HYDROCHLORIDE 0.2 MG/ML
0-15 INJECTION INTRAVENOUS CONTINUOUS
Status: DISCONTINUED | OUTPATIENT
Start: 2023-07-31 | End: 2023-08-01

## 2023-07-31 RX ORDER — HYDRALAZINE HYDROCHLORIDE 20 MG/ML
10 INJECTION INTRAMUSCULAR; INTRAVENOUS EVERY 6 HOURS PRN
Status: DISCONTINUED | OUTPATIENT
Start: 2023-07-31 | End: 2023-08-02 | Stop reason: HOSPADM

## 2023-07-31 RX ORDER — ALBUTEROL SULFATE 90 UG/1
2 AEROSOL, METERED RESPIRATORY (INHALATION) EVERY 4 HOURS PRN
Status: DISCONTINUED | OUTPATIENT
Start: 2023-07-31 | End: 2023-08-02 | Stop reason: HOSPADM

## 2023-07-31 RX ORDER — MUPIROCIN 20 MG/G
1 OINTMENT TOPICAL 2 TIMES DAILY
Status: DISCONTINUED | OUTPATIENT
Start: 2023-07-31 | End: 2023-07-31 | Stop reason: HOSPADM

## 2023-07-31 RX ORDER — LIDOCAINE HYDROCHLORIDE AND EPINEPHRINE 10; 10 MG/ML; UG/ML
INJECTION, SOLUTION INFILTRATION; PERINEURAL
Status: DISCONTINUED | OUTPATIENT
Start: 2023-07-31 | End: 2023-07-31 | Stop reason: HOSPADM

## 2023-07-31 RX ORDER — MUPIROCIN 20 MG/G
OINTMENT TOPICAL
Status: DISCONTINUED | OUTPATIENT
Start: 2023-07-31 | End: 2023-07-31 | Stop reason: HOSPADM

## 2023-07-31 RX ORDER — MORPHINE SULFATE 2 MG/ML
1 INJECTION, SOLUTION INTRAMUSCULAR; INTRAVENOUS EVERY 4 HOURS PRN
Status: DISCONTINUED | OUTPATIENT
Start: 2023-07-31 | End: 2023-08-01

## 2023-07-31 RX ORDER — PROPOFOL 10 MG/ML
VIAL (ML) INTRAVENOUS CONTINUOUS PRN
Status: DISCONTINUED | OUTPATIENT
Start: 2023-07-31 | End: 2023-07-31

## 2023-07-31 RX ORDER — LABETALOL HCL 20 MG/4 ML
10 SYRINGE (ML) INTRAVENOUS EVERY 4 HOURS PRN
Status: DISCONTINUED | OUTPATIENT
Start: 2023-07-31 | End: 2023-08-02 | Stop reason: HOSPADM

## 2023-07-31 RX ORDER — ONDANSETRON 2 MG/ML
4 INJECTION INTRAMUSCULAR; INTRAVENOUS EVERY 4 HOURS PRN
Status: DISCONTINUED | OUTPATIENT
Start: 2023-07-31 | End: 2023-08-02 | Stop reason: HOSPADM

## 2023-07-31 RX ORDER — OXYCODONE HYDROCHLORIDE 10 MG/1
10 TABLET ORAL EVERY 4 HOURS PRN
Status: DISCONTINUED | OUTPATIENT
Start: 2023-07-31 | End: 2023-08-01

## 2023-07-31 RX ORDER — MORPHINE SULFATE 2 MG/ML
4 INJECTION, SOLUTION INTRAMUSCULAR; INTRAVENOUS ONCE
Status: COMPLETED | OUTPATIENT
Start: 2023-07-31 | End: 2023-07-31

## 2023-07-31 RX ORDER — LABETALOL HCL 20 MG/4 ML
10 SYRINGE (ML) INTRAVENOUS EVERY 10 MIN PRN
Status: DISCONTINUED | OUTPATIENT
Start: 2023-07-31 | End: 2023-08-01

## 2023-07-31 RX ORDER — CARBAMAZEPINE 100 MG/1
100 CAPSULE, EXTENDED RELEASE ORAL 2 TIMES DAILY
Status: DISCONTINUED | OUTPATIENT
Start: 2023-07-31 | End: 2023-08-02 | Stop reason: HOSPADM

## 2023-07-31 RX ORDER — PROCHLORPERAZINE EDISYLATE 5 MG/ML
5 INJECTION INTRAMUSCULAR; INTRAVENOUS EVERY 6 HOURS PRN
Status: DISCONTINUED | OUTPATIENT
Start: 2023-07-31 | End: 2023-07-31

## 2023-07-31 RX ORDER — FAMOTIDINE 20 MG/1
40 TABLET, FILM COATED ORAL 2 TIMES DAILY
Status: DISCONTINUED | OUTPATIENT
Start: 2023-07-31 | End: 2023-08-01

## 2023-07-31 RX ORDER — DEXAMETHASONE SODIUM PHOSPHATE 4 MG/ML
INJECTION, SOLUTION INTRA-ARTICULAR; INTRALESIONAL; INTRAMUSCULAR; INTRAVENOUS; SOFT TISSUE
Status: DISCONTINUED | OUTPATIENT
Start: 2023-07-31 | End: 2023-07-31

## 2023-07-31 RX ORDER — SODIUM CHLORIDE 9 MG/ML
INJECTION, SOLUTION INTRAVENOUS CONTINUOUS
Status: DISCONTINUED | OUTPATIENT
Start: 2023-07-31 | End: 2023-08-01

## 2023-07-31 RX ORDER — ONDANSETRON 2 MG/ML
INJECTION INTRAMUSCULAR; INTRAVENOUS
Status: DISCONTINUED | OUTPATIENT
Start: 2023-07-31 | End: 2023-07-31

## 2023-07-31 RX ORDER — OXYCODONE HYDROCHLORIDE 5 MG/1
5 TABLET ORAL EVERY 4 HOURS PRN
Status: DISCONTINUED | OUTPATIENT
Start: 2023-07-31 | End: 2023-07-31

## 2023-07-31 RX ORDER — FINASTERIDE 5 MG/1
5 TABLET, FILM COATED ORAL DAILY
Status: DISCONTINUED | OUTPATIENT
Start: 2023-07-31 | End: 2023-08-02 | Stop reason: HOSPADM

## 2023-07-31 RX ORDER — FENTANYL CITRATE 50 UG/ML
INJECTION, SOLUTION INTRAMUSCULAR; INTRAVENOUS
Status: DISCONTINUED | OUTPATIENT
Start: 2023-07-31 | End: 2023-07-31

## 2023-07-31 RX ORDER — CYCLOBENZAPRINE HCL 10 MG
10 TABLET ORAL 3 TIMES DAILY
Status: DISCONTINUED | OUTPATIENT
Start: 2023-07-31 | End: 2023-08-02 | Stop reason: HOSPADM

## 2023-07-31 RX ORDER — LABETALOL HCL 20 MG/4 ML
SYRINGE (ML) INTRAVENOUS
Status: DISPENSED
Start: 2023-07-31 | End: 2023-08-01

## 2023-07-31 RX ORDER — OXYBUTYNIN CHLORIDE 5 MG/1
5 TABLET ORAL 2 TIMES DAILY
Status: DISCONTINUED | OUTPATIENT
Start: 2023-07-31 | End: 2023-08-02 | Stop reason: HOSPADM

## 2023-07-31 RX ORDER — DEXAMETHASONE SODIUM PHOSPHATE 4 MG/ML
4 INJECTION, SOLUTION INTRA-ARTICULAR; INTRALESIONAL; INTRAMUSCULAR; INTRAVENOUS; SOFT TISSUE EVERY 6 HOURS
Status: COMPLETED | OUTPATIENT
Start: 2023-07-31 | End: 2023-08-01

## 2023-07-31 RX ORDER — DIAZEPAM 5 MG/1
5 TABLET ORAL EVERY 6 HOURS PRN
Status: DISCONTINUED | OUTPATIENT
Start: 2023-07-31 | End: 2023-08-02 | Stop reason: HOSPADM

## 2023-07-31 RX ORDER — CETIRIZINE HYDROCHLORIDE 5 MG/1
10 TABLET ORAL DAILY
Status: DISCONTINUED | OUTPATIENT
Start: 2023-07-31 | End: 2023-08-02

## 2023-07-31 RX ORDER — SUCCINYLCHOLINE CHLORIDE 20 MG/ML
INJECTION INTRAMUSCULAR; INTRAVENOUS
Status: DISCONTINUED | OUTPATIENT
Start: 2023-07-31 | End: 2023-07-31

## 2023-07-31 RX ORDER — ROCURONIUM BROMIDE 10 MG/ML
INJECTION, SOLUTION INTRAVENOUS
Status: DISCONTINUED | OUTPATIENT
Start: 2023-07-31 | End: 2023-07-31

## 2023-07-31 RX ORDER — LIDOCAINE HYDROCHLORIDE 20 MG/ML
INJECTION INTRAVENOUS
Status: DISCONTINUED | OUTPATIENT
Start: 2023-07-31 | End: 2023-07-31

## 2023-07-31 RX ORDER — DEXMEDETOMIDINE HYDROCHLORIDE 100 UG/ML
INJECTION, SOLUTION INTRAVENOUS
Status: DISCONTINUED | OUTPATIENT
Start: 2023-07-31 | End: 2023-07-31

## 2023-07-31 RX ORDER — MIDAZOLAM HYDROCHLORIDE 1 MG/ML
INJECTION INTRAMUSCULAR; INTRAVENOUS
Status: DISCONTINUED | OUTPATIENT
Start: 2023-07-31 | End: 2023-07-31

## 2023-07-31 RX ADMIN — DIAZEPAM 5 MG: 5 TABLET ORAL at 07:07

## 2023-07-31 RX ADMIN — CEFAZOLIN 1 G: 1 INJECTION, POWDER, FOR SOLUTION INTRAMUSCULAR; INTRAVENOUS at 09:07

## 2023-07-31 RX ADMIN — FENTANYL CITRATE 50 MCG: 50 INJECTION, SOLUTION INTRAMUSCULAR; INTRAVENOUS at 07:07

## 2023-07-31 RX ADMIN — FAMOTIDINE 40 MG: 20 TABLET, FILM COATED ORAL at 08:07

## 2023-07-31 RX ADMIN — ZOLPIDEM TARTRATE 5 MG: 5 TABLET ORAL at 11:07

## 2023-07-31 RX ADMIN — CEFAZOLIN 1 G: 1 INJECTION, POWDER, FOR SOLUTION INTRAMUSCULAR; INTRAVENOUS at 01:07

## 2023-07-31 RX ADMIN — SODIUM CHLORIDE: 0.9 INJECTION, SOLUTION INTRAVENOUS at 07:07

## 2023-07-31 RX ADMIN — PROPOFOL 50 MG: 10 INJECTION, EMULSION INTRAVENOUS at 07:07

## 2023-07-31 RX ADMIN — ACETAMINOPHEN 1000 MG: 10 INJECTION, SOLUTION INTRAVENOUS at 08:07

## 2023-07-31 RX ADMIN — DIAZEPAM 5 MG: 5 TABLET ORAL at 01:07

## 2023-07-31 RX ADMIN — NICARDIPINE HYDROCHLORIDE 2.5 MG/HR: 0.2 INJECTION, SOLUTION INTRAVENOUS at 05:07

## 2023-07-31 RX ADMIN — SODIUM CHLORIDE, SODIUM GLUCONATE, SODIUM ACETATE, POTASSIUM CHLORIDE, MAGNESIUM CHLORIDE, SODIUM PHOSPHATE, DIBASIC, AND POTASSIUM PHOSPHATE: .53; .5; .37; .037; .03; .012; .00082 INJECTION, SOLUTION INTRAVENOUS at 07:07

## 2023-07-31 RX ADMIN — CYCLOBENZAPRINE 10 MG: 10 TABLET, FILM COATED ORAL at 08:07

## 2023-07-31 RX ADMIN — MUPIROCIN: 20 OINTMENT TOPICAL at 06:07

## 2023-07-31 RX ADMIN — ROCURONIUM BROMIDE 5 MG: 10 INJECTION, SOLUTION INTRAVENOUS at 07:07

## 2023-07-31 RX ADMIN — ONDANSETRON 8 MG: 2 INJECTION INTRAMUSCULAR; INTRAVENOUS at 10:07

## 2023-07-31 RX ADMIN — SODIUM CHLORIDE: 0.9 INJECTION, SOLUTION INTRAVENOUS at 06:07

## 2023-07-31 RX ADMIN — LIDOCAINE HYDROCHLORIDE 60 MG: 20 INJECTION INTRAVENOUS at 07:07

## 2023-07-31 RX ADMIN — TRAZODONE HYDROCHLORIDE 150 MG: 50 TABLET ORAL at 11:07

## 2023-07-31 RX ADMIN — Medication 10 MG: at 12:07

## 2023-07-31 RX ADMIN — DEXAMETHASONE SODIUM PHOSPHATE 4 MG: 4 INJECTION INTRA-ARTICULAR; INTRALESIONAL; INTRAMUSCULAR; INTRAVENOUS; SOFT TISSUE at 11:07

## 2023-07-31 RX ADMIN — CARBAMAZEPINE 100 MG: 100 CAPSULE, EXTENDED RELEASE ORAL at 08:07

## 2023-07-31 RX ADMIN — PROMETHAZINE HYDROCHLORIDE 12.5 MG: 25 INJECTION INTRAMUSCULAR; INTRAVENOUS at 09:07

## 2023-07-31 RX ADMIN — FENTANYL CITRATE 25 MCG: 50 INJECTION INTRAMUSCULAR; INTRAVENOUS at 12:07

## 2023-07-31 RX ADMIN — SODIUM CHLORIDE 0.2 MCG/KG/MIN: 9 INJECTION, SOLUTION INTRAVENOUS at 07:07

## 2023-07-31 RX ADMIN — CEFTRIAXONE 2 G: 1 INJECTION, POWDER, FOR SOLUTION INTRAMUSCULAR; INTRAVENOUS at 07:07

## 2023-07-31 RX ADMIN — FENTANYL CITRATE 50 MCG: 50 INJECTION, SOLUTION INTRAMUSCULAR; INTRAVENOUS at 10:07

## 2023-07-31 RX ADMIN — DEXAMETHASONE SODIUM PHOSPHATE 4 MG: 4 INJECTION INTRA-ARTICULAR; INTRALESIONAL; INTRAMUSCULAR; INTRAVENOUS; SOFT TISSUE at 06:07

## 2023-07-31 RX ADMIN — Medication 10 MG: at 04:07

## 2023-07-31 RX ADMIN — FAMOTIDINE 40 MG: 20 TABLET, FILM COATED ORAL at 12:07

## 2023-07-31 RX ADMIN — MIDAZOLAM HYDROCHLORIDE 2 MG: 2 INJECTION, SOLUTION INTRAMUSCULAR; INTRAVENOUS at 07:07

## 2023-07-31 RX ADMIN — SODIUM CHLORIDE 0.1 MCG/KG/MIN: 9 INJECTION, SOLUTION INTRAVENOUS at 07:07

## 2023-07-31 RX ADMIN — MORPHINE SULFATE 1 MG: 2 INJECTION, SOLUTION INTRAMUSCULAR; INTRAVENOUS at 04:07

## 2023-07-31 RX ADMIN — FENTANYL CITRATE 50 MCG: 50 INJECTION, SOLUTION INTRAMUSCULAR; INTRAVENOUS at 11:07

## 2023-07-31 RX ADMIN — OXYCODONE HYDROCHLORIDE 10 MG: 10 TABLET ORAL at 08:07

## 2023-07-31 RX ADMIN — ONDANSETRON 4 MG: 2 INJECTION INTRAMUSCULAR; INTRAVENOUS at 03:07

## 2023-07-31 RX ADMIN — OXYCODONE HYDROCHLORIDE 5 MG: 5 TABLET ORAL at 12:07

## 2023-07-31 RX ADMIN — PROMETHAZINE HYDROCHLORIDE 12.5 MG: 25 INJECTION INTRAMUSCULAR; INTRAVENOUS at 04:07

## 2023-07-31 RX ADMIN — MANNITOL 50 G: 20 INJECTION, SOLUTION INTRAVENOUS at 09:07

## 2023-07-31 RX ADMIN — DEXAMETHASONE SODIUM PHOSPHATE 4 MG: 4 INJECTION INTRA-ARTICULAR; INTRALESIONAL; INTRAMUSCULAR; INTRAVENOUS; SOFT TISSUE at 12:07

## 2023-07-31 RX ADMIN — OXYBUTYNIN CHLORIDE 5 MG: 5 TABLET ORAL at 12:07

## 2023-07-31 RX ADMIN — DEXMEDETOMIDINE 4 MCG: 200 INJECTION, SOLUTION INTRAVENOUS at 10:07

## 2023-07-31 RX ADMIN — NICARDIPINE HYDROCHLORIDE 7.5 MG/HR: 0.2 INJECTION, SOLUTION INTRAVENOUS at 09:07

## 2023-07-31 RX ADMIN — MORPHINE SULFATE 1 MG: 2 INJECTION, SOLUTION INTRAMUSCULAR; INTRAVENOUS at 08:07

## 2023-07-31 RX ADMIN — ONDANSETRON 4 MG: 2 INJECTION INTRAMUSCULAR; INTRAVENOUS at 07:07

## 2023-07-31 RX ADMIN — OXYCODONE HYDROCHLORIDE 10 MG: 10 TABLET ORAL at 04:07

## 2023-07-31 RX ADMIN — CARBAMAZEPINE 100 MG: 100 CAPSULE, EXTENDED RELEASE ORAL at 01:07

## 2023-07-31 RX ADMIN — PROPOFOL 150 MCG/KG/MIN: 10 INJECTION, EMULSION INTRAVENOUS at 07:07

## 2023-07-31 RX ADMIN — DEXAMETHASONE SODIUM PHOSPHATE 12 MG: 4 INJECTION, SOLUTION INTRAMUSCULAR; INTRAVENOUS at 07:07

## 2023-07-31 RX ADMIN — ONDANSETRON 4 MG: 2 INJECTION INTRAMUSCULAR; INTRAVENOUS at 11:07

## 2023-07-31 RX ADMIN — CYCLOBENZAPRINE 10 MG: 10 TABLET, FILM COATED ORAL at 03:07

## 2023-07-31 RX ADMIN — OXYBUTYNIN CHLORIDE 5 MG: 5 TABLET ORAL at 08:07

## 2023-07-31 RX ADMIN — PROPOFOL 200 MG: 10 INJECTION, EMULSION INTRAVENOUS at 07:07

## 2023-07-31 RX ADMIN — MORPHINE SULFATE 4 MG: 2 INJECTION, SOLUTION INTRAMUSCULAR; INTRAVENOUS at 01:07

## 2023-07-31 RX ADMIN — SUCCINYLCHOLINE CHLORIDE 120 MG: 20 INJECTION, SOLUTION INTRAMUSCULAR; INTRAVENOUS at 07:07

## 2023-07-31 RX ADMIN — DEXMEDETOMIDINE 8 MCG: 200 INJECTION, SOLUTION INTRAVENOUS at 10:07

## 2023-07-31 RX ADMIN — HYDRALAZINE HYDROCHLORIDE 10 MG: 20 INJECTION INTRAMUSCULAR; INTRAVENOUS at 02:07

## 2023-07-31 RX ADMIN — MORPHINE SULFATE 1 MG: 2 INJECTION, SOLUTION INTRAMUSCULAR; INTRAVENOUS at 11:07

## 2023-07-31 NOTE — ANESTHESIA PROCEDURE NOTES
Arterial    Diagnosis: Neuralgia    Patient location during procedure: done in OR    Staffing  Authorizing Provider: Franck Christian MD  Performing Provider: Rasta Lee DO    Staffing  Performed by: Rasta Lee DO  Authorized by: Franck Christian MD    Anesthesiologist was present at the time of the procedure.    Preanesthetic Checklist  Completed: patient identified, IV checked, site marked, risks and benefits discussed, surgical consent, monitors and equipment checked, pre-op evaluation, timeout performed and anesthesia consent givenArterial  Skin Prep: chlorhexidine gluconate and isopropyl alcohol  Orientation: right  Location: radial    Catheter Size: 20 G  Catheter placement by Anatomical landmarks. Heme positive aspiration all ports. Insertion Attempts: 1  Assessment  Dressing: secured with tape and tegaderm  Patient: Tolerated well

## 2023-07-31 NOTE — PROGRESS NOTES
Safe surgery checklist complete. Patient surgery booked for incorrect side. Patient consented and marked for left side. OR desk notified.

## 2023-07-31 NOTE — INTERVAL H&P NOTE
The patient has been examined and the H&P has been reviewed:    I concur with the findings and no changes have occurred since H&P was written.    Surgery risks, benefits and alternative options discussed and understood by patient/family.    There are no hospital problems to display for this patient.    --Patient evaluated prior to procedure  --All diagnostics and imaging reviewed  --Patient NPO since MN  --No anti-coag/plt medication in the last 72h  --Risks & benefits of surgery explained in detail; patient consented and all questions answered  --Further reccs to follow procedure

## 2023-07-31 NOTE — PLAN OF CARE
"[facility] Care Plan    POC reviewed with Mae Gregory and family at 1630. Pt verbalized understanding. Questions and concerns addressed. No acute events today. Pt progressing toward goals. Will continue to monitor. See below and flowsheets for full assessment and VS info.     -L suboccipital crani microvascular resection today  -postop CT completed  -NRB  -HOB 20-30 degrees per Dr. Resendiz  -phenergan/zofran for nausea/emesis  -prn oxycodone/morphine for HA  -SBP<140        Is this a stroke patient? no    Neuro:  [place] Coma Scale  Best Eye Response: 4-->(E4) spontaneous  Best Motor Response: 6-->(M6) obeys commands  Best Verbal Response: 5-->(V5) oriented  [place] Coma Scale Score: 15  Assessment Qualifiers: patient not sedated/intubated  Pupil PERRLA: yes     24 hr Temp:  [97.7 °F (36.5 °C)-98.5 °F (36.9 °C)]     CV:   Rhythm: normal sinus rhythm  BP goals:   SBP < 140  MAP > 65    Resp:           Plan:  NRB    GI/:     Diet/Nutrition Received: NPO  Last Bowel Movement: 07/30/23  Voiding Characteristics: ureteral catheter    Intake/Output Summary (Last 24 hours) at 7/31/2023 1708  Last data filed at 7/31/2023 1702  Gross per 24 hour   Intake 1834.51 ml   Output 1375 ml   Net 459.51 ml          Labs/Accuchecks:  No results for input(s): "WBC", "RBC", "HGB", "HCT", "PLT" in the last 168 hours. No results for input(s): "NA", "K", "CO2", "CL", "BUN", "CREATININE", "ALKPHOS", "ALT", "AST", "BILITOT" in the last 168 hours.    Invalid input(s): "[id number]" No results for input(s): "PROTIME", "INR", "APTT", "HEPANTIXA" in the last 168 hours. No results for input(s): "CPK", "CPKMB", "TROPONINI", "MB" in the last 168 hours.    Electrolytes: N/A - electrolytes WDL  Accuchecks: none    Gtts:   sodium chloride 0.9% Stopped (07/31/23 1110)    nicardipine         LDA/Wounds:  Lines/Drains/Airways       Drain  Duration                  Urethral Catheter 07/31/23 0742 Silicone 16 Fr. <1 day              Arterial Line  Duration        "      Arterial Line 07/31/23 0833 Right Radial <1 day              Peripheral Intravenous Line  Duration                  Peripheral IV - Single Lumen 18 G Left Wrist -- days         Peripheral IV - Single Lumen 07/31/23 0627 18 G Right Forearm <1 day                  Wounds: Yes  Wound care consulted: No

## 2023-07-31 NOTE — H&P
[facility]  Neurocritical Care  History & Physical    Admit Date: 2023  Service Date: 2023  Length of Stay: 0    Subjective:     Chief Complaint: Glossopharyngeal nerve disease    History of Present Illness: Mae Gregory is a 58 year old female with a PMH of glossopharyngeal neuralgia (CN IX neuralgia) s/p left suboccipital craniotomy retrosigmoid approach for microvascular decompression performed 23 by Dr. Hudson, neurosurgery, after failed conservative medical management. She was seen outpatient for recurrent jaw pain, dysphagia, and ear pain consistent with CN IX nerve pain. On evaluation, she was found to have a tortuous left PICA encroaching on the root entry zone of left-sided nerve roots extending to the jugular foramen, believed to be the origin of her pain. She was previously on Lyrica, Topamax, Tegretol, and steroids, all of which failed to control her pain. She elected for microvascular decompression due to severe, uncontrolled, and limiting pain that was affecting her daily living.               Past Medical History:   Diagnosis Date    DDD (degenerative disc disease), cervical 3/15/2017    DDD (degenerative disc disease), lumbar 3/15/2017    Glaucoma 3/7/2018    Migraine 3/15/2017    Primary insomnia 3/15/2017     Past Surgical History:   Procedure Laterality Date    AUGMENTATION OF BREAST      BREAST SURGERY       SECTION   and 2001    x2    COLON SURGERY  10/15/2018    colon resection       No current facility-administered medications on file prior to encounter.     Current Outpatient Medications on File Prior to Encounter   Medication Sig Dispense Refill    albuterol (PROVENTIL/VENTOLIN HFA) 90 mcg/actuation inhaler TAKE 1 2 PUFF(S) (INHALATION) EVERY 4 HOURS PRN   WHEEZING 18 g 12    carBAMazepine (TEGRETOL XR) 100 MG 12 hr tablet Take 1 tablet (100 mg total) by mouth 2 (two) times daily. 60 tablet 11    cetirizine (ZYRTEC) 10 MG tablet  Take 10 mg by mouth once daily.      COCONUT OIL ORAL Take by mouth.      cyclobenzaprine (FLEXERIL) 10 MG tablet Take 10 mg by mouth 3 (three) times daily.      estradiol-norethindrone (ACTIVELLA) 1-0.5 mg per tablet TAKE 1 TABLET BY MOUTH EVERY DAY 28 tablet 12    multivitamin with minerals tablet Take 1 tablet by mouth once daily.      NURTEC 75 mg odt Take 75 mg by mouth as needed.      sumatriptan (IMITREX) 100 MG tablet Take 1 tablet (100 mg total) by mouth every 2 (two) hours as needed for Migraine. 40 tablet 1    traZODone (DESYREL) 150 MG tablet Take 1 tablet (150 mg total) by mouth nightly as needed for Insomnia. 90 tablet 3    zolpidem (AMBIEN) 5 MG Tab Take 1 tablet (5 mg total) by mouth nightly as needed (insomnia). 30 tablet 5    diazePAM (VALIUM) 5 MG tablet Take 1 tablet (5 mg total) by mouth every 6 (six) hours as needed for Anxiety (neck pain). 12 tablet 3    finasteride (PROSCAR) 5 mg tablet Take 1 tablet (5 mg total) by mouth once daily. 30 tablet 11    valACYclovir (VALTREX) 1000 MG tablet Take 2 tablets (2,000 mg total) by mouth every 12 (twelve) hours. For cold sore (Patient not taking: Reported on 7/18/2023) 30 tablet 11      Allergies: Patient has no known allergies.    Family History   Problem Relation Age of Onset    Stroke Maternal Grandfather     Hypertension Father     Hypertension Mother     Hypertension Brother     Ovarian cancer Neg Hx     Cancer Neg Hx      Social History     Tobacco Use    Smoking status: Never    Smokeless tobacco: Never   Substance Use Topics    Alcohol use: No    Drug use: No     Review of Systems   Constitutional:  Negative for chills and fever.   HENT:  Positive for ear pain and trouble swallowing.    Eyes:  Negative for photophobia and pain.   Respiratory:  Negative for shortness of breath.    Cardiovascular:  Negative for chest pain.   Gastrointestinal:  Negative for abdominal pain.   Musculoskeletal:  Negative for gait problem.      Objective:     Vitals:    Temp: 98.5 °F (36.9 °C)  Pulse: 78  Rhythm: normal sinus rhythm  BP: 138/78  MAP (mmHg): 102  Resp: (!) 26  SpO2: 100 %    Temp  Min: 97.7 °F (36.5 °C)  Max: 98.5 °F (36.9 °C)  Pulse  Min: 78  Max: 101  BP  Min: 138/78  Max: 157/87  MAP (mmHg)  Min: 102  Max: 118  Resp  Min: 9  Max: 26  SpO2  Min: 100 %  Max: 100 %    No intake/output data recorded.            Physical Exam  Vitals and nursing note reviewed.   Constitutional:       General: She is not in acute distress.     Appearance: She is not toxic-appearing.      Comments: Appears uncomfortable   HENT:      Head:      Comments: Bandage over left suboccipital region      Right Ear: External ear normal.      Left Ear: External ear normal.      Mouth/Throat:      Mouth: Mucous membranes are moist.   Eyes:      General: No scleral icterus.     Extraocular Movements: Extraocular movements intact.   Cardiovascular:      Rate and Rhythm: Normal rate and regular rhythm.   Pulmonary:      Effort: Pulmonary effort is normal. No respiratory distress.      Breath sounds: Normal breath sounds.   Abdominal:      General: There is no distension.      Palpations: Abdomen is soft.   Musculoskeletal:      Cervical back: Neck supple.      Right lower leg: No edema.      Left lower leg: No edema.   Skin:     General: Skin is warm and dry.      Capillary Refill: Capillary refill takes less than 2 seconds.   Neurological:      Comments: Appears tired, recovering from anesthesia        Unable to test orientation, language, memory, coordination, gait due to level of consciousness.       Today I personally reviewed pertinent medications, lines/drains/airways, imaging, cardiology results, laboratory results,.      Assessment/Plan:     Neuro  * Glossopharyngeal nerve disease  - Failed conservative medical management including Lyrica, Topamax, Tegretol, and steroids. Continued to have significant limiting pain.   - S/p left suboccipital craniotomy  retrosigmoid approach for microvascular decompression 7/31/23 with Dr. Hudson, neurosurgery  - Appreciate neurosurgery recs: BP <140 next 24h, cardene gtt   - Pain control: morphine 1 mg q4h PRN   - PT/OT       -Dystonia - follows Neurology regularly for Botox injections  - stable     Migraine  - Continue home meds prn   - Symptoms may have been secondary to neuralgia, will monitor for recurrence     Psychiatric  -Anxiety with depression - controlled off Wellbutrin since 4/2023  - diazepam 5 mg q6h PRN    Orthopedic  -Cervical myofascial pain syndrome - follows Neurology for injections  - diazepam 5 mg q6h PRN    Other  -Insomnia  - Zolpidem 5 mg qhs PRN         The patient is being Prophylaxed for:  Venous Thromboembolism with: Chemical  Stress Ulcer with: None  Ventilator Pneumonia with: not applicable    Activity Orders          None        No Order    Eunice Cid MD  Neurocritical Care  [facility]

## 2023-07-31 NOTE — HPI
Mae Gregory is a 58 year old female with a PMH of glossopharyngeal neuralgia (CN IX neuralgia) s/p left suboccipital craniotomy retrosigmoid approach for microvascular decompression performed 7/31/23 by Dr. Hudson, neurosurgery, after failed conservative medical management. She was seen outpatient for recurrent jaw pain, dysphagia, and ear pain consistent with CN IX nerve pain. On evaluation, she was found to have a tortuous left PICA encroaching on the root entry zone of left-sided nerve roots extending to the jugular foramen, believed to be the origin of her pain. She was previously on Lyrica, Topamax, Tegretol, and steroids, all of which failed to control her pain. She elected for microvascular decompression due to severe, uncontrolled, and limiting pain that was affecting her daily living.

## 2023-07-31 NOTE — ANESTHESIA PROCEDURE NOTES
Intubation    Date/Time: 7/31/2023 7:34 AM    Performed by: Rasta Lee DO  Authorized by: Franck Christian MD    Intubation:     Induction:  Rapid sequence induction    Intubated:  Postinduction    Mask Ventilation:  Easy mask    Attempts:  1    Attempted By:  Resident anesthesiologist    Method of Intubation:  Direct    Blade:  Mohit 3    Laryngeal View Grade: Grade I - full view of cords      Difficult Airway Encountered?: No      Complications:  None    Airway Device:  Oral endotracheal tube    Airway Device Size:  7.5    Style/Cuff Inflation:  Cuffed (inflated to minimal occlusive pressure)    Tube secured:  22    Secured at:  The teeth    Placement Verified By:  Capnometry    Complicating Factors:  None    Findings Post-Intubation:  Atraumatic/condition of teeth unchanged

## 2023-07-31 NOTE — TRANSFER OF CARE
"Anesthesia Transfer of Care Note    Patient: Mae Gregory    Procedure(s) Performed: Procedure(s) (LRB):  CRANIOTOMY for microvascular decompression NEURO MONITORING (Left)    Patient location: ICU    Anesthesia Type: general    Transport from OR: Continuous ECG monitoring in transport. Continuous SpO2 monitoring in transport. Continuos invasive BP monitoring in transport. Transported from OR on 6-10 L/min O2 by face mask with adequate spontaneous ventilation    Post pain: adequate analgesia    Post assessment: no apparent anesthetic complications    Post vital signs: stable    Level of consciousness: awake    Nausea/Vomiting: no nausea/vomiting    Complications: none    Transfer of care protocol was followed      Last vitals:   Visit Vitals  BP (!) 154/92   Pulse 84   Temp 36.9 °C (98.5 °F) (Oral)   Resp (!) 26   Ht 5' 5" (1.651 m)   Wt 54.4 kg (120 lb)   SpO2 97%   Breastfeeding No   BMI 19.97 kg/m²     "

## 2023-07-31 NOTE — BRIEF OP NOTE
[facility] - [facility] ([facility])  Brief Operative Note  Cardiology    SUMMARY     Surgery Date: 7/31/2023     Surgeon(s) and Role:     * Nils Hudson MD - Primary     * Bni Ballard MD - Resident - Assisting     * Evan Crawford MD - Co-Surgeon        Pre-op Diagnosis:  Glossopharyngeal nerve disease or syndrome [G52.1]    Post-op Diagnosis: Post-Op Diagnosis Codes:     * Glossopharyngeal nerve disease or syndrome [G52.1]    Procedure Performed:     Procedure(s) (LRB):  CRANIOTOMY for microvascular decompression NEURO MONITORING (Left)    Technical Procedures Used:   Left Suboccipital craniotomy, retrosigmoid approach for microvascular decompression of CN IX; Curtis left between decompressed nerve and PICA    Operative Findings: see full op note    Estimated Blood Loss: 100cc  Specimens:   Specimen (24h ago, onward)      None

## 2023-07-31 NOTE — ASSESSMENT & PLAN NOTE
- Continue home meds prn   - Symptoms may have been secondary to neuralgia, will monitor for recurrence

## 2023-07-31 NOTE — ASSESSMENT & PLAN NOTE
- Failed conservative medical management including Lyrica, Topamax, Tegretol, and steroids. Continued to have significant limiting pain.   - S/p left suboccipital craniotomy retrosigmoid approach for microvascular decompression 7/31/23 with Dr. Hudson, neurosurgery  - Appreciate neurosurgery recs: BP <140 next 24h, cardene gtt   - Pain control: morphine 1 mg q4h PRN   - PT/OT

## 2023-07-31 NOTE — NURSING
Patient arrived to [facility] from OR     Report received from: Manuel SMITH    Type of stroke/diagnosis: Suboccipital crani    Current symptoms: lethargic d/t anesthesia    Skin Assessment done: Yes  Wounds noted: none  *If wounds noted, was Wound Care consulted? N  *If wounds noted, LDA placed? N  Skin Assessment Verified by: Jazmín Salomon Completed? pending    Patient Belongings on Admit: none    NCC notified: BETH Nelson

## 2023-07-31 NOTE — CARE UPDATE
NSG post op check note:     S/p L Suboccipital crani for MVD:    Vitals:    07/31/23 1139   BP: 139/85   Pulse: 101   Resp: (!) 22   Temp:      Drowsy, opening eyes to voice  PERRL, EOMI  Face symmetric  Moving all extremities to command  Incision bandage dry    Plan:   24 hours steroids, antibiotics  CTH today  PT/OT/Speech  SBP < 140  ICU admit: Q1 hour checks

## 2023-07-31 NOTE — NURSING
Pt arrived back to room following CT        Pt was escorted by RN on cardiac monitoring, O2, ambu bag, and IV pole.        Patient placed back on bedside monitor with alarms audible, bed in low position with bed alarm on, call light within reach. JACOB.

## 2023-07-31 NOTE — NURSING
Pt c/o pain 10/10.  Pain medications ordered.     1139: Morphine given for headache 10/10 and incisional pain.     1201: Oxycodone 5mg tablet given after Salomon completed. Labetalol given for SBP > 140. NCC team notified.     1245: Pt continues to c/o headache.  Anne, NP notified of pain level after PRN meds given. Fentanyl 25mcg ordered.     1307: PRN valium given per Anne, NP.  Pt is sitting in bed with SBP > 180.  WCTM. Cardene gtt to be started if SBP does not come down per Anne, NP

## 2023-08-01 LAB
ALBUMIN SERPL BCP-MCNC: 3.8 G/DL (ref 3.5–5.2)
ALP SERPL-CCNC: 50 U/L (ref 55–135)
ALT SERPL W/O P-5'-P-CCNC: 38 U/L (ref 10–44)
ANION GAP SERPL CALC-SCNC: 11 MMOL/L (ref 8–16)
AST SERPL-CCNC: 31 U/L (ref 10–40)
BASOPHILS # BLD AUTO: 0.01 K/UL (ref 0–0.2)
BASOPHILS NFR BLD: 0.1 % (ref 0–1.9)
BILIRUB SERPL-MCNC: 0.3 MG/DL (ref 0.1–1)
BUN SERPL-MCNC: 8 MG/DL (ref 6–20)
CALCIUM SERPL-MCNC: 8.2 MG/DL (ref 8.7–10.5)
CHLORIDE SERPL-SCNC: 110 MMOL/L (ref 95–110)
CO2 SERPL-SCNC: 15 MMOL/L (ref 23–29)
CREAT SERPL-MCNC: 0.6 MG/DL (ref 0.5–1.4)
DIFFERENTIAL METHOD: ABNORMAL
EOSINOPHIL # BLD AUTO: 0 K/UL (ref 0–0.5)
EOSINOPHIL NFR BLD: 0 % (ref 0–8)
ERYTHROCYTE [DISTWIDTH] IN BLOOD BY AUTOMATED COUNT: 12.7 % (ref 11.5–14.5)
EST. GFR  (NO RACE VARIABLE): >60 ML/MIN/1.73 M^2
GLUCOSE SERPL-MCNC: 153 MG/DL (ref 70–110)
HCT VFR BLD AUTO: 40.6 % (ref 37–48.5)
HGB BLD-MCNC: 13.8 G/DL (ref 12–16)
IMM GRANULOCYTES # BLD AUTO: 0.02 K/UL (ref 0–0.04)
IMM GRANULOCYTES NFR BLD AUTO: 0.2 % (ref 0–0.5)
LYMPHOCYTES # BLD AUTO: 0.6 K/UL (ref 1–4.8)
LYMPHOCYTES NFR BLD: 7.3 % (ref 18–48)
MAGNESIUM SERPL-MCNC: 2 MG/DL (ref 1.6–2.6)
MCH RBC QN AUTO: 30.7 PG (ref 27–31)
MCHC RBC AUTO-ENTMCNC: 34 G/DL (ref 32–36)
MCV RBC AUTO: 90 FL (ref 82–98)
MONOCYTES # BLD AUTO: 0.5 K/UL (ref 0.3–1)
MONOCYTES NFR BLD: 6.2 % (ref 4–15)
NEUTROPHILS # BLD AUTO: 7 K/UL (ref 1.8–7.7)
NEUTROPHILS NFR BLD: 86.2 % (ref 38–73)
NRBC BLD-RTO: 0 /100 WBC
PHOSPHATE SERPL-MCNC: 2.7 MG/DL (ref 2.7–4.5)
PLATELET # BLD AUTO: 187 K/UL (ref 150–450)
PMV BLD AUTO: 10.2 FL (ref 9.2–12.9)
POTASSIUM SERPL-SCNC: 3.9 MMOL/L (ref 3.5–5.1)
PROT SERPL-MCNC: 6.4 G/DL (ref 6–8.4)
RBC # BLD AUTO: 4.5 M/UL (ref 4–5.4)
SODIUM SERPL-SCNC: 136 MMOL/L (ref 136–145)
WBC # BLD AUTO: 8.18 K/UL (ref 3.9–12.7)

## 2023-08-01 PROCEDURE — 63600175 PHARM REV CODE 636 W HCPCS: Performed by: STUDENT IN AN ORGANIZED HEALTH CARE EDUCATION/TRAINING PROGRAM

## 2023-08-01 PROCEDURE — 97165 OT EVAL LOW COMPLEX 30 MIN: CPT

## 2023-08-01 PROCEDURE — 94761 N-INVAS EAR/PLS OXIMETRY MLT: CPT

## 2023-08-01 PROCEDURE — 25000003 PHARM REV CODE 250: Performed by: STUDENT IN AN ORGANIZED HEALTH CARE EDUCATION/TRAINING PROGRAM

## 2023-08-01 PROCEDURE — 63600175 PHARM REV CODE 636 W HCPCS

## 2023-08-01 PROCEDURE — 97530 THERAPEUTIC ACTIVITIES: CPT

## 2023-08-01 PROCEDURE — 97535 SELF CARE MNGMENT TRAINING: CPT

## 2023-08-01 PROCEDURE — 97162 PT EVAL MOD COMPLEX 30 MIN: CPT

## 2023-08-01 PROCEDURE — 84100 ASSAY OF PHOSPHORUS: CPT | Performed by: PHYSICIAN ASSISTANT

## 2023-08-01 PROCEDURE — 83735 ASSAY OF MAGNESIUM: CPT | Performed by: PHYSICIAN ASSISTANT

## 2023-08-01 PROCEDURE — 25000003 PHARM REV CODE 250

## 2023-08-01 PROCEDURE — 92610 EVALUATE SWALLOWING FUNCTION: CPT

## 2023-08-01 PROCEDURE — 20600001 HC STEP DOWN PRIVATE ROOM

## 2023-08-01 PROCEDURE — 25000003 PHARM REV CODE 250: Performed by: NURSE PRACTITIONER

## 2023-08-01 PROCEDURE — 25000003 PHARM REV CODE 250: Performed by: PHYSICIAN ASSISTANT

## 2023-08-01 PROCEDURE — 80053 COMPREHEN METABOLIC PANEL: CPT | Performed by: PHYSICIAN ASSISTANT

## 2023-08-01 PROCEDURE — 63600175 PHARM REV CODE 636 W HCPCS: Performed by: NURSE PRACTITIONER

## 2023-08-01 PROCEDURE — 85025 COMPLETE CBC W/AUTO DIFF WBC: CPT | Performed by: PHYSICIAN ASSISTANT

## 2023-08-01 RX ORDER — OXYCODONE HYDROCHLORIDE 10 MG/1
10 TABLET ORAL EVERY 4 HOURS PRN
Status: DISCONTINUED | OUTPATIENT
Start: 2023-08-01 | End: 2023-08-02 | Stop reason: HOSPADM

## 2023-08-01 RX ORDER — OXYCODONE HYDROCHLORIDE 5 MG/1
5 TABLET ORAL EVERY 4 HOURS PRN
Status: DISCONTINUED | OUTPATIENT
Start: 2023-08-01 | End: 2023-08-02 | Stop reason: HOSPADM

## 2023-08-01 RX ORDER — GABAPENTIN 400 MG/1
400 CAPSULE ORAL 3 TIMES DAILY
Status: DISCONTINUED | OUTPATIENT
Start: 2023-08-01 | End: 2023-08-02 | Stop reason: HOSPADM

## 2023-08-01 RX ORDER — HEPARIN SODIUM 5000 [USP'U]/ML
5000 INJECTION, SOLUTION INTRAVENOUS; SUBCUTANEOUS EVERY 8 HOURS
Status: DISCONTINUED | OUTPATIENT
Start: 2023-08-01 | End: 2023-08-02 | Stop reason: HOSPADM

## 2023-08-01 RX ORDER — ACETAMINOPHEN 500 MG
1000 TABLET ORAL 3 TIMES DAILY
Status: DISCONTINUED | OUTPATIENT
Start: 2023-08-01 | End: 2023-08-02 | Stop reason: HOSPADM

## 2023-08-01 RX ORDER — HYDROMORPHONE HYDROCHLORIDE 1 MG/ML
0.5 INJECTION, SOLUTION INTRAMUSCULAR; INTRAVENOUS; SUBCUTANEOUS EVERY 6 HOURS PRN
Status: DISCONTINUED | OUTPATIENT
Start: 2023-08-01 | End: 2023-08-02 | Stop reason: HOSPADM

## 2023-08-01 RX ORDER — GABAPENTIN 300 MG/1
300 CAPSULE ORAL 3 TIMES DAILY
Status: DISCONTINUED | OUTPATIENT
Start: 2023-08-01 | End: 2023-08-01

## 2023-08-01 RX ADMIN — MORPHINE SULFATE 1 MG: 2 INJECTION, SOLUTION INTRAMUSCULAR; INTRAVENOUS at 12:08

## 2023-08-01 RX ADMIN — OXYCODONE HYDROCHLORIDE 10 MG: 10 TABLET ORAL at 06:08

## 2023-08-01 RX ADMIN — OXYCODONE HYDROCHLORIDE 10 MG: 10 TABLET ORAL at 08:08

## 2023-08-01 RX ADMIN — CYCLOBENZAPRINE 10 MG: 10 TABLET, FILM COATED ORAL at 09:08

## 2023-08-01 RX ADMIN — GABAPENTIN 400 MG: 400 CAPSULE ORAL at 08:08

## 2023-08-01 RX ADMIN — CARBAMAZEPINE 100 MG: 100 CAPSULE, EXTENDED RELEASE ORAL at 09:08

## 2023-08-01 RX ADMIN — ACETAMINOPHEN 1000 MG: 500 TABLET ORAL at 02:08

## 2023-08-01 RX ADMIN — CYCLOBENZAPRINE 10 MG: 10 TABLET, FILM COATED ORAL at 02:08

## 2023-08-01 RX ADMIN — NICARDIPINE HYDROCHLORIDE 5 MG/HR: 0.2 INJECTION, SOLUTION INTRAVENOUS at 03:08

## 2023-08-01 RX ADMIN — GABAPENTIN 400 MG: 400 CAPSULE ORAL at 09:08

## 2023-08-01 RX ADMIN — DIAZEPAM 5 MG: 5 TABLET ORAL at 05:08

## 2023-08-01 RX ADMIN — ACETAMINOPHEN 1000 MG: 500 TABLET ORAL at 08:08

## 2023-08-01 RX ADMIN — ACETAMINOPHEN 1000 MG: 500 TABLET ORAL at 09:08

## 2023-08-01 RX ADMIN — GABAPENTIN 400 MG: 400 CAPSULE ORAL at 02:08

## 2023-08-01 RX ADMIN — DIAZEPAM 5 MG: 5 TABLET ORAL at 12:08

## 2023-08-01 RX ADMIN — CARBAMAZEPINE 100 MG: 100 CAPSULE, EXTENDED RELEASE ORAL at 08:08

## 2023-08-01 RX ADMIN — OXYCODONE HYDROCHLORIDE 10 MG: 10 TABLET ORAL at 10:08

## 2023-08-01 RX ADMIN — FINASTERIDE 5 MG: 5 TABLET, FILM COATED ORAL at 08:08

## 2023-08-01 RX ADMIN — MORPHINE SULFATE 1 MG: 2 INJECTION, SOLUTION INTRAMUSCULAR; INTRAVENOUS at 04:08

## 2023-08-01 RX ADMIN — CEFAZOLIN 1 G: 1 INJECTION, POWDER, FOR SOLUTION INTRAMUSCULAR; INTRAVENOUS at 05:08

## 2023-08-01 RX ADMIN — DIAZEPAM 5 MG: 5 TABLET ORAL at 06:08

## 2023-08-01 RX ADMIN — OXYCODONE HYDROCHLORIDE 10 MG: 10 TABLET ORAL at 05:08

## 2023-08-01 RX ADMIN — ONDANSETRON 4 MG: 2 INJECTION INTRAMUSCULAR; INTRAVENOUS at 08:08

## 2023-08-01 RX ADMIN — DEXAMETHASONE SODIUM PHOSPHATE 4 MG: 4 INJECTION INTRA-ARTICULAR; INTRALESIONAL; INTRAMUSCULAR; INTRAVENOUS; SOFT TISSUE at 05:08

## 2023-08-01 RX ADMIN — OXYCODONE HYDROCHLORIDE 10 MG: 10 TABLET ORAL at 12:08

## 2023-08-01 RX ADMIN — ONDANSETRON 4 MG: 2 INJECTION INTRAMUSCULAR; INTRAVENOUS at 07:08

## 2023-08-01 RX ADMIN — OXYBUTYNIN CHLORIDE 5 MG: 5 TABLET ORAL at 08:08

## 2023-08-01 RX ADMIN — OXYBUTYNIN CHLORIDE 5 MG: 5 TABLET ORAL at 09:08

## 2023-08-01 RX ADMIN — TRAZODONE HYDROCHLORIDE 150 MG: 50 TABLET ORAL at 10:08

## 2023-08-01 RX ADMIN — HEPARIN SODIUM 5000 UNITS: 5000 INJECTION INTRAVENOUS; SUBCUTANEOUS at 09:08

## 2023-08-01 RX ADMIN — CYCLOBENZAPRINE 10 MG: 10 TABLET, FILM COATED ORAL at 08:08

## 2023-08-01 NOTE — SUBJECTIVE & OBJECTIVE
Interval History:  8/1/23: POD 1 from CN 9 decompression. Reports that pre-operative left-sided facial, jaw, ear pain with associated dysphagia are completely improved. Only has incisional pain. Step down orders in, pain regimen optimized. CTH post op with moderate pneumocephalus along subarachnoid space, no acute intracranial abnormality. SQH started for tonight 24 hrs post op. Diet ordered, fluids discontinued. On appropriate home meds. D/c a-line, singleton    Review of Systems   Constitutional:  Negative for chills and fever.   HENT:  Negative for hearing loss and trouble swallowing.    Eyes:  Negative for visual disturbance.   Respiratory:  Negative for chest tightness and shortness of breath.    Cardiovascular:  Negative for chest pain.   Gastrointestinal:  Negative for abdominal pain.   Genitourinary:  Negative for difficulty urinating.   Neurological:  Positive for headaches.       Objective:     Vitals:  Temp: 98 °F (36.7 °C)  Pulse: 92  Rhythm: normal sinus rhythm  BP: 120/67  MAP (mmHg): 87  Resp: 10  SpO2: 99 %    Temp  Min: 97.7 °F (36.5 °C)  Max: 98.5 °F (36.9 °C)  Pulse  Min: 78  Max: 112  BP  Min: 105/60  Max: 177/88  MAP (mmHg)  Min: 78  Max: 125  Resp  Min: 8  Max: 50  SpO2  Min: 92 %  Max: 100 %    07/31 0701 - 08/01 0700  In: 2625.3 [P.O.:30; I.V.:850.6]  Out: 2450 [Urine:2450]            Physical Exam  Vitals and nursing note reviewed.   HENT:      Head:      Comments: L retrosigmoid incision with overlying dressing c/d/i  Eyes:      Extraocular Movements: Extraocular movements intact.      Pupils: Pupils are equal, round, and reactive to light.   Cardiovascular:      Rate and Rhythm: Normal rate.   Pulmonary:      Effort: Pulmonary effort is normal.   Abdominal:      Palpations: Abdomen is soft.   Neurological:      General: No focal deficit present.      Mental Status: She is alert and oriented to person, place, and time.       E4V5M6  Aox3  Cni, PERRL  FC x4 AG  SILT    No drift  L retrosigmoid  incision c/d/i       Medications:  Continuous Scheduledacetaminophen, 1,000 mg, TID  carBAMazepine, 100 mg, BID  cetirizine, 10 mg, Daily  cyclobenzaprine, 10 mg, TID  finasteride, 5 mg, Daily  gabapentin, 400 mg, TID  heparin (porcine), 5,000 Units, Q8H  oxybutynin, 5 mg, BID    PRNalbuterol, 2 puff, Q4H PRN  diazePAM, 5 mg, Q6H PRN  hydrALAZINE, 10 mg, Q6H PRN  HYDROmorphone, 0.5 mg, Q6H PRN  labetalol, 10 mg, Q10 Min PRN  labetalol, 10 mg, Q4H PRN  ondansetron, 4 mg, Q4H PRN  oxyCODONE, 5 mg, Q4H PRN  oxyCODONE, 10 mg, Q4H PRN  promethazine (PHENERGAN) 12.5 mg in dextrose 5 % (D5W) 50 mL IVPB, 12.5 mg, Q6H PRN  traZODone, 150 mg, Nightly PRN  zolpidem, 5 mg, Nightly PRN      Today I personally reviewed pertinent medications, lines/drains/airways, imaging, cardiology results, laboratory results, microbiology results, notably:    Diet  Diet Adult Regular (IDDSI Level 7)  Diet Adult Regular (IDDSI Level 7)    CT Head Without Contrast    Result Date: 7/31/2023  Postsurgical changes status post glossopharyngeal nerve decompression on the left.  Moderate pneumocephalus scattered in the subarachnoid space. This report was flagged in Epic as abnormal. Electronically signed by resident: Max Painter Date:    07/31/2023 Time:    16:34 Electronically signed by: Max Oseguera Date:    07/31/2023 Time:    17:14

## 2023-08-01 NOTE — PLAN OF CARE
"[facility] Care Plan    POC reviewed with Mae Gregory and family at 1400. Pt verbalized understanding. Questions and concerns addressed. No acute events today. Pt progressing toward goals. Will continue to monitor. See below and flowsheets for full assessment and VS info.     -pain mgmt with PRN medications  -PT, OT and SLP with patient today   -regular diet ordered   -singleton and arterial line dc'd  -transfer orders in place        Is this a stroke patient? no    Neuro:  [place] Coma Scale  Best Eye Response: 4-->(E4) spontaneous  Best Motor Response: 6-->(M6) obeys commands  Best Verbal Response: 5-->(V5) oriented  [place] Coma Scale Score: 15  Assessment Qualifiers: patient not sedated/intubated, no eye obstruction present  Pupil PERRLA: yes     24 hr Temp:  [97.7 °F (36.5 °C)-98.4 °F (36.9 °C)]     CV:   Rhythm: normal sinus rhythm  BP goals:   SBP < 140  MAP > 65    Resp:           Plan: N/A    GI/:     Diet/Nutrition Received: regular  Last Bowel Movement: 07/30/23  Voiding Characteristics: ureteral catheter    Intake/Output Summary (Last 24 hours) at 8/1/2023 1815  Last data filed at 8/1/2023 1605  Gross per 24 hour   Intake 1754.3 ml   Output 2175 ml   Net -420.7 ml          Labs/Accuchecks:  Recent Labs   Lab 08/01/23  0003   WBC 8.18   RBC 4.50   HGB 13.8   HCT 40.6         Recent Labs   Lab 08/01/23  0003      K 3.9   CO2 15*      BUN 8   CREATININE 0.6   ALKPHOS 50*   ALT 38   AST 31   BILITOT 0.3    No results for input(s): "PROTIME", "INR", "APTT", "HEPANTIXA" in the last 168 hours. No results for input(s): "CPK", "CPKMB", "TROPONINI", "MB" in the last 168 hours.    Electrolytes: N/A - electrolytes WDL  Accuchecks: none    Gtts:      LDA/Wounds:  Lines/Drains/Airways       Peripheral Intravenous Line  Duration                  Peripheral IV - Single Lumen 18 G Left Wrist -- days         Peripheral IV - Single Lumen 07/31/23 1940 22 G Anterior;Right Forearm <1 day              "     Wounds: Yes  Wound care consulted: Yes

## 2023-08-01 NOTE — PLAN OF CARE
OT eval completed; pt at functional baseline, not in need of OT services at this time. DC OT orders, please reconsult pending change in functional status   Problem: Occupational Therapy  Goal: Occupational Therapy Goal  Description: OT eval completed; pt at functional baseline, not in need of OT services at this time. DC OT orders, please reconsult pending change in functional status   Outcome: Met

## 2023-08-01 NOTE — HOSPITAL COURSE
8/1: OR yesterday for left microvascular decompression, tolerated procedure well. Intact on exam. CTH with post op pneumocephalus, but no detrimental change. OK for TTF today   8/2: AF, VSS, Neuro exam stable. Head badge removed. Pt stated she is doing well and admits to having difficulty with articulating certain words. Denies facial pain, dysphasia, headache, vision disturbances. Speech Therapy outpatient referral placed.

## 2023-08-01 NOTE — ASSESSMENT & PLAN NOTE
- Failed conservative medical management including Lyrica, Topamax, Tegretol, and steroids. Continued to have significant limiting pain.   - S/p left suboccipital craniotomy retrosigmoid approach for microvascular decompression 7/31/23 with Dr. Hudson, neurosurgery  - TTF today  - Pain regimen optimized; GBP dose increased; oxy 5/10mg prn and dilaudid breakthrough added  - Dex 24 hrs finished  - CTH post op with no acute intracranial process, scattered subarachnoid pneumocephalus  - d/c a-line, singleton  - okay for diet, maintenance fluids d/c'd  - PT/OT

## 2023-08-01 NOTE — ANESTHESIA POSTPROCEDURE EVALUATION
Anesthesia Post Evaluation    Patient: Mae Gregory    Procedure(s) Performed: Procedure(s) (LRB):  CRANIOTOMY for microvascular decompression NEURO MONITORING (Left)    Final Anesthesia Type: general      Patient location during evaluation: ICU  Patient participation: Yes- Able to Participate  Level of consciousness: awake and alert  Post-procedure vital signs: reviewed and stable  Pain management: adequate  Airway patency: patent  MELANY mitigation strategies: Extubation while patient is awake  PONV status at discharge: No PONV  Anesthetic complications: no      Cardiovascular status: stable  Respiratory status: spontaneous ventilation and face mask  Hydration status: euvolemic  Follow-up not needed.          Vitals Value Taken Time   /63 08/01/23 0802   Temp 36.7 °C (98 °F) 08/01/23 0705   Pulse 88 08/01/23 0823   Resp 10 08/01/23 0823   SpO2 100 % 08/01/23 0823   Vitals shown include unvalidated device data.      No case tracking events are documented in the log.      Pain/Mohsen Score: Pain Rating Prior to Med Admin: 0 (8/1/2023  5:28 AM)  Pain Rating Post Med Admin: 4 (7/31/2023  5:20 PM)

## 2023-08-01 NOTE — PT/OT/SLP EVAL
Physical Therapy Evaluation and Discharge Note    Patient Name:  Mae Gregory   MRN:  [id number]    Recommendations:     Discharge Recommendations: other (see comments)  Discharge Equipment Recommendations: none   Barriers to discharge: None    Assessment:     Mae Gregory is a 58 y.o. female admitted with a medical diagnosis of Glossopharyngeal nerve disease. .  At this time, patient is functioning at their prior level of function and does not require further acute PT services.     Recent Surgery: Procedure(s) (LRB):  CRANIOTOMY for microvascular decompression NEURO MONITORING (Left) 1 Day Post-Op    Plan:     During this hospitalization, patient does not require further acute PT services.  Please re-consult if situation changes.      Subjective     Chief Complaint: headache  Patient/Family Comments/goals: hopes to progress back to home I  Pain/Comfort:  Pain Rating 1: 5/10  Location - Side 1: Bilateral  Location - Orientation 1: generalized  Location 1: head  Pain Addressed 1: Pre-medicate for activity, Reposition, Distraction, Cessation of Activity  Pain Rating Post-Intervention 1: 5/10    Patients cultural, spiritual, Islam conflicts given the current situation: no    Living Environment:  Prior to admission, patients level of function was independent.  Equipment used at home: none.  DME owned (not currently used): none.  Upon discharge, patient will have assistance from family.    Objective:     Communicated with RN prior to session.  Patient found up in chair with singleton catheter, blood pressure cuff, peripheral IV, telemetry, pulse ox (continuous) upon PT entry to room.    General Precautions: Standard, aspiration    Orthopedic Precautions:N/A   Braces: N/A  Respiratory Status: Room air    Exams:  Cognitive Exam:  Patient is oriented to Person, Place, Time, and Situation  Sensation:    -       Intact  RLE ROM: WFL  RLE Strength: WFL  LLE ROM: WFL  LLE Strength: WFL    Functional Mobility:  Transfers:      Sit to Stand:  independence with no AD  Gait: 150' w/ independence    AM-PAC 6 CLICK MOBILITY  Total Score:24       Treatment and Education:  Pt and family educated on PT assessment and plan to d/c orders 2/2 no current needs for skilled PT. Pt instructed to slowly progress mobility each day towards PLOF without attempting to jump right into previous daily routine immediately upon d/c 2/2 general deconditioning from being in hospital. Pt and family in agreement w/ POC and verbalized understanding w/ plan to slowly progress to normal daily activities at home and inform MD team if pt experiences any difficulty progressing back to PLOF while here or upon d/c in order for PT to be re-consulted/orders placed for OP PT as needed.     AM-PAC 6 CLICK MOBILITY  Total Score:24     Patient left up in chair with all lines intact, call button in reach, RN notified, and family present.    GOALS:   Multidisciplinary Problems       Physical Therapy Goals       Not on file                    History:     Past Medical History:   Diagnosis Date    DDD (degenerative disc disease), cervical 3/15/2017    DDD (degenerative disc disease), lumbar 3/15/2017    Glaucoma 3/7/2018    Migraine 3/15/2017    Primary insomnia 3/15/2017       Past Surgical History:   Procedure Laterality Date    AUGMENTATION OF BREAST      BREAST SURGERY       SECTION   and 2001    x2    COLON SURGERY  10/15/2018    colon resection     CRANIOTOMY Left 2023    Procedure: CRANIOTOMY for microvascular decompression NEURO MONITORING;  Surgeon: Nils Hudson MD;  Location: [facility] OR [address] [place] [address];  Service: Neurosurgery;  Laterality: Left;  regular bed, supine, bump, hickman, type and cross 2 units, cranial nerve monitoring, optical stealth CO SURGEON DR [place]       Time Tracking:     PT Received On: 23  PT Start Time: 1426     PT Stop Time: 1435  PT Total Time (min): 9 min     Billable Minutes: Evaluation 9      2023

## 2023-08-01 NOTE — PROGRESS NOTES
[facility] - Neuro Critical Care  Neurocritical Care  Progress Note    Admit Date: 7/31/2023  Service Date: 08/01/2023  Length of Stay: 1    Subjective:     Chief Complaint: Glossopharyngeal nerve disease    History of Present Illness: Mae Gregory is a 58 year old female with a PMH of glossopharyngeal neuralgia (CN IX neuralgia) s/p left suboccipital craniotomy retrosigmoid approach for microvascular decompression performed 7/31/23 by Dr. Hudson, neurosurgery, after failed conservative medical management. She was seen outpatient for recurrent jaw pain, dysphagia, and ear pain consistent with CN IX nerve pain. On evaluation, she was found to have a tortuous left PICA encroaching on the root entry zone of left-sided nerve roots extending to the jugular foramen, believed to be the origin of her pain. She was previously on Lyrica, Topamax, Tegretol, and steroids, all of which failed to control her pain. She elected for microvascular decompression due to severe, uncontrolled, and limiting pain that was affecting her daily living.               Hospital Course: 8/1/23: POD 1 from CN 9 decompression. Reports that pre-operative left-sided facial, jaw, ear pain with associated dysphagia are completely improved. Only has incisional pain. Step down orders in, pain regimen optimized. CTH post op with moderate pneumocephalus along subarachnoid space, no acute intracranial abnormality. SQH started for tonight 24 hrs post op. Diet ordered, fluids discontinued. On appropriate home meds. D/c a-line, singleton      Interval History:  8/1/23: POD 1 from CN 9 decompression. Reports that pre-operative left-sided facial, jaw, ear pain with associated dysphagia are completely improved. Only has incisional pain. Step down orders in, pain regimen optimized. CTH post op with moderate pneumocephalus along subarachnoid space, no acute intracranial abnormality. SQH started for tonight 24 hrs post op. Diet ordered, fluids discontinued. On appropriate  home meds. D/c a-line, singleton    Review of Systems   Constitutional:  Negative for chills and fever.   HENT:  Negative for hearing loss and trouble swallowing.    Eyes:  Negative for visual disturbance.   Respiratory:  Negative for chest tightness and shortness of breath.    Cardiovascular:  Negative for chest pain.   Gastrointestinal:  Negative for abdominal pain.   Genitourinary:  Negative for difficulty urinating.   Neurological:  Positive for headaches.       Objective:     Vitals:  Temp: 98 °F (36.7 °C)  Pulse: 92  Rhythm: normal sinus rhythm  BP: 120/67  MAP (mmHg): 87  Resp: 10  SpO2: 99 %    Temp  Min: 97.7 °F (36.5 °C)  Max: 98.5 °F (36.9 °C)  Pulse  Min: 78  Max: 112  BP  Min: 105/60  Max: 177/88  MAP (mmHg)  Min: 78  Max: 125  Resp  Min: 8  Max: 50  SpO2  Min: 92 %  Max: 100 %    07/31 0701 - 08/01 0700  In: 2625.3 [P.O.:30; I.V.:850.6]  Out: 2450 [Urine:2450]            Physical Exam  Vitals and nursing note reviewed.   HENT:      Head:      Comments: L retrosigmoid incision with overlying dressing c/d/i  Eyes:      Extraocular Movements: Extraocular movements intact.      Pupils: Pupils are equal, round, and reactive to light.   Cardiovascular:      Rate and Rhythm: Normal rate.   Pulmonary:      Effort: Pulmonary effort is normal.   Abdominal:      Palpations: Abdomen is soft.   Neurological:      General: No focal deficit present.      Mental Status: She is alert and oriented to person, place, and time.       E4V5M6  Aox3  Cni, PERRL  FC x4 AG  SILT    No drift  L retrosigmoid incision c/d/i       Medications:  Continuous Scheduledacetaminophen, 1,000 mg, TID  carBAMazepine, 100 mg, BID  cetirizine, 10 mg, Daily  cyclobenzaprine, 10 mg, TID  finasteride, 5 mg, Daily  gabapentin, 400 mg, TID  heparin (porcine), 5,000 Units, Q8H  oxybutynin, 5 mg, BID    PRNalbuterol, 2 puff, Q4H PRN  diazePAM, 5 mg, Q6H PRN  hydrALAZINE, 10 mg, Q6H PRN  HYDROmorphone, 0.5 mg, Q6H PRN  labetalol, 10 mg, Q10 Min  PRN  labetalol, 10 mg, Q4H PRN  ondansetron, 4 mg, Q4H PRN  oxyCODONE, 5 mg, Q4H PRN  oxyCODONE, 10 mg, Q4H PRN  promethazine (PHENERGAN) 12.5 mg in dextrose 5 % (D5W) 50 mL IVPB, 12.5 mg, Q6H PRN  traZODone, 150 mg, Nightly PRN  zolpidem, 5 mg, Nightly PRN      Today I personally reviewed pertinent medications, lines/drains/airways, imaging, cardiology results, laboratory results, microbiology results, notably:    Diet  Diet Adult Regular (IDDSI Level 7)  Diet Adult Regular (IDDSI Level 7)    CT Head Without Contrast    Result Date: 7/31/2023  Postsurgical changes status post glossopharyngeal nerve decompression on the left.  Moderate pneumocephalus scattered in the subarachnoid space. This report was flagged in Epic as abnormal. Electronically signed by resident: Max Painter Date:    07/31/2023 Time:    16:34 Electronically signed by: Max Oseguera Date:    07/31/2023 Time:    17:14         Assessment/Plan:     Neuro  * Glossopharyngeal nerve disease  - Failed conservative medical management including Lyrica, Topamax, Tegretol, and steroids. Continued to have significant limiting pain.   - S/p left suboccipital craniotomy retrosigmoid approach for microvascular decompression 7/31/23 with Dr. Hudson, neurosurgery  - TTF today  - Pain regimen optimized; GBP dose increased; oxy 5/10mg prn and dilaudid breakthrough added  - Dex 24 hrs finished  - CTH post op with no acute intracranial process, scattered subarachnoid pneumocephalus  - d/c a-line singleton  - okay for diet, maintenance fluids d/c'd  - PT/OT       -Dystonia - follows Neurology regularly for Botox injections  - stable     Migraine  - Continue home meds prn   - Symptoms may have been secondary to neuralgia, will monitor for recurrence     Psychiatric  -Anxiety with depression - controlled off Wellbutrin since 4/2023  - diazepam 5 mg q6h PRN    Orthopedic  -Cervical myofascial pain syndrome - follows Neurology for injections  - diazepam 5 mg q6h  PRN    Other  -Insomnia  - Zolpidem 5 mg qhs PRN           The patient is being Prophylaxed for:  Venous Thromboembolism with: Mechanical or Chemical  Stress Ulcer with: Not Applicable   Ventilator Pneumonia with: not applicable    Activity Orders          Diet Adult Regular (IDDSI Level 7): Regular starting at 08/01 0802        No Order    Jethro Mackey MD  Neurocritical Care  [facility] - Neuro Critical Care

## 2023-08-01 NOTE — PLAN OF CARE
[facility] - Neuro Critical Care  Initial Discharge Assessment       Primary Care Provider: Rajiv Garcia MD    Admission Diagnosis: Glossopharyngeal nerve disease or syndrome [G52.1]  Nerve compression syndrome [G58.9]    Admission Date: 7/31/2023  Expected Discharge Date: 8/5/2023    Transition of Care Barriers: None    Payor: BLUE CROSS BLUE SHIELD / Plan: [place] HMO / Product Type: HMO /     Extended Emergency Contact Information  Primary Emergency Contact: Joseph Gottlieb   [place]  Mobile Phone: [phone]  Relation: Son    Discharge Plan A: Home  Discharge Plan B: Home      [id number] - [place], [place] - [address]  [address]  [place] [address]  Phone: [phone] Fax: [phone]    [id number] - [place], [place] - [address]  [address]  [address]  [facility] [address]  Phone: [phone] Fax: [phone]      Transferred from:  home    Past Medical History:   Diagnosis Date    DDD (degenerative disc disease), cervical 3/15/2017    DDD (degenerative disc disease), lumbar 3/15/2017    Glaucoma 3/7/2018    Migraine 3/15/2017    Primary insomnia 3/15/2017         CM met with patient and Billie Akhtar (mother) [phone] in room for Discharge Planning Assessment.  Patient is able to answer questions.  Per patient, she lives alone in a 4th floor apartment with elevator access to enter.   Per patient, she was independent with ADLS and used no dme for ambulation.  Patient will have assistance from her mother and son upon discharge.   Discharge Planning Booklet given to patient/family and discussed.  All questions addressed.  CM will follow for needs.    Initial Assessment (most recent)       Adult Discharge Assessment - 08/01/23 1606          Discharge Assessment    Assessment Type Discharge Planning Assessment     Confirmed/corrected address, phone number and insurance Yes     Confirmed Demographics Correct on Facesheet     Source of Information patient      Communicated CHARMAINE with patient/caregiver Date not available/Unable to determine     Reason For Admission Glossopharyngeal nerve disease (Chronic)     People in Home alone     Facility Arrived From: home     Do you expect to return to your current living situation? Yes     Do you have help at home or someone to help you manage your care at home? Yes     Who are your caregiver(s) and their phone number(s)? Billie Akhtar (mother) [phone]     Prior to hospitilization cognitive status: Alert/Oriented     Current cognitive status: Alert/Oriented     Walking or Climbing Stairs --   independent    Dressing/Bathing --   independent    Home Accessibility other (see comments)   elevator    Home Layout Other (see comments)   4th floor apartment with elevator access    Equipment Currently Used at Home none     Readmission within 30 days? No     Patient currently being followed by outpatient case management? No     Do you currently have service(s) that help you manage your care at home? No     Do you take prescription medications? Yes     Do you have prescription coverage? Yes     Coverage BSBC/Medicaid     Do you have any problems affording any of your prescribed medications? No     Is the patient taking medications as prescribed? yes     Who is going to help you get home at discharge? Billie Akhtar (mother) [phone]     How do you get to doctors appointments? family or friend will provide     Are you on dialysis? No     Do you take coumadin? No     Discharge Plan A Home     Discharge Plan B Home     DME Needed Upon Discharge  none     Discharge Plan discussed with: Patient     Transition of Care Barriers None        Physical Activity    On average, how many days per week do you engage in moderate to strenuous exercise (like a brisk walk)? 5 days   Has not be able to exercise x 2 months due to pain per patient    On average, how many minutes do you engage in exercise at this level? 60 min        Financial Resource  Strain    How hard is it for you to pay for the very basics like food, housing, medical care, and heating? Not hard at all        Housing Stability    In the last 12 months, was there a time when you were not able to pay the mortgage or rent on time? No     In the last 12 months, how many places have you lived? 1     In the last 12 months, was there a time when you did not have a steady place to sleep or slept in a shelter (including now)? No        Transportation Needs    In the past 12 months, has lack of transportation kept you from medical appointments or from getting medications? No     In the past 12 months, has lack of transportation kept you from meetings, work, or from getting things needed for daily living? No        Food Insecurity    Within the past 12 months, you worried that your food would run out before you got the money to buy more. Never true     Within the past 12 months, the food you bought just didn't last and you didn't have money to get more. Never true        Stress    Do you feel stress - tense, restless, nervous, or anxious, or unable to sleep at night because your mind is troubled all the time - these days? To some extent        Social Connections    In a typical week, how many times do you talk on the phone with family, friends, or neighbors? More than three times a week     How often do you get together with friends or relatives? --   2 x per month    How often do you attend Scientology or Yarsani services? More than 4 times per year     Do you belong to any clubs or organizations such as Scientology groups, unions, fraternal or athletic groups, or school groups? No     How often do you attend meetings of the clubs or organizations you belong to? Never     Are you , , , , never , or living with a partner?         Alcohol Use    Q1: How often do you have a drink containing alcohol? Never     Q2: How many drinks containing alcohol do you have on a  typical day when you are drinking? Patient does not drink     Q3: How often do you have six or more drinks on one occasion? Never                          Kylah Nielsen RN, CCRN-K, [facility]  Neuro-Critical Care   [phone]

## 2023-08-01 NOTE — PLAN OF CARE
"  Problem: Adult Inpatient Plan of Care  Goal: Optimal Comfort and Wellbeing  Outcome: Ongoing, Progressing  Goal: Readiness for Transition of Care  Outcome: Ongoing, Progressing   [facility] Care Plan    POC reviewed with Mae Briseno Gregory and family at 0300. Pt verbalized understanding. Questions and concerns addressed. No acute events overnight. Pt progressing toward goals. Will continue to monitor. See below and flowsheets for full assessment and VS info.     PIV placed  Oxy, Valium, morphine given around the clock  Phenergan and Zofran given  Labs WDL  Cardene gtt weaned off    Is this a stroke patient? no    Neuro:  [place] Coma Scale  Best Eye Response: 4-->(E4) spontaneous  Best Motor Response: 6-->(M6) obeys commands  Best Verbal Response: 5-->(V5) oriented  [place] Coma Scale Score: 15  Assessment Qualifiers: patient not sedated/intubated  Pupil PERRLA: yes     24hr Temp:  [97.7 °F (36.5 °C)-98.5 °F (36.9 °C)]     CV:   Rhythm: normal sinus rhythm  BP goals:   SBP < 140  MAP > 65    Resp:           Plan: N/A    GI/:     Diet/Nutrition Received: NPO  Last Bowel Movement: 07/30/23  Voiding Characteristics: urethral catheter (bladder)    Intake/Output Summary (Last 24 hours) at 8/1/2023 0627  Last data filed at 8/1/2023 0600  Gross per 24 hour   Intake 2625.26 ml   Output 2450 ml   Net 175.26 ml          Labs/Accuchecks:  Recent Labs   Lab 08/01/23  0003   WBC 8.18   RBC 4.50   HGB 13.8   HCT 40.6         Recent Labs   Lab 08/01/23  0003      K 3.9   CO2 15*      BUN 8   CREATININE 0.6   ALKPHOS 50*   ALT 38   AST 31   BILITOT 0.3    No results for input(s): "PROTIME", "INR", "APTT", "HEPANTIXA" in the last 168 hours. No results for input(s): "CPK", "CPKMB", "TROPONINI", "MB" in the last 168 hours.    Electrolytes: N/A - electrolytes WDL  Accuchecks: none    Gtts:   sodium chloride 0.9% Stopped (07/31/23 1110)    nicardipine Stopped (08/01/23 0524)       LDA/Wounds:  Lines/Drains/Airways       " Drain  Duration                  Urethral Catheter 07/31/23 0742 Silicone 16 Fr. <1 day              Arterial Line  Duration             Arterial Line 07/31/23 0833 Right Radial <1 day              Peripheral Intravenous Line  Duration                  Peripheral IV - Single Lumen 18 G Left Wrist -- days         Peripheral IV - Single Lumen 07/31/23 1940 22 G Anterior;Right Forearm <1 day                  Wounds: No  Wound care consulted: No

## 2023-08-01 NOTE — SUBJECTIVE & OBJECTIVE
Interval History: 8/1: OR yesterday for left microvascular decompression, tolerated procedure well. Intact on exam. CTH with post op pneumocephalus, but no detrimental change. OK for TTF today     Medications:  Continuous Infusions:  Scheduled Meds:   acetaminophen  1,000 mg Oral TID    carBAMazepine  100 mg Oral BID    cetirizine  10 mg Oral Daily    cyclobenzaprine  10 mg Oral TID    finasteride  5 mg Oral Daily    gabapentin  400 mg Oral TID    heparin (porcine)  5,000 Units Subcutaneous Q8H    oxybutynin  5 mg Oral BID     PRN Meds:albuterol, diazePAM, hydrALAZINE, HYDROmorphone, labetalol, ondansetron, oxyCODONE, oxyCODONE, promethazine (PHENERGAN) 12.5 mg in dextrose 5 % (D5W) 50 mL IVPB, traZODone, zolpidem     Review of Systems  Objective:     Weight: 54.4 kg (120 lb)  Body mass index is 19.97 kg/m².  Vital Signs (Most Recent):  Temp: 98.4 °F (36.9 °C) (08/01/23 1505)  Pulse: 88 (08/01/23 1505)  Resp: 18 (08/01/23 1505)  BP: 123/79 (08/01/23 1505)  SpO2: 99 % (08/01/23 1505) Vital Signs (24h Range):  Temp:  [97.7 °F (36.5 °C)-98.4 °F (36.9 °C)] 98.4 °F (36.9 °C)  Pulse:  [] 88  Resp:  [8-50] 18  SpO2:  [92 %-100 %] 99 %  BP: (105-144)/(60-88) 123/79  Arterial Line BP: (120-140)/(61-79) 136/68     Date 08/01/23 0700 - 08/02/23 0659   Shift 9887-5210 7318-4712 3458-8756 24 Hour Total   INTAKE   P.O. 650 350  1000   Shift Total(mL/kg) 650(11.9) 350(6.4)  1000(18.4)   OUTPUT   Urine(mL/kg/hr) 500(1.1) 750  1250   Shift Total(mL/kg) 500(9.2) 750(13.8)  1250(23)   Weight (kg) 54.4 54.4 54.4 54.4                               Physical Exam         Neurosurgery Physical Exam    Physical Exam:    Constitutional: No distress.     HEENT: atraumatic/normocephalic    Cardiovascular: Regular rhythm.     Pulm: aerating well, saturating well    Abdominal: Soft.     Psych/Behavior: He is alert.     E4V5M6  AOx3  PERRL  EOMI  Face Symmetric  Tongue midline  BUE 5/5  BLE 5/5  No drift      Significant Labs:  Recent Labs  "  Lab 08/01/23  0003   *      K 3.9      CO2 15*   BUN 8   CREATININE 0.6   CALCIUM 8.2*   MG 2.0     Recent Labs   Lab 08/01/23  0003   WBC 8.18   HGB 13.8   HCT 40.6        No results for input(s): "LABPT", "INR", "APTT" in the last 48 hours.  Microbiology Results (last 7 days)       ** No results found for the last 168 hours. **          All pertinent labs from the last 24 hours have been reviewed.    Significant Diagnostics:  I have reviewed and interpreted all pertinent imaging results/findings within the past 24 hours.  "

## 2023-08-01 NOTE — PT/OT/SLP EVAL
"Occupational Therapy   Evaluation and Discharge Note    Name: Mae Gregory  MRN: [id number]  Admitting Diagnosis: Glossopharyngeal nerve disease  Recent Surgery: Procedure(s) (LRB):  CRANIOTOMY for microvascular decompression NEURO MONITORING (Left) 1 Day Post-Op    Recommendations:     Discharge Recommendations: other (see comments)  Discharge Equipment Recommendations: none  Barriers to discharge:  None    Assessment:     Mae Gregory is a 58 y.o. female with a medical diagnosis of Glossopharyngeal nerve disease. At this time, patient is functioning at their prior level of function and does not require further acute OT services.     Plan:     During this hospitalization, patient does not require further acute OT services.  Please re-consult if situation changes.    Plan of Care Reviewed with: patient, family    Subjective     Chief Complaint: Headache  Patient/Family Comments/goals: "I think I'm fine, I have no concerns about being able to do things on my own."    Occupational Profile:  Living Environment: Pt lives alone in apartment with 0STE. Bathroom has a tub/shower combo. Pt owns no DME.  Previous level of function: Independent with ADLs and functional mobility, drives  Roles and Routines: Grandmother, works as a   Equipment Used at home: none  Assistance upon Discharge: Family    Pain/Comfort:  Pain Rating 1: 5/10  Location - Side 1: Bilateral  Location - Orientation 1: generalized  Location 1: head  Pain Addressed 1: Pre-medicate for activity, Reposition, Distraction, Cessation of Activity  Pain Rating Post-Intervention 1: 5/10    Patients cultural, spiritual, Bahai conflicts given the current situation: no    Objective:     Communicated with: RN prior to session.  Patient found HOB elevated with blood pressure cuff, peripheral IV, pulse ox (continuous), telemetry, SCD upon OT entry to room.    General Precautions: Standard, aspiration  Orthopedic Precautions: N/A  Braces: " N/A  Respiratory Status: Room air     Occupational Performance:    Bed Mobility:    Patient completed Rolling/Turning to Right with independence  Patient completed Scooting/Bridging with independence  Patient completed Supine to Sit with independence    Functional Mobility/Transfers:  Patient completed Sit <> Stand Transfer with independence  with  no assistive device   Patient completed Toilet Transfer Step Transfer technique with independence with  no AD  Functional Mobility: Pt ambulated 12' x 2 independently with no AD to the bathroom to promote functional mobility.    Activities of Daily Living:  Grooming: independence to complete oral hygiene, hair combing, face hygiene in stance at sink  Upper Body Dressing: independence to don gown as robe  Lower Body Dressing: independence to doff/don socks    Cognitive/Visual Perceptual:  Cognitive/Psychosocial Skills:     -       Oriented to: Person, Place, Time, and Situation   -       Follows Commands/attention:Follows multistep  commands  -       Communication: clear/fluent  -       Safety awareness/insight to disability: intact     Physical Exam:  Balance:    -       Good  Sensation:    -       Intact  Dominant hand:    -       Left  Upper Extremity Range of Motion:     -       Right Upper Extremity: WNL  -       Left Upper Extremity: WNL  Upper Extremity Strength:    -       Right Upper Extremity: WNL  -       Left Upper Extremity: WNL   Strength:    -       Right Upper Extremity: WNL  -       Left Upper Extremity: WNL  Fine Motor Coordination:    -       Intact    AMPAC 6 Click ADL:  AMPAC Total Score: 24    Treatment & Education:  Pt educated on role of OT, OT POC, general safety precautions. Pt educated on use of call light, importance of calling for staff assist for functional mobility while in hospital. Pt educated on plan to discharge from OT, pt agreeable to this plan. All pt and family questions within OT scope of practice addressed.    Patient left up in  chair with all lines intact, call button in reach, RN notified, and niece present    GOALS:   Multidisciplinary Problems       Occupational Therapy Goals       Not on file              Multidisciplinary Problems (Resolved)          Problem: Occupational Therapy    Goal Priority Disciplines Outcome Interventions   Occupational Therapy Goal   (Resolved)     OT, PT/OT Met    Description: OT eval completed; pt at functional baseline, not in need of OT services at this time. DC OT orders, please reconsult pending change in func tional status                        History:     Past Medical History:   Diagnosis Date    DDD (degenerative disc disease), cervical 3/15/2017    DDD (degenerative disc disease), lumbar 3/15/2017    Glaucoma 3/7/2018    Migraine 3/15/2017    Primary insomnia 3/15/2017         Past Surgical History:   Procedure Laterality Date    AUGMENTATION OF BREAST      BREAST SURGERY       SECTION   and 2001    x2    COLON SURGERY  10/15/2018    colon resection     CRANIOTOMY Left 2023    Procedure: CRANIOTOMY for microvascular decompression NEURO MONITORING;  Surgeon: Nils Hudson MD;  Location: [facility] OR [address] [place] [address];  Service: Neurosurgery;  Laterality: Left;  regular bed, supine, bump, [place], type and cross 2 units, cranial nerve monitoring, optical stealth CO SURGEON DR [place]       Time Tracking:     OT Date of Treatment: 23  OT Start Time: 1221  OT Stop Time: 1250  OT Total Time (min): 29 min    Billable Minutes:Evaluation 8  Self Care/Home Management 10  Therapeutic Activity 11    2023

## 2023-08-01 NOTE — PROGRESS NOTES
[facility] - Neuro Critical Care  Neurosurgery  Progress Note    Subjective:     History of Present Illness: No notes on file    Post-Op Info:  Procedure(s) (LRB):  CRANIOTOMY for microvascular decompression NEURO MONITORING (Left)   1 Day Post-Op     Interval History: 8/1: OR yesterday for left microvascular decompression, tolerated procedure well. Intact on exam. CTH with post op pneumocephalus, but no detrimental change. OK for TTF today     Medications:  Continuous Infusions:  Scheduled Meds:   acetaminophen  1,000 mg Oral TID    carBAMazepine  100 mg Oral BID    cetirizine  10 mg Oral Daily    cyclobenzaprine  10 mg Oral TID    finasteride  5 mg Oral Daily    gabapentin  400 mg Oral TID    heparin (porcine)  5,000 Units Subcutaneous Q8H    oxybutynin  5 mg Oral BID     PRN Meds:albuterol, diazePAM, hydrALAZINE, HYDROmorphone, labetalol, ondansetron, oxyCODONE, oxyCODONE, promethazine (PHENERGAN) 12.5 mg in dextrose 5 % (D5W) 50 mL IVPB, traZODone, zolpidem     Review of Systems  Objective:     Weight: 54.4 kg (120 lb)  Body mass index is 19.97 kg/m².  Vital Signs (Most Recent):  Temp: 98.4 °F (36.9 °C) (08/01/23 1505)  Pulse: 88 (08/01/23 1505)  Resp: 18 (08/01/23 1505)  BP: 123/79 (08/01/23 1505)  SpO2: 99 % (08/01/23 1505) Vital Signs (24h Range):  Temp:  [97.7 °F (36.5 °C)-98.4 °F (36.9 °C)] 98.4 °F (36.9 °C)  Pulse:  [] 88  Resp:  [8-50] 18  SpO2:  [92 %-100 %] 99 %  BP: (105-144)/(60-88) 123/79  Arterial Line BP: (120-140)/(61-79) 136/68     Date 08/01/23 0700 - 08/02/23 0659   Shift 1511-8961 3011-5796 7685-9063 24 Hour Total   INTAKE   P.O. 650 350  1000   Shift Total(mL/kg) 650(11.9) 350(6.4)  1000(18.4)   OUTPUT   Urine(mL/kg/hr) 500(1.1) 750  1250   Shift Total(mL/kg) 500(9.2) 750(13.8)  1250(23)   Weight (kg) 54.4 54.4 54.4 54.4                               Physical Exam         Neurosurgery Physical Exam    Physical Exam:    Constitutional: No distress.     HEENT:  "atraumatic/normocephalic    Cardiovascular: Regular rhythm.     Pulm: aerating well, saturating well    Abdominal: Soft.     Psych/Behavior: He is alert.     E4V5M6  AOx3  PERRL  EOMI  Face Symmetric  Tongue midline  BUE 5/5  BLE 5/5  No drift      Significant Labs:  Recent Labs   Lab 08/01/23  0003   *      K 3.9      CO2 15*   BUN 8   CREATININE 0.6   CALCIUM 8.2*   MG 2.0     Recent Labs   Lab 08/01/23  0003   WBC 8.18   HGB 13.8   HCT 40.6        No results for input(s): "LABPT", "INR", "APTT" in the last 48 hours.  Microbiology Results (last 7 days)       ** No results found for the last 168 hours. **          All pertinent labs from the last 24 hours have been reviewed.    Significant Diagnostics:  I have reviewed and interpreted all pertinent imaging results/findings within the past 24 hours.    Assessment/Plan:     * Glossopharyngeal nerve disease  58 F presents for elective left microvascular decompression for glossopharyngeal neuralgia on 7/31/2023    CTH 7/31 post op demonstrates scattered pneumocephalus, but no detrimental change.    -- ICU post op   -- OK for TTF to floor  -- q4 hour neurochecks  -- SbP < 160  -- steroids 24 hours postop  -- no nonrebreather needed  -- OK for diet  -- OK for [facility]        Petrona Mae MD  Neurosurgery  [facility] - Neuro Critical Care  "

## 2023-08-01 NOTE — HOSPITAL COURSE
8/1/23: POD 1 from CN 9 decompression. Reports that pre-operative left-sided facial, jaw, ear pain with associated dysphagia are completely improved. Only has incisional pain. Step down orders in, pain regimen optimized. CTH post op with moderate pneumocephalus along subarachnoid space, no acute intracranial abnormality. SQH started for tonight 24 hrs post op. Diet ordered, fluids discontinued. On appropriate home meds. D/c a-line, singleton

## 2023-08-01 NOTE — PLAN OF CARE
08/01/23 1537   Post-Acute Status   Post-Acute Authorization Other   Other Status No Post-Acute Service Needs     Cherrie Maldonado LCSW  Neurocritical Care   [facility]  [id number]

## 2023-08-01 NOTE — PT/OT/SLP EVAL
Speech Language Pathology Evaluation  Bedside Swallow    Patient Name:  Mae Gregory   MRN:  [id number]  [id number]    Admitting Diagnosis: Glossopharyngeal nerve disease    Recommendations:                 General Recommendations:  Follow-up indicated  Diet recommendations:  Regular, Thin   Aspiration Precautions: 1 bite/sip at a time, HOB to 90 degrees, Meds whole buried in puree, Small bites/sips, and Standard aspiration precautions   General Precautions: Standard, aspiration  Communication strategies:  none    Assessment:     Mae Gregory is a 58 y.o. female with mild globus sensation, mild odynophagia from intubation, and reports of felling nasal regurgitation with some food items. Noted reports of nasal regurgitation during MBSS in April which did not demonstrate any velopharyngeal impairments. ST will follow up.     History:     Past Medical History:   Diagnosis Date    DDD (degenerative disc disease), cervical 3/15/2017    DDD (degenerative disc disease), lumbar 3/15/2017    Glaucoma 3/7/2018    Migraine 3/15/2017    Primary insomnia 3/15/2017       Past Surgical History:   Procedure Laterality Date    AUGMENTATION OF BREAST      BREAST SURGERY       SECTION   and 2001    x2    COLON SURGERY  10/15/2018    colon resection     CRANIOTOMY Left 2023    Procedure: CRANIOTOMY for microvascular decompression NEURO MONITORING;  Surgeon: Nils Hudson MD;  Location: [facility] OR [address] [place] [address];  Service: Neurosurgery;  Laterality: Left;  regular bed, supine, bump, [place], type and cross 2 units, cranial nerve monitoring, optical stealth CO SURGEON DR CLEM LEZAMA note: History of Present Illness: Mae Gregory is a 58 year old female with a PMH of glossopharyngeal neuralgia (CN IX neuralgia) s/p left suboccipital craniotomy retrosigmoid approach for microvascular decompression performed 23 by Dr. Hudson, neurosurgery, after failed conservative medical management. She was seen outpatient for recurrent  jaw pain, dysphagia, and ear pain consistent with CN IX nerve pain. On evaluation, she was found to have a tortuous left PICA encroaching on the root entry zone of left-sided nerve roots extending to the jugular foramen, believed to be the origin of her pain. She was previously on Lyrica, Topamax, Tegretol, and steroids, all of which failed to control her pain. She elected for microvascular decompression due to severe, uncontrolled, and limiting pain that was affecting her daily living.   Hospital Course: 8/1/23: POD 1 from CN 9 decompression. Reports that pre-operative left-sided facial, jaw, ear pain with associated dysphagia are completely improved. Only has incisional pain. Step down orders in, pain regimen optimized. CTH post op with moderate pneumocephalus along subarachnoid space, no acute intracranial abnormality. SQH started for tonight 24 hrs post op. Diet ordered, fluids discontinued. On appropriate home meds. D/c a-line, singleton  Interval History:  8/1/23: POD 1 from CN 9 decompression. Reports that pre-operative left-sided facial, jaw, ear pain with associated dysphagia are completely improved. Only has incisional pain. Step down orders in, pain regimen optimized. CTH post op with moderate pneumocephalus along subarachnoid space, no acute intracranial abnormality. SQH started for tonight 24 hrs post op. Diet ordered, fluids discontinued. On appropriate home meds. D/c a-line, singleton       Head CT 7/31: Postsurgical changes status post glossopharyngeal nerve decompression on the left.  Moderate pneumocephalus scattered in the subarachnoid space.    Modified Barium Swallow 4/12/23: Patient reports frequent sensation of nasal regurgitation of PO, upon further discussion pt reports she utilizes nasal saline sprays following a meal with bits of solids noted to come up. Pt reports she frequently needs to drink while she is eating. She reports dry mouth and subsequent difficulty with speech. Impressions   There  "was adequate oral function with functional bolus manipulation and mastication  Grossly timely and coordinated swallow with largely coordinated time to complete laryngeal vestibule closure, timely swallow reaction time  Intermittently reduced complete base of tongue retraction with subsequently reduced epiglottic retroflexion and resultant vallecular residues- these were subjectively mild, relatively within normal limits when measured via ASPEKT-C measurements, a few instances of slightly out of range with ASPEKT-C guidelines  Residues were decreased, though, not completely cleared when pt instructed to utilize a thin liquid wash and effortful swallow  A chin tuck was trailed which did not improve residues  There was otherwise functional hyolaryngeal elevation/excursion, velar elevation, symmetric pharyngeal stripping wave when turned a/p, and adequate UES relaxation     Prior diet: reg/thin    Subjective     Patient awake and cooperative.   Patient states, "Eggs seemed to get stuck in my nose."     Pain/Comfort:  Pain Rating 1: 0/10    Respiratory Status: Room air    Objective:     Oral Musculature Evaluation  Oral Musculature: WFL  Dentition: present and adequate  Mucosal Quality: good  Mandibular Strength and Mobility: WFL  Oral Labial Strength and Mobility: WFL  Lingual Strength and Mobility: WFL  Volitional Cough: elicited  Volitional Swallow: timely  Voice Prior to PO Intake: clear    Bedside Swallow Eval:   Consistencies Assessed:  Thin liquids sips of water via straw  Solids bites of cecilia cracker      Oral Phase:   WFL    Pharyngeal Phase:   Throat clearing throughout ST session- similar reports noted during prior MBSS completed in April suspected to be hyper sensation to trace-mild vallecular stasis.   Patient reports occasionally feeling like food gets stuck in her nose-- specifically eggs. Noted similar report during MBSS reported in April  Patient reporting some increased odynophagia from intubation " during procedure  Patient reporting some difficulty swallowing pills in the morning 2/2 dry mouth- agreeable to trialing burying pills in pudding in the morning (patient likes vanilla pudding)    Compensatory Strategies  None    Treatment: SLP provided patient education on SLP recommendations, SLP role, s/s and risks of aspiration, safe swallow precautions, and POC. All questions addressed within SLP scope and patient in agreement with POC.     Goals:   Multidisciplinary Problems       SLP Goals          Problem: SLP    Goal Priority Disciplines Outcome   SLP Goal     SLP                        Plan:     Patient to be seen:  3 x/week   Plan of Care expires:     Plan of Care reviewed with:  patient, family   SLP Follow-Up:  Yes       Discharge recommendations:   (likely no ST needs)   Barriers to Discharge:  None    Time Tracking:     SLP Treatment Date:   08/01/23  Speech Start Time:  0927  Speech Stop Time:  0948     Speech Total Time (min):  21 min    Billable Minutes: Eval Swallow and Oral Function 11 and Self Care/Home Management Training 10    08/01/2023

## 2023-08-01 NOTE — ASSESSMENT & PLAN NOTE
58 F presents for elective left microvascular decompression for glossopharyngeal neuralgia on 7/31/2023    CTH 7/31 post op demonstrates scattered pneumocephalus, but no detrimental change.    -- ICU post op   -- OK for TTF to floor  -- q4 hour neurochecks  -- SbP < 160  -- steroids 24 hours postop  -- no nonrebreather needed  -- OK for diet  -- OK for SQH

## 2023-08-02 VITALS
HEIGHT: 65 IN | RESPIRATION RATE: 18 BRPM | DIASTOLIC BLOOD PRESSURE: 75 MMHG | BODY MASS INDEX: 19.99 KG/M2 | HEART RATE: 96 BPM | TEMPERATURE: 98 F | OXYGEN SATURATION: 100 % | SYSTOLIC BLOOD PRESSURE: 122 MMHG | WEIGHT: 120 LBS

## 2023-08-02 LAB
ALBUMIN SERPL BCP-MCNC: 3.4 G/DL (ref 3.5–5.2)
ALP SERPL-CCNC: 40 U/L (ref 55–135)
ALT SERPL W/O P-5'-P-CCNC: 25 U/L (ref 10–44)
ANION GAP SERPL CALC-SCNC: 6 MMOL/L (ref 8–16)
AST SERPL-CCNC: 23 U/L (ref 10–40)
BASOPHILS # BLD AUTO: 0.01 K/UL (ref 0–0.2)
BASOPHILS NFR BLD: 0.2 % (ref 0–1.9)
BILIRUB SERPL-MCNC: 0.3 MG/DL (ref 0.1–1)
BUN SERPL-MCNC: 9 MG/DL (ref 6–20)
CALCIUM SERPL-MCNC: 8.3 MG/DL (ref 8.7–10.5)
CHLORIDE SERPL-SCNC: 108 MMOL/L (ref 95–110)
CO2 SERPL-SCNC: 25 MMOL/L (ref 23–29)
CREAT SERPL-MCNC: 0.7 MG/DL (ref 0.5–1.4)
DIFFERENTIAL METHOD: NORMAL
EOSINOPHIL # BLD AUTO: 0.1 K/UL (ref 0–0.5)
EOSINOPHIL NFR BLD: 0.9 % (ref 0–8)
ERYTHROCYTE [DISTWIDTH] IN BLOOD BY AUTOMATED COUNT: 13 % (ref 11.5–14.5)
EST. GFR  (NO RACE VARIABLE): >60 ML/MIN/1.73 M^2
GLUCOSE SERPL-MCNC: 117 MG/DL (ref 70–110)
HCT VFR BLD AUTO: 38.2 % (ref 37–48.5)
HGB BLD-MCNC: 12.3 G/DL (ref 12–16)
IMM GRANULOCYTES # BLD AUTO: 0.02 K/UL (ref 0–0.04)
IMM GRANULOCYTES NFR BLD AUTO: 0.3 % (ref 0–0.5)
LYMPHOCYTES # BLD AUTO: 1.5 K/UL (ref 1–4.8)
LYMPHOCYTES NFR BLD: 25.4 % (ref 18–48)
MAGNESIUM SERPL-MCNC: 2.3 MG/DL (ref 1.6–2.6)
MCH RBC QN AUTO: 30.5 PG (ref 27–31)
MCHC RBC AUTO-ENTMCNC: 32.2 G/DL (ref 32–36)
MCV RBC AUTO: 95 FL (ref 82–98)
MONOCYTES # BLD AUTO: 0.6 K/UL (ref 0.3–1)
MONOCYTES NFR BLD: 10.1 % (ref 4–15)
NEUTROPHILS # BLD AUTO: 3.7 K/UL (ref 1.8–7.7)
NEUTROPHILS NFR BLD: 63.1 % (ref 38–73)
NRBC BLD-RTO: 0 /100 WBC
PHOSPHATE SERPL-MCNC: 2.5 MG/DL (ref 2.7–4.5)
PLATELET # BLD AUTO: 176 K/UL (ref 150–450)
PMV BLD AUTO: 10.3 FL (ref 9.2–12.9)
POTASSIUM SERPL-SCNC: 3.4 MMOL/L (ref 3.5–5.1)
PROT SERPL-MCNC: 5.7 G/DL (ref 6–8.4)
RBC # BLD AUTO: 4.03 M/UL (ref 4–5.4)
SODIUM SERPL-SCNC: 139 MMOL/L (ref 136–145)
WBC # BLD AUTO: 5.83 K/UL (ref 3.9–12.7)

## 2023-08-02 PROCEDURE — 80053 COMPREHEN METABOLIC PANEL: CPT | Performed by: PHYSICIAN ASSISTANT

## 2023-08-02 PROCEDURE — 36415 COLL VENOUS BLD VENIPUNCTURE: CPT | Performed by: PHYSICIAN ASSISTANT

## 2023-08-02 PROCEDURE — 25000003 PHARM REV CODE 250: Performed by: STUDENT IN AN ORGANIZED HEALTH CARE EDUCATION/TRAINING PROGRAM

## 2023-08-02 PROCEDURE — 99024 POSTOP FOLLOW-UP VISIT: CPT | Mod: ,,, | Performed by: HEALTH CARE PROVIDER

## 2023-08-02 PROCEDURE — 99024 PR POST-OP FOLLOW-UP VISIT: ICD-10-PCS | Mod: ,,, | Performed by: HEALTH CARE PROVIDER

## 2023-08-02 PROCEDURE — 63600175 PHARM REV CODE 636 W HCPCS

## 2023-08-02 PROCEDURE — 25000003 PHARM REV CODE 250: Performed by: PHYSICIAN ASSISTANT

## 2023-08-02 PROCEDURE — 84100 ASSAY OF PHOSPHORUS: CPT | Performed by: PHYSICIAN ASSISTANT

## 2023-08-02 PROCEDURE — 25000003 PHARM REV CODE 250

## 2023-08-02 PROCEDURE — 92526 ORAL FUNCTION THERAPY: CPT

## 2023-08-02 PROCEDURE — 85025 COMPLETE CBC W/AUTO DIFF WBC: CPT | Performed by: PHYSICIAN ASSISTANT

## 2023-08-02 PROCEDURE — 97535 SELF CARE MNGMENT TRAINING: CPT

## 2023-08-02 PROCEDURE — 83735 ASSAY OF MAGNESIUM: CPT | Performed by: PHYSICIAN ASSISTANT

## 2023-08-02 RX ORDER — CETIRIZINE HYDROCHLORIDE 5 MG/1
10 TABLET ORAL DAILY PRN
Status: DISCONTINUED | OUTPATIENT
Start: 2023-08-02 | End: 2023-08-02 | Stop reason: HOSPADM

## 2023-08-02 RX ORDER — POTASSIUM CHLORIDE 20 MEQ/1
40 TABLET, EXTENDED RELEASE ORAL ONCE
Status: COMPLETED | OUTPATIENT
Start: 2023-08-02 | End: 2023-08-02

## 2023-08-02 RX ORDER — OXYCODONE HYDROCHLORIDE 5 MG/1
5 TABLET ORAL EVERY 4 HOURS PRN
Qty: 20 TABLET | Refills: 0 | Status: ON HOLD | OUTPATIENT
Start: 2023-08-02 | End: 2023-08-10 | Stop reason: HOSPADM

## 2023-08-02 RX ADMIN — CYCLOBENZAPRINE 10 MG: 10 TABLET, FILM COATED ORAL at 02:08

## 2023-08-02 RX ADMIN — DIAZEPAM 5 MG: 5 TABLET ORAL at 12:08

## 2023-08-02 RX ADMIN — ACETAMINOPHEN 1000 MG: 500 TABLET ORAL at 02:08

## 2023-08-02 RX ADMIN — ZOLPIDEM TARTRATE 5 MG: 5 TABLET ORAL at 12:08

## 2023-08-02 RX ADMIN — HEPARIN SODIUM 5000 UNITS: 5000 INJECTION INTRAVENOUS; SUBCUTANEOUS at 05:08

## 2023-08-02 RX ADMIN — HEPARIN SODIUM 5000 UNITS: 5000 INJECTION INTRAVENOUS; SUBCUTANEOUS at 02:08

## 2023-08-02 RX ADMIN — ACETAMINOPHEN 1000 MG: 500 TABLET ORAL at 08:08

## 2023-08-02 RX ADMIN — GABAPENTIN 400 MG: 400 CAPSULE ORAL at 02:08

## 2023-08-02 RX ADMIN — FINASTERIDE 5 MG: 5 TABLET, FILM COATED ORAL at 08:08

## 2023-08-02 RX ADMIN — OXYCODONE HYDROCHLORIDE 10 MG: 10 TABLET ORAL at 05:08

## 2023-08-02 RX ADMIN — OXYBUTYNIN CHLORIDE 5 MG: 5 TABLET ORAL at 08:08

## 2023-08-02 RX ADMIN — GABAPENTIN 400 MG: 400 CAPSULE ORAL at 08:08

## 2023-08-02 RX ADMIN — OXYCODONE HYDROCHLORIDE 10 MG: 10 TABLET ORAL at 12:08

## 2023-08-02 RX ADMIN — DIAZEPAM 5 MG: 5 TABLET ORAL at 05:08

## 2023-08-02 RX ADMIN — POTASSIUM CHLORIDE 40 MEQ: 1500 TABLET, EXTENDED RELEASE ORAL at 10:08

## 2023-08-02 RX ADMIN — CARBAMAZEPINE 100 MG: 100 CAPSULE, EXTENDED RELEASE ORAL at 08:08

## 2023-08-02 RX ADMIN — CYCLOBENZAPRINE 10 MG: 10 TABLET, FILM COATED ORAL at 08:08

## 2023-08-02 NOTE — PLAN OF CARE
Problem: SLP  Goal: SLP Goal  Description: Short Term Goals:   1. Pt will participate in an ongoing assessment to determine the least restrictive and safest diet with possible updated goals to follow pending results.    Outcome: Met  Patient with adequate tolerance of regular solids and thin liquids with no overt s/s of aspiration or acute oral/pharyngeal dysphagia. Patient with baseline impaired resonance and reports of eggs getting stuck in nasal cavity when eating. She has participated in an objective assessment of swallowing (MBSS in April 2023) for similar reports with adequate velopharyngeal closure appreciated during assessment. Patient would benefit from OP ST and ENT follow up s/p discharge. No further skilled acute Speech Therapy services warranted at this time. Please re-consult as needed.

## 2023-08-02 NOTE — DISCHARGE SUMMARY
[facility] - Neurosurgery ([facility])  Neurosurgery  Discharge Summary      Patient Name: Mae Gregory  MRN: [id number]  Admission Date: 7/31/2023  Hospital Length of Stay: 2 days  Discharge Date and Time:  08/02/2023 1:51 PM  Attending Physician: Nils Hudson MD   Discharging Provider: EDUAR BEY PA-C  Primary Care Provider: Rajiv Garcia MD    HPI:   No notes on file    Procedure(s) (LRB):  CRANIOTOMY for microvascular decompression NEURO MONITORING (Left)     Hospital Course: 8/1: OR yesterday for left microvascular decompression, tolerated procedure well. Intact on exam. CTH with post op pneumocephalus, but no detrimental change. OK for TTF today   8/2: AF, VSS, Neuro exam stable. Head badge removed. Pt stated she is doing well and admits to having difficulty with articulating certain words. Denies facial pain, dysphasia, headache, vision disturbances. Speech Therapy outpatient referral placed.   Medically stable for discharge. Home today. Follow up with NSGY scheduled for 2 weeks postop.     Goals of Care Treatment Preferences:         Consults:     Significant Diagnostic Studies: Labs:   BMP:   Recent Labs   Lab 08/01/23  0003 08/02/23  0511   * 117*    139   K 3.9 3.4*    108   CO2 15* 25   BUN 8 9   CREATININE 0.6 0.7   CALCIUM 8.2* 8.3*   MG 2.0 2.3   , CMP   Recent Labs   Lab 08/01/23  0003 08/02/23  0511    139   K 3.9 3.4*    108   CO2 15* 25   * 117*   BUN 8 9   CREATININE 0.6 0.7   CALCIUM 8.2* 8.3*   PROT 6.4 5.7*   ALBUMIN 3.8 3.4*   BILITOT 0.3 0.3   ALKPHOS 50* 40*   AST 31 23   ALT 38 25   ANIONGAP 11 6*   , CBC   Recent Labs   Lab 08/01/23  0003 08/02/23  0511   WBC 8.18 5.83   HGB 13.8 12.3   HCT 40.6 38.2    176   , INR   Lab Results   Component Value Date    INR 1.0 07/24/2023    and All labs within the past 24 hours have been reviewed    Radiology: CT HEAD WITHOUT CONTRAST        Pending Diagnostic Studies:     None        Final Active Diagnoses:     Diagnosis Date Noted POA    PRINCIPAL PROBLEM:  Glossopharyngeal nerve disease [G52.1] 07/31/2023 Yes     Chronic    -Anxiety with depression - controlled off Wellbutrin since 4/2023 [F41.8] 02/06/2023 Yes    -Cervical myofascial pain syndrome - follows Neurology for injections [M79.18] 08/09/2022 Yes    -Dystonia - follows Neurology regularly for Botox injections [G24.9] 02/18/2020 Yes    Migraine [G43.909] 03/15/2017 Yes    -Insomnia [F51.01] 03/15/2017 Yes      Problems Resolved During this Admission:      Discharged Condition: good     Disposition: Home or Self Care    Follow Up:  In 2 weeks  Patient Instructions:      Ambulatory referral/consult to Speech Therapy   Standing Status: Future   Referral Priority: Routine Referral Type: Speech Therapy   Referral Reason: Specialty Services Required   Requested Specialty: Speech Pathology   Number of Visits Requested: 1     Medications:  Reconciled Home Medications:      Medication List      START taking these medications    oxyCODONE 5 MG immediate release tablet  Commonly known as: ROXICODONE  Take 1 tablet (5 mg total) by mouth every 4 (four) hours as needed.        CONTINUE taking these medications    albuterol 90 mcg/actuation inhaler  Commonly known as: PROVENTIL/VENTOLIN HFA  TAKE 1 2 PUFF(S) (INHALATION) EVERY 4 HOURS PRN   WHEEZING     carBAMazepine 100 MG 12 hr tablet  Commonly known as: TEGRETOL XR  Take 1 tablet (100 mg total) by mouth 2 (two) times daily.     cetirizine 10 MG tablet  Commonly known as: ZYRTEC  Take 10 mg by mouth once daily.     COCONUT OIL ORAL  Take by mouth.     cyclobenzaprine 10 MG tablet  Commonly known as: FLEXERIL  Take 10 mg by mouth 3 (three) times daily.     diazePAM 5 MG tablet  Commonly known as: VALIUM  Take 1 tablet (5 mg total) by mouth every 6 (six) hours as needed for Anxiety (neck pain).     estradiol-norethindrone 1-0.5 mg per tablet  Commonly known as: ACTIVELLA  TAKE 1 TABLET BY MOUTH EVERY DAY      finasteride 5 mg tablet  Commonly known as: PROSCAR  Take 1 tablet (5 mg total) by mouth once daily.     multivitamin with minerals tablet  Take 1 tablet by mouth once daily.     NURTEC 75 mg odt  Generic drug: rimegepant  Take 75 mg by mouth as needed.     oxybutynin 5 MG Tab  Commonly known as: DITROPAN  Take 1 tablet (5 mg total) by mouth 2 (two) times daily.     sumatriptan 100 MG tablet  Commonly known as: IMITREX  Take 1 tablet (100 mg total) by mouth every 2 (two) hours as needed for Migraine.     topiramate 50 MG tablet  Commonly known as: TOPAMAX  TAKE 1 TABLET BY MOUTH TWICE A DAY     traZODone 150 MG tablet  Commonly known as: DESYREL  Take 1 tablet (150 mg total) by mouth nightly as needed for Insomnia.     zolpidem 5 MG Tab  Commonly known as: AMBIEN  Take 1 tablet (5 mg total) by mouth nightly as needed (insomnia).        ASK your doctor about these medications    valACYclovir 1000 MG tablet  Commonly known as: VALTREX  Take 2 tablets (2,000 mg total) by mouth every 12 (twelve) hours. For cold sore            EDUAR BEY PA-C  Neurosurgery  [facility] - Neurosurgery [facility])

## 2023-08-02 NOTE — NURSING TRANSFER
Nursing Transfer Note      8/2/2023   12:22 AM    Nurse giving handoff:MOUNIKA Silva  Nurse receiving handoff:NPU RN    Reason patient is being transferred: stepdown    Transfer To:     Transfer From: [facility] 9080    Transfer via bed    Transfer with cardiac monitoring    Transported by MOUNIKA Silva    Transfer Vital Signs:  Blood Pressure:118/72  Heart Rate:89  O2:98  Temperature:98.4  Respirations:11    Telemetry: Yes, Rate 89, NSR   Order for Tele Monitor? Yes    Additional Lines: none    4eyes on Skin: yes    Medicines sent: phenergan IVPB, carbamazepine, finasteride     Any special needs or follow-up needed: neurosgy     Patient belongings transferred with patient: Yes    Chart send with patient: Yes    Notified: son, with pt at bedside    Patient reassessed at: 08/01/23 @ 0020    Upon arrival to floor: cardiac monitor applied, patient oriented to room, call bell in reach, and bed in lowest position

## 2023-08-02 NOTE — PLAN OF CARE
Problem: Adult Inpatient Plan of Care  Goal: Optimal Comfort and Wellbeing  Outcome: Ongoing, Progressing  Goal: Readiness for Transition of Care  Outcome: Ongoing, Progressing     Problem: Adult Inpatient Plan of Care  Goal: Optimal Comfort and Wellbeing  Outcome: Ongoing, Progressing     Problem: Adult Inpatient Plan of Care  Goal: Readiness for Transition of Care  Outcome: Ongoing, Progressing     Problem: Infection  Goal: Absence of Infection Signs and Symptoms  Outcome: Ongoing, Progressing     Problem: Infection  Goal: Absence of Infection Signs and Symptoms  Outcome: Ongoing, Progressing       POC reviewed with patient and spouse at the beside. Verbalization of understanding voiced. Questions and concerns addressed. Precautions maintained. No events noted at present. Cardiac monitoring ongoing. Vital signs all shift. See flow sheet. Bed low and locked with call light within reach. Spouse remains at the bedside.

## 2023-08-02 NOTE — DISCHARGE INSTRUCTIONS
Neurosurgery Patient Information  -Return to work will be determined on an individual basis.  -No driving until released by Dr. Hudson  -Do not take any Aspirin or Aspirin-containing products for 2 weeks after surgery.  -Do not take any Aleve, Naprosyn, Naproxen, Ibuprofen, Advil or any other nonsteroidal anti-inflammatory drug (NSAID) for 2 weeks after surgery.  -Do not take any herbal supplements for 2 weeks after surgery.   -Do not consume any alcoholic beverages until released by your neurosurgeon  -Do not perform any excessive bending over or leaning forward as this is a fall hazard.  -Do not perform any heavy lifting or lifting more than 5-10 lbs from the ground level as this is a fall hazard.  -Slowly increase your ambulation [walking] over the next 2 weeks as tolerated. The goal is to be walking 1-2 miles by the time of your 2 week post op appointment.   -Walk on paved surfaces only. It is okay to walk up and down stairs while holding onto a side rail.      Contact the Neurosurgery Office immediately if:  If you begin to notice any neurologic changes such as:           -Sudden onset of lethargy or sleepiness           -Sudden confusion, trouble speaking, or understanding            -Sudden trouble seeing in one or both eyes            -Sudden trouble walking, dizziness, loss of coordination            -Sudden severe headache with no known cause            -Sudden onset of numbness or weakness     Wound Care:  Keep your incision open to air. You may shower on the 2nd day after your surgery. Keep the incision clean and dry at all times. Do not allow the force of water to hit the incision. If the incision gets damp, gently pat it dry. Do not rub or scrub the incision. You cannot take a bath/swim/submerge the incision until 8 weeks after surgery.    The incision does not need to be cleaned with any water, soap, alcohol, peroxide, or other substance.    Apply Bacitracin ointment (over the counter) to incision twice  daily.    Call your doctor or go to the Emergency Room for any signs of infection including: increased redness, drainage, pain or fever (temperature greater than or equal to 101.4).       Miscellaneous:  -Follow up with Neurosurgery in 2 weeks for a wound check. Appointment will be mailed to you.        [facility] Neurosurgery Office: [phone]              [facility] Neurosurgery Office: [phone]   [place] Office: [phone]

## 2023-08-02 NOTE — NURSING
DISCHARGE NOTE    Pt discharged home with family. Mother at bedside to bring pt home. IV's removed. AVS given & explained to pt. Addressed all questions/concerns. Meds-to-beds delivered. NADN. Awaiting transport to car.    Nedra Nixon

## 2023-08-02 NOTE — SUBJECTIVE & OBJECTIVE
"Interval History:   8/2: AF, VSS, Neuro exam stable. Head badge removed. Pt stated she is doing well and admits to having difficulty with articulating certain words. Denies facial pain, dysphasia, headache, vision disturbances. Speech Therapy outpatient referral placed.   Medications:  Continuous Infusions:  Scheduled Meds:   acetaminophen  1,000 mg Oral TID    carBAMazepine  100 mg Oral BID    cyclobenzaprine  10 mg Oral TID    finasteride  5 mg Oral Daily    gabapentin  400 mg Oral TID    heparin (porcine)  5,000 Units Subcutaneous Q8H    oxybutynin  5 mg Oral BID     PRN Meds:albuterol, cetirizine, diazePAM, hydrALAZINE, HYDROmorphone, labetalol, ondansetron, oxyCODONE, oxyCODONE, promethazine (PHENERGAN) 12.5 mg in dextrose 5 % (D5W) 50 mL IVPB, traZODone, zolpidem     Objective:     Weight: 54.4 kg (120 lb)  Body mass index is 19.97 kg/m².  Vital Signs (Most Recent):  Temp: 97.9 °F (36.6 °C) (08/02/23 1151)  Pulse: 96 (08/02/23 1151)  Resp: 18 (08/02/23 1243)  BP: 122/75 (08/02/23 1151)  SpO2: 100 % (08/02/23 1151) Vital Signs (24h Range):  Temp:  [97.7 °F (36.5 °C)-98.5 °F (36.9 °C)] 97.9 °F (36.6 °C)  Pulse:  [] 96  Resp:  [10-23] 18  SpO2:  [97 %-100 %] 100 %  BP: (111-148)/(65-83) 122/75           Neurosurgery Physical Exam       Constitutional: No distress.   HEENT: atraumatic/normocephalic  Cardiovascular: Regular rhythm.    Pulm: aerating well, saturating well  Abdominal: Soft.    Psych/Behavior: He is alert.      E4V5M6  AOx3  PERRL  EOMI  Face Symmetric  Tongue midline  BUE 5/5  BLE 5/5  No drift    Significant Labs:  Recent Labs   Lab 08/01/23  0003 08/02/23  0511   * 117*    139   K 3.9 3.4*    108   CO2 15* 25   BUN 8 9   CREATININE 0.6 0.7   CALCIUM 8.2* 8.3*   MG 2.0 2.3     Recent Labs   Lab 08/01/23  0003 08/02/23  0511   WBC 8.18 5.83   HGB 13.8 12.3   HCT 40.6 38.2    176     No results for input(s): "LABPT", "INR", "APTT" in the last 48 hours.  Microbiology " Results (last 7 days)       ** No results found for the last 168 hours. **

## 2023-08-02 NOTE — PT/OT/SLP PROGRESS
"Speech Language Pathology Treatment  Discharge    Patient Name:  Mae Gregory   MRN:  [id number]  Admitting Diagnosis: Glossopharyngeal nerve disease    Recommendations:                 General Recommendations:  Follow-up not indicated, f/u with OP ST for baseline resonance impairments  Diet recommendations:  Regular, Liquid Diet Level: Thin; bury medications in sugar free vanilla pudding upon request  Aspiration Precautions: 1 bite/sip at a time, HOB to 90 degrees, Meds whole buried in puree, Small bites/sips, and Standard aspiration precautions (recommended patient avoid food items she notes difficulties with 2/2 patient report of only scrambled eggs typically getting stuck in nose. She reports she does not wish to avoid eggs at this time)  General Precautions: Standard, aspiration  Communication strategies:  none    Assessment:     Mae Gregory is a 58 y.o. female with adequate tolerance of regular solids and thin liquids with no overt s/s of aspiration or acute oral/pharyngeal dysphagia. Patient with baseline impaired resonance and reports of eggs getting stuck in nasal cavity when eating. She has participated in an objective assessment of swallowing (MBSS in April 2023) for similar reports with adequate velopharyngeal closure appreciated during assessment. Patient would benefit from OP ST and ENT follow up s/p discharge. No further skilled acute Speech Therapy services warranted at this time. Please re-consult as needed.     Subjective     Patient awake. Mother at bedside.   Patient states, "They gave me Ambien at about 12, so I feel like I'm still feeling the effects from that."     Objective:     Has the patient been evaluated by SLP for swallowing?   Yes  Keep patient NPO? No     Patient seen to assess tolerance of regular solids and thin liquids. Patient's mother reports patient typically on a very limited diet (primarily meats and protein) with no sugar intake. SLP provided patient with menu so she can " self select meals. She reports continued occasional difficulties with pills getting stuck in her throat 2/2 dry mouth, however, she reports using the sugar free vanilla pudding to assist with taking medications has eliminated globus sensation with pills. She accepted sips of water via straw and bites of cecilia cracker She presents with timely oral clearance and no overt s/s of aspiration. Vocal quality improved from previous date with no noted dysphonia. Continued inconsistent impaired resonance noted, however, this is patient's baseline from prior to hospital admission. SLP provided education on recommendation to continue following up in OP setting for resonance impairment, SLP recommendations, SLP role, s/s and risks of aspiration, safe swallow precautions, and POC. All questions addressed within SLP scope. Patient verbalized understanding of all discussed.       Goals:   Multidisciplinary Problems       SLP Goals       Not on file              Multidisciplinary Problems (Resolved)          Problem: SLP    Goal Priority Disciplines Outcome   SLP Goal   (Resolved)     SLP Met   Description: Short Term Goals:   1. Pt will participate in an ongoing assessment to determine the least restrictive and safest diet with possible updated goals to follow pending results.                         Plan:       Plan of Care reviewed with:  patient, mother   SLP Follow-Up:  No       Discharge recommendations:  outpatient speech therapy (no ST needs)   Barriers to Discharge:  None    Time Tracking:     SLP Treatment Date:   08/02/23  Speech Start Time:  0755  Speech Stop Time:  0812     Speech Total Time (min):  17 min    Billable Minutes: Treatment Swallowing Dysfunction 9 and Self Care/Home Management Training 8    08/02/2023

## 2023-08-02 NOTE — PLAN OF CARE
[facility] (Hospital)  Discharge Final Note    Primary Care Provider: Rajiv Garcia MD    Expected Discharge Date: 8/5/2023    Patient to be discharged home.  The patient does not have any home needs.  Family to provide transportation home.  Neurosurgery clinic to schedule follow up appointment.    Future Appointments   Date Time Provider Department Center   8/14/2023 10:00 AM Lisa Jarrell RN [facility]OS [facility]   9/12/2023  9:45 AM Nils Hudson MD [facility] NEUROS [facility]   9/28/2023  1:00 PM Danny Victoria MD [facility]        Final Discharge Note (most recent)       Final Note - 08/02/23 1417          Final Note    Assessment Type Final Discharge Note     Anticipated Discharge Disposition Home or Self Care        Post-Acute Status    Other Status No Post-Acute Service Needs     Discharge Delays None known at this time                     Important Message from Medicare

## 2023-08-02 NOTE — OP NOTE
Date of Operation:  7/31/12023.    Preoperative Diagnosis:  Left Glossopharyngeal Neuralgia.    Postoperative Diagnosis:  Left Glossopharyngeal Neuralgia.    Procedure:  Left suboccipital craniotomy, microvascular decompression of glossopharyngeal nerve with neuro navigation and microsurgery.  Intraoperative neuro monitoring.    Surgeon:  Evan Crawford MD    Co Surgeon:  Nils Hudson MD    Assistant: Bin Ballard MD (Resident in Neurosurgery)    Anesthesia:  General Endotracheal.    Estimated Blood Loss:  100 ml.    Condition at End of Procedure:  Satisfactory.    Brief History:  This 58-year-old lady with history of migraine developed episodic pain radiating into her left ear and mastoid area and began to note some difficulty with swallowing and speech.  MRI of the brain including a posterior fossa space study shows a prominent posterior inferior cerebellar artery loop compressing the cranial nerve IX,X,XI complex.  It was felt that her symptoms were consistent with glossopharyngeal neuralgia.  Various medications have not been helpful in relieving her symptoms.  She is now admitted for microvascular decompression.    Procedure in Detail:  The patient was brought to the operating room and in the supine position under premedication on the operating table intubated and induced with general anesthesia.  A cannula was placed in the right radial artery for continuous blood pressure monitoring, a Malhotra catheter inserted, sequential compression devices applied to the legs, and various intravenous line started.  The patient was kept in the supine position with the head slightly elevated and turned to the right and immobilized with the Diaz 3 point skeletal fixation device.  The table was then tilted toward Anesthesia to bring the left mastoid to the highest point in the field.  The navigation arc was attached to the Diaz headrest and the head registered to the system with surface recognition.  The left  transverse and sigmoid sinuses and other landmarks were marked on the scalp.  The hair behind her left ear was shaved and this portion of the scalp prepped with Betadine and draped in a sterile fashion.  A curvilinear incision behind her left ear and then coming somewhat medially at the lower hairline was outlined and injected with 1% xylocaine and epinephrine.  The incision was carried through the skin and galea, bleeding controlled with bipolar coagulation and the skin edges retracted with Gelpi retractors.  The pericranium cervical fascia and cervical muscles were divided with Bovie current and scissors to expose the suboccipital area.  The soft tissue was retracted with a cerebellar and Gelpi retractor.  A single bur hole was made at the transverse sigmoid sinus junction and a small craniotomy cut with a high-speed drill elevated and removed from the operative field.  The dura was partially opened.  Additional bone was then removed inferiorly and laterally with Leksell rongeur and a high-speed drill.  The Villasenor retractor was affixed to the [place] headrest, clean towels placed around the craniotomy opening in the operating microscope brought into the field.    As the dura was already opened this was extended with an incision up to the transverse sinus laterally and medially and the triangular flap of dura retracted superiorly the lateral dura retracted over the mastoid with 4-0 Nurolon sutures.  The cerebellar tonsil was elevated and the cisterna magna opened with release of spinal fluid.  This gave good decompression of the posterior fossa.  The tonsil was then gently retracted and initially the vertebral artery seen coming through the foramen magnum.  Arachnoid was fairly thick in this area.  This was opened sharply to expose the cranial nerve IX,X,XI complex.  The spinal accessory nerve could be followed up to the jugular foramen.  The cranial nerves were then dissected off of the vertebral artery and a  prominent looping posterior inferior cerebellar artery was seen elevating the entire complex.  Microsurgical dissection was used to separate this from the underside of the cranial nerves and a piece of Curtis felt placed between the nerves and the artery pushing the artery back down toward the vertebral.  This seemed to give complete relaxation on the cranial nerves.  There was a short burst of facial nerve firing but this settled down.  Brainstem auditory evoked responses were normal throughout.  The wound was then thoroughly irrigated.  The dura was closed with interrupted 4-0 Nurolon sutures and a small graft of dura matrix used at the bottom of the dural incision.  Adherence fibrin glue was placed over this, a piece of DuraGen placed over this and a 2nd layer of adheres used to hold the dura matrix in place.  The bone flap was replaced using the [employer] system.  The suboccipital muscles and cervical fascia were closed with interrupted 2-0 Vicryl sutures the galea and subcutaneous tissue closed with inverted 3-0 but 0 Vicryl sutures and the skin closed with skin staples.  A Telfa bacitracin dressing was placed over this held in place with tape.  The patient's head was released from 3 point skeletal fixation device, she was returned to full supine position, allowed to awaken from anesthesia, extubated, transferred to her [place] and brought to the neuro intensive care unit in stable condition.  She received 12 mg of Decadron and 2 g of Rocephin at the beginning of the procedure and Rocephin containing antibiotic irrigating solutions were used throughout.

## 2023-08-02 NOTE — NURSING
Patient received from NCC, alert and oriented x 4, pain 4/10 received valium and oxy before stepping down, spouse at the beside. Dressing intact to surgical incision.      Nurses Note -- 4 Eyes      8/2/2023   12:35 AM      Skin assessed during: Transfer      [x] No Altered Skin Integrity Present    []Prevention Measures Documented      [] Yes- Altered Skin Integrity Present or Discovered   [] LDA Added if Not in Epic (Describe Wound)   [] New Altered Skin Integrity was Present on Admit and Documented in LDA   [] Wound Image Taken    Wound Care Consulted? No    Attending Nurse:  MOUNIKA Mas      Second RN/Staff Member:  MOUNIKA Lemos

## 2023-08-03 ENCOUNTER — NURSE TRIAGE (OUTPATIENT)
Dept: ADMINISTRATIVE | Facility: CLINIC | Age: 58
End: 2023-08-03
Payer: COMMERCIAL

## 2023-08-03 ENCOUNTER — TELEPHONE (OUTPATIENT)
Dept: NEUROSURGERY | Facility: CLINIC | Age: 58
End: 2023-08-03
Payer: COMMERCIAL

## 2023-08-03 ENCOUNTER — PATIENT OUTREACH (OUTPATIENT)
Dept: ADMINISTRATIVE | Facility: CLINIC | Age: 58
End: 2023-08-03
Payer: COMMERCIAL

## 2023-08-03 DIAGNOSIS — G52.1 GLOSSOPHARYNGEAL NEURALGIA: Primary | ICD-10-CM

## 2023-08-03 RX ORDER — METHYLPREDNISOLONE 4 MG/1
TABLET ORAL
Qty: 21 EACH | Refills: 0 | Status: SHIPPED | OUTPATIENT
Start: 2023-08-03 | End: 2023-08-09 | Stop reason: ALTCHOICE

## 2023-08-03 NOTE — TELEPHONE ENCOUNTER
Pt had surgery on 7/31/23, and since being discharged home, she is having continued difficulty swallowing. Pt does have a consult to speech therapy. She states that she is choking and coughing when she eats or drinks anything. Pt also mentions uncontrolled pain even with the use of prescribed pain medications. She rates her headache at a 6/10 on the numerical pain scale. Care advice is to call the provider now. Attempted to contact the provider by calling the office; the  states she will route a message. Attempted to reach the provider via secure chat; unable to contact. Recommended the pt go to the ED or UCC for further evaluation.     Reason for Disposition   [1] Coughing spells AND [2] occur during eating/feedings or within 2 hours    Additional Information   Negative: [1] Severe difficulty swallowing (e.g., drooling or spitting) AND [2] started suddenly after taking a medicine or allergic food   Negative: Wheezing, stridor, hoarseness, or difficulty breathing   Negative: [1] Swollen tongue AND [2] sudden onset   Negative: Sounds like a life-threatening emergency to the triager   Negative: SEVERE difficulty swallowing (e.g., drooling or spitting, can't swallow water)   Negative: [1] Symptoms of blocked esophagus (e.g., can't swallow normal secretions, drooling) AND [2] present now   Negative: Symptoms of food or bone stuck in throat or esophagus (e.g., pain in throat or chest, FB sensation, blood-tinged saliva)   Negative: SEVERE symptoms of pill stuck in throat or esophagus (e.g., severe pain, bleeding, or inability to swallow liquids)   Negative: [1] Drinking very little AND [2] dehydration suspected (e.g., no urine > 12 hours, very dry mouth, very lightheaded)   Negative: [1] Refuses to drink anything AND [2] for > 12 hours   Negative: Patient sounds very sick or weak to the triager   Negative: Fever > 100.4 F (38.0 C)    Protocols used: Swallowing Difficulty-A-AH

## 2023-08-03 NOTE — TELEPHONE ENCOUNTER
Rebecca spoke to patient. She stated that these symptoms are not new or worsening since discharge.   We prescribed steroids and requesyed she keep us updated. Pt v/u        ----- Message from Agustin Stevens MA sent at 8/3/2023 12:46 PM CDT -----  Regarding: FW: pt advice  Contact: Brenda [phone]    ----- Message -----  From: Lily Cid  Sent: 8/3/2023  11:23 AM CDT  To: [facility]  Subject: pt advice                                        Brenda/on call nurse is calling regarding pt having continuous difficulty swallowing , uncontrolled pain. Pls call

## 2023-08-04 LAB
BLD PROD TYP BPU: NORMAL
BLD PROD TYP BPU: NORMAL
BLOOD UNIT EXPIRATION DATE: NORMAL
BLOOD UNIT EXPIRATION DATE: NORMAL
BLOOD UNIT TYPE CODE: 5100
BLOOD UNIT TYPE CODE: 5100
BLOOD UNIT TYPE: NORMAL
BLOOD UNIT TYPE: NORMAL
CODING SYSTEM: NORMAL
CODING SYSTEM: NORMAL
CROSSMATCH INTERPRETATION: NORMAL
CROSSMATCH INTERPRETATION: NORMAL
DISPENSE STATUS: NORMAL
DISPENSE STATUS: NORMAL
TRANS ERYTHROCYTES VOL PATIENT: NORMAL ML
TRANS ERYTHROCYTES VOL PATIENT: NORMAL ML

## 2023-08-04 NOTE — PROGRESS NOTES
"C3 nurse spoke with Mae Gregory  for a TCC post hospital discharge follow up call. The patient  does not have a scheduled HOSFU .   [facility] NP referral sent, patient in agreement.  Patient informs me that she is feeling a "little better than yesterday, has a little headache, swallowing has improved some, was able to eat a scrambled egg today and no choking" events today.         "

## 2023-08-06 NOTE — OP NOTE
[facility] ([facility])  Neurosurgery  Operative Note    OP Note      Date of Procedure: 7/31/2023       Pre-Operative Diagnosis: Glossopharyngeal nerve disease or syndrome [G52.1]    Post-Operative Diagnosis: Post-Op Diagnosis Codes:     * Glossopharyngeal nerve disease or syndrome [G52.1]    Anesthesia: General    Procedures performed:  Left retrosigmoid suboccipital craniotomy for microvascular decompression of glossopharyngeal nerve with use of neuro navigation and microsurgical technique    Surgeon: Nils Hudson MD    Co-Surgeon::  Evan Crawford MD      Two staff neurosurgeon for this complex operation resident was not at a level to meaningfully to meaningfully assist the operation.    Indication for Procedure:  This is a 58-year-old female with a history of intractable glossopharyngeal neuralgia that is to impair her eat and function.  She would failed conservative management.  Her workup revealed a vascular loop compressing the root entry zone of the glossopharyngeal nerve we felt patient would benefit neurosurgical intervention.    Operative Note:  Patient had undergone a neuro navigation MRI scan.  Patient was anesthetized intubated by anesthesia.  Patient was placed in [place] head pin with head turned to the right.  Patient was taped and secured onto the table.  The table was tilted way to be able to bring up the left-sided suboccipital area.  Navigation system was registered using facial registration.  We then marked out the transverse sigmoid sinuses.  The head was shaved prepped and draped in sterile fashion.  We made a curvilinear S shaped incision behind the left ear.  Dissection was taken down and the suboccipital region below the transverse sigmoid sinuses were exposed.  We made a bur hole right at the transverse sigmoid junction.  We turned a craniotomy flap down toward the foramen magnum not go through the foramen magnum.  We then opened the dura in a stellate fashion securing it  toward the sinus.  We then brought in the microscope with microsurgical technique we opened the cisterna magna.  CSF was released.  We had excellent brain relaxation a cerebellar tonsils in the cerebellum.  We then began a small incremental dissection coming in the inferior lateral aspect of the cerebellar lifting the cerebellum up and over.  We were able to see the spinal accessory nerve and followed up to the jugular foramen.  We are able to identify the vagus nerve and hypoglossal nerve.  We a sharp arachnoid dissection.  We are able to see a loop of artery was knuckled up the posterior aspect of the glossopharyngeal nerve.  We were used microsurgical technique to move the posterior inferior cerebellar loop off of the nerve.  We put a Curtis felt sq that was folded up into a v-shaped fashion to be able to have a spring like expansion pushing the artery away from the nerve.  Once we were happy with the decompression.  We confirmed that the Curtis felt was a secure position.  We then closed the dura in did an allograft patch with suturable DuraGen.  We supplemented that with a fibrin glue and then reapproximated the bone flap back using titanium plates and screws.  We closed the rest of the layers.  We took the patient out of [place] head pin extubated the patient brought her to the the the neuro ICU without any problems or complication.    EBL:  50 cc  Specimen Sent:  None

## 2023-08-07 ENCOUNTER — PES CALL (OUTPATIENT)
Dept: ADMINISTRATIVE | Facility: CLINIC | Age: 58
End: 2023-08-07
Payer: COMMERCIAL

## 2023-08-09 ENCOUNTER — HOSPITAL ENCOUNTER (OUTPATIENT)
Facility: HOSPITAL | Age: 58
Discharge: HOME | End: 2023-08-10
Attending: STUDENT IN AN ORGANIZED HEALTH CARE EDUCATION/TRAINING PROGRAM | Admitting: EMERGENCY MEDICINE
Payer: COMMERCIAL

## 2023-08-09 ENCOUNTER — OFFICE VISIT (OUTPATIENT)
Dept: HOME HEALTH SERVICES | Facility: CLINIC | Age: 58
End: 2023-08-09
Payer: COMMERCIAL

## 2023-08-09 DIAGNOSIS — M79.18 CERVICAL MYOFASCIAL PAIN SYNDROME: ICD-10-CM

## 2023-08-09 DIAGNOSIS — R93.5 ABNORMAL CT OF THE ABDOMEN: ICD-10-CM

## 2023-08-09 DIAGNOSIS — G52.1 GLOSSOPHARYNGEAL NERVE DISEASE: Chronic | ICD-10-CM

## 2023-08-09 DIAGNOSIS — M51.36 DDD (DEGENERATIVE DISC DISEASE), LUMBAR: ICD-10-CM

## 2023-08-09 DIAGNOSIS — M54.41 ACUTE BILATERAL LOW BACK PAIN WITH BILATERAL SCIATICA: ICD-10-CM

## 2023-08-09 DIAGNOSIS — M54.42 ACUTE BILATERAL LOW BACK PAIN WITH BILATERAL SCIATICA: ICD-10-CM

## 2023-08-09 DIAGNOSIS — G52.1 GLOSSOPHARYNGEAL NERVE DISEASE: Primary | Chronic | ICD-10-CM

## 2023-08-09 DIAGNOSIS — K80.50 CHOLEDOCHOLITHIASIS: ICD-10-CM

## 2023-08-09 DIAGNOSIS — F41.8 ANXIETY WITH DEPRESSION: ICD-10-CM

## 2023-08-09 DIAGNOSIS — M54.9 BACK PAIN, UNSPECIFIED BACK LOCATION, UNSPECIFIED BACK PAIN LATERALITY, UNSPECIFIED CHRONICITY: Primary | ICD-10-CM

## 2023-08-09 DIAGNOSIS — K83.1 COMMON BILE DUCT (CBD) OBSTRUCTION: ICD-10-CM

## 2023-08-09 DIAGNOSIS — G52.1 GLOSSOPHARYNGEAL NEURALGIA: ICD-10-CM

## 2023-08-09 PROBLEM — M54.40 ACUTE LOW BACK PAIN WITH SCIATICA: Status: ACTIVE | Noted: 2023-08-09

## 2023-08-09 LAB
ALBUMIN SERPL BCP-MCNC: 3.5 G/DL (ref 3.5–5.2)
ALP SERPL-CCNC: 77 U/L (ref 55–135)
ALT SERPL W/O P-5'-P-CCNC: 100 U/L (ref 10–44)
ANION GAP SERPL CALC-SCNC: 13 MMOL/L (ref 8–16)
AST SERPL-CCNC: 60 U/L (ref 10–40)
BASOPHILS # BLD AUTO: 0.03 K/UL (ref 0–0.2)
BASOPHILS NFR BLD: 0.5 % (ref 0–1.9)
BILIRUB SERPL-MCNC: 0.3 MG/DL (ref 0.1–1)
BILIRUB UR QL STRIP: NEGATIVE
BUN SERPL-MCNC: 12 MG/DL (ref 6–20)
CALCIUM SERPL-MCNC: 9.5 MG/DL (ref 8.7–10.5)
CHLORIDE SERPL-SCNC: 106 MMOL/L (ref 95–110)
CK SERPL-CCNC: 28 U/L (ref 20–180)
CLARITY UR REFRACT.AUTO: CLEAR
CO2 SERPL-SCNC: 20 MMOL/L (ref 23–29)
COLOR UR AUTO: YELLOW
CREAT SERPL-MCNC: 0.7 MG/DL (ref 0.5–1.4)
DIFFERENTIAL METHOD: NORMAL
EOSINOPHIL # BLD AUTO: 0.1 K/UL (ref 0–0.5)
EOSINOPHIL NFR BLD: 1.6 % (ref 0–8)
ERYTHROCYTE [DISTWIDTH] IN BLOOD BY AUTOMATED COUNT: 12.4 % (ref 11.5–14.5)
EST. GFR  (NO RACE VARIABLE): >60 ML/MIN/1.73 M^2
GLUCOSE SERPL-MCNC: 100 MG/DL (ref 70–110)
GLUCOSE UR QL STRIP: NEGATIVE
HCT VFR BLD AUTO: 41 % (ref 37–48.5)
HCV AB SERPL QL IA: NORMAL
HGB BLD-MCNC: 13.5 G/DL (ref 12–16)
HGB UR QL STRIP: NEGATIVE
HIV 1+2 AB+HIV1 P24 AG SERPL QL IA: NORMAL
IMM GRANULOCYTES # BLD AUTO: 0.01 K/UL (ref 0–0.04)
IMM GRANULOCYTES NFR BLD AUTO: 0.2 % (ref 0–0.5)
KETONES UR QL STRIP: NEGATIVE
LEUKOCYTE ESTERASE UR QL STRIP: NEGATIVE
LYMPHOCYTES # BLD AUTO: 1.5 K/UL (ref 1–4.8)
LYMPHOCYTES NFR BLD: 26.9 % (ref 18–48)
MCH RBC QN AUTO: 30.3 PG (ref 27–31)
MCHC RBC AUTO-ENTMCNC: 32.9 G/DL (ref 32–36)
MCV RBC AUTO: 92 FL (ref 82–98)
MONOCYTES # BLD AUTO: 0.6 K/UL (ref 0.3–1)
MONOCYTES NFR BLD: 11.1 % (ref 4–15)
NEUTROPHILS # BLD AUTO: 3.4 K/UL (ref 1.8–7.7)
NEUTROPHILS NFR BLD: 59.7 % (ref 38–73)
NITRITE UR QL STRIP: NEGATIVE
NRBC BLD-RTO: 0 /100 WBC
PH UR STRIP: 7 [PH] (ref 5–8)
PLATELET # BLD AUTO: 287 K/UL (ref 150–450)
PMV BLD AUTO: 9.4 FL (ref 9.2–12.9)
POTASSIUM SERPL-SCNC: 3.8 MMOL/L (ref 3.5–5.1)
PROT SERPL-MCNC: 6.9 G/DL (ref 6–8.4)
PROT UR QL STRIP: NEGATIVE
RBC # BLD AUTO: 4.46 M/UL (ref 4–5.4)
SODIUM SERPL-SCNC: 139 MMOL/L (ref 136–145)
SP GR UR STRIP: 1.01 (ref 1–1.03)
URN SPEC COLLECT METH UR: NORMAL
WBC # BLD AUTO: 5.69 K/UL (ref 3.9–12.7)

## 2023-08-09 PROCEDURE — 1111F DSCHRG MED/CURRENT MED MERGE: CPT | Mod: CPTII,S$GLB,, | Performed by: NURSE PRACTITIONER

## 2023-08-09 PROCEDURE — 99350 PR HOME VISIT,ESTAB PATIENT,LEVEL IV: ICD-10-PCS | Mod: S$GLB,,, | Performed by: NURSE PRACTITIONER

## 2023-08-09 PROCEDURE — 3077F SYST BP >= 140 MM HG: CPT | Mod: CPTII,S$GLB,, | Performed by: NURSE PRACTITIONER

## 2023-08-09 PROCEDURE — 86803 HEPATITIS C AB TEST: CPT | Performed by: STUDENT IN AN ORGANIZED HEALTH CARE EDUCATION/TRAINING PROGRAM

## 2023-08-09 PROCEDURE — 1160F PR REVIEW ALL MEDS BY PRESCRIBER/CLIN PHARMACIST DOCUMENTED: ICD-10-PCS | Mod: CPTII,S$GLB,, | Performed by: NURSE PRACTITIONER

## 2023-08-09 PROCEDURE — G0378 HOSPITAL OBSERVATION PER HR: HCPCS

## 2023-08-09 PROCEDURE — 82550 ASSAY OF CK (CPK): CPT | Performed by: STUDENT IN AN ORGANIZED HEALTH CARE EDUCATION/TRAINING PROGRAM

## 2023-08-09 PROCEDURE — 3077F PR MOST RECENT SYSTOLIC BLOOD PRESSURE >= 140 MM HG: ICD-10-PCS | Mod: CPTII,S$GLB,, | Performed by: NURSE PRACTITIONER

## 2023-08-09 PROCEDURE — 3079F PR MOST RECENT DIASTOLIC BLOOD PRESSURE 80-89 MM HG: ICD-10-PCS | Mod: CPTII,S$GLB,, | Performed by: NURSE PRACTITIONER

## 2023-08-09 PROCEDURE — 3044F HG A1C LEVEL LT 7.0%: CPT | Mod: CPTII,S$GLB,, | Performed by: NURSE PRACTITIONER

## 2023-08-09 PROCEDURE — 1111F PR DISCHARGE MEDS RECONCILED W/ CURRENT OUTPATIENT MED LIST: ICD-10-PCS | Mod: CPTII,S$GLB,, | Performed by: NURSE PRACTITIONER

## 2023-08-09 PROCEDURE — 99350 HOME/RES VST EST HIGH MDM 60: CPT | Mod: S$GLB,,, | Performed by: NURSE PRACTITIONER

## 2023-08-09 PROCEDURE — 96374 THER/PROPH/DIAG INJ IV PUSH: CPT

## 2023-08-09 PROCEDURE — 99223 1ST HOSP IP/OBS HIGH 75: CPT | Mod: ,,, | Performed by: PHYSICIAN ASSISTANT

## 2023-08-09 PROCEDURE — 25000003 PHARM REV CODE 250: Performed by: STUDENT IN AN ORGANIZED HEALTH CARE EDUCATION/TRAINING PROGRAM

## 2023-08-09 PROCEDURE — 87389 HIV-1 AG W/HIV-1&-2 AB AG IA: CPT | Performed by: STUDENT IN AN ORGANIZED HEALTH CARE EDUCATION/TRAINING PROGRAM

## 2023-08-09 PROCEDURE — 99223 PR INITIAL HOSPITAL CARE,LEVL III: ICD-10-PCS | Mod: ,,, | Performed by: PHYSICIAN ASSISTANT

## 2023-08-09 PROCEDURE — 1159F MED LIST DOCD IN RCRD: CPT | Mod: CPTII,S$GLB,, | Performed by: NURSE PRACTITIONER

## 2023-08-09 PROCEDURE — 1159F PR MEDICATION LIST DOCUMENTED IN MEDICAL RECORD: ICD-10-PCS | Mod: CPTII,S$GLB,, | Performed by: NURSE PRACTITIONER

## 2023-08-09 PROCEDURE — 63600175 PHARM REV CODE 636 W HCPCS: Performed by: EMERGENCY MEDICINE

## 2023-08-09 PROCEDURE — 63600175 PHARM REV CODE 636 W HCPCS: Performed by: NURSE PRACTITIONER

## 2023-08-09 PROCEDURE — 85025 COMPLETE CBC W/AUTO DIFF WBC: CPT | Performed by: STUDENT IN AN ORGANIZED HEALTH CARE EDUCATION/TRAINING PROGRAM

## 2023-08-09 PROCEDURE — 63600175 PHARM REV CODE 636 W HCPCS: Performed by: STUDENT IN AN ORGANIZED HEALTH CARE EDUCATION/TRAINING PROGRAM

## 2023-08-09 PROCEDURE — 1160F RVW MEDS BY RX/DR IN RCRD: CPT | Mod: CPTII,S$GLB,, | Performed by: NURSE PRACTITIONER

## 2023-08-09 PROCEDURE — 99285 EMERGENCY DEPT VISIT HI MDM: CPT | Mod: 25

## 2023-08-09 PROCEDURE — 81003 URINALYSIS AUTO W/O SCOPE: CPT | Performed by: STUDENT IN AN ORGANIZED HEALTH CARE EDUCATION/TRAINING PROGRAM

## 2023-08-09 PROCEDURE — 96376 TX/PRO/DX INJ SAME DRUG ADON: CPT

## 2023-08-09 PROCEDURE — 3044F PR MOST RECENT HEMOGLOBIN A1C LEVEL <7.0%: ICD-10-PCS | Mod: CPTII,S$GLB,, | Performed by: NURSE PRACTITIONER

## 2023-08-09 PROCEDURE — 80053 COMPREHEN METABOLIC PANEL: CPT | Performed by: STUDENT IN AN ORGANIZED HEALTH CARE EDUCATION/TRAINING PROGRAM

## 2023-08-09 PROCEDURE — 3079F DIAST BP 80-89 MM HG: CPT | Mod: CPTII,S$GLB,, | Performed by: NURSE PRACTITIONER

## 2023-08-09 RX ORDER — ONDANSETRON 2 MG/ML
4 INJECTION INTRAMUSCULAR; INTRAVENOUS EVERY 8 HOURS PRN
Status: DISCONTINUED | OUTPATIENT
Start: 2023-08-09 | End: 2023-08-10 | Stop reason: HOSPADM

## 2023-08-09 RX ORDER — AMOXICILLIN 250 MG
1 CAPSULE ORAL 2 TIMES DAILY PRN
Status: DISCONTINUED | OUTPATIENT
Start: 2023-08-09 | End: 2023-08-10 | Stop reason: HOSPADM

## 2023-08-09 RX ORDER — SODIUM CHLORIDE 0.9 % (FLUSH) 0.9 %
10 SYRINGE (ML) INJECTION
Status: DISCONTINUED | OUTPATIENT
Start: 2023-08-09 | End: 2023-08-10 | Stop reason: HOSPADM

## 2023-08-09 RX ORDER — OXYBUTYNIN CHLORIDE 5 MG/1
5 TABLET ORAL 2 TIMES DAILY
Status: DISCONTINUED | OUTPATIENT
Start: 2023-08-09 | End: 2023-08-10 | Stop reason: HOSPADM

## 2023-08-09 RX ORDER — MORPHINE SULFATE 4 MG/ML
4 INJECTION, SOLUTION INTRAMUSCULAR; INTRAVENOUS EVERY 4 HOURS PRN
Status: DISCONTINUED | OUTPATIENT
Start: 2023-08-09 | End: 2023-08-09

## 2023-08-09 RX ORDER — LIDOCAINE 50 MG/G
1 PATCH TOPICAL
Status: DISCONTINUED | OUTPATIENT
Start: 2023-08-09 | End: 2023-08-09

## 2023-08-09 RX ORDER — TALC
6 POWDER (GRAM) TOPICAL NIGHTLY PRN
Status: DISCONTINUED | OUTPATIENT
Start: 2023-08-09 | End: 2023-08-09

## 2023-08-09 RX ORDER — NALOXONE HCL 0.4 MG/ML
0.02 VIAL (ML) INJECTION
Status: DISCONTINUED | OUTPATIENT
Start: 2023-08-09 | End: 2023-08-10 | Stop reason: HOSPADM

## 2023-08-09 RX ORDER — SODIUM CHLORIDE 0.9 % (FLUSH) 0.9 %
10 SYRINGE (ML) INJECTION EVERY 8 HOURS
Status: DISCONTINUED | OUTPATIENT
Start: 2023-08-09 | End: 2023-08-10 | Stop reason: HOSPADM

## 2023-08-09 RX ORDER — MORPHINE SULFATE 2 MG/ML
6 INJECTION, SOLUTION INTRAMUSCULAR; INTRAVENOUS
Status: DISCONTINUED | OUTPATIENT
Start: 2023-08-09 | End: 2023-08-09

## 2023-08-09 RX ORDER — MORPHINE SULFATE 2 MG/ML
2 INJECTION, SOLUTION INTRAMUSCULAR; INTRAVENOUS
Status: COMPLETED | OUTPATIENT
Start: 2023-08-09 | End: 2023-08-09

## 2023-08-09 RX ORDER — MORPHINE SULFATE 4 MG/ML
4 INJECTION, SOLUTION INTRAMUSCULAR; INTRAVENOUS EVERY 4 HOURS PRN
Status: DISCONTINUED | OUTPATIENT
Start: 2023-08-09 | End: 2023-08-10 | Stop reason: HOSPADM

## 2023-08-09 RX ORDER — CARBAMAZEPINE 100 MG/1
100 CAPSULE, EXTENDED RELEASE ORAL 2 TIMES DAILY
Status: DISCONTINUED | OUTPATIENT
Start: 2023-08-09 | End: 2023-08-10 | Stop reason: HOSPADM

## 2023-08-09 RX ORDER — LIDOCAINE 50 MG/G
1 PATCH TOPICAL
Status: COMPLETED | OUTPATIENT
Start: 2023-08-09 | End: 2023-08-10

## 2023-08-09 RX ORDER — ACETAMINOPHEN 325 MG/1
650 TABLET ORAL EVERY 6 HOURS PRN
Status: DISCONTINUED | OUTPATIENT
Start: 2023-08-09 | End: 2023-08-10 | Stop reason: HOSPADM

## 2023-08-09 RX ORDER — SODIUM CHLORIDE 9 MG/ML
INJECTION, SOLUTION INTRAVENOUS CONTINUOUS
Status: DISCONTINUED | OUTPATIENT
Start: 2023-08-09 | End: 2023-08-10 | Stop reason: HOSPADM

## 2023-08-09 RX ORDER — TOPIRAMATE 25 MG/1
50 TABLET ORAL 2 TIMES DAILY
Status: DISCONTINUED | OUTPATIENT
Start: 2023-08-09 | End: 2023-08-10 | Stop reason: HOSPADM

## 2023-08-09 RX ORDER — OXYCODONE HYDROCHLORIDE 5 MG/1
5 TABLET ORAL EVERY 6 HOURS PRN
Status: DISCONTINUED | OUTPATIENT
Start: 2023-08-09 | End: 2023-08-10 | Stop reason: HOSPADM

## 2023-08-09 RX ORDER — ACETAMINOPHEN 325 MG/1
650 TABLET ORAL
Status: COMPLETED | OUTPATIENT
Start: 2023-08-09 | End: 2023-08-09

## 2023-08-09 RX ADMIN — LIDOCAINE 5% 1 PATCH: 700 PATCH TOPICAL at 02:08

## 2023-08-09 RX ADMIN — MORPHINE SULFATE 2 MG: 2 INJECTION, SOLUTION INTRAMUSCULAR; INTRAVENOUS at 03:08

## 2023-08-09 RX ADMIN — MORPHINE SULFATE 4 MG: 4 INJECTION INTRAVENOUS at 07:08

## 2023-08-09 RX ADMIN — ACETAMINOPHEN 650 MG: 325 TABLET ORAL at 05:08

## 2023-08-09 NOTE — PROVIDER PROGRESS NOTES - EMERGENCY DEPT.
I have assumed care for this patient from Dr. Arroyo           5:20 PM . I have reviewed case, and have seen patient. I have read the chart and discussed care with the previous attending. I agree with the plan as previously determined. Since assuming care, the patient's course includes:    59 yo F s/p craniotomy 7/31/2023 with :    headache. NSGY consulted, CT head pending    Acute on chronic low back pain, CT a/p pending  Received morphine and tylenol for pain      UA no blood no nitrites no leukocytes  CBC no white count   CMP no gross abnormalities   CPK within normal limits   CT head: postsurgical changes status post left suboccipital craniotomy with interval decrease small amount fluid beneath the craniotomy flap grossly similar scattered suspected blood products beneath the craniotomy flap no large hematoma   Near total resolution of previous extensive pneumocephalus  Evolving area slightly heterogeneous hypoattenuation within the left cerebellar hemisphere likely evolving infarct    CT abdomen pelvis with contrast cholelithiasis, dilated CBD without associated intrahepatic biliary duct dilation  Remote postop changes of the proximal:   Atherosclerosis with expected 1.5 cm partially calcified splenic arterial aneurysms     CMP reviewed: AST 60,  elevated from baseline for the patient, in the picture of cholelithiasis and dilated CBD will obtain gallbladder ultrasound    Discussed with Dr. Russ (NS), CT h reviewed, no post op complications    GB US: Cholelithiasis without sonographic evidence of cholecystitis.  Associated dilatation of the common bile duct with mild intrahepatic ductal dilatation.  Upper limits of normal size caliber of the pancreatic duct.  Can obtain MRCP for further evaluation if clinically warranted      Patient without RUQ/epig pain, not symptomatic of choledocholithiasis, + low back pain radiating on the post surface left thigh suggestive of sciatica  Findings discussed w/ the  patient and decision was made to admit for MRCP and also pain control (patient required multiple doses of opiate medication in the ED for low back pain)

## 2023-08-09 NOTE — ED TRIAGE NOTES
Mae Gregory, a 58 y.o. female presents to the ED w/ complaint of back pain and HA since Friday morning post craniotomy on 7/31. Pt states that the pain is a constant ache as well as a stabbing pain that started in the lower back and radiates down both legs to the calves. Pain is not relieved by meds. Pt reports some nausea when eating and drinking.    Triage note:  Chief Complaint   Patient presents with    Back Pain     Recent craniotomy on 7/31, headache and back pain progression since discharge      Review of patient's allergies indicates:  No Known Allergies  Past Medical History:   Diagnosis Date    DDD (degenerative disc disease), cervical 3/15/2017    DDD (degenerative disc disease), lumbar 3/15/2017    Glaucoma 3/7/2018    Migraine 3/15/2017    Primary insomnia 3/15/2017

## 2023-08-09 NOTE — PHARMACY MED REC
"  Admission Medication History     The home medication history was taken by Giuseppe Booker.    You may go to "Admission" then "Reconcile Home Medications" tabs to review and/or act upon these items.     The home medication list has been updated by the Pharmacy department.   Please read ALL comments highlighted in yellow.   Please address this information as you see fit.    Feel free to contact us if you have any questions or require assistance.      The medications listed below were removed from the home medication list. Please reorder if appropriate:  Patient reports no longer taking the following medication(s):  METHYLPREDNISOLONE 4 MG DOSE  NURTEC 75 MG ODT  VALACYCLOVIR 1000 MG   SUMATRIPTAN 100 MG     Medications listed below were obtained from: Patient/family  Current Outpatient Medications on File Prior to Encounter   Medication Sig    albuterol (PROVENTIL/VENTOLIN HFA) 90 mcg/actuation inhaler   TAKE 1 2 PUFF(S) (INHALATION) EVERY 4 HOURS PRN   WHEEZING    BIOFREEZE, MENTHOL, TOP Apply topically to buttocks and legs as needed for pain.      carBAMazepine (TEGRETOL XR) 100 MG 12 hr tablet   Take 1 tablet (100 mg total) by mouth 2 (two) times daily.    cetirizine (ZYRTEC) 10 MG tablet   Take 10 mg by mouth daily as needed for Allergies.    cyclobenzaprine (FLEXERIL) 10 MG tablet   Take 10 mg by mouth 3 (three) times daily.    diazePAM (VALIUM) 5 MG tablet Take 1 tablet (5 mg total) by mouth every 6 (six) hours as needed for Anxiety (neck pain).      estradiol-norethindrone (ACTIVELLA) 1-0.5 mg per tablet   TAKE 1 TABLET BY MOUTH EVERY DAY    famotidine (PEPCID AC ORAL) Take 1 capsule by mouth daily as needed (Heartburn).      finasteride (PROSCAR) 5 mg tablet   Take 1 tablet (5 mg total) by mouth once daily.    oxybutynin (DITROPAN) 5 MG Tab   Take 1 tablet (5 mg total) by mouth 2 (two) times daily.    sodium chloride (SALINE MIST NASL)   1 spray by Each Nostril route daily as needed (Congestion).    topiramate " (TOPAMAX) 50 MG tablet   TAKE 1 TABLET BY MOUTH TWICE A DAY    traZODone (DESYREL) 150 MG tablet   Take 1 tablet (150 mg total) by mouth nightly as needed for Insomnia.    zolpidem (AMBIEN) 5 MG Tab Take 1 tablet (5 mg total) by mouth nightly as needed (insomnia).      COCONUT OIL ORAL   Take 1 capsule by mouth once daily.    multivitamin with minerals tablet Take 1 tablet by mouth once daily.         Potential issues to be addressed PRIOR TO DISCHARGE  Patient reported not taking the following medications: (ROXICODONE). These medications remain on the home medication list. Please address accordingly.             Giuseppe Booker  EXT [id number]                .

## 2023-08-09 NOTE — Clinical Note
Diagnosis: Back pain, unspecified back location, unspecified back pain laterality, unspecified chronicity [[id number]]   Future Attending Provider: JAVI NJ [[id number]]   Admitting Provider:: NAGA MARTINEZ [[id number]]   Special Needs:: No Special Needs [1]

## 2023-08-09 NOTE — ED PROVIDER NOTES
Source of History  patient, family, and EMR    Chief Complaint    Back Pain (Recent craniotomy on 7/31, headache and back pain progression since discharge )      History of Present Illness    Mae Gregory is a 58 y.o. female presenting with acute low back pain progressing since discharge August 1st after she was admitted for a craniotomy related to glossopharyngeal nerve decompression.  She is also had a headache, particularly over the left side, where they had held her head during the surgery.  Her surgical site is well-appearing.  She is had no symptoms of infection.  She reports extreme discomfort, restlessness.  She has tried Epsom salts, local rubs, acetaminophen.  She denies nausea, vomiting.  She denies urinary incontinence or retention.  No numbness or tingling, no saddle anesthesia.  No prior surgical intervention of the lower back but does have known degenerative disc disease.  She is able to ambulate though she feels rather weak.  She has been taking Valium at night as well.  None of this seems to help her pain in her back.    Review of Systems    As per HPI and below:  Constitutional symptoms:  No chills, no sweats, no fever   Skin symptoms:  No rash    Eye symptoms:  No blurred vision  ENMT symptoms:  No sore throat    Respiratory symptoms:  No shortness of breath  Cardiovascular symptoms:  No chest pain   Gastrointestinal symptoms:  No abdominal pain, no nausea, no vomiting  Genitourinary symptoms:  No dysuria, no urinary retention nor incontinence  Musculoskeletal symptoms:  + back pain in lumbar spine, +restlessness though weak legs, can ambulate  Neurologic symptoms:  No headache, no weakness, no numbness, no saddle anesthesia  Endocrine symptoms:  None except as in HPI     Past History    As per HPI and below:  Past Medical History:   Diagnosis Date    DDD (degenerative disc disease), cervical 3/15/2017    DDD (degenerative disc disease), lumbar 3/15/2017    Glaucoma 3/7/2018    Migraine  3/15/2017    Primary insomnia 3/15/2017       Past Surgical History:   Procedure Laterality Date    AUGMENTATION OF BREAST      BREAST SURGERY       SECTION   and 2001    x2    COLON SURGERY  10/15/2018    colon resection     CRANIOTOMY Left 2023    Procedure: CRANIOTOMY for microvascular decompression NEURO MONITORING;  Surgeon: Nils Hudson MD;  Location: [facility] OR [address] [place] [address];  Service: Neurosurgery;  Laterality: Left;  regular bed, supine, bump, hickman, type and cross 2 units, cranial nerve monitoring, optical stealth CO SURGEON DR NJ       Social History     Socioeconomic History    Marital status:    Tobacco Use    Smoking status: Never    Smokeless tobacco: Never   Substance and Sexual Activity    Alcohol use: No    Drug use: No    Sexual activity: Yes     Partners: Male     Comment: ablation     Social Determinants of Health     Financial Resource Strain: Low Risk  (2023)    Overall Financial Resource Strain (CARDIA)     Difficulty of Paying Living Expenses: Not hard at all   Food Insecurity: No Food Insecurity (2023)    Hunger Vital Sign     Worried About Running Out of Food in the Last Year: Never true     Ran Out of Food in the Last Year: Never true   Transportation Needs: No Transportation Needs (2023)    PRAPARE - Transportation     Lack of Transportation (Medical): No     Lack of Transportation (Non-Medical): No   Physical Activity: Sufficiently Active (2023)    Exercise Vital Sign     Days of Exercise per Week: 5 days     Minutes of Exercise per Session: 60 min   Stress: Stress Concern Present (2023)    [place] [employer] - Occupational Stress Questionnaire     Feeling of Stress : To some extent   Social Connections: Moderately Isolated (2023)    Social Connection and Isolation Panel [NHANES]     Frequency of Communication with Friends and Family: More than three times a week     Attends Hinduism Services: More than 4 times per  year     Active Member of Clubs or Organizations: No     Attends Club or Organization Meetings: Never     Marital Status:    Housing Stability: Low Risk  (8/1/2023)    Housing Stability Vital Sign     Unable to Pay for Housing in the Last Year: No     Number of Places Lived in the Last Year: 1     Unstable Housing in the Last Year: No       Family History   Problem Relation Age of Onset    Stroke Maternal Grandfather     Hypertension Father     Hypertension Mother     Hypertension Brother     Ovarian cancer Neg Hx     Cancer Neg Hx        Review of patient's allergies indicates:  No Known Allergies    No current facility-administered medications on file prior to encounter.     Current Outpatient Medications on File Prior to Encounter   Medication Sig Dispense Refill    carBAMazepine (TEGRETOL XR) 100 MG 12 hr tablet Take 1 tablet (100 mg total) by mouth 2 (two) times daily. 60 tablet 11    estradiol-norethindrone (ACTIVELLA) 1-0.5 mg per tablet TAKE 1 TABLET BY MOUTH EVERY DAY 28 tablet 12    oxybutynin (DITROPAN) 5 MG Tab Take 1 tablet (5 mg total) by mouth 2 (two) times daily. 180 tablet 1    oxyCODONE (ROXICODONE) 5 MG immediate release tablet Take 1 tablet (5 mg total) by mouth every 4 (four) hours as needed. 20 tablet 0    topiramate (TOPAMAX) 50 MG tablet TAKE 1 TABLET BY MOUTH TWICE A  tablet 3    traZODone (DESYREL) 150 MG tablet Take 1 tablet (150 mg total) by mouth nightly as needed for Insomnia. 90 tablet 3    zolpidem (AMBIEN) 5 MG Tab Take 1 tablet (5 mg total) by mouth nightly as needed (insomnia). 30 tablet 5    albuterol (PROVENTIL/VENTOLIN HFA) 90 mcg/actuation inhaler TAKE 1 2 PUFF(S) (INHALATION) EVERY 4 HOURS PRN   WHEEZING (Patient not taking: Reported on 8/4/2023) 18 g 12    cetirizine (ZYRTEC) 10 MG tablet Take 10 mg by mouth once daily.      COCONUT OIL ORAL Take by mouth.      cyclobenzaprine (FLEXERIL) 10 MG tablet Take 10 mg by mouth 3 (three) times daily.      diazePAM  (VALIUM) 5 MG tablet Take 1 tablet (5 mg total) by mouth every 6 (six) hours as needed for Anxiety (neck pain). 12 tablet 3    finasteride (PROSCAR) 5 mg tablet Take 1 tablet (5 mg total) by mouth once daily. 30 tablet 11    methylPREDNISolone (MEDROL DOSEPACK) 4 mg tablet use as directed 21 each 0    multivitamin with minerals tablet Take 1 tablet by mouth once daily.      NURTEC 75 mg odt Take 75 mg by mouth as needed.      sumatriptan (IMITREX) 100 MG tablet Take 1 tablet (100 mg total) by mouth every 2 (two) hours as needed for Migraine. (Patient not taking: Reported on 8/4/2023) 40 tablet 1    valACYclovir (VALTREX) 1000 MG tablet Take 2 tablets (2,000 mg total) by mouth every 12 (twelve) hours. For cold sore (Patient not taking: Reported on 7/18/2023) 30 tablet 11       Physical Exam    Reviewed nursing notes.  Vitals:    08/09/23 1350 08/09/23 1456 08/09/23 1501 08/09/23 1552   BP: (!) 149/100 (!) 184/104     BP Location: Right arm Right arm     Patient Position: Sitting Sitting     Pulse: 108 (!) 116     Resp: 18 20 18 18   Temp: 97.4 °F (36.3 °C) 97.4 °F (36.3 °C)     TempSrc: Oral Oral     SpO2: 98% 98%     Weight: 56.7 kg (125 lb)        General:  Alert, no acute distress.  Pacing in the room, appears uncomfortable  Skin:  Warm, dry, intact.  No rash.  Head:  Normocephalic, surgical site appears well, staples are intact an in-line, no erythema or fluctuance  Neck:  Supple.   HEENT:  Pupils are equal and round, appropriate for room, extraocular movements are intact.  Normal phonation.  Moist mucous membranes.  Cardiovascular:  Regular rate and rhythm, Normal peripheral perfusion, No edema.    Respiratory:  Lungs are clear to auscultation, respirations are non-labored, breath sounds are equal.    Gastrointestinal:  Soft, Nontender, Non distended.   Back:  Nontender. Normal gait.  Ambulatory.  Nontender.  No overlying skin changes.  Musculoskeletal:  Normal range of motion observed.  Neurological:  Alert and  oriented to person, place, time, and situation.  No focal deficits observed.  Equal strength in all extremities.  Psychiatric:  Cooperative, appropriate mood & affect.             Initial MDM and Data Review    58 y.o. female presenting for evaluation of concern for ongoing headache with acute low back pain with recent crani for glossopharyngeal nerve decompression.  No infectious symptoms.  Appears uncomfortable, pacing the room.    Differential includes: acute on chronic lumbar back pain / deg disc disease, less likely kidney stone but considered, ICH/worsening hematoma from recent crani, less likely spinal cord pathology, msk / muscle strain possible    Work-up includes:   PVR, urinalysis, basic labs, CT head given the recent craniotomy, CT lumbar spine, neurosurgery consultation    Interventions include:  Pain control    The patient has significant medical comorbidities that influence decision making for this acute process, such as: recent surgery / crani    I decided to obtain the patient's medical records and review relevant documentation from hospital records.  Pertinent information is noted.  Craniotomy microvascular decompression for glossopharyngeal nerve with CT head postop with pneumocephalus but no other acute concerning symptoms and was stable for discharge with follow up with Neurosurgery planned    Medications   LIDOcaine 5 % patch 1 patch (1 patch Transdermal Patch Applied 8/9/23 1448)   morphine injection 2 mg (2 mg Intravenous Given 8/9/23 1501)   morphine injection 2 mg (2 mg Intravenous Given 8/9/23 1552)   acetaminophen tablet 650 mg (650 mg Oral Given 8/9/23 1706)       Results and ED Course    Labs Reviewed   COMPREHENSIVE METABOLIC PANEL - Abnormal; Notable for the following components:       Result Value    CO2 20 (*)     AST 60 (*)      (*)     All other components within normal limits    Narrative:     Release to patient->Immediate   HIV 1 / 2 ANTIBODY    Narrative:     Release to  patient->Immediate   HEPATITIS C ANTIBODY    Narrative:     Release to patient->Immediate   CBC W/ AUTO DIFFERENTIAL    Narrative:     Release to patient->Immediate   URINALYSIS, REFLEX TO URINE CULTURE    Narrative:     Specimen Source->Urine   CK   CK    Narrative:     ADD ON CPK PER DR TOMMIE FRANCOIS/ORDER# [id number] @ 16:00                                                      Release to patient->Immediate       Imaging Results              CT Head Without Contrast (Final result)  Result time 08/09/23 17:13:12      Final result by Colby Fleming MD (08/09/23 17:13:12)                   Impression:      Postsurgical changes status post left suboccipital craniotomy with interval decreased small amount fluid beneath the craniotomy flap.  Grossly similar scattered suspected blood products beneath the craniotomy flap.  No large hematoma.    Near-total resolution of previous extensive pneumocephalus, now minimal.    Evolving area of slightly heterogeneous hypoattenuation within the left cerebellar hemisphere, likely evolving infarct.      Electronically signed by: Colby Fleming MD  Date:    08/09/2023  Time:    17:13               Narrative:    EXAMINATION:  CT HEAD WITHOUT CONTRAST    CLINICAL HISTORY:  Headache, chronic, new features or increased frequency;    TECHNIQUE:  Low dose axial images were obtained through the head.  Coronal and sagittal reformations were also performed. Contrast was not administered.    COMPARISON:  Head CT 07/31/2023, MRI brain 04/06/2023    FINDINGS:  Redemonstrated postoperative changes of left suboccipital craniotomy.  Previous pneumocephalus has nearly completely resolved with minimal amount along the anterior interhemispheric fissure.  Grossly stable serpiginous hyperattenuation beneath the craniotomy flap likely blood products with decreased fluid volume.  Evolving area of slightly heterogeneous hypoattenuation within the left cerebellar hemisphere, likely evolving infarct.  No new  focal areas of abnormal parenchymal attenuation.  Ventricles remain stable in size and configuration without acute hydrocephalus.  No acute displaced skull fracture.  Stable suspected nonspecific partial empty sella configuration.    Paranasal sinuses and mastoid air cells are clear.  Skin staples remain in place overlying the left parieto-occipital calvarium.  Interval decreased soft tissue swelling overlying the left parietal and occipital calvarium.                                       CT Abdomen Pelvis  Without Contrast (Final result)  Result time 08/09/23 17:19:06      Final result by Colby Fleming MD (08/09/23 17:19:06)                   Impression:      1. No radiodense calculus seen within the urinary tract or evidence of obstructive uropathy on either side.  2. Cholelithiasis.  Dilated common bile duct without associated intrahepatic biliary ductal dilatation.  Correlation with LFTs advised.  Further evaluation with right upper quadrant ultrasound can be obtained as warranted.  3. Remote postoperative changes of the proximal colon without acute complication.  4. Atherosclerosis with suspected 1.5 cm partially calcified splenic arterial aneurysm.  5. Few additional findings as above      Electronically signed by: Colby Fleming MD  Date:    08/09/2023  Time:    17:19               Narrative:    EXAMINATION:  CT ABDOMEN PELVIS WITHOUT CONTRAST    CLINICAL HISTORY:  Flank pain, kidney stone suspected;    TECHNIQUE:  Low dose axial images, sagittal and coronal reformations were obtained from the lung bases to the pubic symphysis.  No contrast media utilized.    COMPARISON:  Chest radiograph 02/14/2022    FINDINGS:  Lack of IV contrast limits evaluation of soft tissue and vascular structures.    Imaged lung bases are clear.  Base of the heart is within normal limits.    Cholelithiasis without evidence of acute cholecystitis.  Common bile duct measures up to 13 mm but appears to taper appropriately at the  ampulla.  No significant intrahepatic biliary ductal dilatation.    Liver is normal in size noting small peripheral parenchymal calcification at the inferior right lobe.  Noncontrast appearance of the pancreas, spleen, stomach, duodenum and bilateral adrenal glands are within normal limits.    Bilateral kidneys are normal in size, shape and location.  No radiodense calculus seen within the collecting systems on either side or urinary bladder.  No hydronephrosis or significant perinephric stranding.  Ureters are normal in course and caliber.  Urinary bladder is well distended without wall thickening.  Noncontrast appearance of the uterus and bilateral adnexa are within normal limits.  No significant volume free fluid in the pelvis.  Pelvic phleboliths noted.    Postoperative changes of the proximal colon without acute complication.  Appendix not identified and likely surgically absent.  No evidence of bowel obstruction or acute inflammation noting moderate amount of stool noted throughout the mid to distal colon.  No pneumatosis or portal venous gas.    Mild scattered calcific atherosclerosis of the abdominal aorta.  1.5 cm suspected partially calcified splenic arterial aneurysm near its hilum.  Aorta tapers normally.    Tiny fat containing umbilical hernia.    Osseous structures show minimal degenerative change and otherwise appear intact.                                      ED Course as of 08/09/23 1725   Wed Aug 09, 2023   1502 Urinalysis, Reflex to Urine Culture Urine, Clean Catch  nml [AC]   1503 WBC: 5.69 [AC]   1503 PVR nml [AC]   1518 Discuss with Neurosurgery on-call in regards to patient's headache in the setting of recent surgical intervention.  They agree with current plan of care. [AC]   1518 Sodium: 139 [AC]   1518 Potassium: 3.8 [AC]   1518 CO2(!): 20 [AC]   1518 Glucose: 100 [AC]   1518 WBC: 5.69 [AC]   1518 Hemoglobin: 13.5 [AC]   1558 AST(!): 60 [AC]   1558 ALT(!): 100 [AC]   1558 AST and ALT are  elevated for unclear reasons.  Will add on CK. [AC]   1644 CPK: 28 [AC]      ED Course User Index  [AC] Jostin Arroyo,        Relevant imaging interpreted by myself  CTH with blood products related to recent surgery but perhaps improved from prior    Impression and Plan    58 y.o. female with findings of low back pain and headache based on the work up in the emergency department as above.    Important lab/imaging findings include: LFTs as above    I consulted with: neurosx    All tests, treatment options and disposition options were discussed with the patient.  The decision was made to observe the patient in the ED while awaiting further work up.    The patient was signed out to oncoming attending in stable condition and all further questions/concerns by patient and/or family were addressed.    Pending remainder of work up and care in the ED for headache, lumbar back pain, elevated LFTs.           Final diagnoses:  [R93.5] Abnormal CT of the abdomen  [R93.5] Abnormal CT of the abdomen - elevated LFts, dilated CBD, + cholelithiasis  [M54.9] Back pain, unspecified back location, unspecified back pain laterality, unspecified chronicity (Primary)                 Jostin Arroyo,   08/09/23 [id number]

## 2023-08-10 VITALS
OXYGEN SATURATION: 100 % | BODY MASS INDEX: 20.83 KG/M2 | RESPIRATION RATE: 18 BRPM | SYSTOLIC BLOOD PRESSURE: 159 MMHG | HEIGHT: 65 IN | DIASTOLIC BLOOD PRESSURE: 91 MMHG | HEART RATE: 88 BPM | TEMPERATURE: 96 F | WEIGHT: 125 LBS

## 2023-08-10 VITALS
DIASTOLIC BLOOD PRESSURE: 85 MMHG | TEMPERATURE: 97 F | RESPIRATION RATE: 20 BRPM | HEART RATE: 85 BPM | SYSTOLIC BLOOD PRESSURE: 145 MMHG | OXYGEN SATURATION: 98 %

## 2023-08-10 LAB
ALBUMIN SERPL BCP-MCNC: 3.2 G/DL (ref 3.5–5.2)
ALP SERPL-CCNC: 69 U/L (ref 55–135)
ALT SERPL W/O P-5'-P-CCNC: 75 U/L (ref 10–44)
ANION GAP SERPL CALC-SCNC: 10 MMOL/L (ref 8–16)
ANION GAP SERPL CALC-SCNC: 12 MMOL/L (ref 8–16)
AST SERPL-CCNC: 39 U/L (ref 10–40)
BASOPHILS # BLD AUTO: 0.02 K/UL (ref 0–0.2)
BASOPHILS NFR BLD: 0.4 % (ref 0–1.9)
BILIRUB SERPL-MCNC: 0.4 MG/DL (ref 0.1–1)
BUN SERPL-MCNC: 10 MG/DL (ref 6–20)
BUN SERPL-MCNC: 8 MG/DL (ref 6–20)
CALCIUM SERPL-MCNC: 8.6 MG/DL (ref 8.7–10.5)
CALCIUM SERPL-MCNC: 8.9 MG/DL (ref 8.7–10.5)
CHLORIDE SERPL-SCNC: 109 MMOL/L (ref 95–110)
CHLORIDE SERPL-SCNC: 109 MMOL/L (ref 95–110)
CO2 SERPL-SCNC: 18 MMOL/L (ref 23–29)
CO2 SERPL-SCNC: 22 MMOL/L (ref 23–29)
CREAT SERPL-MCNC: 0.6 MG/DL (ref 0.5–1.4)
CREAT SERPL-MCNC: 0.6 MG/DL (ref 0.5–1.4)
DIFFERENTIAL METHOD: ABNORMAL
EOSINOPHIL # BLD AUTO: 0.1 K/UL (ref 0–0.5)
EOSINOPHIL NFR BLD: 2 % (ref 0–8)
ERYTHROCYTE [DISTWIDTH] IN BLOOD BY AUTOMATED COUNT: 12.4 % (ref 11.5–14.5)
EST. GFR  (NO RACE VARIABLE): >60 ML/MIN/1.73 M^2
EST. GFR  (NO RACE VARIABLE): >60 ML/MIN/1.73 M^2
GLUCOSE SERPL-MCNC: 87 MG/DL (ref 70–110)
GLUCOSE SERPL-MCNC: 95 MG/DL (ref 70–110)
HCT VFR BLD AUTO: 36.2 % (ref 37–48.5)
HGB BLD-MCNC: 11.8 G/DL (ref 12–16)
IMM GRANULOCYTES # BLD AUTO: 0.02 K/UL (ref 0–0.04)
IMM GRANULOCYTES NFR BLD AUTO: 0.4 % (ref 0–0.5)
LYMPHOCYTES # BLD AUTO: 1.4 K/UL (ref 1–4.8)
LYMPHOCYTES NFR BLD: 28.3 % (ref 18–48)
MAGNESIUM SERPL-MCNC: 2 MG/DL (ref 1.6–2.6)
MCH RBC QN AUTO: 30.3 PG (ref 27–31)
MCHC RBC AUTO-ENTMCNC: 32.6 G/DL (ref 32–36)
MCV RBC AUTO: 93 FL (ref 82–98)
MONOCYTES # BLD AUTO: 0.5 K/UL (ref 0.3–1)
MONOCYTES NFR BLD: 9.8 % (ref 4–15)
NEUTROPHILS # BLD AUTO: 3 K/UL (ref 1.8–7.7)
NEUTROPHILS NFR BLD: 59.1 % (ref 38–73)
NRBC BLD-RTO: 0 /100 WBC
PLATELET # BLD AUTO: 249 K/UL (ref 150–450)
PMV BLD AUTO: 9.9 FL (ref 9.2–12.9)
POTASSIUM SERPL-SCNC: 3.5 MMOL/L (ref 3.5–5.1)
POTASSIUM SERPL-SCNC: 4.3 MMOL/L (ref 3.5–5.1)
PROT SERPL-MCNC: 6.1 G/DL (ref 6–8.4)
RBC # BLD AUTO: 3.89 M/UL (ref 4–5.4)
SODIUM SERPL-SCNC: 139 MMOL/L (ref 136–145)
SODIUM SERPL-SCNC: 141 MMOL/L (ref 136–145)
WBC # BLD AUTO: 4.99 K/UL (ref 3.9–12.7)

## 2023-08-10 PROCEDURE — 99024 POSTOP FOLLOW-UP VISIT: CPT | Mod: ,,, | Performed by: NEUROLOGICAL SURGERY

## 2023-08-10 PROCEDURE — 96376 TX/PRO/DX INJ SAME DRUG ADON: CPT

## 2023-08-10 PROCEDURE — 80053 COMPREHEN METABOLIC PANEL: CPT | Performed by: STUDENT IN AN ORGANIZED HEALTH CARE EDUCATION/TRAINING PROGRAM

## 2023-08-10 PROCEDURE — 99024 PR POST-OP FOLLOW-UP VISIT: ICD-10-PCS | Mod: ,,, | Performed by: NEUROLOGICAL SURGERY

## 2023-08-10 PROCEDURE — 80048 BASIC METABOLIC PNL TOTAL CA: CPT | Mod: XB | Performed by: PHYSICIAN ASSISTANT

## 2023-08-10 PROCEDURE — 97165 OT EVAL LOW COMPLEX 30 MIN: CPT

## 2023-08-10 PROCEDURE — 83735 ASSAY OF MAGNESIUM: CPT | Performed by: PHYSICIAN ASSISTANT

## 2023-08-10 PROCEDURE — 85025 COMPLETE CBC W/AUTO DIFF WBC: CPT | Performed by: PHYSICIAN ASSISTANT

## 2023-08-10 PROCEDURE — 25000003 PHARM REV CODE 250: Performed by: PHYSICIAN ASSISTANT

## 2023-08-10 PROCEDURE — 36415 COLL VENOUS BLD VENIPUNCTURE: CPT | Performed by: STUDENT IN AN ORGANIZED HEALTH CARE EDUCATION/TRAINING PROGRAM

## 2023-08-10 PROCEDURE — 63600175 PHARM REV CODE 636 W HCPCS: Performed by: PHYSICIAN ASSISTANT

## 2023-08-10 PROCEDURE — A4216 STERILE WATER/SALINE, 10 ML: HCPCS | Performed by: PHYSICIAN ASSISTANT

## 2023-08-10 PROCEDURE — 36415 COLL VENOUS BLD VENIPUNCTURE: CPT | Performed by: PHYSICIAN ASSISTANT

## 2023-08-10 PROCEDURE — 99239 HOSP IP/OBS DSCHRG MGMT >30: CPT | Mod: ,,, | Performed by: STUDENT IN AN ORGANIZED HEALTH CARE EDUCATION/TRAINING PROGRAM

## 2023-08-10 PROCEDURE — 25000003 PHARM REV CODE 250: Performed by: STUDENT IN AN ORGANIZED HEALTH CARE EDUCATION/TRAINING PROGRAM

## 2023-08-10 PROCEDURE — 99239 PR HOSPITAL DISCHARGE DAY,>30 MIN: ICD-10-PCS | Mod: ,,, | Performed by: STUDENT IN AN ORGANIZED HEALTH CARE EDUCATION/TRAINING PROGRAM

## 2023-08-10 PROCEDURE — G0378 HOSPITAL OBSERVATION PER HR: HCPCS

## 2023-08-10 RX ORDER — OXYCODONE HYDROCHLORIDE 5 MG/1
5 TABLET ORAL EVERY 6 HOURS PRN
Qty: 20 TABLET | Refills: 0 | Status: SHIPPED | OUTPATIENT
Start: 2023-08-10 | End: 2023-08-15

## 2023-08-10 RX ORDER — GABAPENTIN 300 MG/1
300 CAPSULE ORAL 3 TIMES DAILY
Status: DISCONTINUED | OUTPATIENT
Start: 2023-08-10 | End: 2023-08-10 | Stop reason: HOSPADM

## 2023-08-10 RX ORDER — ZOLPIDEM TARTRATE 5 MG/1
5 TABLET ORAL ONCE
Status: COMPLETED | OUTPATIENT
Start: 2023-08-10 | End: 2023-08-10

## 2023-08-10 RX ORDER — GABAPENTIN 300 MG/1
300 CAPSULE ORAL 3 TIMES DAILY
Qty: 90 CAPSULE | Refills: 0 | Status: SHIPPED | OUTPATIENT
Start: 2023-08-10 | End: 2023-10-11 | Stop reason: CLARIF

## 2023-08-10 RX ADMIN — MORPHINE SULFATE 4 MG: 4 INJECTION INTRAVENOUS at 06:08

## 2023-08-10 RX ADMIN — ZOLPIDEM TARTRATE 5 MG: 5 TABLET ORAL at 01:08

## 2023-08-10 RX ADMIN — GABAPENTIN 300 MG: 300 CAPSULE ORAL at 09:08

## 2023-08-10 RX ADMIN — Medication 10 ML: at 05:08

## 2023-08-10 RX ADMIN — MORPHINE SULFATE 4 MG: 4 INJECTION INTRAVENOUS at 12:08

## 2023-08-10 RX ADMIN — TRAZODONE HYDROCHLORIDE 150 MG: 50 TABLET ORAL at 01:08

## 2023-08-10 RX ADMIN — MORPHINE SULFATE 4 MG: 4 INJECTION INTRAVENOUS at 10:08

## 2023-08-10 RX ADMIN — TOPIRAMATE 50 MG: 25 TABLET, FILM COATED ORAL at 09:08

## 2023-08-10 RX ADMIN — TOPIRAMATE 50 MG: 25 TABLET, FILM COATED ORAL at 01:08

## 2023-08-10 RX ADMIN — CARBAMAZEPINE 100 MG: 100 CAPSULE, EXTENDED RELEASE ORAL at 01:08

## 2023-08-10 RX ADMIN — SODIUM CHLORIDE: 9 INJECTION, SOLUTION INTRAVENOUS at 12:08

## 2023-08-10 RX ADMIN — OXYBUTYNIN CHLORIDE 5 MG: 5 TABLET ORAL at 09:08

## 2023-08-10 RX ADMIN — Medication 10 ML: at 12:08

## 2023-08-10 RX ADMIN — OXYBUTYNIN CHLORIDE 5 MG: 5 TABLET ORAL at 01:08

## 2023-08-10 RX ADMIN — CARBAMAZEPINE 100 MG: 100 CAPSULE, EXTENDED RELEASE ORAL at 09:08

## 2023-08-10 NOTE — SUBJECTIVE & OBJECTIVE
Past Medical History:   Diagnosis Date    DDD (degenerative disc disease), cervical 3/15/2017    DDD (degenerative disc disease), lumbar 3/15/2017    Glaucoma 3/7/2018    Migraine 3/15/2017    Primary insomnia 3/15/2017       Past Surgical History:   Procedure Laterality Date    AUGMENTATION OF BREAST      BREAST SURGERY       SECTION   and 2001    x2    COLON SURGERY  10/15/2018    colon resection     CRANIOTOMY Left 2023    Procedure: CRANIOTOMY for microvascular decompression NEURO MONITORING;  Surgeon: Nils Hudson MD;  Location: [facility] OR [address] [place] [address];  Service: Neurosurgery;  Laterality: Left;  regular bed, supine, bump, hickman, type and cross 2 units, cranial nerve monitoring, optical stealth CO SURGEON DR [place]       Social History     Socioeconomic History    Marital status:    Tobacco Use    Smoking status: Never    Smokeless tobacco: Never   Substance and Sexual Activity    Alcohol use: No    Drug use: No    Sexual activity: Yes     Partners: Male     Comment: ablation     Social Determinants of Health     Financial Resource Strain: Low Risk  (8/10/2023)    Overall Financial Resource Strain (CARDIA)     Difficulty of Paying Living Expenses: Not hard at all   Food Insecurity: No Food Insecurity (8/10/2023)    Hunger Vital Sign     Worried About Running Out of Food in the Last Year: Never true     Ran Out of Food in the Last Year: Never true   Transportation Needs: No Transportation Needs (8/10/2023)    PRAPARE - Transportation     Lack of Transportation (Medical): No     Lack of Transportation (Non-Medical): No   Physical Activity: Inactive (8/10/2023)    Exercise Vital Sign     Days of Exercise per Week: 0 days     Minutes of Exercise per Session: 0 min   Stress: No Stress Concern Present (8/10/2023)    [employer] [place] [employer] - Occupational Stress Questionnaire     Feeling of Stress : Not at all   Recent Concern: Stress - Stress Concern Present (2023)    [place] of Occupational [employer] - Occupational Stress Questionnaire     Feeling of Stress : To some extent   Social Connections: Moderately Isolated (8/10/2023)    Social Connection and Isolation Panel [NHANES]     Frequency of Communication with Friends and Family: More than three times a week     Frequency of Social Gatherings with Friends and Family: More than three times a week     Attends Caodaism Services: More than 4 times per year     Active Member of Clubs or Organizations: No     Attends Club or Organization Meetings: Never     Marital Status:    Housing Stability: Low Risk  (8/10/2023)    Housing Stability Vital Sign     Unable to Pay for Housing in the Last Year: No     Number of Places Lived in the Last Year: 1     Unstable Housing in the Last Year: No       Family History   Problem Relation Age of Onset    Stroke Maternal Grandfather     Hypertension Father     Hypertension Mother     Hypertension Brother     Ovarian cancer Neg Hx     Cancer Neg Hx        Review of patient's allergies indicates:  No Known Allergies      Medications:  Continuous Infusions:   sodium chloride 0.9% 125 mL/hr at 08/10/23 0055     Scheduled Meds:   carBAMazepine  100 mg Oral BID    gabapentin  300 mg Oral TID    oxybutynin  5 mg Oral BID    sodium chloride 0.9%  10 mL Intravenous Q8H    topiramate  50 mg Oral BID     PRN Meds:acetaminophen, morphine, naloxone, ondansetron, oxyCODONE, senna-docusate 8.6-50 mg, sodium chloride 0.9%, traZODone     Review of Systems  Objective:     Weight: 56.7 kg (125 lb)  Body mass index is 20.8 kg/m².  Vital Signs (Most Recent):  Temp: 96.4 °F (35.8 °C) (08/10/23 1127)  Pulse: 88 (08/10/23 1127)  Resp: 18 (08/10/23 1127)  BP: (!) 159/91 (08/10/23 1127)  SpO2: 100 % (08/10/23 1127) Vital Signs (24h Range):  Temp:  [96.4 °F (35.8 °C)-98.6 °F (37 °C)] 96.4 °F (35.8 °C)  Pulse:  [] 88  Resp:  [16-20] 18  SpO2:  [97 %-100 %] 100 %  BP: (133-184)/() 159/91                  "                Physical Exam         Neurosurgery Physical Exam    Physical Exam:    Constitutional: No distress.     HEENT: atraumatic/normocephalic    Cardiovascular: Regular rhythm.     Pulm: aerating well, saturating well    Abdominal: Soft.     Psych/Behavior: He is alert.     E4V5M6  AOx3  PERRL  EOMI  Face Symmetric  Tongue midline  BUE 5/5  BLE 5/5  No drift    Left retrosigmoid incision staples in place, c/d/i    Significant Labs:  Recent Labs   Lab 08/09/23  1434 08/10/23  0300 08/10/23  1018    95 87    139 141   K 3.8 3.5 4.3    109 109   CO2 20* 18* 22*   BUN 12 10 8   CREATININE 0.7 0.6 0.6   CALCIUM 9.5 8.6* 8.9   MG  --  2.0  --      Recent Labs   Lab 08/09/23  1434 08/10/23  0300   WBC 5.69 4.99   HGB 13.5 11.8*   HCT 41.0 36.2*    249     No results for input(s): "LABPT", "INR", "APTT" in the last 48 hours.  Microbiology Results (last 7 days)       ** No results found for the last 168 hours. **          All pertinent labs from the last 24 hours have been reviewed.    Significant Diagnostics:  I have reviewed all pertinent imaging results/findings within the past 24 hours.  "

## 2023-08-10 NOTE — PT/OT/SLP PROGRESS
Physical Therapy      Patient Name:  Mae Gregory   MRN:  [id number]    Patient not seen today secondary to: pt lying supine in bed fully dressed, stating she is waiting for transport to push her out to her car. Pt states she feels comfortable and confident d/c home. Pt states she would like HHPT. Will follow up if pt does not discharge today as anticipated.    8/10/2023

## 2023-08-10 NOTE — ASSESSMENT & PLAN NOTE
S/p craniotomy  --follow-up appointment with Neurology; endorses transportation  --patient has no more pain medication and reports that she has a constant headache that is at least a 4 to 5/10 on the pain scale constantly; patient also reports that her lower back and legs a are severely in pain to the point where she can not sleep and rates the pain 10/10.  --the patient is having difficulty with speech and swallowing; speech evaluation ordered  --home health ordered as the patient is having difficulty with walking/weakness  --the patient was sent to the ED of the same day due to increased pain in head and back that is unrelieved by any of her pain medications.

## 2023-08-10 NOTE — HPI
58 F PMHx L MVD for glossopharyngeal neuralgia by Dr. Hudson on 7/31, consulted to NSGY to evaluate post op CTH. Pt reported back pain and was found to have choledocolithiasis. She also complained of forehead pain near pin site from previous surgery prompting CTH and neurosurgery consult. Pt reports jaw pain has been absent since surgery, she denies leakage from wound or new LOC confusion, speech difficulty, swallowing difficulty or focal weakness.

## 2023-08-10 NOTE — CONSULTS
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    [facility] consulted for choledocholithiasis    VS since arrival unremarkable, normotensive ABBEY  CBC w/o leukocytosis, Hgb stable 12-13, plts wnl  BMP w/ BUN/ Cr 10/ 0.6  LFTs w/ BR and ALP wnl, but AST/ ALT 60/ 10    US biliary ductal dilation 11mm  MRCP with Intra and extrahepatic biliary ductal dilation (1.3cm) without filling defect or choledocholithiasis.  2.  Cholelithiasis without evidence of acute cholecystitis.  Prior MRI spine was 1 cm      Objective:     Vitals:    08/10/23 0740   BP: (!) 137/95   Pulse: 87   Resp: 17   Temp: 96.8 °F (36 °C)         Constitutional:  not in acute distress and well developed  HENT: Head: Normal, normocephalic, atraumatic.  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    AES consulted for choledocholithiasis    No role for EUS/ ERCP with stable  biliary ductal dilation and no cholestatic liver injury    Problem List:  Stable biliary ductal dilation    Recommendations:  No role for EUS/ ERCP at [facility] time    Thank you for involving us in the care of Mae Gregory. Please call with any additional questions, concerns or changes in the patient's clinical status. We will sign off.    Guillermo Ge MD  Gastroenterology Fellow PGY VI  [facility]

## 2023-08-10 NOTE — ED NOTES
Telemetry Verification    Box Number: [id number]  Rate: 89  Rhythm: Normal Sinus  Monitor Tech: War room

## 2023-08-10 NOTE — NURSING
Pt is AAOx4. No distress noted. Call light in reach. Bed in low locked position.   Side rails x2. Belongings at bedside.   Pt free of fall and injuries Questions and concerns voiced and answered.    Plan of care continues.

## 2023-08-10 NOTE — CONSULTS
[facility]  Neurosurgery  Consult Note    Subjective:     History of Present Illness: 58 F PMHx L MVD for glossopharyngeal neuralgia by Dr. Hudson on , consulted to NSGY to evaluate post op CTH. Pt reported back pain and was found to have choledocolithiasis. She also complained of forehead pain near pin site from previous surgery prompting CTH and neurosurgery consult. Pt reports jaw pain has been absent since surgery, she denies leakage from wound or new LOC confusion, speech difficulty, swallowing difficulty or focal weakness.       Post-Op Info:  * No surgery found *         Past Medical History:   Diagnosis Date    DDD (degenerative disc disease), cervical 3/15/2017    DDD (degenerative disc disease), lumbar 3/15/2017    Glaucoma 3/7/2018    Migraine 3/15/2017    Primary insomnia 3/15/2017       Past Surgical History:   Procedure Laterality Date    AUGMENTATION OF BREAST      BREAST SURGERY       SECTION   and 2001    x2    COLON SURGERY  10/15/2018    colon resection     CRANIOTOMY Left 2023    Procedure: CRANIOTOMY for microvascular decompression NEURO MONITORING;  Surgeon: Nils Hudson MD;  Location: [facility] OR [address] [place] [address];  Service: Neurosurgery;  Laterality: Left;  regular bed, supine, bump, hickman, type and cross 2 units, cranial nerve monitoring, optical stealth CO SURGEON DR [place]       Social History     Socioeconomic History    Marital status:    Tobacco Use    Smoking status: Never    Smokeless tobacco: Never   Substance and Sexual Activity    Alcohol use: No    Drug use: No    Sexual activity: Yes     Partners: Male     Comment: ablation     Social Determinants of Health     Financial Resource Strain: Low Risk  (8/10/2023)    Overall Financial Resource Strain (CARDIA)     Difficulty of Paying Living Expenses: Not hard at all   Food Insecurity: No Food Insecurity (8/10/2023)    Hunger Vital Sign     Worried About Running Out of Food in the Last  Year: Never true     Ran Out of Food in the Last Year: Never true   Transportation Needs: No Transportation Needs (8/10/2023)    PRAPARE - Transportation     Lack of Transportation (Medical): No     Lack of Transportation (Non-Medical): No   Physical Activity: Inactive (8/10/2023)    Exercise Vital Sign     Days of Exercise per Week: 0 days     Minutes of Exercise per Session: 0 min   Stress: No Stress Concern Present (8/10/2023)    [facility] [place] of Occupational Health - Occupational Stress Questionnaire     Feeling of Stress : Not at all   Recent Concern: Stress - Stress Concern Present (8/1/2023)    [facility] [place] of Occupational Health - Occupational Stress Questionnaire     Feeling of Stress : To some extent   Social Connections: Moderately Isolated (8/10/2023)    Social Connection and Isolation Panel [NHANES]     Frequency of Communication with Friends and Family: More than three times a week     Frequency of Social Gatherings with Friends and Family: More than three times a week     Attends Jewish Services: More than 4 times per year     Active Member of Clubs or Organizations: No     Attends Club or Organization Meetings: Never     Marital Status:    Housing Stability: Low Risk  (8/10/2023)    Housing Stability Vital Sign     Unable to Pay for Housing in the Last Year: No     Number of Places Lived in the Last Year: 1     Unstable Housing in the Last Year: No       Family History   Problem Relation Age of Onset    Stroke Maternal Grandfather     Hypertension Father     Hypertension Mother     Hypertension Brother     Ovarian cancer Neg Hx     Cancer Neg Hx        Review of patient's allergies indicates:  No Known Allergies      Medications:  Continuous Infusions:   sodium chloride 0.9% 125 mL/hr at 08/10/23 0055     Scheduled Meds:   carBAMazepine  100 mg Oral BID    gabapentin  300 mg Oral TID    oxybutynin  5 mg Oral BID    sodium chloride 0.9%  10 mL Intravenous  "Q8H    topiramate  50 mg Oral BID     PRN Meds:acetaminophen, morphine, naloxone, ondansetron, oxyCODONE, senna-docusate 8.6-50 mg, sodium chloride 0.9%, traZODone     Review of Systems  Objective:     Weight: 56.7 kg (125 lb)  Body mass index is 20.8 kg/m².  Vital Signs (Most Recent):  Temp: 96.4 °F (35.8 °C) (08/10/23 1127)  Pulse: 88 (08/10/23 1127)  Resp: 18 (08/10/23 1127)  BP: (!) 159/91 (08/10/23 1127)  SpO2: 100 % (08/10/23 1127) Vital Signs (24h Range):  Temp:  [96.4 °F (35.8 °C)-98.6 °F (37 °C)] 96.4 °F (35.8 °C)  Pulse:  [] 88  Resp:  [16-20] 18  SpO2:  [97 %-100 %] 100 %  BP: (133-184)/() 159/91                                 Physical Exam         Neurosurgery Physical Exam    Physical Exam:    Constitutional: No distress.     HEENT: atraumatic/normocephalic    Cardiovascular: Regular rhythm.     Pulm: aerating well, saturating well    Abdominal: Soft.     Psych/Behavior: He is alert.     E4V5M6  AOx3  PERRL  EOMI  Face Symmetric  Tongue midline  BUE 5/5  BLE 5/5  No drift    Left retrosigmoid incision staples in place, c/d/i    Significant Labs:  Recent Labs   Lab 08/09/23  1434 08/10/23  0300 08/10/23  1018    95 87    139 141   K 3.8 3.5 4.3    109 109   CO2 20* 18* 22*   BUN 12 10 8   CREATININE 0.7 0.6 0.6   CALCIUM 9.5 8.6* 8.9   MG  --  2.0  --      Recent Labs   Lab 08/09/23  1434 08/10/23  0300   WBC 5.69 4.99   HGB 13.5 11.8*   HCT 41.0 36.2*    249     No results for input(s): "LABPT", "INR", "APTT" in the last 48 hours.  Microbiology Results (last 7 days)       ** No results found for the last 168 hours. **          All pertinent labs from the last 24 hours have been reviewed.    Significant Diagnostics:  I have reviewed all pertinent imaging results/findings within the past 24 hours.    Assessment/Plan:     Glossopharyngeal nerve disease  58 F PMHx L MVD for glossopharyngeal neuralgia by Dr. Hudson on 7/31, consulted to NSGY to evaluate post op CTH. Pt " reported back pain and was found to have choledocolithiasis. She also complained of forehead pain near pin site from previous surgery prompting CTH and neurosurgery consult.    CTH with expected post op changes, no fractures near pin sites.     -- Pt recovering very well since surgery. No need for neurosurgical intervention or further imaging.   -- Will follow up as planned for routine clinic post op visit         Petrona Mae MD  Neurosurgery  [facility]

## 2023-08-10 NOTE — PT/OT/SLP EVAL
Occupational Therapy   Evaluation and Discharge Note    Name: Mae Gregory  MRN: [id number]  Admitting Diagnosis: Choledocholithiasis  Recent Surgery: * No surgery found *      Recommendations:     Discharge Recommendations: home (pt reported that she has good support from family)  Discharge Equipment Recommendations: bath bench  Barriers to discharge:  None    Assessment:     Mae Gregory is a 58 y.o. female with a medical diagnosis of Choledocholithiasis. At this time, patient is functioning at their prior level of function and does not require further acute OT services.     Plan:     During this hospitalization, patient does not require further acute OT services.  Please re-consult if situation changes.    Plan of Care Reviewed with: patient, family    Subjective     Chief Complaint: pain  Patient/Family Comments/goals: decrease sciatic pain    Occupational Profile:  Living Environment: apartment 4th floor with elevator, lives with mom, tub shower, owns no DME  Previous level of function: independent  Roles and Routines: mother and daughter  Equipment Used at home: none  Assistance upon Discharge: intermittent supervision and assist    Pain/Comfort:  Pain Rating 1:  (pt did not rate)  Location - Side 1: Left  Location - Orientation 1:  (sciatic)  Pain Addressed 1: Pre-medicate for activity  Pain Rating Post-Intervention 1:  (pt c/o of pain but did not rate, reported that pain increases with activity)    Patients cultural, spiritual, Sikhism conflicts given the current situation: no    Objective:     Communicated with: Nurse prior to session.  Patient found supine with   upon OT entry to room.    General Precautions: Standard, fall  Orthopedic Precautions: N/A  Braces: N/A  Respiratory Status: Room air     Occupational Performance:    Bed Mobility:    Pt refused 2/2 to pain and having just got up to use the bathroom /c nurse.   Functional Mobility/Transfers:  Unable to assess 2/2 to pain - per nursing stand  pivot SBA, Pt reported needing unilateral support sometimes    Cognitive/Visual Perceptual:  Cognitive/Psychosocial Skills:     -       Oriented to: Person, Place, Time, and Situation   -       Follows Commands/attention: good  -       Communication: clear/fluent  -       Memory: No Deficits noted  -       Mood/Affect/Coping skills/emotional control: Pleasant    Physical Exam:  Upper Extremity Range of Motion:     -       Right Upper Extremity: WFL  -       Left Upper Extremity: WFL  Upper Extremity Strength:    -       Right Upper Extremity: WFL  -       Left Upper Extremity: WFL   Strength:    -       Right Upper Extremity: WFL  -       Left Upper Extremity: WFL  Fine Motor Coordination:    -       Intact    AMPAC 6 Click ADL:  AMPAC Total Score: 24    Treatment & Education:  -Education on energy conservation and task modification to maximize safety and (I) during ADLs and mobility  -Education on importance of OOB activity to improve overall activity tolerance and promote recovery.  -OT provided pt with light/med resistance band and HEP to improve B UE strength for increased (I) with self care and mobility. OT demonstrated chest pulls, bicep curls, tricep pulls, punches, and overhead pulls.   -Pt educated to call for assistance and to transfer with hospital staff only  -Provided education regarding role of OT, POC, & discharge recommendations with pt and pt's family verbalizing understanding.  Pt had no further questions & when asked whether there were any concerns pt reported none.     Patient left supine with all lines intact, call button in reach, and family present    GOALS:   Multidisciplinary Problems       Occupational Therapy Goals       Not on file              Multidisciplinary Problems (Resolved)          Problem: Occupational Therapy    Goal Priority Disciplines Outcome Interventions   Occupational Therapy Goal   (Resolved)     OT, PT/OT Met    Description: OT eval / treatment session completed, Pt  is functioning at prior level, no acute services needed at this time. She has good support from family.                       History:     Past Medical History:   Diagnosis Date    DDD (degenerative disc disease), cervical 3/15/2017    DDD (degenerative disc disease), lumbar 3/15/2017    Glaucoma 3/7/2018    Migraine 3/15/2017    Primary insomnia 3/15/2017         Past Surgical History:   Procedure Laterality Date    AUGMENTATION OF BREAST      BREAST SURGERY       SECTION   and 2001    x2    COLON SURGERY  10/15/2018    colon resection     CRANIOTOMY Left 2023    Procedure: CRANIOTOMY for microvascular decompression NEURO MONITORING;  Surgeon: Nils Hudson MD;  Location: [facility] OR [address] [place] [address];  Service: Neurosurgery;  Laterality: Left;  regular bed, supine, bump, hickman, type and cross 2 units, cranial nerve monitoring, optical stealth CO SURGEON DR [place]       Time Tracking:     OT Date of Treatment:    OT Start Time: 1020  OT Stop Time: 1029  OT Total Time (min): 9 min    Billable Minutes:Evaluation 9    8/10/2023

## 2023-08-10 NOTE — SUBJECTIVE & OBJECTIVE
Past Medical History:   Diagnosis Date    DDD (degenerative disc disease), cervical 3/15/2017    DDD (degenerative disc disease), lumbar 3/15/2017    Glaucoma 3/7/2018    Migraine 3/15/2017    Primary insomnia 3/15/2017       Past Surgical History:   Procedure Laterality Date    AUGMENTATION OF BREAST      BREAST SURGERY       SECTION   and 2001    x2    COLON SURGERY  10/15/2018    colon resection     CRANIOTOMY Left 2023    Procedure: CRANIOTOMY for microvascular decompression NEURO MONITORING;  Surgeon: Nils Hudson MD;  Location: [facility] OR [address] [place] [address];  Service: Neurosurgery;  Laterality: Left;  regular bed, supine, bump, hickman, type and cross 2 units, cranial nerve monitoring, optical stealth [place] SURGEON DR [place]       Review of patient's allergies indicates:  No Known Allergies    No current facility-administered medications on file prior to encounter.     Current Outpatient Medications on File Prior to Encounter   Medication Sig    albuterol (PROVENTIL/VENTOLIN HFA) 90 mcg/actuation inhaler TAKE 1 2 PUFF(S) (INHALATION) EVERY 4 HOURS PRN   WHEEZING    BIOFREEZE, MENTHOL, TOP Apply topically to buttocks and legs as needed for pain.    carBAMazepine (TEGRETOL XR) 100 MG 12 hr tablet Take 1 tablet (100 mg total) by mouth 2 (two) times daily.    cetirizine (ZYRTEC) 10 MG tablet Take 10 mg by mouth daily as needed for Allergies.    cyclobenzaprine (FLEXERIL) 10 MG tablet Take 10 mg by mouth 3 (three) times daily.    diazePAM (VALIUM) 5 MG tablet Take 1 tablet (5 mg total) by mouth every 6 (six) hours as needed for Anxiety (neck pain).    estradiol-norethindrone (ACTIVELLA) 1-0.5 mg per tablet TAKE 1 TABLET BY MOUTH EVERY DAY    famotidine (PEPCID AC ORAL) Take 1 capsule by mouth daily as needed (Heartburn).    finasteride (PROSCAR) 5 mg tablet Take 1 tablet (5 mg total) by mouth once daily.    oxybutynin (DITROPAN) 5 MG Tab Take 1 tablet (5 mg total) by mouth 2 (two) times daily.    sodium chloride  (SALINE MIST NASL) 1 spray by Each Nostril route daily as needed (Congestion).    topiramate (TOPAMAX) 50 MG tablet TAKE 1 TABLET BY MOUTH TWICE A DAY    traZODone (DESYREL) 150 MG tablet Take 1 tablet (150 mg total) by mouth nightly as needed for Insomnia.    zolpidem (AMBIEN) 5 MG Tab Take 1 tablet (5 mg total) by mouth nightly as needed (insomnia).    COCONUT OIL ORAL Take 1 capsule by mouth once daily.    multivitamin with minerals tablet Take 1 tablet by mouth once daily.    oxyCODONE (ROXICODONE) 5 MG immediate release tablet Take 1 tablet (5 mg total) by mouth every 4 (four) hours as needed. (Patient not taking: Reported on 8/9/2023)     Family History       Problem Relation (Age of Onset)    Hypertension Father, Mother, Brother    Stroke Maternal Grandfather          Tobacco Use    Smoking status: Never    Smokeless tobacco: Never   Substance and Sexual Activity    Alcohol use: No    Drug use: No    Sexual activity: Yes     Partners: Male     Comment: ablation     Review of Systems   Constitutional:  Negative for chills, diaphoresis, fatigue and fever.   Respiratory:  Negative for cough, shortness of breath and wheezing.    Cardiovascular:  Negative for chest pain and palpitations.   Gastrointestinal:  Negative for abdominal distention, abdominal pain, constipation, diarrhea, nausea and vomiting.   Genitourinary:  Negative for decreased urine volume, dysuria, enuresis, flank pain, frequency, hematuria and urgency.   Musculoskeletal:  Positive for back pain and gait problem. Negative for arthralgias, joint swelling, myalgias, neck pain and neck stiffness.        Pain radiates down the back of both legs.    Skin:  Positive for wound (s/p brain surgery). Negative for color change, pallor and rash.   Neurological:  Negative for dizziness, syncope, weakness, light-headedness, numbness and headaches.   Psychiatric/Behavioral:  Negative for agitation and confusion.      Objective:     Vital Signs (Most  Recent):  Temp: 97.7 °F (36.5 °C) (08/10/23 0029)  Pulse: 86 (08/10/23 0029)  Resp: 18 (08/10/23 0055)  BP: (!) 168/90 (08/10/23 0029)  SpO2: 98 % (08/10/23 0029) Vital Signs (24h Range):  Temp:  [97.4 °F (36.3 °C)-98.6 °F (37 °C)] 97.7 °F (36.5 °C)  Pulse:  [] 86  Resp:  [16-20] 18  SpO2:  [97 %-98 %] 98 %  BP: (140-184)/() 168/90     Weight: 56.7 kg (125 lb)  Body mass index is 20.8 kg/m².     Physical Exam  Vitals and nursing note reviewed.   Constitutional:       General: She is not in acute distress.     Appearance: She is well-developed and normal weight. She is not ill-appearing, toxic-appearing or diaphoretic.      Comments: Chronically ill appearing female.    HENT:      Head: Normocephalic and atraumatic.   Eyes:      General: No scleral icterus.        Right eye: No discharge.         Left eye: No discharge.      Conjunctiva/sclera: Conjunctivae normal.   Neck:      Trachea: No tracheal deviation.   Cardiovascular:      Rate and Rhythm: Normal rate and regular rhythm.      Heart sounds: Normal heart sounds. No murmur heard.     No gallop.   Pulmonary:      Effort: Pulmonary effort is normal. No respiratory distress.      Breath sounds: Normal breath sounds. No stridor. No wheezing or rales.   Abdominal:      General: Bowel sounds are normal. There is no distension.      Palpations: Abdomen is soft. There is no mass.      Tenderness: There is no abdominal tenderness. There is no guarding.   Musculoskeletal:         General: No deformity.      Cervical back: Normal range of motion and neck supple.   Skin:     General: Skin is warm and dry.      Coloration: Skin is not pale.      Findings: No erythema or rash.   Neurological:      General: No focal deficit present.      Mental Status: She is alert and oriented to person, place, and time.      Cranial Nerves: No cranial nerve deficit.      Motor: No abnormal muscle tone.   Psychiatric:         Mood and Affect: Mood normal. Affect is angry.          "Behavior: Behavior is agitated.         Thought Content: Thought content normal.         Judgment: Judgment normal.                Significant Labs: All pertinent labs within the past 24 hours have been reviewed.  BMP:   Recent Labs   Lab 08/09/23  1434         K 3.8      CO2 20*   BUN 12   CREATININE 0.7   CALCIUM 9.5     CBC:   Recent Labs   Lab 08/09/23  1434   WBC 5.69   HGB 13.5   HCT 41.0        CMP:   Recent Labs   Lab 08/09/23  1434      K 3.8      CO2 20*      BUN 12   CREATININE 0.7   CALCIUM 9.5   PROT 6.9   ALBUMIN 3.5   BILITOT 0.3   ALKPHOS 77   AST 60*   *   ANIONGAP 13     Urine Culture: No results for input(s): "LABURIN" in the last 48 hours.  Urine Studies:   Recent Labs   Lab 08/09/23  1435   COLORU Yellow   APPEARANCEUA Clear   PHUR 7.0   SPECGRAV 1.010   PROTEINUA Negative   GLUCUA Negative   KETONESU Negative   BILIRUBINUA Negative   OCCULTUA Negative   NITRITE Negative   LEUKOCYTESUR Negative       Significant Imaging: I have reviewed all pertinent imaging results/findings within the past 24 hours.  Imaging Results              MRI MRCP Without Contrast (Final result)  Result time 08/09/23 23:04:18      Final result by Chad Marie DO (08/09/23 23:04:18)                   Impression:      1. Intra and extrahepatic biliary ductal dilation without filling defect or choledocholithiasis.  2.  Cholelithiasis without evidence of acute cholecystitis.      Electronically signed by: Chad Marie  Date:    08/09/2023  Time:    23:04               Narrative:    EXAMINATION:  MRI ABDOMEN WITHOUT CONTRAST MRCP    CLINICAL HISTORY:  choldeochalithiasis;    TECHNIQUE:  Multiplanar, multisequence magnetic resonance images of the liver and upper abdomen without intravenous contrast. Additionally 2D and 3D MRCP sequences are performed as well as MIP images.    COMPARISON:  MRI cervical, thoracic, and lumbar spine from 01/07/2022. CT abdomen and pelvis " from 08/09/2023.  Ultrasound of the abdomen from 08/09/2023.    FINDINGS:  Pulmonary Bases: No gross abnormality.    Liver: The liver is normal in size and demonstrates normal signal.  No evidence of fat or iron deposition.  No focal lesion.    Bile Ducts: There is intra and extrahepatic biliary ductal dilation.  The CBD measures up to approximately 1.3 cm in maximal diameter.  There was also biliary ductal dilation on MRI of the spine dated 01/07/2022, with the CBD measuring up to approximately 1.0 cm on that study, note that direct comparison is limited due to difference in technique between these 2 studies.    Gallbladder: There are small layering gallstones.  There is no gallbladder wall thickening or pericholecystic fluid or inflammation.    Pancreas: Unremarkable without focal lesion or pancreatic ductal dilation.    Spleen: Normal size without focal lesion.    Gastrointestinal tract: No bowel wall thickening or obstruction.    Mesentery and retroperitoneum: No ascites, focal fluid collection, or free air.    Lymph nodes: No evidence of lymphadenopathy.    Adrenal Glands: Unremarkable.    Kidneys: Normal without focal lesion or hydronephrosis.    Aorta and Abdominal Vasculature: No aneurysm.    Body wall: Scar tissue along the anterior abdominal wall noted.  There are bilateral breast implants.    Musculoskeletal: Normal marrow signal.                                        US Abdomen Limited (Gallbladder) (Final result)  Result time 08/09/23 18:56:03      Final result by Colby Fleming MD (08/09/23 18:56:03)                   Impression:      Cholelithiasis without sonographic evidence of cholecystitis.  Associated dilatation of the common bile duct with mild intrahepatic ductal dilatation.  Upper limits of normal size caliber of the pancreatic duct.  Can obtain MRCP for further evaluation if clinically warranted.    This report was flagged in Epic as abnormal.    Electronically signed by resident: Marek  Cristal  Date:    08/09/2023  Time:    18:50    Electronically signed by: Colby Fleming MD  Date:    08/09/2023  Time:    18:56               Narrative:    EXAMINATION:  US ABDOMEN LIMITED    CLINICAL HISTORY:  .    Abnormal findings on diagnostic imaging of other abdominal regions, including retroperitoneum    TECHNIQUE:  Limited ultrasound of the right upper quadrant of the abdomen including pancreas, gallbladder, common bile duct was performed to evaluate for cholecystitis.    COMPARISON:  CT abdomen pelvis 08/09/2023    FINDINGS:  Gallbladder: Single gallstone with biliary sludge.  No gallbladder wall thickening or pericholecystic fluid.  No wall hyperemia. No sonographic Campbell's sign.    Biliary system: The common duct is dilated, measuring 11 mm.  Mild intrahepatic ductal dilatation.    Pancreas: The visualized portions of pancreas appear normal.  Upper limit of normal size pancreatic duct diameter.                                       CT Head Without Contrast (Final result)  Result time 08/09/23 17:13:12      Final result by Colby Fleming MD (08/09/23 17:13:12)                   Impression:      Postsurgical changes status post left suboccipital craniotomy with interval decreased small amount fluid beneath the craniotomy flap.  Grossly similar scattered suspected blood products beneath the craniotomy flap.  No large hematoma.    Near-total resolution of previous extensive pneumocephalus, now minimal.    Evolving area of slightly heterogeneous hypoattenuation within the left cerebellar hemisphere, likely evolving infarct.      Electronically signed by: Colby Fleming MD  Date:    08/09/2023  Time:    17:13               Narrative:    EXAMINATION:  CT HEAD WITHOUT CONTRAST    CLINICAL HISTORY:  Headache, chronic, new features or increased frequency;    TECHNIQUE:  Low dose axial images were obtained through the head.  Coronal and sagittal reformations were also performed. Contrast was not  administered.    COMPARISON:  Head CT 07/31/2023, MRI brain 04/06/2023    FINDINGS:  Redemonstrated postoperative changes of left suboccipital craniotomy.  Previous pneumocephalus has nearly completely resolved with minimal amount along the anterior interhemispheric fissure.  Grossly stable serpiginous hyperattenuation beneath the craniotomy flap likely blood products with decreased fluid volume.  Evolving area of slightly heterogeneous hypoattenuation within the left cerebellar hemisphere, likely evolving infarct.  No new focal areas of abnormal parenchymal attenuation.  Ventricles remain stable in size and configuration without acute hydrocephalus.  No acute displaced skull fracture.  Stable suspected nonspecific partial empty sella configuration.    Paranasal sinuses and mastoid air cells are clear.  Skin staples remain in place overlying the left parieto-occipital calvarium.  Interval decreased soft tissue swelling overlying the left parietal and occipital calvarium.                                       CT Abdomen Pelvis  Without Contrast (Final result)  Result time 08/09/23 17:19:06      Final result by Colby Fleming MD (08/09/23 17:19:06)                   Impression:      1. No radiodense calculus seen within the urinary tract or evidence of obstructive uropathy on either side.  2. Cholelithiasis.  Dilated common bile duct without associated intrahepatic biliary ductal dilatation.  Correlation with LFTs advised.  Further evaluation with right upper quadrant ultrasound can be obtained as warranted.  3. Remote postoperative changes of the proximal colon without acute complication.  4. Atherosclerosis with suspected 1.5 cm partially calcified splenic arterial aneurysm.  5. Few additional findings as above      Electronically signed by: Colby Fleming MD  Date:    08/09/2023  Time:    17:19               Narrative:    EXAMINATION:  CT ABDOMEN PELVIS WITHOUT CONTRAST    CLINICAL HISTORY:  Flank pain, kidney  stone suspected;    TECHNIQUE:  Low dose axial images, sagittal and coronal reformations were obtained from the lung bases to the pubic symphysis.  No contrast media utilized.    COMPARISON:  Chest radiograph 02/14/2022    FINDINGS:  Lack of IV contrast limits evaluation of soft tissue and vascular structures.    Imaged lung bases are clear.  Base of the heart is within normal limits.    Cholelithiasis without evidence of acute cholecystitis.  Common bile duct measures up to 13 mm but appears to taper appropriately at the ampulla.  No significant intrahepatic biliary ductal dilatation.    Liver is normal in size noting small peripheral parenchymal calcification at the inferior right lobe.  Noncontrast appearance of the pancreas, spleen, stomach, duodenum and bilateral adrenal glands are within normal limits.    Bilateral kidneys are normal in size, shape and location.  No radiodense calculus seen within the collecting systems on either side or urinary bladder.  No hydronephrosis or significant perinephric stranding.  Ureters are normal in course and caliber.  Urinary bladder is well distended without wall thickening.  Noncontrast appearance of the uterus and bilateral adnexa are within normal limits.  No significant volume free fluid in the pelvis.  Pelvic phleboliths noted.    Postoperative changes of the proximal colon without acute complication.  Appendix not identified and likely surgically absent.  No evidence of bowel obstruction or acute inflammation noting moderate amount of stool noted throughout the mid to distal colon.  No pneumatosis or portal venous gas.    Mild scattered calcific atherosclerosis of the abdominal aorta.  1.5 cm suspected partially calcified splenic arterial aneurysm near its hilum.  Aorta tapers normally.    Tiny fat containing umbilical hernia.    Osseous structures show minimal degenerative change and otherwise appear intact.

## 2023-08-10 NOTE — ASSESSMENT & PLAN NOTE
- Ms. Mae Gregory presents with back pain and headache  - CT of abdomen and pelvis with concern for cholelithiasis with CBD dilatation without intrahepatic biliary ductal dilatation   - UA of abdomen and pelvis shows cholelithiasis without cholecystitis and CVD dilatation with mild intrahepatic ductal dilatation   - MRCP pending   - Advanced endoscopy consulted   - NPO at [facility]   - IV fluids for hydration

## 2023-08-10 NOTE — PLAN OF CARE
[facility]  Discharge Final Note    Primary Care Provider: Rajiv Garcia MD    Expected Discharge Date: 8/10/2023    Patient discharged to home via personal transportation.     Patient's bedside nurse and patient notified of the above.      Final Discharge Note (most recent)       Final Note - 08/10/23 1422          Final Note    Assessment Type Final Discharge Note (P)      Anticipated Discharge Disposition Home or Self Care (P)         Post-Acute Status    Post-Acute Authorization Other (P)      Other Status No Post-Acute Service Needs (P)                      Important Message from Medicare             Contact Info       Rajiv Garcia MD   Specialty: Family Medicine   Relationship: PCP - General    [address] [place], [place] [address] [address]   Phone: [phone]       Next Steps: Schedule an appointment as soon as possible for a visit            Future Appointments   Date Time Provider Department Center   8/14/2023 10:00 AM Lisa Jarrell RN [facility] NEUROS8 [facility]   8/15/2023  9:30 AM Gisela Maya MCD, CCC-SLP [facility]   8/17/2023 12:20 PM Rajiv Garcia MD [facility]   9/12/2023  9:45 AM Nils Hudson MD [facility] NEUROS8 [facility]   9/28/2023  1:00 PM Danny Victoria MD [facility] MECHELLE EPI [facility]        scheduled post-discharge follow-up appointment and information added to AVS.     Manisha Beltre LMSW  [facility]  Ext. [phone]

## 2023-08-10 NOTE — PROGRESS NOTES
[facility]  Transition of Care Home Visit    Visit Date: 8/9/2023  Encounter Provider: Camilo Gabirel   PCP:  Rajiv Garcia MD    PRESENTING HISTORY      Patient ID: Mae Gregory is a 58 y.o. female.    Consult Requested By:  Dr. Abdias Garduno  Reason for Consult:  Hospital Follow Up.    Mae is being seen at home due to being seen at home due to physical debility that presents a taxing effort to leave the home, to mitigate high risk of hospital readmission and/or due to the limited availability of reliable or safe options for transportation to the point of access to the provider. Prior to treatment on this visit the chart was reviewed and patient verbal consent was obtained.      Chief Complaint: Transitional Care        History of Present Illness: Ms. Mae Gregory is a 58 y.o. female who was recently admitted to the hospital.     HPI: Ms. Mae Gregory is a 58 y.o. female, with PMH of cervical and lumbar DDD, cervical myofascial pain syndrome, glossopharyngeal nerve disease, sensorineural hearing loss, Raynaud's phenomenon, anxiety/depression, who presented to [facility] ED on 8/9/23 due to back pain. She states the back pain began after her craniotomy for glossopharyngeal nerve decompression, on 7/31/23.     Procedure(s) (LRB):  CRANIOTOMY for microvascular decompression NEURO MONITORING (Left)      Hospital Course: 8/1: OR yesterday for left microvascular decompression, tolerated procedure well. Intact on exam. CTH with post op pneumocephalus, but no detrimental change. OK for TTF today   8/2: AF, VSS, Neuro exam stable. Head badge removed. Pt stated she is doing well and admits to having difficulty with articulating certain words. Denies facial pain, dysphasia, headache, vision disturbances. Speech Therapy outpatient referral placed.   Medically stable for discharge. Home today. Follow up with NSGY scheduled for 2 weeks postop.       ___________________________________________________________________    Today: With this visit today patient is found ambulating around her home with her mother present for the visit. Patient is AAOx3, able to verify her name and .  See note below for details on visit.  Patient was sent back to the emergency room due to increased pain.  Reports she is has lost 6 lb since hospital discharge and has had a very poor appetite.  Endorses nausea, denies vomiting.  Issues swallowing and with speech/word-finding; speech therapy ordered.  She is not been able to sleep at night due to lower back pain despite medications given.  Her mother will transport her to the emergency room the visit.  Vital signs remain stable. Denies falls.  Denies smoking, alcohol abuse, illicit drug use. No additional needs at this this time. All of my information was given to the patient and family if any questions or concerns arise.     VSS. Denies fever, chest pain, shortness of breath, nausea, vomiting, diarrhea. Risks of environmental exposure to coronavirus discussed including: social distancing, hand hygiene, and limiting departures from the home for necessities only.  Reports understanding and willingness to comply.  All hospital discharge orders reviewed and being followed, all medications reconciled and reviewed, patient and family verbalized understanding. No other needs identified at this time.   \  Review of Systems   Constitutional:  Positive for activity change, appetite change, fatigue and unexpected weight change.   HENT:  Positive for trouble swallowing. Negative for congestion and dental problem.    Eyes:  Negative for discharge and itching.   Respiratory:  Negative for choking, chest tightness and shortness of breath.    Cardiovascular:  Negative for chest pain and palpitations.   Gastrointestinal:  Negative for rectal pain and vomiting.   Endocrine: Negative for cold intolerance and heat intolerance.   Genitourinary:   Negative for enuresis and flank pain.   Musculoskeletal:  Positive for back pain and gait problem. Negative for myalgias and neck pain.   Skin:  Negative for color change and wound.   Allergic/Immunologic: Negative for environmental allergies and food allergies.   Neurological:  Positive for weakness. Negative for tremors and syncope.   Hematological:  Does not bruise/bleed easily.   Psychiatric/Behavioral:  Positive for sleep disturbance. Negative for decreased concentration and dysphoric mood. The patient is nervous/anxious.      Assessments:  Environmental:  Clean, 4th floor apartment  Functional Status:  Partial assistance  Safety:  Moderate fall risk  Nutritional:  inadequate  Home Health/DME/Supplies:  None    PAST HISTORY:     Past Medical History:   Diagnosis Date    DDD (degenerative disc disease), cervical 3/15/2017    DDD (degenerative disc disease), lumbar 3/15/2017    Glaucoma 3/7/2018    Migraine 3/15/2017    Primary insomnia 3/15/2017       Past Surgical History:   Procedure Laterality Date    AUGMENTATION OF BREAST      BREAST SURGERY       SECTION   and 2001    x2    COLON SURGERY  10/15/2018    colon resection     CRANIOTOMY Left 2023    Procedure: CRANIOTOMY for microvascular decompression NEURO MONITORING;  Surgeon: Nils Hudson MD;  Location: [facility] OR [address] [place] [address];  Service: Neurosurgery;  Laterality: Left;  regular bed, supine, bump, hickman, type and cross 2 units, cranial nerve monitoring, optical stealth CO SURGEON DR [place]       Family History   Problem Relation Age of Onset    Stroke Maternal Grandfather     Hypertension Father     Hypertension Mother     Hypertension Brother     Ovarian cancer Neg Hx     Cancer Neg Hx        Social History     Socioeconomic History    Marital status:    Tobacco Use    Smoking status: Never    Smokeless tobacco: Never   Substance and Sexual Activity    Alcohol use: No    Drug use: No    Sexual activity: Yes     Partners: Male      Comment: ablation     Social Determinants of Health     Financial Resource Strain: Low Risk  (8/10/2023)    Overall Financial Resource Strain (CARDIA)     Difficulty of Paying Living Expenses: Not hard at all   Food Insecurity: No Food Insecurity (8/10/2023)    Hunger Vital Sign     Worried About Running Out of Food in the Last Year: Never true     Ran Out of Food in the Last Year: Never true   Transportation Needs: No Transportation Needs (8/10/2023)    PRAPARE - Transportation     Lack of Transportation (Medical): No     Lack of Transportation (Non-Medical): No   Physical Activity: Inactive (8/10/2023)    Exercise Vital Sign     Days of Exercise per Week: 0 days     Minutes of Exercise per Session: 0 min   Stress: No Stress Concern Present (8/10/2023)    [employer] [place] [employer] - Occupational Stress Questionnaire     Feeling of Stress : Not at all   Recent Concern: Stress - Stress Concern Present (8/1/2023)    [employer] [place] [employer] - Occupational Stress Questionnaire     Feeling of Stress : To some extent   Social Connections: Moderately Isolated (8/10/2023)    Social Connection and Isolation Panel [NHANES]     Frequency of Communication with Friends and Family: More than three times a week     Frequency of Social Gatherings with Friends and Family: More than three times a week     Attends [employer] Services: More than 4 times per year     Active Member of Clubs or Organizations: No     Attends Club or Organization Meetings: Never     Marital Status:    Housing Stability: Low Risk  (8/10/2023)    Housing Stability Vital Sign     Unable to Pay for Housing in the Last Year: No     Number of Places Lived in the Last Year: 1     Unstable Housing in the Last Year: No       MEDICATIONS & ALLERGIES:     No current facility-administered medications on file prior to visit.     Current Outpatient Medications on File Prior to Visit   Medication Sig Dispense Refill    albuterol  (PROVENTIL/VENTOLIN HFA) 90 mcg/actuation inhaler TAKE 1 2 PUFF(S) (INHALATION) EVERY 4 HOURS PRN   WHEEZING 18 g 12    carBAMazepine (TEGRETOL XR) 100 MG 12 hr tablet Take 1 tablet (100 mg total) by mouth 2 (two) times daily. 60 tablet 11    cetirizine (ZYRTEC) 10 MG tablet Take 10 mg by mouth daily as needed for Allergies.      COCONUT OIL ORAL Take 1 capsule by mouth once daily.      cyclobenzaprine (FLEXERIL) 10 MG tablet Take 10 mg by mouth 3 (three) times daily.      diazePAM (VALIUM) 5 MG tablet Take 1 tablet (5 mg total) by mouth every 6 (six) hours as needed for Anxiety (neck pain). 12 tablet 3    estradiol-norethindrone (ACTIVELLA) 1-0.5 mg per tablet TAKE 1 TABLET BY MOUTH EVERY DAY 28 tablet 12    finasteride (PROSCAR) 5 mg tablet Take 1 tablet (5 mg total) by mouth once daily. 30 tablet 11    multivitamin with minerals tablet Take 1 tablet by mouth once daily.      oxybutynin (DITROPAN) 5 MG Tab Take 1 tablet (5 mg total) by mouth 2 (two) times daily. 180 tablet 1    topiramate (TOPAMAX) 50 MG tablet TAKE 1 TABLET BY MOUTH TWICE A  tablet 3    traZODone (DESYREL) 150 MG tablet Take 1 tablet (150 mg total) by mouth nightly as needed for Insomnia. 90 tablet 3    zolpidem (AMBIEN) 5 MG Tab Take 1 tablet (5 mg total) by mouth nightly as needed (insomnia). 30 tablet 5    [DISCONTINUED] oxyCODONE (ROXICODONE) 5 MG immediate release tablet Take 1 tablet (5 mg total) by mouth every 4 (four) hours as needed. (Patient not taking: Reported on 8/9/2023) 20 tablet 0        Review of patient's allergies indicates:  No Known Allergies    OBJECTIVE:     Vital Signs:  Vitals:    08/09/23 1247   BP: (!) 145/85   Pulse: 85   Resp: 20   Temp: 97.4 °F (36.3 °C)     There is no height or weight on file to calculate BMI.     Physical Exam:  Physical Exam  Vitals reviewed.   Constitutional:       Appearance: She is well-developed.   HENT:      Head: Normocephalic and atraumatic.   Eyes:      General: Lids are normal.     "  Pupils: Pupils are equal, round, and reactive to light.   Cardiovascular:      Rate and Rhythm: Normal rate.   Pulmonary:      Effort: Pulmonary effort is normal.      Breath sounds: Normal breath sounds.   Abdominal:      General: Bowel sounds are normal.      Palpations: Abdomen is soft.   Musculoskeletal:         General: Normal range of motion.      Cervical back: Normal range of motion and neck supple.      Right lower leg: No edema.      Left lower leg: No edema.   Skin:     General: Skin is warm and dry.      Comments: Multiple yakelin holes 2/2 craniotomy   Neurological:      Mental Status: She is alert and oriented to person, place, and time. Mental status is at baseline.      GCS: GCS eye subscore is 4. GCS verbal subscore is 5. GCS motor subscore is 6.      Comments: Issues with word-finding and swallowing   Psychiatric:         Attention and Perception: Attention normal.         Mood and Affect: Mood is anxious.         Speech: Speech is delayed.     Laboratory  Lab Results   Component Value Date    WBC 4.99 08/10/2023    HGB 11.8 (L) 08/10/2023    HCT 36.2 (L) 08/10/2023    MCV 93 08/10/2023     08/10/2023     Lab Results   Component Value Date    INR 1.0 07/24/2023     Lab Results   Component Value Date    HGBA1C 4.9 02/08/2023     No results for input(s): "POCTGLUCOSE" in the last 72 hours.    TRANSITION OF CARE:     [facility] Contact Note: 8/3/23    Family and/or Caretaker present at visit?  Yes.  Diagnostic tests reviewed/disposition: No diagnosic tests pending after this hospitalization.  Disease/illness education:   Home health/community services discussion/referrals: Patient does not have home health established from hospital visit.  They do need home health.  If needed, we will set up home health for the patient.   Establishment or re-establishment of referral orders for community resources: No other necessary community resources.   Discussion with other health care providers:  " Discussed readmission to hospital with Neurology team via secure chat .     Transition of Care Visit:  I have reviewed and updated the history and problem list.  I have reconciled the medication list.  I have discussed the hospitalization and current medical issues, prognosis and plans with the patient/family.  I  spent more than 50% of time discussing the care with the patient/family.  Total Face-to-Face Encounter: 60 minutes.    Medications Reconciliation:   I have reconciled the patient's home medications and discharge medications with the patient/family. I have updated all changes.  Refer to After-Visit Medication List.    I have discussed discharge plans, follow-up instructions, future appointments, provider contact information, indicators to seek medical emergency treatment, and advisement to call with additional questions or concerns. Patient and caregiver verbalize understanding.    ASSESSMENT & PLAN:       1. Glossopharyngeal nerve disease  Overview:  - Failed conservative medical management including Lyrica, Topamax, Tegretol, and steroids. Continued to have significant limiting pain.   - S/p left suboccipital craniotomy retrosigmoid approach for microvascular decompression 7/31/23 with Dr. Hudson, neurosurgery  - Appreciate neurosurgery recs: BP <140 next 24h, cardene gtt   - Pain control: morphine 1 mg q4h PRN           Assessment & Plan:  S/p craniotomy  --follow-up appointment with Neurology; endorses transportation  --patient has no more pain medication and reports that she has a constant headache that is at least a 4 to 5/10 on the pain scale constantly; patient also reports that her lower back and legs a are severely in pain to the point where she can not sleep and rates the pain 10/10.  --the patient is having difficulty with speech and swallowing; speech evaluation ordered  --home health ordered as the patient is having difficulty with walking/weakness  --the patient was sent to the ED of the same  day due to increased pain in head and back that is unrelieved by any of her pain medications.    Orders:  -     Ambulatory referral/consult to Speech Therapy; Future; Expected date: 08/17/2023  -     Ambulatory referral/consult to Home Health; Future; Expected date: 08/11/2023    2. Glossopharyngeal neuralgia  -     Ambulatory referral/consult to [facility]         Were controlled substances prescribed?  No    Instructions for the patient:    Scheduled Follow-up :  Future Appointments   Date Time Provider Department Center   8/14/2023 10:00 AM Lisa Jarrell RN [facility] [place] [address]   8/17/2023 12:20 PM Rajiv Garcia MD [facility]   9/12/2023  9:45 AM Nils Hudson MD [facility] [address] [place] [address]   9/28/2023  1:00 PM Danny Victoira MD [facility]HELLE E[facility]       After Visit Medication List :     Medication List            Accurate as of August 9, 2023  1:53 PM. If you have any questions, ask your nurse or doctor.                CONTINUE taking these medications      albuterol 90 mcg/actuation inhaler  Commonly known as: PROVENTIL/VENTOLIN HFA  TAKE 1 2 PUFF(S) (INHALATION) EVERY 4 HOURS PRN   WHEEZING     carBAMazepine 100 MG 12 hr tablet  Commonly known as: TEGRETOL XR  Take 1 tablet (100 mg total) by mouth 2 (two) times daily.     cetirizine 10 MG tablet  Commonly known as: ZYRTEC     COCONUT OIL ORAL     cyclobenzaprine 10 MG tablet  Commonly known as: FLEXERIL     diazePAM 5 MG tablet  Commonly known as: VALIUM  Take 1 tablet (5 mg total) by mouth every 6 (six) hours as needed for Anxiety (neck pain).     estradiol-norethindrone 1-0.5 mg per tablet  Commonly known as: ACTIVELLA  TAKE 1 TABLET BY MOUTH EVERY DAY     finasteride 5 mg tablet  Commonly known as: PROSCAR  Take 1 tablet (5 mg total) by mouth once daily.     gabapentin 300 MG capsule  Commonly known as: NEURONTIN  Take 1 capsule (300 mg total) by mouth 3 (three) times daily.     methylPREDNISolone 4 mg  tablet  Commonly known as: MEDROL DOSEPACK  use as directed     multivitamin with minerals tablet     oxybutynin 5 MG Tab  Commonly known as: DITROPAN  Take 1 tablet (5 mg total) by mouth 2 (two) times daily.     * oxyCODONE 5 MG immediate release tablet  Commonly known as: ROXICODONE  Take 1 tablet (5 mg total) by mouth every 4 (four) hours as needed.     * oxyCODONE 5 MG immediate release tablet  Commonly known as: ROXICODONE  Take 1 tablet (5 mg total) by mouth every 6 (six) hours as needed for Pain.     topiramate 50 MG tablet  Commonly known as: TOPAMAX  TAKE 1 TABLET BY MOUTH TWICE A DAY     traZODone 150 MG tablet  Commonly known as: DESYREL  Take 1 tablet (150 mg total) by mouth nightly as needed for Insomnia.     zolpidem 5 MG Tab  Commonly known as: AMBIEN  Take 1 tablet (5 mg total) by mouth nightly as needed (insomnia).           * This list has 2 medication(s) that are the same as other medications prescribed for you. Read the directions carefully, and ask your doctor or other care provider to review them with you.                  Signature: Camilo Gabriel NP

## 2023-08-10 NOTE — ASSESSMENT & PLAN NOTE
58 F PMHx L MVD for glossopharyngeal neuralgia by Dr. Hudson on 7/31, consulted to NSGY to evaluate post op CTH. Pt reported back pain and was found to have choledocolithiasis. She also complained of forehead pain near pin site from previous surgery prompting CTH and neurosurgery consult.    CTH with expected post op changes, no fractures near pin sites.     -- Pt recovering very well since surgery. No need for neurosurgical intervention or further imaging.   -- Will follow up as planned for routine clinic post op visit

## 2023-08-10 NOTE — PLAN OF CARE
Problem: Occupational Therapy  Goal: Occupational Therapy Goal  Description: OT eval / treatment session completed, Pt is functioning at prior level, no acute services needed at this time. She has good support from family.  Outcome: Met

## 2023-08-10 NOTE — H&P
[facility] - Emergency Dept  [facility] Medicine  History & Physical    Patient Name: Mae Gregory  MRN: [id number]  Patient Class: OP- Observation  Admission Date: 8/9/2023  Attending Physician: Raúl Stock MD   Primary Care Provider: Rajiv Garcia MD         Patient information was obtained from patient, past medical records and ER records.     Subjective:     Principal Problem:Choledocholithiasis    Chief Complaint:   Chief Complaint   Patient presents with    Back Pain     Recent craniotomy on 7/31, headache and back pain progression since discharge         HPI: Ms. Mae Gregory is a 58 y.o. female, with PMH of cervical and lumbar DDD, cervical myofascial pain syndrome, glossopharyngeal nerve disease, sensorineural hearing loss, Raynaud's phenomenon, anxiety/depression, who presented to [facility] ED on 8/9/23 due to back pain. She states the back pain began after her craniotomy for glossopharyngeal nerve decompression, on 7/31/23, and has progressively worsened since discharge. She states she has bene able to ambulate, but feels weak. She states that the Valium she takes at night does not help. She further notes a left sided headache. She states she has tried Epsom salt soaks, rubs, and tylenol. She denies numbness, tingling, saddle anesthesia, nausea, vomiting, urinary incontinence or retention. She was evaluated in the ED with labs showing a normal CBC. A metabolic panel showed AST 60 and ALT of 100. UA was without UTI. A CT of the abdomen and pelvis showed a chololithiasis with dilated CBD without intrahepatic biliary ductal dilatation. An US of the abdomen showed cholelithiasis without cholecystitis, and with CBD dilatation with mild intrahepatic ductal dilatation, and pancreatic duct at ULN. CT Head showed changes related to her recent craniotomy with interval decreased in fluid beneath the craniotomy flap, and near-total resolution of previous extensive pneumocephalus. There was also an area  "of the left cerebellar hemisphere concerning for evolving infarct. Case discussed between ED MD and Neurosurgery and patient was "cleared from neurosurgery perspective" per ED MD report. The patient was placed on observation for further management of intractable pain from sciatica, as well as choldeochalithiasis.       Past Medical History:   Diagnosis Date    DDD (degenerative disc disease), cervical 3/15/2017    DDD (degenerative disc disease), lumbar 3/15/2017    Glaucoma 3/7/2018    Migraine 3/15/2017    Primary insomnia 3/15/2017       Past Surgical History:   Procedure Laterality Date    AUGMENTATION OF BREAST      BREAST SURGERY       SECTION   and 2001    x2    COLON SURGERY  10/15/2018    colon resection     CRANIOTOMY Left 2023    Procedure: CRANIOTOMY for microvascular decompression NEURO MONITORING;  Surgeon: Nils Hudson MD;  Location: [facility] OR [address] [place] [address];  Service: Neurosurgery;  Laterality: Left;  regular bed, supine, bump, hickman, type and cross 2 units, cranial nerve monitoring, optical stealth CO SURGEON DR [place]       Review of patient's allergies indicates:  No Known Allergies    No current facility-administered medications on file prior to encounter.     Current Outpatient Medications on File Prior to Encounter   Medication Sig    albuterol (PROVENTIL/VENTOLIN HFA) 90 mcg/actuation inhaler TAKE 1 2 PUFF(S) (INHALATION) EVERY 4 HOURS PRN   WHEEZING    BIOFREEZE, MENTHOL, TOP Apply topically to buttocks and legs as needed for pain.    carBAMazepine (TEGRETOL XR) 100 MG 12 hr tablet Take 1 tablet (100 mg total) by mouth 2 (two) times daily.    cetirizine (ZYRTEC) 10 MG tablet Take 10 mg by mouth daily as needed for Allergies.    cyclobenzaprine (FLEXERIL) 10 MG tablet Take 10 mg by mouth 3 (three) times daily.    diazePAM (VALIUM) 5 MG tablet Take 1 tablet (5 mg total) by mouth every 6 (six) hours as needed for Anxiety (neck pain).    estradiol-norethindrone " (ACTIVELLA) 1-0.5 mg per tablet TAKE 1 TABLET BY MOUTH EVERY DAY    famotidine (PEPCID AC ORAL) Take 1 capsule by mouth daily as needed (Heartburn).    finasteride (PROSCAR) 5 mg tablet Take 1 tablet (5 mg total) by mouth once daily.    oxybutynin (DITROPAN) 5 MG Tab Take 1 tablet (5 mg total) by mouth 2 (two) times daily.    sodium chloride (SALINE MIST NASL) 1 spray by Each Nostril route daily as needed (Congestion).    topiramate (TOPAMAX) 50 MG tablet TAKE 1 TABLET BY MOUTH TWICE A DAY    traZODone (DESYREL) 150 MG tablet Take 1 tablet (150 mg total) by mouth nightly as needed for Insomnia.    zolpidem (AMBIEN) 5 MG Tab Take 1 tablet (5 mg total) by mouth nightly as needed (insomnia).    COCONUT OIL ORAL Take 1 capsule by mouth once daily.    multivitamin with minerals tablet Take 1 tablet by mouth once daily.    oxyCODONE (ROXICODONE) 5 MG immediate release tablet Take 1 tablet (5 mg total) by mouth every 4 (four) hours as needed. (Patient not taking: Reported on 8/9/2023)     Family History       Problem Relation (Age of Onset)    Hypertension Father, Mother, Brother    Stroke Maternal Grandfather          Tobacco Use    Smoking status: Never    Smokeless tobacco: Never   Substance and Sexual Activity    Alcohol use: No    Drug use: No    Sexual activity: Yes     Partners: Male     Comment: ablation     Review of Systems   Constitutional:  Negative for chills, diaphoresis, fatigue and fever.   Respiratory:  Negative for cough, shortness of breath and wheezing.    Cardiovascular:  Negative for chest pain and palpitations.   Gastrointestinal:  Negative for abdominal distention, abdominal pain, constipation, diarrhea, nausea and vomiting.   Genitourinary:  Negative for decreased urine volume, dysuria, enuresis, flank pain, frequency, hematuria and urgency.   Musculoskeletal:  Positive for back pain and gait problem. Negative for arthralgias, joint swelling, myalgias, neck pain and neck stiffness.         Pain radiates down the back of both legs.    Skin:  Positive for wound (s/p brain surgery). Negative for color change, pallor and rash.   Neurological:  Negative for dizziness, syncope, weakness, light-headedness, numbness and headaches.   Psychiatric/Behavioral:  Negative for agitation and confusion.      Objective:     Vital Signs (Most Recent):  Temp: 97.7 °F (36.5 °C) (08/10/23 0029)  Pulse: 86 (08/10/23 0029)  Resp: 18 (08/10/23 0055)  BP: (!) 168/90 (08/10/23 0029)  SpO2: 98 % (08/10/23 0029) Vital Signs (24h Range):  Temp:  [97.4 °F (36.3 °C)-98.6 °F (37 °C)] 97.7 °F (36.5 °C)  Pulse:  [] 86  Resp:  [16-20] 18  SpO2:  [97 %-98 %] 98 %  BP: (140-184)/() 168/90     Weight: 56.7 kg (125 lb)  Body mass index is 20.8 kg/m².     Physical Exam  Vitals and nursing note reviewed.   Constitutional:       General: She is not in acute distress.     Appearance: She is well-developed and normal weight. She is not ill-appearing, toxic-appearing or diaphoretic.      Comments: Chronically ill appearing female.    HENT:      Head: Normocephalic and atraumatic.   Eyes:      General: No scleral icterus.        Right eye: No discharge.         Left eye: No discharge.      Conjunctiva/sclera: Conjunctivae normal.   Neck:      Trachea: No tracheal deviation.   Cardiovascular:      Rate and Rhythm: Normal rate and regular rhythm.      Heart sounds: Normal heart sounds. No murmur heard.     No gallop.   Pulmonary:      Effort: Pulmonary effort is normal. No respiratory distress.      Breath sounds: Normal breath sounds. No stridor. No wheezing or rales.   Abdominal:      General: Bowel sounds are normal. There is no distension.      Palpations: Abdomen is soft. There is no mass.      Tenderness: There is no abdominal tenderness. There is no guarding.   Musculoskeletal:         General: No deformity.      Cervical back: Normal range of motion and neck supple.   Skin:     General: Skin is warm and dry.       "Coloration: Skin is not pale.      Findings: No erythema or rash.   Neurological:      General: No focal deficit present.      Mental Status: She is alert and oriented to person, place, and time.      Cranial Nerves: No cranial nerve deficit.      Motor: No abnormal muscle tone.   Psychiatric:         Mood and Affect: Mood normal. Affect is angry.         Behavior: Behavior is agitated.         Thought Content: Thought content normal.         Judgment: Judgment normal.                Significant Labs: All pertinent labs within the past 24 hours have been reviewed.  BMP:   Recent Labs   Lab 08/09/23  1434         K 3.8      CO2 20*   BUN 12   CREATININE 0.7   CALCIUM 9.5     CBC:   Recent Labs   Lab 08/09/23  1434   WBC 5.69   HGB 13.5   HCT 41.0        CMP:   Recent Labs   Lab 08/09/23  1434      K 3.8      CO2 20*      BUN 12   CREATININE 0.7   CALCIUM 9.5   PROT 6.9   ALBUMIN 3.5   BILITOT 0.3   ALKPHOS 77   AST 60*   *   ANIONGAP 13     Urine Culture: No results for input(s): "LABURIN" in the last 48 hours.  Urine Studies:   Recent Labs   Lab 08/09/23  1435   COLORU Yellow   APPEARANCEUA Clear   PHUR 7.0   SPECGRAV 1.010   PROTEINUA Negative   GLUCUA Negative   KETONESU Negative   BILIRUBINUA Negative   OCCULTUA Negative   NITRITE Negative   LEUKOCYTESUR Negative       Significant Imaging: I have reviewed all pertinent imaging results/findings within the past 24 hours.  Imaging Results              MRI MRCP Without Contrast (Final result)  Result time 08/09/23 23:04:18      Final result by Chad Marie DO (08/09/23 23:04:18)                   Impression:      1. Intra and extrahepatic biliary ductal dilation without filling defect or choledocholithiasis.  2.  Cholelithiasis without evidence of acute cholecystitis.      Electronically signed by: Chad Marie  Date:    08/09/2023  Time:    23:04               Narrative:    EXAMINATION:  MRI ABDOMEN WITHOUT " CONTRAST MRCP    CLINICAL HISTORY:  choldeochalithiasis;    TECHNIQUE:  Multiplanar, multisequence magnetic resonance images of the liver and upper abdomen without intravenous contrast. Additionally 2D and 3D MRCP sequences are performed as well as MIP images.    COMPARISON:  MRI cervical, thoracic, and lumbar spine from 01/07/2022. CT abdomen and pelvis from 08/09/2023.  Ultrasound of the abdomen from 08/09/2023.    FINDINGS:  Pulmonary Bases: No gross abnormality.    Liver: The liver is normal in size and demonstrates normal signal.  No evidence of fat or iron deposition.  No focal lesion.    Bile Ducts: There is intra and extrahepatic biliary ductal dilation.  The CBD measures up to approximately 1.3 cm in maximal diameter.  There was also biliary ductal dilation on MRI of the spine dated 01/07/2022, with the CBD measuring up to approximately 1.0 cm on that study, note that direct comparison is limited due to difference in technique between these 2 studies.    Gallbladder: There are small layering gallstones.  There is no gallbladder wall thickening or pericholecystic fluid or inflammation.    Pancreas: Unremarkable without focal lesion or pancreatic ductal dilation.    Spleen: Normal size without focal lesion.    Gastrointestinal tract: No bowel wall thickening or obstruction.    Mesentery and retroperitoneum: No ascites, focal fluid collection, or free air.    Lymph nodes: No evidence of lymphadenopathy.    Adrenal Glands: Unremarkable.    Kidneys: Normal without focal lesion or hydronephrosis.    Aorta and Abdominal Vasculature: No aneurysm.    Body wall: Scar tissue along the anterior abdominal wall noted.  There are bilateral breast implants.    Musculoskeletal: Normal marrow signal.                                        US Abdomen Limited (Gallbladder) (Final result)  Result time 08/09/23 18:56:03      Final result by Colby Fleming MD (08/09/23 18:56:03)                   Impression:      Cholelithiasis  without sonographic evidence of cholecystitis.  Associated dilatation of the common bile duct with mild intrahepatic ductal dilatation.  Upper limits of normal size caliber of the pancreatic duct.  Can obtain MRCP for further evaluation if clinically warranted.    This report was flagged in Epic as abnormal.    Electronically signed by resident: Marek Bee  Date:    08/09/2023  Time:    18:50    Electronically signed by: Colby Fleming MD  Date:    08/09/2023  Time:    18:56               Narrative:    EXAMINATION:  US ABDOMEN LIMITED    CLINICAL HISTORY:  .    Abnormal findings on diagnostic imaging of other abdominal regions, including retroperitoneum    TECHNIQUE:  Limited ultrasound of the right upper quadrant of the abdomen including pancreas, gallbladder, common bile duct was performed to evaluate for cholecystitis.    COMPARISON:  CT abdomen pelvis 08/09/2023    FINDINGS:  Gallbladder: Single gallstone with biliary sludge.  No gallbladder wall thickening or pericholecystic fluid.  No wall hyperemia. No sonographic Campbell's sign.    Biliary system: The common duct is dilated, measuring 11 mm.  Mild intrahepatic ductal dilatation.    Pancreas: The visualized portions of pancreas appear normal.  Upper limit of normal size pancreatic duct diameter.                                       CT Head Without Contrast (Final result)  Result time 08/09/23 17:13:12      Final result by Colby Fleming MD (08/09/23 17:13:12)                   Impression:      Postsurgical changes status post left suboccipital craniotomy with interval decreased small amount fluid beneath the craniotomy flap.  Grossly similar scattered suspected blood products beneath the craniotomy flap.  No large hematoma.    Near-total resolution of previous extensive pneumocephalus, now minimal.    Evolving area of slightly heterogeneous hypoattenuation within the left cerebellar hemisphere, likely evolving infarct.      Electronically signed by: Colby  MD Lonnie  Date:    08/09/2023  Time:    17:13               Narrative:    EXAMINATION:  CT HEAD WITHOUT CONTRAST    CLINICAL HISTORY:  Headache, chronic, new features or increased frequency;    TECHNIQUE:  Low dose axial images were obtained through the head.  Coronal and sagittal reformations were also performed. Contrast was not administered.    COMPARISON:  Head CT 07/31/2023, MRI brain 04/06/2023    FINDINGS:  Redemonstrated postoperative changes of left suboccipital craniotomy.  Previous pneumocephalus has nearly completely resolved with minimal amount along the anterior interhemispheric fissure.  Grossly stable serpiginous hyperattenuation beneath the craniotomy flap likely blood products with decreased fluid volume.  Evolving area of slightly heterogeneous hypoattenuation within the left cerebellar hemisphere, likely evolving infarct.  No new focal areas of abnormal parenchymal attenuation.  Ventricles remain stable in size and configuration without acute hydrocephalus.  No acute displaced skull fracture.  Stable suspected nonspecific partial empty sella configuration.    Paranasal sinuses and mastoid air cells are clear.  Skin staples remain in place overlying the left parieto-occipital calvarium.  Interval decreased soft tissue swelling overlying the left parietal and occipital calvarium.                                       CT Abdomen Pelvis  Without Contrast (Final result)  Result time 08/09/23 17:19:06      Final result by Colby Fleming MD (08/09/23 17:19:06)                   Impression:      1. No radiodense calculus seen within the urinary tract or evidence of obstructive uropathy on either side.  2. Cholelithiasis.  Dilated common bile duct without associated intrahepatic biliary ductal dilatation.  Correlation with LFTs advised.  Further evaluation with right upper quadrant ultrasound can be obtained as warranted.  3. Remote postoperative changes of the proximal colon without acute  complication.  4. Atherosclerosis with suspected 1.5 cm partially calcified splenic arterial aneurysm.  5. Few additional findings as above      Electronically signed by: Colby Fleming MD  Date:    08/09/2023  Time:    17:19               Narrative:    EXAMINATION:  CT ABDOMEN PELVIS WITHOUT CONTRAST    CLINICAL HISTORY:  Flank pain, kidney stone suspected;    TECHNIQUE:  Low dose axial images, sagittal and coronal reformations were obtained from the lung bases to the pubic symphysis.  No contrast media utilized.    COMPARISON:  Chest radiograph 02/14/2022    FINDINGS:  Lack of IV contrast limits evaluation of soft tissue and vascular structures.    Imaged lung bases are clear.  Base of the heart is within normal limits.    Cholelithiasis without evidence of acute cholecystitis.  Common bile duct measures up to 13 mm but appears to taper appropriately at the ampulla.  No significant intrahepatic biliary ductal dilatation.    Liver is normal in size noting small peripheral parenchymal calcification at the inferior right lobe.  Noncontrast appearance of the pancreas, spleen, stomach, duodenum and bilateral adrenal glands are within normal limits.    Bilateral kidneys are normal in size, shape and location.  No radiodense calculus seen within the collecting systems on either side or urinary bladder.  No hydronephrosis or significant perinephric stranding.  Ureters are normal in course and caliber.  Urinary bladder is well distended without wall thickening.  Noncontrast appearance of the uterus and bilateral adnexa are within normal limits.  No significant volume free fluid in the pelvis.  Pelvic phleboliths noted.    Postoperative changes of the proximal colon without acute complication.  Appendix not identified and likely surgically absent.  No evidence of bowel obstruction or acute inflammation noting moderate amount of stool noted throughout the mid to distal colon.  No pneumatosis or portal venous gas.    Mild  scattered calcific atherosclerosis of the abdominal aorta.  1.5 cm suspected partially calcified splenic arterial aneurysm near its hilum.  Aorta tapers normally.    Tiny fat containing umbilical hernia.    Osseous structures show minimal degenerative change and otherwise appear intact.                                       Assessment/Plan:     * Choledocholithiasis  - Ms. Mae Gregory presents with back pain and headache  - CT of abdomen and pelvis with concern for cholelithiasis with CBD dilatation without intrahepatic biliary ductal dilatation   - UA of abdomen and pelvis shows cholelithiasis without cholecystitis and CVD dilatation with mild intrahepatic ductal dilatation   - MRCP pending   - Advanced endoscopy consulted   - NPO at [facility]   - IV fluids for hydration       Acute on chronic low back pain with sciatica  - patient has h/o DDD of lumbar spine   - no red flag symptoms for cauda equina   - PT/OT consulted   - PRN meds for pain ordered       Glossopharyngeal nerve disease  - s/p craniotomy 7/31/23       -Anxiety with depression - controlled off Wellbutrin since 4/2023  - controlled without medications per notes     VTE Risk Mitigation (From admission, onward)         Ordered     IP VTE HIGH RISK PATIENT  Once         08/09/23 2205     Place sequential compression device  Until discontinued         08/09/23 2205     Reason for No Pharmacological VTE Prophylaxis  Once        Question:  Reasons:  Answer:  Physician Provided (leave comment)    08/09/23 2204                     On 08/10/2023, patient should be placed in hospital observation services under the care of Dr Raúl Stock MD.      RADAMES BuiC  Department of Hospital Medicine  [facility] - Emergency Dept

## 2023-08-10 NOTE — PLAN OF CARE
08/10/23 0357   Post-Acute Status   Post-Acute Authorization Other   Coverage [employer]   Other Status No Post-Acute Service Needs   Discharge Delays None known at this time   Discharge Plan   Discharge Plan A Home;Home with family     Patient stated that she lives home alone. Patient stated that she has support from her mother and son.    Patient stated that she is independent and can safely return to her home on discharge.    Patient denies any post acute services at this time.     Patient stated that she will follow up with her neurologist for PT.     Buck Moreno, JACOBO, MSW-LMSW  Medical Social Worker/  ER Department

## 2023-08-10 NOTE — ASSESSMENT & PLAN NOTE
- patient has h/o DDD of lumbar spine   - no red flag symptoms for cauda equina   - PT/OT consulted   - PRN meds for pain ordered

## 2023-08-10 NOTE — PLAN OF CARE
Pt discharged, AVS given,education completed. Pt verbalized understanding. All questions and concerns were met. Awaiting bedside delivery.   Problem: Adult Inpatient Plan of Care  Goal: Plan of Care Review  Outcome: Met  Goal: Patient-Specific Goal (Individualized)  Outcome: Met

## 2023-08-10 NOTE — PLAN OF CARE
[facility] - Observation 11H  Initial Discharge Assessment       Primary Care Provider: Rajiv Garcia MD    Admission Diagnosis: Abnormal CT of the abdomen [R93.5]  Common bile duct (CBD) obstruction [K83.1]  Back pain, unspecified back location, unspecified back pain laterality, unspecified chronicity [M54.9]    Admission Date: 8/9/2023  Expected Discharge Date: 8/10/2023    Transition of Care Barriers: (P) None    Payor: BLUE CROSS BLUE SHIELD / Plan: [place] HMO / Product Type: HMO /     Extended Emergency Contact Information  Primary Emergency Contact: CheryJoseph   [place]  Mobile Phone: [phone]  Relation: Son  Secondary Emergency Contact: Billie Akhtar  Mobile Phone: [phone]  Relation: Mother    Discharge Plan A: (P) Home, Home with family         [id number] - [place], [place] - [address]  [address]  [place] [address]  Phone: [phone] Fax: [phone]    [id number] - [place], [place] - [address]  [address]  [address]  [place] [address]  Phone: [phone] Fax: [phone]      Initial Assessment (most recent)       Adult Discharge Assessment - 08/10/23 0354          Discharge Assessment    Assessment Type Discharge Planning Assessment (P)      Confirmed/corrected address, phone number and insurance Yes (P)      Confirmed Demographics Correct on Facesheet (P)      Source of Information patient (P)      When was your last doctors appointment? -- (P)    in Mid July    Does patient/caregiver understand observation status Yes (P)      Communicated CHARMAINE with patient/caregiver Yes (P)      Reason For Admission Sever pain that started in waist area (P)      People in Home alone (P)      Facility Arrived From: Home (P)      Do you expect to return to your current living situation? Yes (P)      Do you have help at home or someone to help you manage your care at home? No (P)      Prior to hospitilization cognitive status: Alert/Oriented (P)       Current cognitive status: Alert/Oriented (P)      Home Layout Able to live on 1st floor (P)      Equipment Currently Used at Home none (P)      Readmission within 30 days? Yes (P)      Patient currently being followed by outpatient case management? No (P)      Do you currently have service(s) that help you manage your care at home? No (P)      Do you take prescription medications? Yes (P)      Do you have prescription coverage? Yes (P)      Coverage BCBS (P)      Do you have any problems affording any of your prescribed medications? No (P)      Is the patient taking medications as prescribed? yes (P)      Who is going to help you get home at discharge? Moter or Son (P)      How do you get to doctors appointments? car, drives self (P)      Are you on dialysis? No (P)      Do you take coumadin? No (P)      Discharge Plan A Home;Home with family (P)      DME Needed Upon Discharge  none (P)      Discharge Plan discussed with: Patient (P)      Transition of Care Barriers None (P)         Physical Activity    On average, how many days per week do you engage in moderate to strenuous exercise (like a brisk walk)? 0 days (P)      On average, how many minutes do you engage in exercise at this level? 0 min (P)         Financial Resource Strain    How hard is it for you to pay for the very basics like food, housing, medical care, and heating? Not hard at all (P)         Housing Stability    In the last 12 months, was there a time when you were not able to pay the mortgage or rent on time? No (P)      In the last 12 months, how many places have you lived? 1 (P)      In the last 12 months, was there a time when you did not have a steady place to sleep or slept in a shelter (including now)? No (P)         Transportation Needs    In the past 12 months, has lack of transportation kept you from medical appointments or from getting medications? No (P)      In the past 12 months, has lack of transportation kept you from meetings, work,  or from getting things needed for daily living? No (P)         Food Insecurity    Within the past 12 months, you worried that your food would run out before you got the money to buy more. Never true (P)      Within the past 12 months, the food you bought just didn't last and you didn't have money to get more. Never true (P)         Stress    Do you feel stress - tense, restless, nervous, or anxious, or unable to sleep at night because your mind is troubled all the time - these days? Not at all (P)         Social Connections    In a typical week, how many times do you talk on the phone with family, friends, or neighbors? More than three times a week (P)      How often do you get together with friends or relatives? More than three times a week (P)      How often do you attend Yazidi or Mu-ism services? More than 4 times per year (P)      Do you belong to any clubs or organizations such as Yazidi groups, unions, fraternal or athletic groups, or school groups? No (P)      How often do you attend meetings of the clubs or organizations you belong to? Never (P)      Are you , , , , never , or living with a partner?  (P)         Alcohol Use    Q1: How often do you have a drink containing alcohol? Never (P)      Q2: How many drinks containing alcohol do you have on a typical day when you are drinking? Patient does not drink (P)      Q3: How often do you have six or more drinks on one occasion? Never (P)

## 2023-08-10 NOTE — HOSPITAL COURSE
Pt admitted to [facility]. MRCP without evidence of choledocholithiasis, and [facility] felt that no intervention was warranted. Back pain appears to be sciatica in nature; started ob gabapentin with PRN analgesics. Pt deemed appropriate for discharge. Plan discussed with pt, who was agreeable and amenable; medications were discussed and reviewed, outpatient follow-up scheduled, ER precautions were given, all questions were answered to the pt's satisfaction, and Ms. Gregory was subsequently discharged.

## 2023-08-10 NOTE — HPI
"Ms. Mae Gregory is a 58 y.o. female, with PMH of cervical and lumbar DDD, cervical myofascial pain syndrome, glossopharyngeal nerve disease, sensorineural hearing loss, Raynaud's phenomenon, anxiety/depression, who presented to [facility] ED on 8/9/23 due to back pain. She states the back pain began after her craniotomy for glossopharyngeal nerve decompression, on 7/31/23, and has progressively worsened since discharge. She states she has bene able to ambulate, but feels weak. She states that the Valium she takes at night does not help. She further notes a left sided headache. She states she has tried Epsom salt soaks, rubs, and tylenol. She denies numbness, tingling, saddle anesthesia, nausea, vomiting, urinary incontinence or retention. She was evaluated in the ED with labs showing a normal CBC. A metabolic panel showed AST 60 and ALT of 100. UA was without UTI. A CT of the abdomen and pelvis showed a chololithiasis with dilated CBD without intrahepatic biliary ductal dilatation. An US of the abdomen showed cholelithiasis without cholecystitis, and with CBD dilatation with mild intrahepatic ductal dilatation, and pancreatic duct at ULN. CT Head showed changes related to her recent craniotomy with interval decreased in fluid beneath the craniotomy flap, and near-total resolution of previous extensive pneumocephalus. There was also an area of the left cerebellar hemisphere concerning for evolving infarct. Case discussed between ED MD and Neurosurgery and patient was "cleared from neurosurgery perspective" per ED MD report. The patient was placed on observation for further management of intractable pain from sciatica, as well as choldeochalithiasis.   "

## 2023-08-10 NOTE — DISCHARGE SUMMARY
[facility] - Observation [address] [place] [address] Medicine  Discharge Summary      Patient Name: Mae Gregory  MRN: [id number]  LAMAR: [id number]  Patient Class: OP- Observation  Admission Date: 8/9/2023  Hospital Length of Stay: 0 days  Discharge Date and Time:  08/10/2023 10:33 AM  Attending Physician: Raúl Stock MD   Discharging Provider: Raúl Stock MD  Primary Care Provider: Rajiv Garcia MD  [facility] Medicine Team: [facility] MED  Raúl Stock MD  Primary Care Team: [facility]    HPI:   Ms. Mae Gregory is a 58 y.o. female, with PMH of cervical and lumbar DDD, cervical myofascial pain syndrome, glossopharyngeal nerve disease, sensorineural hearing loss, Raynaud's phenomenon, anxiety/depression, who presented to [facility] ED on 8/9/23 due to back pain. She states the back pain began after her craniotomy for glossopharyngeal nerve decompression, on 7/31/23, and has progressively worsened since discharge. She states she has bene able to ambulate, but feels weak. She states that the Valium she takes at night does not help. She further notes a left sided headache. She states she has tried Epsom salt soaks, rubs, and tylenol. She denies numbness, tingling, saddle anesthesia, nausea, vomiting, urinary incontinence or retention. She was evaluated in the ED with labs showing a normal CBC. A metabolic panel showed AST 60 and ALT of 100. UA was without UTI. A CT of the abdomen and pelvis showed a chololithiasis with dilated CBD without intrahepatic biliary ductal dilatation. An US of the abdomen showed cholelithiasis without cholecystitis, and with CBD dilatation with mild intrahepatic ductal dilatation, and pancreatic duct at ULN. CT Head showed changes related to her recent craniotomy with interval decreased in fluid beneath the craniotomy flap, and near-total resolution of previous extensive pneumocephalus. There was also an area of the left cerebellar hemisphere concerning for evolving infarct. Case  "discussed between ED MD and Neurosurgery and patient was "cleared from neurosurgery perspective" per ED MD report. The patient was placed on observation for further management of intractable pain from sciatica, as well as choldeochalithiasis.       * No surgery found *      Hospital Course:   Pt admitted to [facility]. MRCP without evidence of choledocholithiasis, and [facility] felt that no intervention was warranted. Back pain appears to be sciatica in nature; started ob gabapentin with PRN analgesics. Pt deemed appropriate for discharge. Plan discussed with pt, who was agreeable and amenable; medications were discussed and reviewed, outpatient follow-up scheduled, ER precautions were given, all questions were answered to the pt's satisfaction, and Ms. Gregory was subsequently discharged.         Goals of Care Treatment Preferences:  Code Status: Full Code      Consults:   Consults (From admission, onward)        Status Ordering Provider     Inpatient consult to Neurosurgery  Once        Provider:  (Not yet assigned)    Acknowledged MILADYS BUCIO     Inpatient consult to [facility]  Once        Provider:  (Not yet assigned)    Completed MILADYS BUCIO          No new Assessment & Plan notes have been filed under this hospital service since the last note was generated.  Service: Hospital Medicine    Final Active Diagnoses:    Diagnosis Date Noted POA    PRINCIPAL PROBLEM:  Choledocholithiasis [K80.50] 08/09/2023 Yes    Acute on chronic low back pain with sciatica [M54.40] 08/09/2023 Yes    Glossopharyngeal nerve disease [G52.1] 07/31/2023 Yes     Chronic    -Anxiety with depression - controlled off Wellbutrin since 4/2023 [F41.8] 02/06/2023 Yes      Problems Resolved During this Admission:       Discharged Condition: stable    Disposition: Home or Self Care    Follow Up:   Follow-up Information     Rajiv Garcia MD. Schedule an appointment as soon as possible for a visit.    Specialty: " Family Medicine  Contact information:  [place] [id number]  [phone]                       Patient Instructions:      Ambulatory referral/consult to Family Practice   Standing Status: Future   Referral Priority: Routine Referral Type: Consultation   Referral Reason: Specialty Services Required   Requested Specialty: Family Medicine   Number of Visits Requested: 1     Diet Adult Regular     Notify your health care provider if you experience any of the following:  increased confusion or weakness     Notify your health care provider if you experience any of the following:  persistent dizziness, light-headedness, or visual disturbances     Notify your health care provider if you experience any of the following:  worsening rash     Notify your health care provider if you experience any of the following:  severe persistent headache     Notify your health care provider if you experience any of the following:  difficulty breathing or increased cough     Notify your health care provider if you experience any of the following:  severe uncontrolled pain     Notify your health care provider if you experience any of the following:  persistent nausea and vomiting or diarrhea     Notify your health care provider if you experience any of the following:  temperature >100.4     Activity as tolerated       Significant Diagnostic Studies: Labs: All labs within the past 24 hours have been reviewed    Pending Diagnostic Studies:     Procedure Component Value Units Date/Time    Comprehensive Metabolic Panel [[id number]] Collected: 08/10/23 1018    Order Status: Sent Lab Status: In process Updated: 08/10/23 1032    Specimen: Blood          Medications:  Reconciled Home Medications:      Medication List      START taking these medications    gabapentin 300 MG capsule  Commonly known as: NEURONTIN  Take 1 capsule (300 mg total) by mouth 3 (three) times daily.        CHANGE how you take these medications    oxyCODONE 5 MG  immediate release tablet  Commonly known as: ROXICODONE  Take 1 tablet (5 mg total) by mouth every 6 (six) hours as needed for Pain.  What changed:   · when to take this  · reasons to take this        CONTINUE taking these medications    albuterol 90 mcg/actuation inhaler  Commonly known as: PROVENTIL/VENTOLIN HFA  TAKE 1 2 PUFF(S) (INHALATION) EVERY 4 HOURS PRN   WHEEZING     BIOFREEZE (MENTHOL) TOP  Apply topically to buttocks and legs as needed for pain.     carBAMazepine 100 MG 12 hr tablet  Commonly known as: TEGRETOL XR  Take 1 tablet (100 mg total) by mouth 2 (two) times daily.     cetirizine 10 MG tablet  Commonly known as: ZYRTEC  Take 10 mg by mouth daily as needed for Allergies.     COCONUT OIL ORAL  Take 1 capsule by mouth once daily.     cyclobenzaprine 10 MG tablet  Commonly known as: FLEXERIL  Take 10 mg by mouth 3 (three) times daily.     diazePAM 5 MG tablet  Commonly known as: VALIUM  Take 1 tablet (5 mg total) by mouth every 6 (six) hours as needed for Anxiety (neck pain).     estradiol-norethindrone 1-0.5 mg per tablet  Commonly known as: ACTIVELLA  TAKE 1 TABLET BY MOUTH EVERY DAY     finasteride 5 mg tablet  Commonly known as: PROSCAR  Take 1 tablet (5 mg total) by mouth once daily.     multivitamin with minerals tablet  Take 1 tablet by mouth once daily.     oxybutynin 5 MG Tab  Commonly known as: DITROPAN  Take 1 tablet (5 mg total) by mouth 2 (two) times daily.     PEPCID AC ORAL  Take 1 capsule by mouth daily as needed (Heartburn).     SALINE MIST NASL  1 spray by Each Nostril route daily as needed (Congestion).     topiramate 50 MG tablet  Commonly known as: TOPAMAX  TAKE 1 TABLET BY MOUTH TWICE A DAY     traZODone 150 MG tablet  Commonly known as: DESYREL  Take 1 tablet (150 mg total) by mouth nightly as needed for Insomnia.     zolpidem 5 MG Tab  Commonly known as: AMBIEN  Take 1 tablet (5 mg total) by mouth nightly as needed (insomnia).            Indwelling Lines/Drains at time of  discharge:   Lines/Drains/Airways     None                 Time spent on the discharge of patient: 35 minutes         Raúl Stock MD  Attending Physician  Medical Director - [facility] Observation Unit  Department of Hospital Medicine  8/10/2023

## 2023-08-10 NOTE — NURSING
Pt discharged, AVS given,education completed. Pt verbalized understanding. All questions and concerns were met. Awaiting bedside delivery.

## 2023-08-14 ENCOUNTER — CLINICAL SUPPORT (OUTPATIENT)
Dept: NEUROSURGERY | Facility: CLINIC | Age: 58
End: 2023-08-14
Payer: COMMERCIAL

## 2023-08-14 DIAGNOSIS — G52.1: ICD-10-CM

## 2023-08-14 DIAGNOSIS — G58.9 NERVE COMPRESSION SYNDROME: Primary | ICD-10-CM

## 2023-08-14 PROCEDURE — 99999 PR PBB SHADOW E&M-EST. PATIENT-LVL III: ICD-10-PCS | Mod: PBBFAC,,,

## 2023-08-14 PROCEDURE — 99999 PR PBB SHADOW E&M-EST. PATIENT-LVL III: CPT | Mod: PBBFAC,,,

## 2023-08-14 RX ORDER — AMLODIPINE BESYLATE 5 MG/1
5 TABLET ORAL
COMMUNITY
Start: 2023-08-11 | End: 2023-11-16

## 2023-08-14 NOTE — PROGRESS NOTES
Patient seen in clinic for 2 week post op s/p craniotomy for microvascular decompression of L glossopharyngeal nerve with Dr Hudson and Dr Crawford 07/31/2023. Patient has headaches occasionally 4-5/10 that last 1 or 2 minutes. The left temple is swore for hickman placement. Otherwise no other complaints. Not taking pain medication       Left retrosigmoid suboccipital craniotomy incision assessed, removed staples, patient tolerated well, no redness, swelling, or drainage, edges well approximated.     Patient was instructed as follows:   Discontinue Bacitracin after tonight.  May shower normally but pat dry after shower.  Do not submerge wound in bath tub or go swimming until released by the physician  Keep incision clean, dry and open to air as much as possible.  Patient encouraged to walk as much as possible but advised to walk with family member or friend and rest as necessary.  No lifting >10lbs.      Patient verbalized understanding of all instructions.    All questions were answered. Patient will follow up with Dr Hudson 09/12/2023. Patient was encouraged to call clinic with any future concerns prior to follow up appt. If any worsening symptoms, patient should report to ED.       Lisa Jarrell, MSN, BSN, RN  Neurosurgery Nurse Navigator

## 2023-08-15 ENCOUNTER — TELEPHONE (OUTPATIENT)
Dept: FAMILY MEDICINE | Facility: CLINIC | Age: 58
End: 2023-08-15
Payer: COMMERCIAL

## 2023-08-15 VITALS — TEMPERATURE: 98 F

## 2023-08-15 NOTE — TELEPHONE ENCOUNTER
----- Message from Lisa Huynh sent at 8/15/2023  2:56 PM CDT -----  Name of Who is Calling: [facility]               What is the request in detail: Rep requesting a call back to discuss order for modified barium swallow study outpatient Fax [phone]              Can the clinic reply by [facility]: No              What Number to [facility] Back [facility]: [phone] Sera. Please call to confirm

## 2023-08-15 NOTE — TELEPHONE ENCOUNTER
Rep requesting a call back to discuss order for modified barium swallow study outpatient Fax [phone]

## 2023-08-17 ENCOUNTER — OFFICE VISIT (OUTPATIENT)
Dept: FAMILY MEDICINE | Facility: CLINIC | Age: 58
End: 2023-08-17
Payer: COMMERCIAL

## 2023-08-17 VITALS
TEMPERATURE: 98 F | HEIGHT: 65 IN | BODY MASS INDEX: 19.83 KG/M2 | DIASTOLIC BLOOD PRESSURE: 88 MMHG | OXYGEN SATURATION: 99 % | SYSTOLIC BLOOD PRESSURE: 138 MMHG | HEART RATE: 95 BPM | WEIGHT: 119.06 LBS

## 2023-08-17 DIAGNOSIS — M51.36 DDD (DEGENERATIVE DISC DISEASE), LUMBAR: ICD-10-CM

## 2023-08-17 DIAGNOSIS — K80.20 CALCULUS OF GALLBLADDER WITHOUT CHOLECYSTITIS WITHOUT OBSTRUCTION: ICD-10-CM

## 2023-08-17 DIAGNOSIS — M54.9 BACK PAIN, UNSPECIFIED BACK LOCATION, UNSPECIFIED BACK PAIN LATERALITY, UNSPECIFIED CHRONICITY: ICD-10-CM

## 2023-08-17 DIAGNOSIS — M54.41 ACUTE BILATERAL LOW BACK PAIN WITH BILATERAL SCIATICA: ICD-10-CM

## 2023-08-17 DIAGNOSIS — M79.18 CERVICAL MYOFASCIAL PAIN SYNDROME: ICD-10-CM

## 2023-08-17 DIAGNOSIS — G52.1 GLOSSOPHARYNGEAL NERVE DISEASE: Chronic | ICD-10-CM

## 2023-08-17 DIAGNOSIS — M54.42 ACUTE BILATERAL LOW BACK PAIN WITH BILATERAL SCIATICA: ICD-10-CM

## 2023-08-17 DIAGNOSIS — Z09 HOSPITAL DISCHARGE FOLLOW-UP: Primary | ICD-10-CM

## 2023-08-17 PROCEDURE — 3079F PR MOST RECENT DIASTOLIC BLOOD PRESSURE 80-89 MM HG: ICD-10-PCS | Mod: CPTII,S$GLB,, | Performed by: FAMILY MEDICINE

## 2023-08-17 PROCEDURE — 1160F RVW MEDS BY RX/DR IN RCRD: CPT | Mod: CPTII,S$GLB,, | Performed by: FAMILY MEDICINE

## 2023-08-17 PROCEDURE — 1111F PR DISCHARGE MEDS RECONCILED W/ CURRENT OUTPATIENT MED LIST: ICD-10-PCS | Mod: CPTII,S$GLB,, | Performed by: FAMILY MEDICINE

## 2023-08-17 PROCEDURE — 3044F PR MOST RECENT HEMOGLOBIN A1C LEVEL <7.0%: ICD-10-PCS | Mod: CPTII,S$GLB,, | Performed by: FAMILY MEDICINE

## 2023-08-17 PROCEDURE — 3079F DIAST BP 80-89 MM HG: CPT | Mod: CPTII,S$GLB,, | Performed by: FAMILY MEDICINE

## 2023-08-17 PROCEDURE — 3044F HG A1C LEVEL LT 7.0%: CPT | Mod: CPTII,S$GLB,, | Performed by: FAMILY MEDICINE

## 2023-08-17 PROCEDURE — 99999 PR PBB SHADOW E&M-EST. PATIENT-LVL IV: ICD-10-PCS | Mod: PBBFAC,,, | Performed by: FAMILY MEDICINE

## 2023-08-17 PROCEDURE — 1159F PR MEDICATION LIST DOCUMENTED IN MEDICAL RECORD: ICD-10-PCS | Mod: CPTII,S$GLB,, | Performed by: FAMILY MEDICINE

## 2023-08-17 PROCEDURE — 3008F PR BODY MASS INDEX (BMI) DOCUMENTED: ICD-10-PCS | Mod: CPTII,S$GLB,, | Performed by: FAMILY MEDICINE

## 2023-08-17 PROCEDURE — 3075F SYST BP GE 130 - 139MM HG: CPT | Mod: CPTII,S$GLB,, | Performed by: FAMILY MEDICINE

## 2023-08-17 PROCEDURE — 99213 OFFICE O/P EST LOW 20 MIN: CPT | Mod: S$GLB,,, | Performed by: FAMILY MEDICINE

## 2023-08-17 PROCEDURE — 1111F DSCHRG MED/CURRENT MED MERGE: CPT | Mod: CPTII,S$GLB,, | Performed by: FAMILY MEDICINE

## 2023-08-17 PROCEDURE — 99999 PR PBB SHADOW E&M-EST. PATIENT-LVL IV: CPT | Mod: PBBFAC,,, | Performed by: FAMILY MEDICINE

## 2023-08-17 PROCEDURE — 1160F PR REVIEW ALL MEDS BY PRESCRIBER/CLIN PHARMACIST DOCUMENTED: ICD-10-PCS | Mod: CPTII,S$GLB,, | Performed by: FAMILY MEDICINE

## 2023-08-17 PROCEDURE — 3008F BODY MASS INDEX DOCD: CPT | Mod: CPTII,S$GLB,, | Performed by: FAMILY MEDICINE

## 2023-08-17 PROCEDURE — 3075F PR MOST RECENT SYSTOLIC BLOOD PRESS GE 130-139MM HG: ICD-10-PCS | Mod: CPTII,S$GLB,, | Performed by: FAMILY MEDICINE

## 2023-08-17 PROCEDURE — 1159F MED LIST DOCD IN RCRD: CPT | Mod: CPTII,S$GLB,, | Performed by: FAMILY MEDICINE

## 2023-08-17 PROCEDURE — 99213 PR OFFICE/OUTPT VISIT, EST, LEVL III, 20-29 MIN: ICD-10-PCS | Mod: S$GLB,,, | Performed by: FAMILY MEDICINE

## 2023-08-17 RX ORDER — CYCLOBENZAPRINE HCL 10 MG
10 TABLET ORAL 3 TIMES DAILY
Qty: 60 TABLET | Refills: 11 | Status: SHIPPED | OUTPATIENT
Start: 2023-08-17 | End: 2023-09-01 | Stop reason: SDUPTHER

## 2023-08-17 NOTE — PROGRESS NOTES
"  Physical Exam  /88   Pulse 95   Temp 98.2 °F (36.8 °C) (Oral)   Ht 5' 5" (1.651 m)   Wt 54 kg (119 lb 0.8 oz)   SpO2 99%   BMI 19.81 kg/m²      Office Visit    Patient Name: Mae Gregory    : 1965  MRN: [id number]      Assessment/Plan:  Mae Gregory is a 58 y.o. 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  Dispense: 60 tablet; Refill: 11    -still with moderate sciatic pain.  She declined PT at this time, but we did go over stretching exercises in clinic that she states seems to help with her sciatic pain. I also advised to add heat/massage and avoid provocative movements that could worsen pain. She may continue with Tylenol PRN and Flexeril. She was given a handout, and I encourage her to continue to work with her HH PT.  She is to follow up with [facility] for post-op as scheduled.   -call clinic back with any concerns            Follow up for worsening Sx. Urgent care/ED precautions provided.      This note was created by combination of typed  and MModal dictation.  Transcription errors may be present.  If there are any questions, please contact me.      ----------------------------------------------------------------------------------------------------------------------      HPI:  Patient Care Team:  Rajiv Garcia MD as PCP - General (Family Medicine)  Tavo Boateng MD (Inactive) as Consulting Physician (General Surgery)  May De La Torre MA (Inactive)  May De La Torre MA (Inactive) as Care Coordinator  Sukumar Gates MD as Consulting Physician " (Neurology)    Mae is a 58 y.o. female with      Patient Active Problem List   Diagnosis    Migraine    DDD (degenerative disc disease), cervical    DDD (degenerative disc disease), lumbar    -Insomnia    -Meniere's disease of both ears    Sensorineural hearing loss (SNHL) of both ears    Hair loss    Glaucoma    -Sleepwalking - controlled on PRN Trazodone    -Dystonia - follows Neurology regularly for Botox injections    Pain    -Raynaud's phenomenon - follows Rheumatology - on Norvasc 2.5mg    -Cervical myofascial pain syndrome - follows Neurology for injections    -Anxiety with depression - controlled off Wellbutrin since 4/2023    -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 She denies any falls, but states that she's been sitting more than usually, which causes pain and tightness in her lower back/buttocks muscles that occasionally radiates down both legs. She also was found to have cholelithiasis with dilated CBD, for which MRCP was performed that showed no evidence of choledocholithiasis. She was discharged on Gabapentin, which she states doesn't really help as she has to stand up a certain way to get some relief. Her Sx include tingling/numbness/pain that shoots down her legs. No changes in bladder/bowels. She would like to get more refills on her Flexeril for both her neck and low back pain. She continues to work with  PT.         Additional ROS    CONST: no fever, +activity change  EYES: no vision change.   ENT: no sore throat. No dysphagia.   CV: no CP with exertion  RESP: no SOB  GI: no N/V/diarrhea/constipation  : no urinary concerns  MSK:  see HPI  SKIN: no new rashes  NEURO: no focal deficits.                [facility] - Observation 11H  Hospital Medicine  Discharge Summary        Patient Name: Mae Gregory  MRN: [id number]  [facility]: [id number]  Patient Class: OP- Observation  Admission Date: 8/9/2023  Hospital Length of Stay: 0 days  Discharge Date and Time:  08/10/2023 10:33 AM  Attending Physician: Raúl Stock MD   Discharging Provider: Raúl Stock MD  Primary Care Provider: Rajiv Garcia MD  Hospital Medicine Team: [facility] Raúl Stock MD  Primary Care Team: [facility]     HPI:   Ms. Mae Gregory is a 58 y.o. female, with PMH of cervical and lumbar DDD, cervical myofascial pain syndrome, glossopharyngeal nerve disease, sensorineural hearing loss, Raynaud's phenomenon, anxiety/depression, who presented to [facility] ED on 8/9/23 due to back pain. She states the back pain began after her craniotomy for glossopharyngeal nerve decompression, on 7/31/23, and has progressively worsened since discharge. She states she has bene able to ambulate, but feels weak. She states that the Valium she takes at night does not help. She further notes a left sided headache. She states she has tried Epsom salt soaks, rubs, and tylenol. She denies numbness, tingling, saddle anesthesia, nausea, vomiting, urinary incontinence or retention. She was evaluated in the ED with labs showing a normal CBC. A metabolic panel showed AST 60 and ALT of 100. UA was without UTI. A CT of the abdomen and pelvis showed a chololithiasis with dilated CBD without intrahepatic biliary ductal dilatation. An US of the abdomen showed cholelithiasis without cholecystitis, and with CBD dilatation with mild intrahepatic ductal dilatation, and pancreatic duct at ULN. CT Head showed changes related to her recent craniotomy with interval decreased in fluid beneath the craniotomy flap, and near-total resolution of previous extensive pneumocephalus. There was also an  "area of the left cerebellar hemisphere concerning for evolving infarct. Case discussed between ED MD and Neurosurgery and patient was "cleared from neurosurgery perspective" per ED MD report. The patient was placed on observation for further management of intractable pain from sciatica, as well as choldeochalithiasis.         * No surgery found *       Hospital Course:   [place] [facility] without evidence of choledocholithiasis, and AES felt that no intervention was warranted. Back pain appears to be sciatica in nature; started ob gabapentin with PRN analgesics. Pt deemed appropriate for discharge. Plan discussed with pt, who was agreeable and amenable; medications were discussed and reviewed, outpatient follow-up scheduled, ER precautions were given, all questions were answered to the pt's satisfaction, and Ms. Gregory was subsequently discharged.                 Patient Active Problem List   Diagnosis    Migraine    DDD (degenerative disc disease), cervical    DDD (degenerative disc disease), lumbar    -Insomnia    -Meniere's disease of both ears    Sensorineural hearing loss (SNHL) of both ears    Hair loss    Glaucoma    -Sleepwalking - controlled on PRN Trazodone    -Dystonia - follows Neurology regularly for Botox injections    Pain    -Raynaud's phenomenon - follows Rheumatology - on Norvasc 2.5mg    -Cervical myofascial pain syndrome - follows Neurology for injections    -Anxiety with depression - controlled off Wellbutrin since 4/2023    -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       Current Outpatient Medications:     albuterol (PROVENTIL/VENTOLIN HFA) 90 mcg/actuation inhaler, TAKE 1 2 PUFF(S) (INHALATION) EVERY 4 HOURS PRN   WHEEZING, Disp: 18 g, Rfl: 12    amLODIPine " (NORVASC) 5 MG tablet, Take 5 mg by mouth., Disp: , Rfl:     carBAMazepine (TEGRETOL XR) 100 MG 12 hr tablet, Take 1 tablet (100 mg total) by mouth 2 (two) times daily., Disp: 60 tablet, Rfl: 11    cetirizine (ZYRTEC) 10 MG tablet, Take 10 mg by mouth daily as needed for Allergies., Disp: , Rfl:     COCONUT OIL ORAL, Take 1 capsule by mouth once daily., Disp: , Rfl:     diazePAM (VALIUM) 5 MG tablet, Take 1 tablet (5 mg total) by mouth every 6 (six) hours as needed for Anxiety (neck pain)., Disp: 12 tablet, Rfl: 3    estradiol-norethindrone (ACTIVELLA) 1-0.5 mg per tablet, TAKE 1 TABLET BY MOUTH EVERY DAY, Disp: 28 tablet, Rfl: 12    finasteride (PROSCAR) 5 mg tablet, Take 1 tablet (5 mg total) by mouth once daily., Disp: 30 tablet, Rfl: 11    gabapentin (NEURONTIN) 300 MG capsule, Take 1 capsule (300 mg total) by mouth 3 (three) times daily., Disp: 90 capsule, Rfl: 0    multivitamin with minerals tablet, Take 1 tablet by mouth once daily., Disp: , Rfl:     oxybutynin (DITROPAN) 5 MG Tab, Take 1 tablet (5 mg total) by mouth 2 (two) times daily., Disp: 180 tablet, Rfl: 1    sodium chloride (SALINE MIST NASL), 1 spray by Each Nostril route daily as needed (Congestion)., Disp: , Rfl:     topiramate (TOPAMAX) 50 MG tablet, TAKE 1 TABLET BY MOUTH TWICE A DAY, Disp: 180 tablet, Rfl: 3    traZODone (DESYREL) 150 MG tablet, Take 1 tablet (150 mg total) by mouth nightly as needed for Insomnia., Disp: 90 tablet, Rfl: 3    zolpidem (AMBIEN) 5 MG Tab, Take 1 tablet (5 mg total) by mouth nightly as needed (insomnia)., Disp: 30 tablet, Rfl: 5    cyclobenzaprine (FLEXERIL) 10 MG tablet, Take 1 tablet (10 mg total) by mouth 3 (three) times daily., Disp: 60 tablet, Rfl: 11    Past Surgical History:   Procedure Laterality Date    AUGMENTATION OF BREAST      BREAST SURGERY       SECTION   and 2001    x2    COLON SURGERY  10/15/2018    colon resection     CRANIOTOMY Left 2023    Procedure: CRANIOTOMY for microvascular  decompression NEURO MONITORING;  Surgeon: Nils Hudson MD;  Location: [facility] OR [address] [place] [address];  Service: Neurosurgery;  Laterality: Left;  regular bed, supine, bump, hickman, type and cross 2 units, cranial nerve monitoring, optical stealth [place] SURGE[employer] DR [place]       Family History   Problem Relation Age of Onset    Stroke Maternal Grandfather     Hypertension Father     Hypertension Mother     Hypertension Brother     Ovarian cancer Neg Hx     Cancer Neg Hx        Social History     Socioeconomic History    Marital status:    Tobacco Use    Smoking status: Never    Smokeless tobacco: Never   Substance and Sexual Activity    Alcohol use: No    Drug use: No    Sexual activity: Yes     Partners: Male     Comment: ablation     Social Determinants of Health     Financial Resource Strain: Low Risk  (8/10/2023)    Overall Financial Resource Strain (CARDIA)     Difficulty of Paying Living Expenses: Not hard at all   Food Insecurity: No Food Insecurity (8/10/2023)    Hunger Vital Sign     Worried About Running Out of Food in the Last Year: Never true     Ran Out of Food in the Last Year: Never true   Transportation Needs: No Transportation Needs (8/10/2023)    PRAPARE - Transportation     Lack of Transportation (Medical): No     Lack of Transportation (Non-Medical): No   Physical Activity: Inactive (8/10/2023)    Exercise Vital Sign     Days of Exercise per Week: 0 days     Minutes of Exercise per Session: 0 min   Stress: No Stress Concern Present (8/10/2023)    [employer] [place] [employer] - Occupational Stress Questionnaire     Feeling of Stress : Not at all   Recent Concern: Stress - Stress Concern Present (8/1/2023)    [employer] [place] [employer] - Occupational Stress Questionnaire     Feeling of Stress : To some extent   Social Connections: Moderately Isolated (8/10/2023)    Social Connection and Isolation Panel [NHANES]     Frequency of Communication with Friends and Family: More than three  "times a week     Frequency of Social Gatherings with Friends and Family: More than three times a week     Attends Scientology Services: More than 4 times per year     Active Member of Clubs or Organizations: No     Attends Club or Organization Meetings: Never     Marital Status:    Housing Stability: Low Risk  (8/10/2023)    Housing Stability Vital Sign     Unable to Pay for Housing in the Last Year: No     Number of Places Lived in the Last Year: 1     Unstable Housing in the Last Year: No           Allergies   Review of patient's allergies indicates:  No Known Allergies          Review of Systems  See HPI        [unfilled]  /88   Pulse 95   Temp 98.2 °F (36.8 °C) (Oral)   Ht 5' 5" (1.651 m)   Wt 54 kg (119 lb 0.8 oz)   SpO2 99%   BMI 19.81 kg/m²     GEN: NAD, well developed  HEENT: NCAT, PERRLA, EOMI, sclera clear, anicteric  NECK: normal, supple with midline trachea, no LAD, no thyromegaly  LUNGS: CTAB, no w/r/r, normal effort, no increased work of breathing,  HEART: RRR, normal S1 and S2, no m/r/g, no palpitations, no edema, normal pulses  ABD: s/nt/nd, NABS, no organomegaly, no masses  SKIN: warm and dry with normal turgor, no rashes,  PSYCH: AOx3, appropriate mood and affect.   MSK: warm/well perfused, normal ROM in all extremities except as noted below, no c/c  BACK: normal alignment of spine. FROM of back. Normal gait.  +mild TTP over bilateral lower back as well as coccyx, Spine is atraumatic and nontender without step-off. +pain with forward flexion and backward extension. No pain with lateral bending. +straight leg raise on bilaterally, R>L. No swelling/redness. +mild pain of   NEURO: normal without focal findings, CN II-XII are intact.  Sensation grossly normal, gait and station normal.           "

## 2023-08-22 ENCOUNTER — TELEPHONE (OUTPATIENT)
Dept: NEUROSURGERY | Facility: CLINIC | Age: 58
End: 2023-08-22
Payer: COMMERCIAL

## 2023-08-22 ENCOUNTER — PATIENT MESSAGE (OUTPATIENT)
Dept: NEUROSURGERY | Facility: CLINIC | Age: 58
End: 2023-08-22
Payer: COMMERCIAL

## 2023-08-22 ENCOUNTER — PATIENT MESSAGE (OUTPATIENT)
Dept: FAMILY MEDICINE | Facility: CLINIC | Age: 58
End: 2023-08-22
Payer: COMMERCIAL

## 2023-08-22 NOTE — TELEPHONE ENCOUNTER
----- Message from Nakia Cid sent at 8/22/2023  3:09 PM CDT -----  Regarding: /Orders  Contact: Luis Zamora/ [facility] calling to request order for Modified Barium Swallow. Please  call [phone].

## 2023-08-22 NOTE — TELEPHONE ENCOUNTER
----- Message from Elvia Castañeda MA sent at 8/22/2023  3:32 PM CDT -----  Regarding: FW: /Orders  Contact: Luis JUARES    ----- Message -----  From: Nakia Cid  Sent: 8/22/2023   3:14 PM CDT  To: #  Subject: /Orders                                          Lisa/ [facility] calling to request order for Modified Barium Swallow. Please  call [phone].

## 2023-08-23 ENCOUNTER — PATIENT MESSAGE (OUTPATIENT)
Dept: NEUROSURGERY | Facility: CLINIC | Age: 58
End: 2023-08-23
Payer: COMMERCIAL

## 2023-08-28 ENCOUNTER — PATIENT MESSAGE (OUTPATIENT)
Dept: NEUROSURGERY | Facility: CLINIC | Age: 58
End: 2023-08-28
Payer: COMMERCIAL

## 2023-09-01 DIAGNOSIS — M79.18 CERVICAL MYOFASCIAL PAIN SYNDROME: ICD-10-CM

## 2023-09-01 RX ORDER — CYCLOBENZAPRINE HCL 10 MG
10 TABLET ORAL 3 TIMES DAILY
Qty: 60 TABLET | Refills: 11 | Status: SHIPPED | OUTPATIENT
Start: 2023-09-01 | End: 2024-04-03

## 2023-09-06 ENCOUNTER — DOCUMENT SCAN (OUTPATIENT)
Dept: HOME HEALTH SERVICES | Facility: HOSPITAL | Age: 58
End: 2023-09-06
Payer: COMMERCIAL

## 2023-09-12 ENCOUNTER — OFFICE VISIT (OUTPATIENT)
Dept: NEUROSURGERY | Facility: CLINIC | Age: 58
End: 2023-09-12
Payer: COMMERCIAL

## 2023-09-12 VITALS — TEMPERATURE: 98 F

## 2023-09-12 DIAGNOSIS — G52.1: Primary | ICD-10-CM

## 2023-09-12 PROCEDURE — 99999 PR PBB SHADOW E&M-EST. PATIENT-LVL III: CPT | Mod: PBBFAC,,, | Performed by: NEUROLOGICAL SURGERY

## 2023-09-12 PROCEDURE — 1159F MED LIST DOCD IN RCRD: CPT | Mod: CPTII,S$GLB,, | Performed by: PHYSICIAN ASSISTANT

## 2023-09-12 PROCEDURE — 99999 PR PBB SHADOW E&M-EST. PATIENT-LVL III: ICD-10-PCS | Mod: PBBFAC,,, | Performed by: NEUROLOGICAL SURGERY

## 2023-09-12 PROCEDURE — 3044F HG A1C LEVEL LT 7.0%: CPT | Mod: CPTII,S$GLB,, | Performed by: PHYSICIAN ASSISTANT

## 2023-09-12 PROCEDURE — 99024 POSTOP FOLLOW-UP VISIT: CPT | Mod: S$GLB,,, | Performed by: PHYSICIAN ASSISTANT

## 2023-09-12 PROCEDURE — 99024 PR POST-OP FOLLOW-UP VISIT: ICD-10-PCS | Mod: S$GLB,,, | Performed by: PHYSICIAN ASSISTANT

## 2023-09-12 PROCEDURE — 3044F PR MOST RECENT HEMOGLOBIN A1C LEVEL <7.0%: ICD-10-PCS | Mod: CPTII,S$GLB,, | Performed by: PHYSICIAN ASSISTANT

## 2023-09-12 PROCEDURE — 1159F PR MEDICATION LIST DOCUMENTED IN MEDICAL RECORD: ICD-10-PCS | Mod: CPTII,S$GLB,, | Performed by: PHYSICIAN ASSISTANT

## 2023-09-12 RX ORDER — ACETAMINOPHEN 160 MG/1
160 BAR, CHEWABLE ORAL EVERY 4 HOURS PRN
COMMUNITY
End: 2023-12-27

## 2023-09-13 ENCOUNTER — EXTERNAL HOME HEALTH (OUTPATIENT)
Dept: HOME HEALTH SERVICES | Facility: HOSPITAL | Age: 58
End: 2023-09-13
Payer: COMMERCIAL

## 2023-09-14 ENCOUNTER — PATIENT MESSAGE (OUTPATIENT)
Dept: NEUROSURGERY | Facility: CLINIC | Age: 58
End: 2023-09-14
Payer: COMMERCIAL

## 2023-09-15 NOTE — PROGRESS NOTES
Neurosurgery  Established Patient    SUBJECTIVE:     History of Present Illness:  57 yo female who presents for 6 week post-op follow up s/p left retrosigmoid suboccipital craniotomy for microvascular decompression of the glossopharyngeal nerve. She had a history of intractable glossopharyngeal neuralgia that impaired her eating. Her pain was previously debilitating but she reports today that her pre-op symptoms have resolved. She reports improvement in her swallowing and resolution of her left sided face pain. Her incision is well healing, though she does report some concern about possible drainage from the inferior edge of her incision. She reports this occurred once this am but states it has not recurred. She denies fever, chills, erythema or dehiscence.    Review of patient's allergies indicates:  No Known Allergies    Current Outpatient Medications   Medication Sig Dispense Refill    acetaminophen (TYLENOL) 160 MG Chew Take 160 mg by mouth every 4 (four) hours as needed.      amLODIPine (NORVASC) 5 MG tablet Take 5 mg by mouth.      carBAMazepine (TEGRETOL XR) 100 MG 12 hr tablet Take 1 tablet (100 mg total) by mouth 2 (two) times daily. 60 tablet 11    cetirizine (ZYRTEC) 10 MG tablet Take 10 mg by mouth daily as needed for Allergies.      COCONUT OIL ORAL Take 1 capsule by mouth once daily.      cyclobenzaprine (FLEXERIL) 10 MG tablet Take 1 tablet (10 mg total) by mouth 3 (three) times daily. 60 tablet 11    diazePAM (VALIUM) 5 MG tablet Take 1 tablet (5 mg total) by mouth every 6 (six) hours as needed for Anxiety (neck pain). 12 tablet 3    estradiol-norethindrone (ACTIVELLA) 1-0.5 mg per tablet TAKE 1 TABLET BY MOUTH EVERY DAY 28 tablet 12    finasteride (PROSCAR) 5 mg tablet Take 1 tablet (5 mg total) by mouth once daily. 30 tablet 11    multivitamin with minerals tablet Take 1 tablet by mouth once daily.      oxybutynin (DITROPAN) 5 MG Tab Take 1 tablet (5 mg total) by mouth 2 (two) times daily. 180  tablet 1    topiramate (TOPAMAX) 50 MG tablet TAKE 1 TABLET BY MOUTH TWICE A  tablet 3    traZODone (DESYREL) 150 MG tablet Take 1 tablet (150 mg total) by mouth nightly as needed for Insomnia. 90 tablet 3    zolpidem (AMBIEN) 5 MG Tab Take 1 tablet (5 mg total) by mouth nightly as needed (insomnia). 30 tablet 5    albuterol (PROVENTIL/VENTOLIN HFA) 90 mcg/actuation inhaler TAKE 1 2 PUFF(S) (INHALATION) EVERY 4 HOURS PRN   WHEEZING (Patient not taking: Reported on 2023) 18 g 12    gabapentin (NEURONTIN) 300 MG capsule Take 1 capsule (300 mg total) by mouth 3 (three) times daily. 90 capsule 0    sodium chloride (SALINE MIST NASL) 1 spray by Each Nostril route daily as needed (Congestion).       No current facility-administered medications for this visit.       Past Medical History:   Diagnosis Date    DDD (degenerative disc disease), cervical 3/15/2017    DDD (degenerative disc disease), lumbar 3/15/2017    Glaucoma 3/7/2018    Migraine 3/15/2017    Primary insomnia 3/15/2017     Past Surgical History:   Procedure Laterality Date    AUGMENTATION OF BREAST      BREAST SURGERY       SECTION   and 2001    x2    COLON SURGERY  10/15/2018    colon resection     CRANIOTOMY Left 2023    Procedure: CRANIOTOMY for microvascular decompression NEURO MONITORING;  Surgeon: Nils Hudson MD;  Location: [facility] OR [address] [place] [address];  Service: Neurosurgery;  Laterality: Left;  regular bed, supine, bump, hickman, type and cross 2 units, cranial nerve monitoring, optical stealth [place] SURGEON DR [place]     Family History       Problem Relation (Age of Onset)    Hypertension Father, Mother, Brother    Stroke Maternal Grandfather          Social History     Socioeconomic History    Marital status:    Tobacco Use    Smoking status: Never    Smokeless tobacco: Never   Substance and Sexual Activity    Alcohol use: No    Drug use: No    Sexual activity: Yes     Partners: Male     Comment: ablation     Social  Determinants of Health     Financial Resource Strain: Low Risk  (8/10/2023)    Overall Financial Resource Strain (CARDIA)     Difficulty of Paying Living Expenses: Not hard at all   Food Insecurity: No Food Insecurity (8/10/2023)    Hunger Vital Sign     Worried About Running Out of Food in the Last Year: Never true     Ran Out of Food in the Last Year: Never true   Transportation Needs: No Transportation Needs (8/10/2023)    PRAPARE - Transportation     Lack of Transportation (Medical): No     Lack of Transportation (Non-Medical): No   Physical Activity: Inactive (8/10/2023)    Exercise Vital Sign     Days of Exercise per Week: 0 days     Minutes of Exercise per Session: 0 min   Stress: No Stress Concern Present (8/10/2023)    [employer] [place] [employer] - Occupational Stress Questionnaire     Feeling of Stress : Not at all   Recent Concern: Stress - Stress Concern Present (8/1/2023)    [employer] [place] [employer] - Occupational Stress Questionnaire     Feeling of Stress : To some extent   Social Connections: Moderately Isolated (8/10/2023)    Social Connection and Isolation Panel [NHANES]     Frequency of Communication with Friends and Family: More than three times a week     Frequency of Social Gatherings with Friends and Family: More than three times a week     Attends Taoism Services: More than 4 times per year     Active Member of Clubs or Organizations: No     Attends Club or Organization Meetings: Never     Marital Status:    Housing Stability: Low Risk  (8/10/2023)    Housing Stability Vital Sign     Unable to Pay for Housing in the Last Year: No     Number of Places Lived in the Last Year: 1     Unstable Housing in the Last Year: No       Review of Systems    OBJECTIVE:     Vital Signs  Temp: 97.8 °F (36.6 °C)  Pain Score: 0-No pain  There is no height or weight on file to calculate BMI.    Neurosurgery Physical Exam  General: well developed, well nourished, no  distress.   Head: normocephalic, atraumatic  Neurologic: Alert and oriented. Thought content appropriate.  GCS: Motor: 6/Verbal: 5/Eyes: 4 GCS Total: 15  Mental Status: Awake, Alert, Oriented x3  Cranial nerves: face symmetric, tongue midline, CN II-XII grossly intact.   Eyes: pupils equal, round, reactive to light with accomodation, EOMI. Unable to appreciate optic disc. Red reflex intact bilaterally.   Sensory: intact to light touch throughout    Motor Strength:Moves all extremities spontaneously with good tone.  Full strength upper and lower extremities. No abnormal movements seen.   Left retrosigmoid incision scar well healed, there appears to be a small area of minimal redness to inferior edge, no drainage expressed, no dehiscence, no tenderness       Diagnostic Results:  None new     ASSESSMENT/PLAN:     59 yo female 6 weeks s/p microvascular decomprssion of the left glossopharyngeal nerve. She is doing very well post operatively with near resolution of her pre-op symptoms. There was some concern regarding her incision, however today, it appears to be well healing. There is no expressible drainage or dehiscence. There was possibly a small stitch irritation. Due to this I will continue her swimming restrictions and advised her to use bacitracin to the inferior edge of incision BID x 1 week. I encouraged her to call us with any worsening or other signs of infection. She expressed understanding.   We will plan to see her back in 6 months with repeat MRI.      Geraldine Monae PA-C  Neurosurgery            Note dictated with voice recognition software, please excuse any grammatical errors.

## 2023-09-16 ENCOUNTER — PATIENT MESSAGE (OUTPATIENT)
Dept: FAMILY MEDICINE | Facility: CLINIC | Age: 58
End: 2023-09-16
Payer: COMMERCIAL

## 2023-09-16 DIAGNOSIS — G52.1 GLOSSOPHARYNGEAL NERVE DISEASE: Primary | Chronic | ICD-10-CM

## 2023-09-18 NOTE — TELEPHONE ENCOUNTER
CLYDE,    A referral was put in.    Dr. Garcia, can you please refer me to someone who can help me with my abdominal pain when I eat?      Thank you,   Mae Gregory

## 2023-09-21 ENCOUNTER — PATIENT MESSAGE (OUTPATIENT)
Dept: NEUROSURGERY | Facility: CLINIC | Age: 58
End: 2023-09-21
Payer: COMMERCIAL

## 2023-09-28 ENCOUNTER — PROCEDURE VISIT (OUTPATIENT)
Dept: NEUROLOGY | Facility: CLINIC | Age: 58
End: 2023-09-28
Payer: COMMERCIAL

## 2023-09-28 VITALS
BODY MASS INDEX: 19.81 KG/M2 | HEIGHT: 65 IN | DIASTOLIC BLOOD PRESSURE: 88 MMHG | HEART RATE: 80 BPM | SYSTOLIC BLOOD PRESSURE: 126 MMHG

## 2023-09-28 DIAGNOSIS — G43.109 MIGRAINE WITH AURA AND WITHOUT STATUS MIGRAINOSUS, NOT INTRACTABLE: ICD-10-CM

## 2023-09-28 DIAGNOSIS — G43.709 CHRONIC MIGRAINE WITHOUT AURA WITHOUT STATUS MIGRAINOSUS, NOT INTRACTABLE: Primary | ICD-10-CM

## 2023-09-28 PROCEDURE — 64615 PR CHEMODENERVATION OF MUSCLE FOR CHRONIC MIGRAINE: ICD-10-PCS | Mod: S$GLB,,, | Performed by: PSYCHIATRY & NEUROLOGY

## 2023-09-28 PROCEDURE — 64615 CHEMODENERV MUSC MIGRAINE: CPT | Mod: S$GLB,,, | Performed by: PSYCHIATRY & NEUROLOGY

## 2023-09-28 NOTE — PROCEDURES
Procedures  She reports dramatic improvement in the sharp pains after the surgery   No complications     Prior note:   Mae presents for follow-up in neurology clinic.  Since her last visit she reports mild improvement in her overall symptomatology.  She reports recently starting Tegretol which has helped with the pain intensity.  Pain intensity.  She continues to have a variable frequency of episodes characterized by severe sharp stabbing pain in the neck behind the jaw which radiates to the left occipital region.  These episodes can last up to several minutes.  She can go days without any episodes.  Sometimes she has multiple episodes in 1 day.  She is not able to identify any triggers.    She reports an ongoing flare that started 2 days ago.  She is tearful during this encounter.    Prior note:   Mae presents to the neurology clinic for follow-up.  She reports significant improvement in her migraine frequency since initiation of Botox therapy.  Previously the migraines were 2-4 days per week.  Since Botox the frequency has reduced to 1-2 migraine days per week.  She also reports a wearing off effect which starts typically around 10 weeks following a round of Botox injections.  She continues to take topiramate and Nurtec on a regular basis.    Today she also reports a 6 month history of left-sided facial pain.  This started approximately 6 months ago.  She experienced sudden onset of thunderclap type of pain on the left side of her head which was excruciating and lasted a few seconds.  Subsequently she began experiencing intermittent pains on the left side predominant in the cheek region and radiating to the ear canal and the mastoid region.  Sometimes the pain refers to the occipital region.  She reports a very low-grade dull ache in this region on a continuous basis.  In addition she reports flare-ups of sharp stabbing pain.  These flare-ups can occur up to 5 times a week lasting several hours.  In addition  the flare-ups can also occur lasting a few seconds.  There is no recognized triggers for these flare-ups.  She also reports slight sagging of the left side her face and somewhat atypical fatigue sensation of her tongue after prolonged speaking.  Otherwise she denies any weakness in her tongue or pain in the tongue or teeth or gums.  Sometimes she will appreciate a slight difference in the touch sensation on the left side of her face when compared to the right side of her face.  She also reports some trouble with swallowing were in the food will enter the nasopharynx.  Sometimes she has trouble enunciating certain words.  All these symptoms started approximately 6 months ago.  She is tried taking NSAIDs without any relief.    Prior note:   Overall doing well.   HA is much better   Using less pain meds   Using Nurtec   Feels L jaw line paresthesias     Pt of Dr. oKdy SHI:  Headaches last hours with photophobia, phonophobia, + nausea.  Has visual aura.  Often L cervico-occipital.  Bilateral anteriorly.   She has been seeing Dr. Maynard in [place] but moved back to the [place] area several years ago.  She had a remote, nl MRI brain in [place].      Freq: 30/30 and 20/30     Last Botox June 30, 2022 and not as effective as usual.  Usually only 2-3 HAs/month but now having daily HA.    BOTOX #1 10/18/22 - helped    BOTOX #2 1/12/23 - helped    BOTOX #3 4/13/23 - helped    BOTOX #4 6/26/23 - helped     Abortives:              Effective: Nurtec > Imitrex              Ineffective: ketamine/lidocaine  PRN flexeril      Prophylactics:             Effective: Botox, topiramate 50mg qhs (bid sedation), trazodone    MRI brain was reviewed in detail with the patient.  Impression:     Mri Iac: Tortuous left posteroinferior cerebellar artery which extends to abut and slightly posterior displaced the proximal cisternal left glossopharyngeal nerve.  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     MRI brain: Unremarkable MRI brain as detailed above specifically without evidence for hydrocephalus or intracranial enhancing lesion     Questioned partially empty sella.  Intracranial hypertension to be included in differential in the appropriate clinical setting, clinical correlation advised.    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  3, PRN valium 5 mg - prescribed   4, pending audiogram    BOTOX treatment response:   Prior to initiating BOTOX, the patient had  28  migraine days per month on average. This meets criteria for chronic migraine.   After starting BOTOX, the patient experienced a reduction in  21  days per month   After starting BOTOX, the patient experienced a reduction in > 100 hours of migraine symptoms per month   After starting BOTOX, the patient experienced a 50% reduction in burden of migraine days per month   Based on this information, BOTOX is medically necessary for the management of the patient's chronic migraine.     BOTOX Injection intervals:   Patient reports a 'wearing off effect' prior to the subsequent BOTOX injections at 12 weeks. This occurs at week 10-11  Symptoms of this a 'wearing off effect' include - worsening of migraine headache frequency and intensity   Medications used during the 'wearing off effect' period include nurtec, imitrex    Based on this information, it is medically necessary for the patient to receive BOTOX therapy at an interval of every 10 weeks for the management of chronic migraine.    5, BOTOX was performed as an indicated therapy for intractable chronic migraine headaches given that the patient failed > 3  prophylactic medications    Botulinum Toxin Injection Procedure   Pre-operative diagnosis: Chronic migraine   Post-operative diagnosis: Same   Procedure:  Chemical neurolysis   After risks and benefits were explained including bleeding, infection, worsening of pain, damage to the areas being injected, weakness of muscles, loss of muscle control, dysphagia if injecting the head or neck, facial droop if injecting the facial area, painful injection, allergic or other reaction to the medications being injected, and the failure of the procedure to help the problem, a signed consent was obtained.   The patient was placed in a comfortable area and the sites to be treated were identified.The area to be treated was prepped three times with alcohol and the alcohol allowed to dry. Next, a 30 gauge needle was used to inject the medication in the area to be treated.   Area(s) injected:   Total Botox used: 160 Units   Botox wastage: 40 Units   Injection sites:    muscle bilaterally ( a total of 10 units divided into 2 sites)   Procerus muscle (5 units)   Frontalis muscle bilaterally (a total of 20 units divided into 4 sites)   Temporalis muscle bilaterally (a total of 40 units divided into 8 sites)   Occipitalis muscle bilaterally (a total of 30 units divided into 6 sites)   Cervical paraspinal muscles (a total of 20 units divided into 4 sites)   Trapezius muscle bilaterally (a total of 30 units divided into 6 sites)   Mastoid 5 units on L     Complications: none   RTC for the next Botox injection:  10 weeks

## 2023-10-06 ENCOUNTER — TELEPHONE (OUTPATIENT)
Dept: GASTROENTEROLOGY | Facility: CLINIC | Age: 58
End: 2023-10-06
Payer: COMMERCIAL

## 2023-10-09 ENCOUNTER — OFFICE VISIT (OUTPATIENT)
Dept: GASTROENTEROLOGY | Facility: CLINIC | Age: 58
End: 2023-10-09
Payer: COMMERCIAL

## 2023-10-09 ENCOUNTER — LAB VISIT (OUTPATIENT)
Dept: LAB | Facility: HOSPITAL | Age: 58
End: 2023-10-09
Attending: INTERNAL MEDICINE
Payer: COMMERCIAL

## 2023-10-09 ENCOUNTER — ANESTHESIA EVENT (OUTPATIENT)
Dept: SURGERY | Facility: HOSPITAL | Age: 58
End: 2023-10-09
Payer: COMMERCIAL

## 2023-10-09 ENCOUNTER — OFFICE VISIT (OUTPATIENT)
Dept: SURGERY | Facility: CLINIC | Age: 58
End: 2023-10-09
Payer: COMMERCIAL

## 2023-10-09 VITALS
SYSTOLIC BLOOD PRESSURE: 150 MMHG | HEIGHT: 65 IN | DIASTOLIC BLOOD PRESSURE: 93 MMHG | HEART RATE: 84 BPM | BODY MASS INDEX: 20.99 KG/M2 | WEIGHT: 126 LBS

## 2023-10-09 VITALS
BODY MASS INDEX: 21.19 KG/M2 | WEIGHT: 127.19 LBS | SYSTOLIC BLOOD PRESSURE: 132 MMHG | OXYGEN SATURATION: 92 % | RESPIRATION RATE: 15 BRPM | HEART RATE: 82 BPM | HEIGHT: 65 IN | DIASTOLIC BLOOD PRESSURE: 82 MMHG

## 2023-10-09 DIAGNOSIS — K21.9 GASTROESOPHAGEAL REFLUX DISEASE, UNSPECIFIED WHETHER ESOPHAGITIS PRESENT: ICD-10-CM

## 2023-10-09 DIAGNOSIS — K81.9 CHOLECYSTITIS: Primary | ICD-10-CM

## 2023-10-09 DIAGNOSIS — K80.21 CALCULUS OF GALLBLADDER WITH BILIARY OBSTRUCTION BUT WITHOUT CHOLECYSTITIS: ICD-10-CM

## 2023-10-09 DIAGNOSIS — Q44.5 BILE DUCT, COMMON, CYSTIC DILATATION: Primary | ICD-10-CM

## 2023-10-09 DIAGNOSIS — R82.90: ICD-10-CM

## 2023-10-09 DIAGNOSIS — Q44.5 BILE DUCT, COMMON, CYSTIC DILATATION: ICD-10-CM

## 2023-10-09 LAB
ALBUMIN SERPL BCP-MCNC: 4 G/DL (ref 3.5–5.2)
ALP SERPL-CCNC: 61 U/L (ref 55–135)
ALT SERPL W/O P-5'-P-CCNC: 25 U/L (ref 10–44)
ANION GAP SERPL CALC-SCNC: 8 MMOL/L (ref 8–16)
AST SERPL-CCNC: 20 U/L (ref 10–40)
BILIRUB SERPL-MCNC: 0.2 MG/DL (ref 0.1–1)
BILIRUB UR QL STRIP: NEGATIVE
BUN SERPL-MCNC: 15 MG/DL (ref 6–20)
CALCIUM SERPL-MCNC: 9.1 MG/DL (ref 8.7–10.5)
CHLORIDE SERPL-SCNC: 109 MMOL/L (ref 95–110)
CLARITY UR: CLEAR
CO2 SERPL-SCNC: 23 MMOL/L (ref 23–29)
COLOR UR: YELLOW
CREAT SERPL-MCNC: 0.8 MG/DL (ref 0.5–1.4)
EST. GFR  (NO RACE VARIABLE): >60 ML/MIN/1.73 M^2
GLUCOSE SERPL-MCNC: 89 MG/DL (ref 70–110)
GLUCOSE UR QL STRIP: NEGATIVE
HGB UR QL STRIP: NEGATIVE
KETONES UR QL STRIP: ABNORMAL
LEUKOCYTE ESTERASE UR QL STRIP: NEGATIVE
NITRITE UR QL STRIP: NEGATIVE
PH UR STRIP: 7 [PH] (ref 5–8)
POTASSIUM SERPL-SCNC: 3.5 MMOL/L (ref 3.5–5.1)
PROT SERPL-MCNC: 6.8 G/DL (ref 6–8.4)
PROT UR QL STRIP: NEGATIVE
SODIUM SERPL-SCNC: 140 MMOL/L (ref 136–145)
SP GR UR STRIP: 1.01 (ref 1–1.03)
URN SPEC COLLECT METH UR: ABNORMAL
UROBILINOGEN UR STRIP-ACNC: NEGATIVE EU/DL

## 2023-10-09 PROCEDURE — 99999 PR PBB SHADOW E&M-EST. PATIENT-LVL V: ICD-10-PCS | Mod: PBBFAC,,, | Performed by: SURGERY

## 2023-10-09 PROCEDURE — 3077F SYST BP >= 140 MM HG: CPT | Mod: CPTII,S$GLB,, | Performed by: INTERNAL MEDICINE

## 2023-10-09 PROCEDURE — 99999 PR PBB SHADOW E&M-EST. PATIENT-LVL V: ICD-10-PCS | Mod: PBBFAC,,, | Performed by: INTERNAL MEDICINE

## 2023-10-09 PROCEDURE — 99205 OFFICE O/P NEW HI 60 MIN: CPT | Mod: S$GLB,,, | Performed by: INTERNAL MEDICINE

## 2023-10-09 PROCEDURE — 81003 URINALYSIS AUTO W/O SCOPE: CPT | Performed by: INTERNAL MEDICINE

## 2023-10-09 PROCEDURE — 3044F PR MOST RECENT HEMOGLOBIN A1C LEVEL <7.0%: ICD-10-PCS | Mod: CPTII,S$GLB,, | Performed by: INTERNAL MEDICINE

## 2023-10-09 PROCEDURE — 3075F PR MOST RECENT SYSTOLIC BLOOD PRESS GE 130-139MM HG: ICD-10-PCS | Mod: CPTII,S$GLB,, | Performed by: SURGERY

## 2023-10-09 PROCEDURE — 3080F DIAST BP >= 90 MM HG: CPT | Mod: CPTII,S$GLB,, | Performed by: INTERNAL MEDICINE

## 2023-10-09 PROCEDURE — 1159F MED LIST DOCD IN RCRD: CPT | Mod: CPTII,S$GLB,, | Performed by: SURGERY

## 2023-10-09 PROCEDURE — 1159F PR MEDICATION LIST DOCUMENTED IN MEDICAL RECORD: ICD-10-PCS | Mod: CPTII,S$GLB,, | Performed by: INTERNAL MEDICINE

## 2023-10-09 PROCEDURE — 3079F PR MOST RECENT DIASTOLIC BLOOD PRESSURE 80-89 MM HG: ICD-10-PCS | Mod: CPTII,S$GLB,, | Performed by: SURGERY

## 2023-10-09 PROCEDURE — 3044F HG A1C LEVEL LT 7.0%: CPT | Mod: CPTII,S$GLB,, | Performed by: INTERNAL MEDICINE

## 2023-10-09 PROCEDURE — 80053 COMPREHEN METABOLIC PANEL: CPT | Performed by: INTERNAL MEDICINE

## 2023-10-09 PROCEDURE — 99999 PR PBB SHADOW E&M-EST. PATIENT-LVL V: CPT | Mod: PBBFAC,,, | Performed by: SURGERY

## 2023-10-09 PROCEDURE — 99204 OFFICE O/P NEW MOD 45 MIN: CPT | Mod: S$GLB,,, | Performed by: SURGERY

## 2023-10-09 PROCEDURE — 1160F PR REVIEW ALL MEDS BY PRESCRIBER/CLIN PHARMACIST DOCUMENTED: ICD-10-PCS | Mod: CPTII,S$GLB,, | Performed by: INTERNAL MEDICINE

## 2023-10-09 PROCEDURE — 99204 PR OFFICE/OUTPT VISIT, NEW, LEVL IV, 45-59 MIN: ICD-10-PCS | Mod: S$GLB,,, | Performed by: SURGERY

## 2023-10-09 PROCEDURE — 1159F MED LIST DOCD IN RCRD: CPT | Mod: CPTII,S$GLB,, | Performed by: INTERNAL MEDICINE

## 2023-10-09 PROCEDURE — 1160F RVW MEDS BY RX/DR IN RCRD: CPT | Mod: CPTII,S$GLB,, | Performed by: INTERNAL MEDICINE

## 2023-10-09 PROCEDURE — 3008F BODY MASS INDEX DOCD: CPT | Mod: CPTII,S$GLB,, | Performed by: SURGERY

## 2023-10-09 PROCEDURE — 3075F SYST BP GE 130 - 139MM HG: CPT | Mod: CPTII,S$GLB,, | Performed by: SURGERY

## 2023-10-09 PROCEDURE — 3079F DIAST BP 80-89 MM HG: CPT | Mod: CPTII,S$GLB,, | Performed by: SURGERY

## 2023-10-09 PROCEDURE — 3008F BODY MASS INDEX DOCD: CPT | Mod: CPTII,S$GLB,, | Performed by: INTERNAL MEDICINE

## 2023-10-09 PROCEDURE — 99999 PR PBB SHADOW E&M-EST. PATIENT-LVL V: CPT | Mod: PBBFAC,,, | Performed by: INTERNAL MEDICINE

## 2023-10-09 PROCEDURE — 3077F PR MOST RECENT SYSTOLIC BLOOD PRESSURE >= 140 MM HG: ICD-10-PCS | Mod: CPTII,S$GLB,, | Performed by: INTERNAL MEDICINE

## 2023-10-09 PROCEDURE — 3008F PR BODY MASS INDEX (BMI) DOCUMENTED: ICD-10-PCS | Mod: CPTII,S$GLB,, | Performed by: SURGERY

## 2023-10-09 PROCEDURE — 3044F PR MOST RECENT HEMOGLOBIN A1C LEVEL <7.0%: ICD-10-PCS | Mod: CPTII,S$GLB,, | Performed by: SURGERY

## 2023-10-09 PROCEDURE — 3008F PR BODY MASS INDEX (BMI) DOCUMENTED: ICD-10-PCS | Mod: CPTII,S$GLB,, | Performed by: INTERNAL MEDICINE

## 2023-10-09 PROCEDURE — 3044F HG A1C LEVEL LT 7.0%: CPT | Mod: CPTII,S$GLB,, | Performed by: SURGERY

## 2023-10-09 PROCEDURE — 1159F PR MEDICATION LIST DOCUMENTED IN MEDICAL RECORD: ICD-10-PCS | Mod: CPTII,S$GLB,, | Performed by: SURGERY

## 2023-10-09 PROCEDURE — 3080F PR MOST RECENT DIASTOLIC BLOOD PRESSURE >= 90 MM HG: ICD-10-PCS | Mod: CPTII,S$GLB,, | Performed by: INTERNAL MEDICINE

## 2023-10-09 PROCEDURE — 99205 PR OFFICE/OUTPT VISIT, NEW, LEVL V, 60-74 MIN: ICD-10-PCS | Mod: S$GLB,,, | Performed by: INTERNAL MEDICINE

## 2023-10-09 PROCEDURE — 36415 COLL VENOUS BLD VENIPUNCTURE: CPT | Performed by: INTERNAL MEDICINE

## 2023-10-09 RX ORDER — OMEPRAZOLE 40 MG/1
40 CAPSULE, DELAYED RELEASE ORAL DAILY
Qty: 30 CAPSULE | Refills: 3 | Status: SHIPPED | OUTPATIENT
Start: 2023-10-09 | End: 2023-12-27

## 2023-10-09 RX ORDER — LIDOCAINE HYDROCHLORIDE 10 MG/ML
1 INJECTION, SOLUTION EPIDURAL; INFILTRATION; INTRACAUDAL; PERINEURAL ONCE
Status: CANCELLED | OUTPATIENT
Start: 2023-10-09 | End: 2023-10-09

## 2023-10-09 RX ORDER — SODIUM CHLORIDE 9 MG/ML
INJECTION, SOLUTION INTRAVENOUS CONTINUOUS
Status: CANCELLED | OUTPATIENT
Start: 2023-10-09

## 2023-10-09 RX ORDER — INDOCYANINE GREEN AND WATER 25 MG
5 KIT INJECTION
Status: CANCELLED | OUTPATIENT
Start: 2023-10-09

## 2023-10-09 NOTE — H&P (VIEW-ONLY)
History and Physical Exam    Patient ID: Mae Gregory is a 58 y.o. female.    Chief Complaint: Gall Bladder Problem      HPI:  59 y/o woman with history of RUQ abd pain and US findings of chollithasis.        Review of Systems   Constitutional:  Negative for chills and unexpected weight change.   HENT:  Negative for congestion, ear pain and sore throat.    Eyes:  Negative for pain and redness.   Respiratory:  Negative for cough and shortness of breath.    Cardiovascular:  Negative for chest pain and palpitations.   Gastrointestinal:  Negative for abdominal distention, abdominal pain and constipation.   Endocrine: Negative for cold intolerance and heat intolerance.   Genitourinary:  Negative for dysuria and frequency.   Musculoskeletal:  Negative for back pain and neck pain.   Skin:  Negative for pallor and rash.   Neurological:  Negative for syncope, light-headedness and headaches.   Hematological:  Negative for adenopathy. Does not bruise/bleed easily.   Psychiatric/Behavioral:  Negative for confusion and hallucinations.        Current Outpatient Medications   Medication Sig Dispense Refill    acetaminophen (TYLENOL) 160 MG Chew Take 160 mg by mouth every 4 (four) hours as needed.      albuterol (PROVENTIL/VENTOLIN HFA) 90 mcg/actuation inhaler TAKE 1 2 PUFF(S) (INHALATION) EVERY 4 HOURS PRN   WHEEZING 18 g 12    amLODIPine (NORVASC) 5 MG tablet Take 5 mg by mouth.      carBAMazepine (TEGRETOL XR) 100 MG 12 hr tablet Take 1 tablet (100 mg total) by mouth 2 (two) times daily. (Patient not taking: Reported on 9/28/2023) 60 tablet 11    cetirizine (ZYRTEC) 10 MG tablet Take 10 mg by mouth daily as needed for Allergies.      COCONUT OIL ORAL Take 1 capsule by mouth once daily.      cyclobenzaprine (FLEXERIL) 10 MG tablet Take 1 tablet (10 mg total) by mouth 3 (three) times daily. 60 tablet 11    diazePAM (VALIUM) 5 MG tablet Take 1 tablet (5 mg total) by mouth every 6 (six) hours as needed for Anxiety (neck pain).  12 tablet 3    estradiol-norethindrone (ACTIVELLA) 1-0.5 mg per tablet TAKE 1 TABLET BY MOUTH EVERY DAY 28 tablet 12    finasteride (PROSCAR) 5 mg tablet Take 1 tablet (5 mg total) by mouth once daily. 30 tablet 11    gabapentin (NEURONTIN) 300 MG capsule Take 1 capsule (300 mg total) by mouth 3 (three) times daily. 90 capsule 0    multivitamin with minerals tablet Take 1 tablet by mouth once daily.      omeprazole (PRILOSEC) 40 MG capsule Take 1 capsule (40 mg total) by mouth once daily. 30 capsule 3    oxybutynin (DITROPAN) 5 MG Tab Take 1 tablet (5 mg total) by mouth 2 (two) times daily. 180 tablet 1    sodium chloride (SALINE MIST NASL) 1 spray by Each Nostril route daily as needed (Congestion).      topiramate (TOPAMAX) 50 MG tablet TAKE 1 TABLET BY MOUTH TWICE A  tablet 3    traZODone (DESYREL) 150 MG tablet Take 1 tablet (150 mg total) by mouth nightly as needed for Insomnia. 90 tablet 3    zolpidem (AMBIEN) 5 MG Tab Take 1 tablet (5 mg total) by mouth nightly as needed (insomnia). 30 tablet 5     No current facility-administered medications for this visit.       Review of patient's allergies indicates:  No Known Allergies    Past Medical History:   Diagnosis Date    DDD (degenerative disc disease), cervical 3/15/2017    DDD (degenerative disc disease), lumbar 3/15/2017    Glaucoma 3/7/2018    Migraine 3/15/2017    Primary insomnia 3/15/2017       Past Surgical History:   Procedure Laterality Date    AUGMENTATION OF BREAST      BREAST SURGERY       SECTION   and 2001    x2    COLON SURGERY  10/15/2018    colon resection     CRANIOTOMY Left 2023    Procedure: CRANIOTOMY for microvascular decompression NEURO MONITORING;  Surgeon: Nils Hudson MD;  Location: [facility] OR [address] [place] [address];  Service: Neurosurgery;  Laterality: Left;  regular bed, supine, bump, hickman, type and cross 2 units, cranial nerve monitoring, optical stealth [employer] SURGEON DR [place]       Family History   Problem Relation Age of  Onset    Hypertension Mother     Hypertension Father     Hypertension Brother     Stroke Maternal Grandfather     Ovarian cancer Neg Hx     Cancer Neg Hx     Colon cancer Neg Hx     Esophageal cancer Neg Hx        Social History     Socioeconomic History    Marital status:    Tobacco Use    Smoking status: Never    Smokeless tobacco: Never   Substance and Sexual Activity    Alcohol use: No    Drug use: No    Sexual activity: Yes     Partners: Male     Comment: ablation     Social Determinants of Health     Financial Resource Strain: Low Risk  (8/10/2023)    Overall Financial Resource Strain (CARDIA)     Difficulty of Paying Living Expenses: Not hard at all   Food Insecurity: No Food Insecurity (8/10/2023)    Hunger Vital Sign     Worried About Running Out of Food in the Last Year: Never true     Ran Out of Food in the Last Year: Never true   Transportation Needs: No Transportation Needs (8/10/2023)    PRAPARE - Transportation     Lack of Transportation (Medical): No     Lack of Transportation (Non-Medical): No   Physical Activity: Inactive (8/10/2023)    Exercise Vital Sign     Days of Exercise per Week: 0 days     Minutes of Exercise per Session: 0 min   Stress: No Stress Concern Present (8/10/2023)    [employer] - Occupational Stress Questionnaire     Feeling of Stress : Not at all   Recent Concern: Stress - Stress Concern Present (8/1/2023)    [employer] - Occupational Stress Questionnaire     Feeling of Stress : To some extent   Social Connections: Moderately Isolated (8/10/2023)    Social Connection and Isolation Panel [NHANES]     Frequency of Communication with Friends and Family: More than three times a week     Frequency of Social Gatherings with Friends and Family: More than three times a week     Attends Nondenominational Services: More than 4 times per year     Active Member of Clubs or Organizations: No     Attends Club or Organization  Meetings: Never     Marital Status:    Housing Stability: Low Risk  (8/10/2023)    Housing Stability Vital Sign     Unable to Pay for Housing in the Last Year: No     Number of Places Lived in the Last Year: 1     Unstable Housing in the Last Year: No       Vitals:    10/09/23 1124   BP: 132/82   Pulse: 82   Resp: 15       Physical Exam  Constitutional:       Appearance: She is well-developed.   HENT:      Head: Normocephalic and atraumatic.   Eyes:      Pupils: Pupils are equal, round, and reactive to light.   Cardiovascular:      Rate and Rhythm: Normal rate and regular rhythm.      Heart sounds: No murmur heard.  Pulmonary:      Effort: Pulmonary effort is normal.      Breath sounds: Normal breath sounds. No wheezing.   Abdominal:      General: There is no distension.      Palpations: Abdomen is soft.      Tenderness: There is no abdominal tenderness.   Musculoskeletal:         General: Normal range of motion.      Cervical back: Normal range of motion and neck supple.   Skin:     General: Skin is warm and dry.      Capillary Refill: Capillary refill takes less than 2 seconds.      Findings: No rash.   Neurological:      Mental Status: She is alert and oriented to person, place, and time.   Psychiatric:         Judgment: Judgment normal.         Assessment & Plan:   Cholecystitis, plan lap jaida, estimated chance of improvement with surgery to 60-70% given her symptoms do not fit the classic pattern.     Risks, benefits, alternatives to the procedure were discussed with the patient, who appears to understand and agree with the treatment plan.

## 2023-10-09 NOTE — PROGRESS NOTES
.  Subjective:       Patient ID: Mae Gregory is a 58 y.o. female.    Chief Complaint: Abdominal Pain (Urine cloudy and discolored. Was told need gallbladder out), distension, Emesis, and Diarrhea      HPI:   Abdominal Pain  This is a new problem. Episode onset: 2 months. The problem occurs constantly. The pain is located in the RUQ. The pain is at a severity of 5/10. The abdominal pain radiates to the epigastric region. Associated symptoms include belching, diarrhea and vomiting. Pertinent negatives include no arthralgias or fever. The pain is aggravated by eating (fatty food). She has tried antacids for the symptoms. Prior diagnostic workup includes CT scan and ultrasound (MRCP).   Emesis   This is a new problem. There has been no fever. Associated symptoms include abdominal pain and diarrhea. Pertinent negatives include no arthralgias, chest pain, dizziness or fever.   Diarrhea   This is a new (alternates with constipation) problem. Associated symptoms include abdominal pain and vomiting. Pertinent negatives include no arthralgias or fever.     Recent workup including MRCP, US and CT scan revealed cholelithiasis and biliary ductal dilatation.    Also complaining of heartburn. There is no dysphagia or melena.    Review of Systems   Constitutional:  Positive for fatigue. Negative for appetite change and fever.   HENT:  Negative for sore throat and trouble swallowing.    Eyes:  Negative for photophobia and visual disturbance.   Respiratory:  Negative for wheezing.    Cardiovascular:  Negative for chest pain and palpitations.   Gastrointestinal:  Positive for abdominal pain, diarrhea and vomiting.        See HPI for details   Genitourinary:         Cloudy urine     Musculoskeletal:  Negative for arthralgias, joint swelling and neck pain.   Integumentary:  Negative for rash and wound.   Neurological:  Negative for dizziness, tremors and weakness.   Psychiatric/Behavioral:  Negative for behavioral problems and  suicidal ideas.          Objective:      Physical Exam  Eyes:      Pupils: Pupils are equal, round, and reactive to light.   Cardiovascular:      Heart sounds: Normal heart sounds.   Pulmonary:      Effort: Pulmonary effort is normal.      Breath sounds: Normal breath sounds.   Abdominal:      Palpations: Abdomen is soft. There is no mass.      Tenderness: There is abdominal tenderness (epigastric and RLQ). There is no guarding.   Musculoskeletal:         General: Normal range of motion.      Cervical back: Neck supple.   Lymphadenopathy:      Cervical: No cervical adenopathy.   Skin:     General: Skin is warm.      Findings: No rash.   Neurological:      Mental Status: She is alert and oriented to person, place, and time.         Assessment:       1. Bile duct, common, cystic dilatation    2. Calculus of gallbladder with biliary obstruction but without cholecystitis    3. Abnormal urinary product    4. Gastroesophageal reflux disease, unspecified whether esophagitis present        Plan:       Mae was seen today for abdominal pain, distension, emesis and diarrhea.    Diagnoses and all orders for this visit:    Bile duct, common, cystic dilatation  -     COMPREHENSIVE METABOLIC PANEL; Future  -     Ambulatory referral/consult to General Surgery; Future    Calculus of gallbladder with biliary obstruction but without cholecystitis  -     COMPREHENSIVE METABOLIC PANEL; Future  -     Ambulatory referral/consult to General Surgery; Future    Abnormal urinary product  -     Urinalysis; Future    Gastroesophageal reflux disease, unspecified whether esophagitis present    Other orders  -     omeprazole (PRILOSEC) 40 MG capsule; Take 1 capsule (40 mg total) by mouth once daily.

## 2023-10-09 NOTE — PROGRESS NOTES
History and Physical Exam    Patient ID: Mae Gregory is a 58 y.o. female.    Chief Complaint: Gall Bladder Problem      HPI:  59 y/o woman with history of RUQ abd pain and US findings of chollithasis.        Review of Systems   Constitutional:  Negative for chills and unexpected weight change.   HENT:  Negative for congestion, ear pain and sore throat.    Eyes:  Negative for pain and redness.   Respiratory:  Negative for cough and shortness of breath.    Cardiovascular:  Negative for chest pain and palpitations.   Gastrointestinal:  Negative for abdominal distention, abdominal pain and constipation.   Endocrine: Negative for cold intolerance and heat intolerance.   Genitourinary:  Negative for dysuria and frequency.   Musculoskeletal:  Negative for back pain and neck pain.   Skin:  Negative for pallor and rash.   Neurological:  Negative for syncope, light-headedness and headaches.   Hematological:  Negative for adenopathy. Does not bruise/bleed easily.   Psychiatric/Behavioral:  Negative for confusion and hallucinations.        Current Outpatient Medications   Medication Sig Dispense Refill    acetaminophen (TYLENOL) 160 MG Chew Take 160 mg by mouth every 4 (four) hours as needed.      albuterol (PROVENTIL/VENTOLIN HFA) 90 mcg/actuation inhaler TAKE 1 2 PUFF(S) (INHALATION) EVERY 4 HOURS PRN   WHEEZING 18 g 12    amLODIPine (NORVASC) 5 MG tablet Take 5 mg by mouth.      carBAMazepine (TEGRETOL XR) 100 MG 12 hr tablet Take 1 tablet (100 mg total) by mouth 2 (two) times daily. (Patient not taking: Reported on 9/28/2023) 60 tablet 11    cetirizine (ZYRTEC) 10 MG tablet Take 10 mg by mouth daily as needed for Allergies.      COCONUT OIL ORAL Take 1 capsule by mouth once daily.      cyclobenzaprine (FLEXERIL) 10 MG tablet Take 1 tablet (10 mg total) by mouth 3 (three) times daily. 60 tablet 11    diazePAM (VALIUM) 5 MG tablet Take 1 tablet (5 mg total) by mouth every 6 (six) hours as needed for Anxiety (neck pain).  12 tablet 3    estradiol-norethindrone (ACTIVELLA) 1-0.5 mg per tablet TAKE 1 TABLET BY MOUTH EVERY DAY 28 tablet 12    finasteride (PROSCAR) 5 mg tablet Take 1 tablet (5 mg total) by mouth once daily. 30 tablet 11    gabapentin (NEURONTIN) 300 MG capsule Take 1 capsule (300 mg total) by mouth 3 (three) times daily. 90 capsule 0    multivitamin with minerals tablet Take 1 tablet by mouth once daily.      omeprazole (PRILOSEC) 40 MG capsule Take 1 capsule (40 mg total) by mouth once daily. 30 capsule 3    oxybutynin (DITROPAN) 5 MG Tab Take 1 tablet (5 mg total) by mouth 2 (two) times daily. 180 tablet 1    sodium chloride (SALINE MIST NASL) 1 spray by Each Nostril route daily as needed (Congestion).      topiramate (TOPAMAX) 50 MG tablet TAKE 1 TABLET BY MOUTH TWICE A  tablet 3    traZODone (DESYREL) 150 MG tablet Take 1 tablet (150 mg total) by mouth nightly as needed for Insomnia. 90 tablet 3    zolpidem (AMBIEN) 5 MG Tab Take 1 tablet (5 mg total) by mouth nightly as needed (insomnia). 30 tablet 5     No current facility-administered medications for this visit.       Review of patient's allergies indicates:  No Known Allergies    Past Medical History:   Diagnosis Date    DDD (degenerative disc disease), cervical 3/15/2017    DDD (degenerative disc disease), lumbar 3/15/2017    Glaucoma 3/7/2018    Migraine 3/15/2017    Primary insomnia 3/15/2017       Past Surgical History:   Procedure Laterality Date    AUGMENTATION OF BREAST      BREAST SURGERY       SECTION   and 2001    x2    COLON SURGERY  10/15/2018    colon resection     CRANIOTOMY Left 2023    Procedure: CRANIOTOMY for microvascular decompression NEURO MONITORING;  Surgeon: Nils Hudson MD;  Location: [facility] OR [address] [place] [address];  Service: Neurosurgery;  Laterality: Left;  regular bed, supine, bump, hickman, type and cross 2 units, cranial nerve monitoring, optical stealth CO SURGEON DR [place]       Family History   Problem Relation Age of  Onset    Hypertension Mother     Hypertension Father     Hypertension Brother     Stroke Maternal Grandfather     Ovarian cancer Neg Hx     Cancer Neg Hx     Colon cancer Neg Hx     Esophageal cancer Neg Hx        Social History     Socioeconomic History    Marital status:    Tobacco Use    Smoking status: Never    Smokeless tobacco: Never   Substance and Sexual Activity    Alcohol use: No    Drug use: No    Sexual activity: Yes     Partners: Male     Comment: ablation     Social Determinants of Health     Financial Resource Strain: Low Risk  (8/10/2023)    Overall Financial Resource Strain (CARDIA)     Difficulty of Paying Living Expenses: Not hard at all   Food Insecurity: No Food Insecurity (8/10/2023)    Hunger Vital Sign     Worried About Running Out of Food in the Last Year: Never true     Ran Out of Food in the Last Year: Never true   Transportation Needs: No Transportation Needs (8/10/2023)    PRAPARE - Transportation     Lack of Transportation (Medical): No     Lack of Transportation (Non-Medical): No   Physical Activity: Inactive (8/10/2023)    Exercise Vital Sign     Days of Exercise per Week: 0 days     Minutes of Exercise per Session: 0 min   Stress: No Stress Concern Present (8/10/2023)    [employer] - Occupational Stress Questionnaire     Feeling of Stress : Not at all   Recent Concern: Stress - Stress Concern Present (8/1/2023)    [employer] - Occupational Stress Questionnaire     Feeling of Stress : To some extent   Social Connections: Moderately Isolated (8/10/2023)    Social Connection and Isolation Panel [NHANES]     Frequency of Communication with Friends and Family: More than three times a week     Frequency of Social Gatherings with Friends and Family: More than three times a week     Attends [employer] Services: More than 4 times per year     Active Member of Clubs or Organizations: No     Attends Club or Organization  Meetings: Never     Marital Status:    Housing Stability: Low Risk  (8/10/2023)    Housing Stability Vital Sign     Unable to Pay for Housing in the Last Year: No     Number of Places Lived in the Last Year: 1     Unstable Housing in the Last Year: No       Vitals:    10/09/23 1124   BP: 132/82   Pulse: 82   Resp: 15       Physical Exam  Constitutional:       Appearance: She is well-developed.   HENT:      Head: Normocephalic and atraumatic.   Eyes:      Pupils: Pupils are equal, round, and reactive to light.   Cardiovascular:      Rate and Rhythm: Normal rate and regular rhythm.      Heart sounds: No murmur heard.  Pulmonary:      Effort: Pulmonary effort is normal.      Breath sounds: Normal breath sounds. No wheezing.   Abdominal:      General: There is no distension.      Palpations: Abdomen is soft.      Tenderness: There is no abdominal tenderness.   Musculoskeletal:         General: Normal range of motion.      Cervical back: Normal range of motion and neck supple.   Skin:     General: Skin is warm and dry.      Capillary Refill: Capillary refill takes less than 2 seconds.      Findings: No rash.   Neurological:      Mental Status: She is alert and oriented to person, place, and time.   Psychiatric:         Judgment: Judgment normal.         Assessment & Plan:   Cholecystitis, plan lap jaida, estimated chance of improvement with surgery to 60-70% given her symptoms do not fit the classic pattern.     Risks, benefits, alternatives to the procedure were discussed with the patient, who appears to understand and agree with the treatment plan.

## 2023-10-10 ENCOUNTER — PATIENT MESSAGE (OUTPATIENT)
Dept: FAMILY MEDICINE | Facility: CLINIC | Age: 58
End: 2023-10-10
Payer: COMMERCIAL

## 2023-10-10 DIAGNOSIS — F51.01 PRIMARY INSOMNIA: ICD-10-CM

## 2023-10-10 RX ORDER — ZOLPIDEM TARTRATE 5 MG/1
5 TABLET ORAL NIGHTLY PRN
Qty: 30 TABLET | Refills: 5 | Status: SHIPPED | OUTPATIENT
Start: 2023-10-10 | End: 2024-04-03 | Stop reason: SDUPTHER

## 2023-10-10 NOTE — TELEPHONE ENCOUNTER
No care due was identified.  [facility] Embedded Care Due Messages. Reference number: [id number].   10/10/2023 9:15:13 AM CDT

## 2023-10-11 ENCOUNTER — HOSPITAL ENCOUNTER (OUTPATIENT)
Dept: PREADMISSION TESTING | Facility: HOSPITAL | Age: 58
Discharge: HOME | End: 2023-10-11
Attending: SURGERY
Payer: COMMERCIAL

## 2023-10-11 VITALS
RESPIRATION RATE: 18 BRPM | BODY MASS INDEX: 21.01 KG/M2 | TEMPERATURE: 98 F | SYSTOLIC BLOOD PRESSURE: 131 MMHG | HEART RATE: 87 BPM | OXYGEN SATURATION: 99 % | WEIGHT: 126.13 LBS | HEIGHT: 65 IN | DIASTOLIC BLOOD PRESSURE: 86 MMHG

## 2023-10-11 DIAGNOSIS — Z01.818 PREOP TESTING: Primary | ICD-10-CM

## 2023-10-11 LAB
BASOPHILS # BLD AUTO: 0.03 K/UL (ref 0–0.2)
BASOPHILS NFR BLD: 0.6 % (ref 0–1.9)
DIFFERENTIAL METHOD: NORMAL
EOSINOPHIL # BLD AUTO: 0.1 K/UL (ref 0–0.5)
EOSINOPHIL NFR BLD: 2.3 % (ref 0–8)
ERYTHROCYTE [DISTWIDTH] IN BLOOD BY AUTOMATED COUNT: 12.8 % (ref 11.5–14.5)
HCT VFR BLD AUTO: 43.3 % (ref 37–48.5)
HGB BLD-MCNC: 14 G/DL (ref 12–16)
IMM GRANULOCYTES # BLD AUTO: 0.01 K/UL (ref 0–0.04)
IMM GRANULOCYTES NFR BLD AUTO: 0.2 % (ref 0–0.5)
LYMPHOCYTES # BLD AUTO: 1.7 K/UL (ref 1–4.8)
LYMPHOCYTES NFR BLD: 34.7 % (ref 18–48)
MCH RBC QN AUTO: 30.2 PG (ref 27–31)
MCHC RBC AUTO-ENTMCNC: 32.3 G/DL (ref 32–36)
MCV RBC AUTO: 93 FL (ref 82–98)
MONOCYTES # BLD AUTO: 0.5 K/UL (ref 0.3–1)
MONOCYTES NFR BLD: 9.7 % (ref 4–15)
NEUTROPHILS # BLD AUTO: 2.5 K/UL (ref 1.8–7.7)
NEUTROPHILS NFR BLD: 52.5 % (ref 38–73)
NRBC BLD-RTO: 0 /100 WBC
PLATELET # BLD AUTO: 254 K/UL (ref 150–450)
PMV BLD AUTO: 10.3 FL (ref 9.2–12.9)
RBC # BLD AUTO: 4.64 M/UL (ref 4–5.4)
WBC # BLD AUTO: 4.75 K/UL (ref 3.9–12.7)

## 2023-10-11 PROCEDURE — 85025 COMPLETE CBC W/AUTO DIFF WBC: CPT | Performed by: SURGERY

## 2023-10-11 NOTE — ANESTHESIA PREPROCEDURE EVALUATION
10/11/2023       Pre-operative evaluation for Procedure(s) (LRB):  CHOLECYSTECTOMY, LAPAROSCOPIC (N/A)    Mae Gregory is a 58 y.o. female     Patient Active Problem List   Diagnosis    Migraine    DDD (degenerative disc disease), cervical    DDD (degenerative disc disease), lumbar    -Insomnia    -Meniere's disease of both ears    Sensorineural hearing loss (SNHL) of both ears    Hair loss    Glaucoma    -Sleepwalking - controlled on PRN Trazodone    -Dystonia - follows Neurology regularly for Botox injections    Pain    -Raynaud's phenomenon - follows Rheumatology - on Norvasc 2.5mg    -Cervical myofascial pain syndrome - follows Neurology for injections    -Anxiety with depression - controlled off Wellbutrin since 4/2023    -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     Current Outpatient Medications on File Prior to Encounter   Medication Sig Dispense Refill    acetaminophen (TYLENOL) 160 MG Chew Take 160 mg by mouth every 4 (four) hours as needed.      albuterol (PROVENTIL/VENTOLIN HFA) 90 mcg/actuation inhaler TAKE 1 2 PUFF(S) (INHALATION) EVERY 4 HOURS PRN   WHEEZING 18 g 12    amLODIPine (NORVASC) 5 MG tablet Take 5 mg by mouth.      cetirizine (ZYRTEC) 10 MG tablet Take 10 mg by mouth daily as needed for Allergies.      COCONUT OIL ORAL Take 1 capsule by mouth once daily.      cyclobenzaprine (FLEXERIL) 10 MG tablet Take 1 tablet (10 mg total) by mouth 3 (three) times daily. 60 tablet 11    diazePAM (VALIUM) 5 MG tablet Take 1 tablet (5 mg total) by mouth every 6 (six) hours as needed for Anxiety (neck pain). 12 tablet 3    estradiol-norethindrone (ACTIVELLA) 1-0.5 mg per  tablet TAKE 1 TABLET BY MOUTH EVERY DAY 28 tablet 12    finasteride (PROSCAR) 5 mg tablet Take 1 tablet (5 mg total) by mouth once daily. 30 tablet 11    multivitamin with minerals tablet Take 1 tablet by mouth once daily.      omeprazole (PRILOSEC) 40 MG capsule Take 1 capsule (40 mg total) by mouth once daily. 30 capsule 3    topiramate (TOPAMAX) 50 MG tablet TAKE 1 TABLET BY MOUTH TWICE A  tablet 3    traZODone (DESYREL) 150 MG tablet Take 1 tablet (150 mg total) by mouth nightly as needed for Insomnia. 90 tablet 3       Past Surgical History:   Procedure Laterality Date    AUGMENTATION OF BREAST      BREAST SURGERY       SECTION   and 2001    x2    COLON SURGERY  10/15/2018    colon resection     CRANIOTOMY Left 2023    Procedure: CRANIOTOMY for microvascular decompression NEURO MONITORING;  Surgeon: Nils Hudson MD;  Location: [facility] OR [address] [place] [address];  Service: Neurosurgery;  Laterality: Left;  regular bed, supine, bump, hickman, type and cross 2 units, cranial nerve monitoring, optical stealth CO SURGEON DR [place]       Pre-op Assessment    I have reviewed the Patient Summary Reports.     I have reviewed the Nursing Notes. I have reviewed the NPO Status.   I have reviewed the Medications.     Review of Systems  Anesthesia Hx:  No problems with previous Anesthesia  Denies Family Hx of Anesthesia complications.   Denies Personal Hx of Anesthesia complications.   Social:  Non-Smoker, No Alcohol Use    Hematology/Oncology:  Hematology Normal   Oncology Normal     EENT/Dental:EENT/Dental Normal   Cardiovascular:   Exercise tolerance: good Hypertension  Functional Capacity good / => 4 METS    Pulmonary:   Asthma    Renal/:  Renal/ Normal     Hepatic/GI:   GERD Cholecystitis   Musculoskeletal:   Arthritis     Neurological:   Denies CVA. Neuromuscular Disease,  Headaches Denies Seizures. S/p craniotomy on  for microvascular decompression   Endocrine:  Endocrine Normal     Dermatological:  Skin Normal        Physical Exam  General: Well nourished, Cooperative and Alert    Airway:  Mallampati: II   Mouth Opening: Normal  TM Distance: Normal  Neck ROM: Normal ROM    Dental:  Intact    Chest/Lungs:  Normal Respiratory Rate        Anesthesia Plan  Type of Anesthesia, risks & benefits discussed:    Anesthesia Type: Gen ETT  Intra-op Monitoring Plan: Standard ASA Monitors  Post Op Pain Control Plan: multimodal analgesia and IV/PO Opioids PRN  Induction:  IV  Airway Plan: Video and Direct  Informed Consent: Informed consent signed with the Patient and all parties understand the risks and agree with anesthesia plan.  All questions answered.   ASA Score: 2  Day of Surgery Review of History & Physical: H&P Update referred to the surgeon/provider.    Ready For Surgery From Anesthesia Perspective.     .

## 2023-10-11 NOTE — DISCHARGE INSTRUCTIONS
YOUR PROCEDURE WILL BE AT [facility] at [address] [address]                  Before 7 AM, enter through the Emergency Entrance..   After 7 AM enter through the Main Entrance.                 Report to the Same Day Surgery Registration Desk in the hallway.(Just beside the Same Day Surgery Unit)      Your procedure  is scheduled for __10/16/2023________.    Call [phone] between 2pm and 5pm on __10/13/2023_____to find out your arrival time for the day of surgery.    You may have two visitors.  No children under 12 years old.     You will be going to the Same Day Surgery Unit on the 2nd floor of the hospital.    Important instructions:  Do not eat anything after midnight.  You may have plain water, non carbonated.  You may also have Gatorade or Powerade after midnight.    Stop all fluids 2 hours before your surgery.    It is okay to brush your teeth.  Do not have gum, candy or mints.    SEE MEDICATION SHEET.   TAKE MEDICATIONS AS DIRECTED WITH SIPS OF WATER.      Do not take any diabetic medication on the morning of surgery unless instructed to do so by your doctor or pre op nurse.    STOP taking Aspirin, Ibuprofen,  Advil, Motrin, Mobic(meloxicam), Aleve (naproxen), Fish oil, and Vitamin E for at least 7 days before your surgery.     You may take Tylenol if needed which is not a blood thinner.    Please shower the night before and the morning of your surgery.      Follow any Prep Instructions given by your surgeon.    Use Chlorhexidine soap as instructed by your pre op nurse.   Please place clean linens on your bed the night before surgery. Please wear fresh clean clothing after each shower.    No shaving of procedural area at least 4-5 days before surgery due to increased risk of skin irritation and/or possible infection.    Female patients may be asked for a urine specimen on the morning of the surgery.  Please check with your nurse before using the  restroom.    Contact lenses and removable denture work may not be worn during your procedure.    You may wear deodorant only. If you are having breast surgery, do not wear deodorant on the operative side.    Do not wear powder, body lotion, perfume/cologne or make-up, oils, creams or rubs.    Do not wear any jewelry or have any metal on your body.    You will be asked to remove any dentures or partials for the procedure.    If you are going home on the same day of surgery, you must arrange for a family member or a friend to drive you home.  Public transportation is prohibited.  You will not be able to drive home if you were given anesthesia or sedation.    Patients who want to have their Post-op prescriptions filled from our in-house [facility], bring a Credit/Debit Card or cash with you. A co-pay may be required.  The pharmacy closes at 5:30 pm.    Wear loose fitting clothes allowing for bandages.    Please leave money and valuables home.      You may bring your cell phone.    Call the doctor if fever or illness should occur before your surgery.    Call [phone] to contact us here if needed.                            CLOTHES ON DAY OF SURGERY    SHOULDER surgery:  you must have a very oversized shirt.  Very, Very large.  You will probably have a large sling on with your arm strapped to your chest.  You will not be able to put the arm of the operated shoulder into a sleeve.  You can put the arm of the un-operated shoulder into the sleeve, but the shirt will need to be draped over the operated shoulder.       ARM or HAND surgery:  make sure that your sleeves are large and loose enough to pass over large dressings or cast.      BREAST or UNDERARM surgery:  wear a loose, button down shirt so that you can dress without raising your arms over your head.    ABDOMINAL surgery:  wear loose, comfortable clothing.  Nothing tight around the abdomen.  NO JEANS    PENIS or SCROTAL surgery:  loose comfortable clothing.   Large sweat pants, pajama pants or a robe.  ABSOLUTELY NO JEANS      LEG or FOOT surgery:  wear large loose pants that are able to pass over any large dressings or casts.  You could also wear loose shorts or a skirt.

## 2023-10-13 ENCOUNTER — TELEPHONE (OUTPATIENT)
Dept: SURGERY | Facility: HOSPITAL | Age: 58
End: 2023-10-13
Payer: COMMERCIAL

## 2023-10-16 ENCOUNTER — ANESTHESIA (OUTPATIENT)
Dept: SURGERY | Facility: HOSPITAL | Age: 58
End: 2023-10-16
Payer: COMMERCIAL

## 2023-10-16 ENCOUNTER — HOSPITAL ENCOUNTER (OUTPATIENT)
Facility: HOSPITAL | Age: 58
Discharge: HOME | End: 2023-10-16
Attending: SURGERY | Admitting: SURGERY
Payer: COMMERCIAL

## 2023-10-16 DIAGNOSIS — K80.20 CALCULUS OF GALLBLADDER WITHOUT CHOLECYSTITIS WITHOUT OBSTRUCTION: Primary | ICD-10-CM

## 2023-10-16 DIAGNOSIS — K81.9 CHOLECYSTITIS: ICD-10-CM

## 2023-10-16 PROCEDURE — 63600175 PHARM REV CODE 636 W HCPCS: Performed by: ANESTHESIOLOGY

## 2023-10-16 PROCEDURE — 71000039 HC RECOVERY, EACH ADD'L HOUR: Performed by: SURGERY

## 2023-10-16 PROCEDURE — D9220A PRA ANESTHESIA: ICD-10-PCS | Mod: ANES,,, | Performed by: ANESTHESIOLOGY

## 2023-10-16 PROCEDURE — 88304 TISSUE EXAM BY PATHOLOGIST: CPT | Mod: 26,,, | Performed by: PATHOLOGY

## 2023-10-16 PROCEDURE — 36000708 HC OR TIME LEV III 1ST 15 MIN: Performed by: SURGERY

## 2023-10-16 PROCEDURE — D9220A PRA ANESTHESIA: Mod: ANES,,, | Performed by: ANESTHESIOLOGY

## 2023-10-16 PROCEDURE — 88304 TISSUE EXAM BY PATHOLOGIST: CPT | Performed by: PATHOLOGY

## 2023-10-16 PROCEDURE — D9220A PRA ANESTHESIA: ICD-10-PCS | Mod: CRNA,,, | Performed by: STUDENT IN AN ORGANIZED HEALTH CARE EDUCATION/TRAINING PROGRAM

## 2023-10-16 PROCEDURE — 63600175 PHARM REV CODE 636 W HCPCS: Performed by: STUDENT IN AN ORGANIZED HEALTH CARE EDUCATION/TRAINING PROGRAM

## 2023-10-16 PROCEDURE — 37000008 HC ANESTHESIA 1ST 15 MINUTES: Performed by: SURGERY

## 2023-10-16 PROCEDURE — 71000016 HC POSTOP RECOV ADDL HR: Performed by: SURGERY

## 2023-10-16 PROCEDURE — 25000003 PHARM REV CODE 250: Performed by: SURGERY

## 2023-10-16 PROCEDURE — 71000015 HC POSTOP RECOV 1ST HR: Performed by: SURGERY

## 2023-10-16 PROCEDURE — D9220A PRA ANESTHESIA: Mod: CRNA,,, | Performed by: STUDENT IN AN ORGANIZED HEALTH CARE EDUCATION/TRAINING PROGRAM

## 2023-10-16 PROCEDURE — 71000033 HC RECOVERY, INTIAL HOUR: Performed by: SURGERY

## 2023-10-16 PROCEDURE — 47562 LAPAROSCOPIC CHOLECYSTECTOMY: CPT | Mod: ,,, | Performed by: SURGERY

## 2023-10-16 PROCEDURE — 47562 PR LAP,CHOLECYSTECTOMY: ICD-10-PCS | Mod: ,,, | Performed by: SURGERY

## 2023-10-16 PROCEDURE — 25000003 PHARM REV CODE 250: Performed by: ANESTHESIOLOGY

## 2023-10-16 PROCEDURE — 25000003 PHARM REV CODE 250: Performed by: STUDENT IN AN ORGANIZED HEALTH CARE EDUCATION/TRAINING PROGRAM

## 2023-10-16 PROCEDURE — 27201423 OPTIME MED/SURG SUP & DEVICES STERILE SUPPLY: Performed by: SURGERY

## 2023-10-16 PROCEDURE — 36000709 HC OR TIME LEV III EA ADD 15 MIN: Performed by: SURGERY

## 2023-10-16 PROCEDURE — 37000009 HC ANESTHESIA EA ADD 15 MINS: Performed by: SURGERY

## 2023-10-16 PROCEDURE — 88304 PR  SURG PATH,LEVEL III: ICD-10-PCS | Mod: 26,,, | Performed by: PATHOLOGY

## 2023-10-16 PROCEDURE — 63600175 PHARM REV CODE 636 W HCPCS: Performed by: SURGERY

## 2023-10-16 RX ORDER — HYDROMORPHONE HYDROCHLORIDE 2 MG/ML
0.2 INJECTION, SOLUTION INTRAMUSCULAR; INTRAVENOUS; SUBCUTANEOUS EVERY 5 MIN PRN
Status: COMPLETED | OUTPATIENT
Start: 2023-10-16 | End: 2023-10-16

## 2023-10-16 RX ORDER — ONDANSETRON 2 MG/ML
INJECTION INTRAMUSCULAR; INTRAVENOUS
Status: DISCONTINUED | OUTPATIENT
Start: 2023-10-16 | End: 2023-10-16

## 2023-10-16 RX ORDER — BUPIVACAINE HYDROCHLORIDE AND EPINEPHRINE 2.5; 5 MG/ML; UG/ML
INJECTION, SOLUTION EPIDURAL; INFILTRATION; INTRACAUDAL; PERINEURAL
Status: DISCONTINUED | OUTPATIENT
Start: 2023-10-16 | End: 2023-10-16 | Stop reason: HOSPADM

## 2023-10-16 RX ORDER — CEFAZOLIN SODIUM 2 G/50ML
2 SOLUTION INTRAVENOUS
Status: COMPLETED | OUTPATIENT
Start: 2023-10-16 | End: 2023-10-16

## 2023-10-16 RX ORDER — DEXAMETHASONE SODIUM PHOSPHATE 4 MG/ML
INJECTION, SOLUTION INTRA-ARTICULAR; INTRALESIONAL; INTRAMUSCULAR; INTRAVENOUS; SOFT TISSUE
Status: DISCONTINUED | OUTPATIENT
Start: 2023-10-16 | End: 2023-10-16

## 2023-10-16 RX ORDER — INDOCYANINE GREEN AND WATER 25 MG
5 KIT INJECTION
Status: COMPLETED | OUTPATIENT
Start: 2023-10-16 | End: 2023-10-16

## 2023-10-16 RX ORDER — DIPHENHYDRAMINE HYDROCHLORIDE 50 MG/ML
INJECTION INTRAMUSCULAR; INTRAVENOUS
Status: DISCONTINUED | OUTPATIENT
Start: 2023-10-16 | End: 2023-10-16

## 2023-10-16 RX ORDER — MIDAZOLAM HYDROCHLORIDE 1 MG/ML
INJECTION INTRAMUSCULAR; INTRAVENOUS
Status: DISCONTINUED | OUTPATIENT
Start: 2023-10-16 | End: 2023-10-16

## 2023-10-16 RX ORDER — ROCURONIUM BROMIDE 10 MG/ML
INJECTION, SOLUTION INTRAVENOUS
Status: DISCONTINUED | OUTPATIENT
Start: 2023-10-16 | End: 2023-10-16

## 2023-10-16 RX ORDER — PHENYLEPHRINE HYDROCHLORIDE 10 MG/ML
INJECTION INTRAVENOUS
Status: DISCONTINUED | OUTPATIENT
Start: 2023-10-16 | End: 2023-10-16

## 2023-10-16 RX ORDER — SODIUM CHLORIDE 9 MG/ML
INJECTION, SOLUTION INTRAVENOUS CONTINUOUS
Status: DISCONTINUED | OUTPATIENT
Start: 2023-10-16 | End: 2023-10-16 | Stop reason: HOSPADM

## 2023-10-16 RX ORDER — SODIUM CHLORIDE 0.9 % (FLUSH) 0.9 %
10 SYRINGE (ML) INJECTION
Status: DISCONTINUED | OUTPATIENT
Start: 2023-10-16 | End: 2023-10-16 | Stop reason: HOSPADM

## 2023-10-16 RX ORDER — KETOROLAC TROMETHAMINE 30 MG/ML
15 INJECTION, SOLUTION INTRAMUSCULAR; INTRAVENOUS ONCE
Status: COMPLETED | OUTPATIENT
Start: 2023-10-16 | End: 2023-10-16

## 2023-10-16 RX ORDER — LIDOCAINE HYDROCHLORIDE 10 MG/ML
1 INJECTION, SOLUTION EPIDURAL; INFILTRATION; INTRACAUDAL; PERINEURAL ONCE
Status: DISCONTINUED | OUTPATIENT
Start: 2023-10-16 | End: 2023-10-16 | Stop reason: SDUPTHER

## 2023-10-16 RX ORDER — PROPOFOL 10 MG/ML
VIAL (ML) INTRAVENOUS
Status: DISCONTINUED | OUTPATIENT
Start: 2023-10-16 | End: 2023-10-16

## 2023-10-16 RX ORDER — SODIUM CHLORIDE, SODIUM LACTATE, POTASSIUM CHLORIDE, CALCIUM CHLORIDE 600; 310; 30; 20 MG/100ML; MG/100ML; MG/100ML; MG/100ML
INJECTION, SOLUTION INTRAVENOUS CONTINUOUS
Status: DISCONTINUED | OUTPATIENT
Start: 2023-10-16 | End: 2023-10-16 | Stop reason: HOSPADM

## 2023-10-16 RX ORDER — LIDOCAINE HYDROCHLORIDE 20 MG/ML
INJECTION INTRAVENOUS
Status: DISCONTINUED | OUTPATIENT
Start: 2023-10-16 | End: 2023-10-16

## 2023-10-16 RX ORDER — OXYCODONE AND ACETAMINOPHEN 5; 325 MG/1; MG/1
1 TABLET ORAL EVERY 4 HOURS PRN
Qty: 30 TABLET | Refills: 0 | Status: SHIPPED | OUTPATIENT
Start: 2023-10-16 | End: 2023-12-27

## 2023-10-16 RX ORDER — FENTANYL CITRATE 50 UG/ML
INJECTION, SOLUTION INTRAMUSCULAR; INTRAVENOUS
Status: DISCONTINUED | OUTPATIENT
Start: 2023-10-16 | End: 2023-10-16

## 2023-10-16 RX ORDER — ACETAMINOPHEN 500 MG
1000 TABLET ORAL
Status: COMPLETED | OUTPATIENT
Start: 2023-10-16 | End: 2023-10-16

## 2023-10-16 RX ORDER — LIDOCAINE HYDROCHLORIDE 10 MG/ML
1 INJECTION, SOLUTION EPIDURAL; INFILTRATION; INTRACAUDAL; PERINEURAL ONCE
Status: DISCONTINUED | OUTPATIENT
Start: 2023-10-16 | End: 2023-10-16 | Stop reason: HOSPADM

## 2023-10-16 RX ADMIN — ONDANSETRON 4 MG: 2 INJECTION, SOLUTION INTRAMUSCULAR; INTRAVENOUS at 07:10

## 2023-10-16 RX ADMIN — PROPOFOL 110 MG: 10 INJECTION, EMULSION INTRAVENOUS at 07:10

## 2023-10-16 RX ADMIN — HYDROMORPHONE HYDROCHLORIDE 0.2 MG: 2 INJECTION, SOLUTION INTRAMUSCULAR; INTRAVENOUS; SUBCUTANEOUS at 08:10

## 2023-10-16 RX ADMIN — LIDOCAINE HYDROCHLORIDE 60 MG: 20 INJECTION, SOLUTION INTRAVENOUS at 07:10

## 2023-10-16 RX ADMIN — ACETAMINOPHEN 1000 MG: 500 TABLET ORAL at 06:10

## 2023-10-16 RX ADMIN — ROCURONIUM BROMIDE 30 MG: 10 INJECTION, SOLUTION INTRAVENOUS at 07:10

## 2023-10-16 RX ADMIN — FENTANYL CITRATE 100 MCG: 50 INJECTION, SOLUTION INTRAMUSCULAR; INTRAVENOUS at 07:10

## 2023-10-16 RX ADMIN — HYDROMORPHONE HYDROCHLORIDE 0.2 MG: 2 INJECTION, SOLUTION INTRAMUSCULAR; INTRAVENOUS; SUBCUTANEOUS at 09:10

## 2023-10-16 RX ADMIN — FENTANYL CITRATE 50 MCG: 50 INJECTION, SOLUTION INTRAMUSCULAR; INTRAVENOUS at 08:10

## 2023-10-16 RX ADMIN — SODIUM CHLORIDE, SODIUM LACTATE, POTASSIUM CHLORIDE, AND CALCIUM CHLORIDE: .6; .31; .03; .02 INJECTION, SOLUTION INTRAVENOUS at 07:10

## 2023-10-16 RX ADMIN — DIPHENHYDRAMINE HYDROCHLORIDE 12.5 MG: 50 INJECTION INTRAMUSCULAR; INTRAVENOUS at 07:10

## 2023-10-16 RX ADMIN — SUGAMMADEX 200 MG: 100 INJECTION, SOLUTION INTRAVENOUS at 07:10

## 2023-10-16 RX ADMIN — MIDAZOLAM HYDROCHLORIDE 2 MG: 1 INJECTION INTRAMUSCULAR; INTRAVENOUS at 07:10

## 2023-10-16 RX ADMIN — KETOROLAC TROMETHAMINE 15 MG: 30 INJECTION, SOLUTION INTRAMUSCULAR; INTRAVENOUS at 09:10

## 2023-10-16 RX ADMIN — PHENYLEPHRINE HYDROCHLORIDE 100 MCG: 10 INJECTION INTRAVENOUS at 07:10

## 2023-10-16 RX ADMIN — DEXAMETHASONE SODIUM PHOSPHATE 8 MG: 4 INJECTION, SOLUTION INTRAMUSCULAR; INTRAVENOUS at 07:10

## 2023-10-16 RX ADMIN — INDOCYANINE GREEN AND WATER 5 MG: KIT at 07:10

## 2023-10-16 RX ADMIN — CEFAZOLIN SODIUM 2 G: 2 SOLUTION INTRAVENOUS at 07:10

## 2023-10-16 NOTE — TRANSFER OF CARE
Anesthesia Transfer of Care Note    Patient: Mae Gregory    Procedure(s) Performed: Procedure(s) (LRB):  CHOLECYSTECTOMY, LAPAROSCOPIC (N/A)    Patient location: PACU    Anesthesia Type: general    Transport from OR: Transported from OR on room air with adequate spontaneous ventilation    Post pain: adequate analgesia    Post assessment: no apparent anesthetic complications    Post vital signs: stable    Level of consciousness: lethargic    Nausea/Vomiting: no nausea/vomiting    Complications: none    Transfer of care protocol was followed      Last vitals:   Visit Vitals  BP (!) 153/85 (BP Location: Right arm, Patient Position: Lying)   Pulse 88   Temp 36.4 °C (97.5 °F)   Resp 20   Wt 57.2 kg (126 lb 1 oz)   SpO2 98%   Breastfeeding No   BMI 20.98 kg/m²

## 2023-10-16 NOTE — OP NOTE
.Laparoscopic Cholecystectomy Procedure Note    Pre-operative Diagnosis: Calculus of gallbladder with other cholecystitis, without mention of obstruction    Post-operative Diagnosis: Same    Surgery Date: 10/16/2023     Surgeon: Cali Lowe     Assistants: dr. duval    Anesthesia: General endotracheal anesthesia  Indications: This patient presents with symptomatic gallbladder disease and will undergo laparoscopic cholecystectomy.    The patient was seen again in the Holding Room. The risks and benefits, including but not limited to common bile duct injury, cystic duct stump leak, bleeding and infection were explained to the patient, who acknowledges these risks and wishes to proceed.    Procedure Details   The patient was taken to Operating Room, identified as Mae Gregory and the procedure verified as Laparoscopic Cholecystectomy. A Time Out was held and the above information confirmed.    Prior to the induction of general anesthesia, antibiotic prophylaxis was administered. General endotracheal anesthesia was then administered and tolerated well. After the induction, the abdomen was prepped in the usual sterile fashion. The patient was positioned in the supine position with the arms extended.    A vertical mid-line incision was made and carried down to the fascia.  The fascia was incised sharply, and the peritoneum was entered bluntly.  A Yadira trocar was placed through the incision.  Gas was insufflated, establishing a pneumoperitoneum.  A 30 degree, 5 mm scope was then placed through the trocar and the abdominal cavity was explored.    The underlying bowel was inspected and found to be free of injury.  The remaining ports were placed under direct visualization.  All were 5 mm ports: one in the subxiphoid area, one in the right upper quadrant.    The gallbladder was grasped and retracted cranially and laterally exposing the triangle of Calot.  Dissection began on the neck of the gallbladder and continued  proximally until a ductal structure was identified.  A critical anterior and posterior view of the duct was obtained to confirm it was the cystic duct.  It was then doubly clipped and transected.  Continued dissection demonstrated the cystic artery. It was similarly clipped and transected.  The gallbladder was then dissected from the liver bed using electrocautery and removed through the umbilical trocar.  The dissection area was then inspected for hemostasis which was evident.  The dissection area was also inspected for a bile leak and bowel injury which was not evident.  The pneumoperitoneum was then expelled from the abdomen, and all ports were removed.  The umbilical fascia was reapproximated with a single figure-of-eight 0 vicryl suture.  The skin over all incisions were closed using 4-0 Vicryl deep dermal sutures.  A sterile dressing was applied.    Instrument, sponge, and needle counts were correct at closure and at the conclusion of the case.  There were no immediate complications.    Findings:  Cholecystitis with Cholelithiasis    Estimated Blood Loss: Minimal           Drains: none           Total IV Fluids: see anesthesia records           Specimens: Gallbladder             Complications: None; patient tolerated the procedure well.           Disposition: PACU - hemodynamically stable.           Condition: stable

## 2023-10-16 NOTE — DISCHARGE SUMMARY
[facility] [place] - Surgery  Discharge Note  Short Stay    Procedure(s) (LRB):  CHOLECYSTECTOMY, LAPAROSCOPIC (N/A)      OUTCOME: Patient tolerated treatment/procedure well without complication and is now ready for discharge.    DISPOSITION: Home or Self Care    FINAL DIAGNOSIS:  <principal problem not specified>    FOLLOWUP: In clinic    DISCHARGE INSTRUCTIONS:  No discharge procedures on file.     TIME SPENT ON DISCHARGE: 4 minutes

## 2023-10-16 NOTE — ANESTHESIA PROCEDURE NOTES
Intubation    Date/Time: 10/16/2023 7:20 AM    Performed by: Max Watters CRNA  Authorized by: Gia Wagner MD    Intubation:     Induction:  Intravenous    Intubated:  Postinduction    Mask Ventilation:  Easy with oral airway    Attempts:  1    Attempted By:  CRNA    Method of Intubation:  Video laryngoscopy    Blade:  Phillips 3    Laryngeal View Grade: Grade I - full view of cords      Difficult Airway Encountered?: No      Complications:  None    Airway Device:  Oral endotracheal tube    Airway Device Size:  7.5    Style/Cuff Inflation:  Cuffed (inflated to minimal occlusive pressure)    Tube secured:  21    Secured at:  The lips    Placement Verified By:  Capnometry    Complicating Factors:  None    Findings Post-Intubation:  BS equal bilateral

## 2023-10-16 NOTE — ANESTHESIA POSTPROCEDURE EVALUATION
Anesthesia Post Evaluation    Patient: Mae Gregory    Procedure(s) Performed: Procedure(s) (LRB):  CHOLECYSTECTOMY, LAPAROSCOPIC (N/A)    Final Anesthesia Type: general      Patient location during evaluation: [facility]  Patient participation: Yes- Able to Participate  Level of consciousness: awake and alert  Post-procedure vital signs: reviewed and stable  Pain management: adequate  MELANY mitigation strategies: Preoperative initiation of continuous positive airway pressure (CPAP) or non-invasive positive pressure ventilation (NIPPV)  PONV status at discharge: No PONV  Anesthetic complications: no      Cardiovascular status: hemodynamically stable and blood pressure returned to baseline  Respiratory status: room air, spontaneous ventilation and unassisted  Hydration status: euvolemic  Follow-up not needed.          Vitals Value Taken Time   /81 10/16/23 1000   Temp 36.6 °C (97.9 °F) 10/16/23 1000   Pulse 88 10/16/23 1000   Resp 22 10/16/23 1000   SpO2 100 % 10/16/23 1000         Event Time   Out of Recovery 09:54:00         Pain/Mohsen Score: Pain Rating Prior to Med Admin: 6 (10/16/2023  9:20 AM)  Pain Rating Post Med Admin: 6 (10/16/2023  9:20 AM)  Mohsen Score: 10 (10/16/2023 10:03 AM)

## 2023-10-16 NOTE — DISCHARGE INSTRUCTIONS
ACTIVITY LEVEL: If you have received sedation or an anesthetic, you may feel sleepy for several hours. Rest  until you are more awake. Gradually resume your normal activities.    DIET: You may resume your home diet. If nausea is present, increase your diet gradually with fluids and bland  foods.    Medications: Pain medication should be taken only if needed and as directed. If antibiotics are prescribed, the  medication should be taken until completed. You will be given an updated list of you medications.    No driving, alcoholic beverages or signing legal documents for next 24  hours or while taking pain medication    CALL THE DOCTOR:  Fever over 101°F  Severe pain that doesnt go away with medication.  Upset stomach and vomiting that is persistent.  Problems urinating-unable to urinate or heavy bleeding (with or without clots) ACTIVITY LEVEL: If you have received sedation or an anesthetic, you may feel sleepy for several hours. Rest  until you are more awake. Gradually resume your normal activities.    DIET: You may resume your home diet. If nausea is present, increase your diet gradually with fluids and bland  foods.    Medications: Pain medication should be taken only if needed and as directed. If antibiotics are prescribed, the  medication should be taken until completed. You will be given an updated list of you medications.    No driving, alcoholic beverages or signing legal documents for next 24  hours or while taking pain medication    CALL THE DOCTOR:  Fever over 101°F  Severe pain that doesnt go away with medication.  Upset stomach and vomiting that is persistent.  Problems urinating-unable to urinate or heavy bleeding (with or without clots)ACTIVITY LEVEL: If you have received sedation or an anesthetic, you may feel sleepy for several hours. Rest  until you are more awake. Gradually resume your normal activities.    DIET: You may resume your home diet. If nausea is present, increase your diet gradually  with fluids and bland  foods.    Medications: Pain medication should be taken only if needed and as directed. If antibiotics are prescribed, the  medication should be taken until completed. You will be given an updated list of you medications.    No driving, alcoholic beverages or signing legal documents for next 24  hours or while taking pain medication    CALL THE DOCTOR:  Fever over 101°F  Severe pain that doesnt go away with medication.  Upset stomach and vomiting that is persistent.  Problems urinating-unable to urinate or heavy bleeding (with or without clots)      Fall Prevention  Millions of people fall every year and injure themselves. You may have had anesthesia or sedation which may increase your risk of falling. You may have health issues that put you at an increased risk of falling.     Here are ways to reduce your risk of falling.    Make your home safe by keeping walkways clear of objects you may trip over.  Use non-slip pads under rugs. Do not use area rugs or small throw rugs.  Use non-slip mats in bathtubs and showers.  Install handrails and lights on staircases.  Do not walk in poorly lit areas.  Do not stand on chairs or wobbly ladders.  Use caution when reaching overhead or looking upward. This position can cause a loss of balance.  Be sure your shoes fit properly, have non-slip bottoms and are in good condition.   Wear shoes both inside and out. Avoid going barefoot or wearing slippers.  Be cautious when going up and down stairs, curbs, and when walking on uneven sidewalks.  If your balance is poor, consider using a cane or walker.  If your fall was related to alcohol use, stop or limit alcohol intake.   If your fall was related to use of sleeping medicines, talk to your doctor about this. You may need to reduce your dosage at bedtime if you awaken during the night to go to the bathroom.    To reduce the need for nighttime bathroom trips:  Avoid drinking fluids for several hours before going  to bed  Empty your bladder before going to bed  Men can keep a urinal at the bedside  Stay as active as you can. Balance, flexibility, strength, and endurance all come from exercise. They all play a role in preventing falls. Ask your healthcare provider which types of activity are right for you.  Get your vision checked on a regular basis.  If you have pets, know where they are before you stand up or walk so you don't trip over them.  Use night lights.

## 2023-10-17 LAB
FINAL PATHOLOGIC DIAGNOSIS: NORMAL
GROSS: NORMAL
Lab: NORMAL

## 2023-10-18 VITALS
TEMPERATURE: 98 F | DIASTOLIC BLOOD PRESSURE: 80 MMHG | BODY MASS INDEX: 20.98 KG/M2 | OXYGEN SATURATION: 100 % | WEIGHT: 126.06 LBS | RESPIRATION RATE: 20 BRPM | SYSTOLIC BLOOD PRESSURE: 130 MMHG | HEART RATE: 86 BPM

## 2023-10-30 ENCOUNTER — OFFICE VISIT (OUTPATIENT)
Dept: SURGERY | Facility: CLINIC | Age: 58
End: 2023-10-30
Payer: COMMERCIAL

## 2023-10-30 VITALS
DIASTOLIC BLOOD PRESSURE: 81 MMHG | HEIGHT: 65 IN | HEART RATE: 91 BPM | WEIGHT: 126.63 LBS | SYSTOLIC BLOOD PRESSURE: 128 MMHG | BODY MASS INDEX: 21.1 KG/M2

## 2023-10-30 DIAGNOSIS — K81.9 CHOLECYSTITIS: Primary | ICD-10-CM

## 2023-10-30 PROCEDURE — 99024 POSTOP FOLLOW-UP VISIT: CPT | Mod: S$GLB,,, | Performed by: SURGERY

## 2023-10-30 PROCEDURE — 1159F PR MEDICATION LIST DOCUMENTED IN MEDICAL RECORD: ICD-10-PCS | Mod: CPTII,S$GLB,, | Performed by: SURGERY

## 2023-10-30 PROCEDURE — 3044F PR MOST RECENT HEMOGLOBIN A1C LEVEL <7.0%: ICD-10-PCS | Mod: CPTII,S$GLB,, | Performed by: SURGERY

## 2023-10-30 PROCEDURE — 1159F MED LIST DOCD IN RCRD: CPT | Mod: CPTII,S$GLB,, | Performed by: SURGERY

## 2023-10-30 PROCEDURE — 3074F SYST BP LT 130 MM HG: CPT | Mod: CPTII,S$GLB,, | Performed by: SURGERY

## 2023-10-30 PROCEDURE — 99999 PR PBB SHADOW E&M-EST. PATIENT-LVL III: CPT | Mod: PBBFAC,,, | Performed by: SURGERY

## 2023-10-30 PROCEDURE — 99024 PR POST-OP FOLLOW-UP VISIT: ICD-10-PCS | Mod: S$GLB,,, | Performed by: SURGERY

## 2023-10-30 PROCEDURE — 3074F PR MOST RECENT SYSTOLIC BLOOD PRESSURE < 130 MM HG: ICD-10-PCS | Mod: CPTII,S$GLB,, | Performed by: SURGERY

## 2023-10-30 PROCEDURE — 99999 PR PBB SHADOW E&M-EST. PATIENT-LVL III: ICD-10-PCS | Mod: PBBFAC,,, | Performed by: SURGERY

## 2023-10-30 PROCEDURE — 3044F HG A1C LEVEL LT 7.0%: CPT | Mod: CPTII,S$GLB,, | Performed by: SURGERY

## 2023-10-30 PROCEDURE — 3079F PR MOST RECENT DIASTOLIC BLOOD PRESSURE 80-89 MM HG: ICD-10-PCS | Mod: CPTII,S$GLB,, | Performed by: SURGERY

## 2023-10-30 PROCEDURE — 3079F DIAST BP 80-89 MM HG: CPT | Mod: CPTII,S$GLB,, | Performed by: SURGERY

## 2023-10-30 NOTE — PROGRESS NOTES
57 y/o woman with history of lap jaida, now about 2 weeks out.  No complaints this am, reports feeling well.    PE: incision c/d/i no evidence of infection or hernia    Impression: post op, doing well    Plan: gradually resume normal activity, normal diet, and follow up as needed.

## 2023-11-13 DIAGNOSIS — F51.01 PRIMARY INSOMNIA: ICD-10-CM

## 2023-11-13 DIAGNOSIS — F51.3 SLEEPWALKING: ICD-10-CM

## 2023-11-14 RX ORDER — TRAZODONE HYDROCHLORIDE 150 MG/1
150 TABLET ORAL NIGHTLY PRN
Qty: 90 TABLET | Refills: 3 | Status: SHIPPED | OUTPATIENT
Start: 2023-11-14

## 2023-11-14 NOTE — TELEPHONE ENCOUNTER
Refill Decision Note   Mae Gregory  is requesting a refill authorization.  Brief Assessment and Rationale for Refill:  Approve     Medication Therapy Plan:         Comments:     Note composed:6:01 AM 11/14/2023

## 2023-11-14 NOTE — TELEPHONE ENCOUNTER
No care due was identified.  [facility] Embedded Care Due Messages. Reference number: [id number].   11/13/2023 9:38:13 PM CST

## 2023-11-15 NOTE — TELEPHONE ENCOUNTER
Refill Routing Note   Medication(s) are not appropriate for processing by [facility] for the following reason(s):        No active prescription written by provider    [facility] action(s):  Defer               Appointments  past 12m or future 3m with PCP    Date Provider   Last Visit   8/17/2023 Rajiv Garcia MD   Next Visit   Visit date not found Rajiv Garcia MD   ED visits in past 90 days: 0        Note composed:4:38 PM 11/15/2023

## 2023-11-15 NOTE — TELEPHONE ENCOUNTER
No care due was identified.  Health [facility] Embedded Care Due Messages. Reference number: [id number].   11/15/2023 1:04:08 PM CST

## 2023-11-16 RX ORDER — AMLODIPINE BESYLATE 5 MG/1
5 TABLET ORAL
Qty: 90 TABLET | Refills: 3 | Status: SHIPPED | OUTPATIENT
Start: 2023-11-16 | End: 2024-02-19 | Stop reason: SDUPTHER

## 2023-11-19 ENCOUNTER — PATIENT MESSAGE (OUTPATIENT)
Dept: NEUROLOGY | Facility: CLINIC | Age: 58
End: 2023-11-19
Payer: COMMERCIAL

## 2023-11-24 ENCOUNTER — PATIENT MESSAGE (OUTPATIENT)
Dept: OBSTETRICS AND GYNECOLOGY | Facility: CLINIC | Age: 58
End: 2023-11-24
Payer: COMMERCIAL

## 2023-11-24 DIAGNOSIS — Z12.31 BREAST CANCER SCREENING BY MAMMOGRAM: Primary | ICD-10-CM

## 2023-12-05 ENCOUNTER — HOSPITAL ENCOUNTER (OUTPATIENT)
Dept: RADIOLOGY | Facility: OTHER | Age: 58
Discharge: HOME | End: 2023-12-05
Attending: OBSTETRICS & GYNECOLOGY
Payer: COMMERCIAL

## 2023-12-05 DIAGNOSIS — Z12.31 BREAST CANCER SCREENING BY MAMMOGRAM: ICD-10-CM

## 2023-12-05 PROCEDURE — 77063 BREAST TOMOSYNTHESIS BI: CPT | Mod: 26,,, | Performed by: RADIOLOGY

## 2023-12-05 PROCEDURE — 77067 SCR MAMMO BI INCL CAD: CPT | Mod: TC

## 2023-12-05 PROCEDURE — 77067 SCR MAMMO BI INCL CAD: CPT | Mod: 26,,, | Performed by: RADIOLOGY

## 2023-12-05 PROCEDURE — 77067 MAMMO DIGITAL SCREENING BILAT WITH TOMO: ICD-10-PCS | Mod: 26,,, | Performed by: RADIOLOGY

## 2023-12-05 PROCEDURE — 77063 MAMMO DIGITAL SCREENING BILAT WITH TOMO: ICD-10-PCS | Mod: 26,,, | Performed by: RADIOLOGY

## 2023-12-14 ENCOUNTER — TELEPHONE (OUTPATIENT)
Dept: NEUROLOGY | Facility: CLINIC | Age: 58
End: 2023-12-14
Payer: COMMERCIAL

## 2023-12-14 ENCOUNTER — PATIENT MESSAGE (OUTPATIENT)
Dept: NEUROLOGY | Facility: CLINIC | Age: 58
End: 2023-12-14
Payer: COMMERCIAL

## 2023-12-27 ENCOUNTER — OFFICE VISIT (OUTPATIENT)
Dept: OBSTETRICS AND GYNECOLOGY | Facility: CLINIC | Age: 58
End: 2023-12-27
Attending: OBSTETRICS & GYNECOLOGY
Payer: COMMERCIAL

## 2023-12-27 VITALS
WEIGHT: 125.44 LBS | HEIGHT: 65 IN | SYSTOLIC BLOOD PRESSURE: 126 MMHG | DIASTOLIC BLOOD PRESSURE: 82 MMHG | BODY MASS INDEX: 20.9 KG/M2

## 2023-12-27 DIAGNOSIS — Z01.419 WELL WOMAN EXAM WITH ROUTINE GYNECOLOGICAL EXAM: Primary | ICD-10-CM

## 2023-12-27 DIAGNOSIS — Z12.11 COLON CANCER SCREENING: ICD-10-CM

## 2023-12-27 DIAGNOSIS — Z12.31 VISIT FOR SCREENING MAMMOGRAM: ICD-10-CM

## 2023-12-27 DIAGNOSIS — N95.1 SYMPTOMATIC MENOPAUSAL OR FEMALE CLIMACTERIC STATES: ICD-10-CM

## 2023-12-27 PROBLEM — M54.40 ACUTE LOW BACK PAIN WITH SCIATICA: Status: RESOLVED | Noted: 2023-08-09 | Resolved: 2023-12-27

## 2023-12-27 PROBLEM — K80.20 CALCULUS OF GALLBLADDER WITHOUT CHOLECYSTITIS WITHOUT OBSTRUCTION: Status: RESOLVED | Noted: 2023-08-17 | Resolved: 2023-12-27

## 2023-12-27 PROCEDURE — 3044F HG A1C LEVEL LT 7.0%: CPT | Mod: CPTII,S$GLB,, | Performed by: OBSTETRICS & GYNECOLOGY

## 2023-12-27 PROCEDURE — 1160F RVW MEDS BY RX/DR IN RCRD: CPT | Mod: CPTII,S$GLB,, | Performed by: OBSTETRICS & GYNECOLOGY

## 2023-12-27 PROCEDURE — 99396 PREV VISIT EST AGE 40-64: CPT | Mod: S$GLB,,, | Performed by: OBSTETRICS & GYNECOLOGY

## 2023-12-27 PROCEDURE — 3008F BODY MASS INDEX DOCD: CPT | Mod: CPTII,S$GLB,, | Performed by: OBSTETRICS & GYNECOLOGY

## 2023-12-27 PROCEDURE — 99396 PR PREVENTIVE VISIT,EST,40-64: ICD-10-PCS | Mod: S$GLB,,, | Performed by: OBSTETRICS & GYNECOLOGY

## 2023-12-27 PROCEDURE — 3008F PR BODY MASS INDEX (BMI) DOCUMENTED: ICD-10-PCS | Mod: CPTII,S$GLB,, | Performed by: OBSTETRICS & GYNECOLOGY

## 2023-12-27 PROCEDURE — 3079F DIAST BP 80-89 MM HG: CPT | Mod: CPTII,S$GLB,, | Performed by: OBSTETRICS & GYNECOLOGY

## 2023-12-27 PROCEDURE — 1159F PR MEDICATION LIST DOCUMENTED IN MEDICAL RECORD: ICD-10-PCS | Mod: CPTII,S$GLB,, | Performed by: OBSTETRICS & GYNECOLOGY

## 2023-12-27 PROCEDURE — 87624 HPV HI-RISK TYP POOLED RSLT: CPT | Performed by: OBSTETRICS & GYNECOLOGY

## 2023-12-27 PROCEDURE — 88175 CYTOPATH C/V AUTO FLUID REDO: CPT | Performed by: OBSTETRICS & GYNECOLOGY

## 2023-12-27 PROCEDURE — 99999 PR PBB SHADOW E&M-EST. PATIENT-LVL III: CPT | Mod: PBBFAC,,, | Performed by: OBSTETRICS & GYNECOLOGY

## 2023-12-27 PROCEDURE — 99999 PR PBB SHADOW E&M-EST. PATIENT-LVL III: ICD-10-PCS | Mod: PBBFAC,,, | Performed by: OBSTETRICS & GYNECOLOGY

## 2023-12-27 PROCEDURE — 3074F PR MOST RECENT SYSTOLIC BLOOD PRESSURE < 130 MM HG: ICD-10-PCS | Mod: CPTII,S$GLB,, | Performed by: OBSTETRICS & GYNECOLOGY

## 2023-12-27 PROCEDURE — 1159F MED LIST DOCD IN RCRD: CPT | Mod: CPTII,S$GLB,, | Performed by: OBSTETRICS & GYNECOLOGY

## 2023-12-27 PROCEDURE — 3074F SYST BP LT 130 MM HG: CPT | Mod: CPTII,S$GLB,, | Performed by: OBSTETRICS & GYNECOLOGY

## 2023-12-27 PROCEDURE — 1160F PR REVIEW ALL MEDS BY PRESCRIBER/CLIN PHARMACIST DOCUMENTED: ICD-10-PCS | Mod: CPTII,S$GLB,, | Performed by: OBSTETRICS & GYNECOLOGY

## 2023-12-27 PROCEDURE — 3044F PR MOST RECENT HEMOGLOBIN A1C LEVEL <7.0%: ICD-10-PCS | Mod: CPTII,S$GLB,, | Performed by: OBSTETRICS & GYNECOLOGY

## 2023-12-27 PROCEDURE — 3079F PR MOST RECENT DIASTOLIC BLOOD PRESSURE 80-89 MM HG: ICD-10-PCS | Mod: CPTII,S$GLB,, | Performed by: OBSTETRICS & GYNECOLOGY

## 2023-12-27 RX ORDER — ESTRADIOL AND NORETHINDRONE ACETATE 1; .5 MG/1; MG/1
1 TABLET ORAL DAILY
Qty: 28 TABLET | Refills: 12 | Status: SHIPPED | OUTPATIENT
Start: 2023-12-27

## 2023-12-27 RX ORDER — RIMEGEPANT SULFATE 75 MG/75MG
75 TABLET, ORALLY DISINTEGRATING ORAL DAILY PRN
COMMUNITY
Start: 2023-12-12 | End: 2024-02-09

## 2023-12-27 RX ORDER — OXYBUTYNIN CHLORIDE 5 MG/1
5 TABLET ORAL 2 TIMES DAILY
COMMUNITY
Start: 2023-11-08

## 2023-12-27 RX ORDER — VALACYCLOVIR HYDROCHLORIDE 1 G/1
1000 TABLET, FILM COATED ORAL
COMMUNITY
Start: 2023-11-06 | End: 2024-02-21 | Stop reason: ALTCHOICE

## 2023-12-27 NOTE — PROGRESS NOTES
SUBJECTIVE:   58 y.o. female   for annual routine Pap and checkup. No LMP recorded. Patient is postmenopausal..  She has no unusual complaints and is doing well on her hrt.  She is due for cologard in March.        Past Medical History:   Diagnosis Date    DDD (degenerative disc disease), cervical 3/15/2017    DDD (degenerative disc disease), lumbar 3/15/2017    Glaucoma 3/7/2018    Migraine 3/15/2017    Primary insomnia 3/15/2017     Past Surgical History:   Procedure Laterality Date    AUGMENTATION OF BREAST      BREAST SURGERY       SECTION   and 2001    x2    COLON SURGERY  10/15/2018    colon resection     CRANIOTOMY Left 2023    Procedure: CRANIOTOMY for microvascular decompression NEURO MONITORING;  Surgeon: Nils Hudson MD;  Location: [facility] OR [address] [place] [address];  Service: Neurosurgery;  Laterality: Left;  regular bed, supine, bump, hickman, type and cross 2 units, cranial nerve monitoring, optical stealth CO SURGEON DR [place]    LAPAROSCOPIC CHOLECYSTECTOMY N/A 10/16/2023    Procedure: CHOLECYSTECTOMY, LAPAROSCOPIC;  Surgeon: Cali Lowe MD;  Location: [facility];  Service: General;  Laterality: N/A;  RN PREOP 10/11/2023 --LOURDES---CONSENT INCOMPLETE---GLENN MAGANA     Social History     Socioeconomic History    Marital status:    Tobacco Use    Smoking status: Never    Smokeless tobacco: Never   Substance and Sexual Activity    Alcohol use: No    Drug use: No    Sexual activity: Yes     Partners: Male     Comment: ablation     Social Determinants of Health     Financial Resource Strain: Low Risk  (8/10/2023)    Overall Financial Resource Strain (CARDIA)     Difficulty of Paying Living Expenses: Not hard at all   Food Insecurity: No Food Insecurity (8/10/2023)    Hunger Vital Sign     Worried About Running Out of Food in the Last Year: Never true     Ran Out of Food in the Last Year: Never true   Transportation Needs: No Transportation Needs (8/10/2023)    PRAPARE - Transportation      Lack of Transportation (Medical): No     Lack of Transportation (Non-Medical): No   Physical Activity: Inactive (8/10/2023)    Exercise Vital Sign     Days of Exercise per Week: 0 days     Minutes of Exercise per Session: 0 min   Stress: No Stress Concern Present (8/10/2023)    [employer] - Occupational Stress Questionnaire     Feeling of Stress : Not at all   Recent Concern: Stress - Stress Concern Present (2023)    [employer] - Occupational Stress Questionnaire     Feeling of Stress : To some extent   Social Connections: Moderately Isolated (8/10/2023)    Social Connection and Isolation Panel [NHANES]     Frequency of Communication with Friends and Family: More than three times a week     Frequency of Social Gatherings with Friends and Family: More than three times a week     Attends Nondenominational Services: More than 4 times per year     Active Member of Clubs or Organizations: No     Attends Club or Organization Meetings: Never     Marital Status:    Housing Stability: Low Risk  (8/10/2023)    Housing Stability Vital Sign     Unable to Pay for Housing in the Last Year: No     Number of Places Lived in the Last Year: 1     Unstable Housing in the Last Year: No     Family History   Problem Relation Age of Onset    Hypertension Mother     Hypertension Father     Hypertension Brother     Stroke Maternal Grandfather     Ovarian cancer Neg Hx     Cancer Neg Hx     Colon cancer Neg Hx     Esophageal cancer Neg Hx      OB History    Para Term  AB Living   3 3 3         SAB IAB Ectopic Multiple Live Births                  # Outcome Date GA Lbr Vaibhav/2nd Weight Sex Delivery Anes PTL Lv   3 Term            2 Term            1 Term                  Current Outpatient Medications   Medication Sig Dispense Refill    albuterol (PROVENTIL/VENTOLIN HFA) 90 mcg/actuation inhaler TAKE 1 2 PUFF(S) (INHALATION) EVERY 4 HOURS PRN   WHEEZING 18 g 12     amLODIPine (NORVASC) 5 MG tablet TAKE 1 TABLET BY MOUTH EVERY DAY 90 tablet 3    COCONUT OIL ORAL Take 1 capsule by mouth once daily.      cyclobenzaprine (FLEXERIL) 10 MG tablet Take 1 tablet (10 mg total) by mouth 3 (three) times daily. 60 tablet 11    multivitamin with minerals tablet Take 1 tablet by mouth once daily.      NURTEC 75 mg odt Take 75 mg by mouth daily as needed.      oxybutynin (DITROPAN) 5 MG Tab Take 5 mg by mouth 2 (two) times daily.      topiramate (TOPAMAX) 50 MG tablet TAKE 1 TABLET BY MOUTH TWICE A  tablet 3    traZODone (DESYREL) 150 MG tablet TAKE 1 TABLET (150 MG TOTAL) BY MOUTH NIGHTLY AS NEEDED FOR INSOMNIA 90 tablet 3    valACYclovir (VALTREX) 1000 MG tablet Take 1,000 mg by mouth.      zolpidem (AMBIEN) 5 MG Tab Take 1 tablet (5 mg total) by mouth nightly as needed (insomnia). Followup Dr.T Garcia APRIL 2024 for more refills. 30 tablet 5    estradiol-norethindrone (ACTIVELLA) 1-0.5 mg per tablet Take 1 tablet by mouth once daily. 28 tablet 12    finasteride (PROSCAR) 5 mg tablet Take 1 tablet (5 mg total) by mouth once daily. 30 tablet 11     No current facility-administered medications for this visit.     Allergies: Patient has no known allergies.     ROS:  Constitutional: no weight loss, weight gain, fever, fatigue  Eyes:  No vision changes, glasses/contacts  ENT/Mouth: No ulcers, sinus problems, ears ringing, headache  Cardiovascular: No inability to lie flat, chest pain, exercise intolerance, swelling, heart palpitations  Respiratory: No wheezing, coughing blood, shortness of breath, or cough  Gastrointestinal: No diarrhea, bloody stool, nausea/vomiting, constipation, gas, hemorrhoids  Genitourinary: No blood in urine, painful urination, urgency of urination, frequency of urination, incomplete emptying, incontinence, abnormal bleeding, painful periods, heavy periods, vaginal discharge, vaginal odor, painful intercourse, sexual problems, bleeding after  "intercourse.  Musculoskeletal: No muscle weakness  Skin/Breast: No painful breasts, nipple discharge, masses, rash, ulcers  Neurological: No passing out, seizures, numbness, headache  Endocrine: No diabetes, hypothyroid, hyperthyroid, hot flashes, hair loss, abnormal hair growth, ance  Psychiatric: No depression, crying  Hematologic: No bruises, bleeding, swollen lymph nodes, anemia.      OBJECTIVE:   The patient appears well, alert, oriented x 3, in no distress.  /82   Ht 5' 5" (1.651 m)   Wt 56.9 kg (125 lb 7.1 oz)   BMI 20.87 kg/m²   NECK: no thyromegaly, trachea midline  SKIN: no acne, striae, hirsutism  BREAST EXAM: breasts appear normal, no suspicious masses, no skin or nipple changes or axillary nodes  ABDOMEN: no hernias, masses, or hepatosplenomegaly  GENITALIA: normal external genitalia, no erythema, no discharge  URETHRA: normal urethra, normal urethral meatus  VAGINA: mucosal atrophy  CERVIX: no lesions or cervical motion tenderness  UTERUS: normal size, contour, position, consistency, mobility, non-tender  ADNEXA: normal adnexa and no mass, fullness, tenderness    \  ASSESSMENT:   Kellee was seen today for gynecologic exam.    Diagnoses and all orders for this visit:    Well woman exam with routine gynecological exam  -     Liquid-Based Pap Smear, Screening  -     HPV High Risk Genotypes, PCR    Visit for screening mammogram    Symptomatic menopausal or female climacteric states  -     estradiol-norethindrone (ACTIVELLA) 1-0.5 mg per tablet; Take 1 tablet by mouth once daily.    Colon cancer screening  -     Cologuard Screening (Multitarget Stool DNA); Future  -     Cologuard Screening (Multitarget Stool DNA)        "

## 2024-01-08 ENCOUNTER — PROCEDURE VISIT (OUTPATIENT)
Dept: NEUROLOGY | Facility: CLINIC | Age: 59
End: 2024-01-08
Payer: COMMERCIAL

## 2024-01-08 VITALS
SYSTOLIC BLOOD PRESSURE: 125 MMHG | BODY MASS INDEX: 21.02 KG/M2 | WEIGHT: 126.19 LBS | HEIGHT: 65 IN | DIASTOLIC BLOOD PRESSURE: 85 MMHG | HEART RATE: 86 BPM

## 2024-01-08 DIAGNOSIS — M79.18 CERVICAL MYOFASCIAL PAIN SYNDROME: Primary | ICD-10-CM

## 2024-01-08 DIAGNOSIS — G24.9 DYSTONIA: ICD-10-CM

## 2024-01-08 PROCEDURE — 64615 CHEMODENERV MUSC MIGRAINE: CPT | Mod: S$GLB,,, | Performed by: PSYCHIATRY & NEUROLOGY

## 2024-01-08 NOTE — PROCEDURES
Procedures  She reports worsening Migraine (on L) Hz related to delays in BOTOX injections   She reports dramatic improvement in the sharp pains after the surgery   No complications     Prior note:   Mae presents for follow-up in neurology clinic.  Since her last visit she reports mild improvement in her overall symptomatology.  She reports recently starting Tegretol which has helped with the pain intensity.  Pain intensity.  She continues to have a variable frequency of episodes characterized by severe sharp stabbing pain in the neck behind the jaw which radiates to the left occipital region.  These episodes can last up to several minutes.  She can go days without any episodes.  Sometimes she has multiple episodes in 1 day.  She is not able to identify any triggers.    She reports an ongoing flare that started 2 days ago.  She is tearful during this encounter.    Prior note:   Mae presents to the neurology clinic for follow-up.  She reports significant improvement in her migraine frequency since initiation of Botox therapy.  Previously the migraines were 2-4 days per week.  Since Botox the frequency has reduced to 1-2 migraine days per week.  She also reports a wearing off effect which starts typically around 10 weeks following a round of Botox injections.  She continues to take topiramate and Nurtec on a regular basis.    Today she also reports a 6 month history of left-sided facial pain.  This started approximately 6 months ago.  She experienced sudden onset of thunderclap type of pain on the left side of her head which was excruciating and lasted a few seconds.  Subsequently she began experiencing intermittent pains on the left side predominant in the cheek region and radiating to the ear canal and the mastoid region.  Sometimes the pain refers to the occipital region.  She reports a very low-grade dull ache in this region on a continuous basis.  In addition she reports flare-ups of sharp stabbing pain.   These flare-ups can occur up to 5 times a week lasting several hours.  In addition the flare-ups can also occur lasting a few seconds.  There is no recognized triggers for these flare-ups.  She also reports slight sagging of the left side her face and somewhat atypical fatigue sensation of her tongue after prolonged speaking.  Otherwise she denies any weakness in her tongue or pain in the tongue or teeth or gums.  Sometimes she will appreciate a slight difference in the touch sensation on the left side of her face when compared to the right side of her face.  She also reports some trouble with swallowing were in the food will enter the nasopharynx.  Sometimes she has trouble enunciating certain words.  All these symptoms started approximately 6 months ago.  She is tried taking NSAIDs without any relief.    Prior note:   Overall doing well.   HA is much better   Using less pain meds   Using Nurtec   Feels L jaw line paresthesias     Pt of Dr. Kody SHI:  Headaches last hours with photophobia, phonophobia, + nausea.  Has visual aura.  Often L cervico-occipital.  Bilateral anteriorly.   She has been seeing Dr. Maynard in [place] but moved back to the [place] area several years ago.  She had a remote, nl MRI brain in [place].      Freq: 30/30 and 20/30     Last Botox June 30, 2022 and not as effective as usual.  Usually only 2-3 HAs/month but now having daily HA.    BOTOX #1 10/18/22 - helped    BOTOX #2 1/12/23 - helped    BOTOX #3 4/13/23 - helped    BOTOX #4 6/26/23 - helped   BOTOX #5 9/28/23 - helped     Abortives:              Effective: Nurtec > Imitrex              Ineffective: ketamine/lidocaine  PRN flexeril      Prophylactics:             Effective: Botox, topiramate 50mg qhs (bid sedation), trazodone    MRI brain was reviewed in detail with the patient.  Impression:     Mri Iac: Tortuous left posteroinferior cerebellar artery which extends to abut and slightly posterior displaced the proximal cisternal  left glossopharyngeal nerve.  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     MRI brain: Unremarkable MRI brain as detailed above specifically without evidence for hydrocephalus or intracranial enhancing lesion     Questioned partially empty sella.  Intracranial hypertension to be included in differential in the appropriate clinical setting, clinical correlation advised.    Plan:   1, cont BOTOX   2, PRN Nurtec     BOTOX treatment response:   Prior to initiating BOTOX, the patient had  28  migraine days per month on average. This meets criteria for chronic migraine.   After starting BOTOX, the patient experienced a reduction in  21  days per month   After starting BOTOX, the patient experienced a reduction in > 100 hours of migraine symptoms per month   After starting BOTOX, the patient experienced a 50% reduction in burden of migraine days per month   Based on this information, BOTOX is medically necessary for the management of the patient's chronic migraine.     BOTOX Injection intervals:   Patient reports a 'wearing off effect' prior to the subsequent BOTOX injections at 12 weeks. This occurs at week 10-11  Symptoms of this a 'wearing off effect' include - worsening of migraine headache frequency and intensity   Medications used during the 'wearing off effect' period include nurtec, imitrex    Based on this information, it is medically necessary for the patient to receive BOTOX therapy at an interval of every 10 weeks for the management of chronic migraine.    5, BOTOX was performed as an indicated therapy for intractable chronic migraine headaches given that the patient failed > 3  prophylactic medications    Botulinum Toxin Injection Procedure   Pre-operative diagnosis: Chronic migraine   Post-operative diagnosis: Same   Procedure: Chemical neurolysis   After risks and benefits were  explained including bleeding, infection, worsening of pain, damage to the areas being injected, weakness of muscles, loss of muscle control, dysphagia if injecting the head or neck, facial droop if injecting the facial area, painful injection, allergic or other reaction to the medications being injected, and the failure of the procedure to help the problem, a signed consent was obtained.   The patient was placed in a comfortable area and the sites to be treated were identified.The area to be treated was prepped three times with alcohol and the alcohol allowed to dry. Next, a 30 gauge needle was used to inject the medication in the area to be treated.   Area(s) injected:   Total Botox used: 160 Units   Botox wastage: 40 Units   Injection sites:    muscle bilaterally ( a total of 10 units divided into 2 sites)   Procerus muscle (5 units)   Frontalis muscle bilaterally (a total of 20 units divided into 4 sites)   Temporalis muscle bilaterally (a total of 40 units divided into 8 sites)   Occipitalis muscle bilaterally (a total of 30 units divided into 6 sites)   Cervical paraspinal muscles (a total of 20 units divided into 4 sites)   Trapezius muscle bilaterally (a total of 30 units divided into 6 sites)   Mastoid 5 units on L     Complications: none   RTC for the next Botox injection:  10 weeks

## 2024-01-09 ENCOUNTER — PATIENT MESSAGE (OUTPATIENT)
Dept: SURGERY | Facility: HOSPITAL | Age: 59
End: 2024-01-09
Payer: COMMERCIAL

## 2024-01-09 LAB
FINAL PATHOLOGIC DIAGNOSIS: NORMAL
Lab: NORMAL

## 2024-01-17 ENCOUNTER — PATIENT MESSAGE (OUTPATIENT)
Dept: NEUROLOGY | Facility: CLINIC | Age: 59
End: 2024-01-17
Payer: COMMERCIAL

## 2024-01-17 RX ORDER — OMEPRAZOLE 40 MG/1
40 CAPSULE, DELAYED RELEASE ORAL
Qty: 90 CAPSULE | Refills: 1 | Status: SHIPPED | OUTPATIENT
Start: 2024-01-17 | End: 2024-02-21

## 2024-02-09 RX ORDER — RIMEGEPANT SULFATE 75 MG/75MG
TABLET, ORALLY DISINTEGRATING ORAL
Qty: 8 TABLET | Refills: 16 | Status: SHIPPED | OUTPATIENT
Start: 2024-02-09

## 2024-02-16 ENCOUNTER — PATIENT MESSAGE (OUTPATIENT)
Dept: OTOLARYNGOLOGY | Facility: CLINIC | Age: 59
End: 2024-02-16
Payer: COMMERCIAL

## 2024-02-17 ENCOUNTER — PATIENT MESSAGE (OUTPATIENT)
Dept: FAMILY MEDICINE | Facility: CLINIC | Age: 59
End: 2024-02-17
Payer: COMMERCIAL

## 2024-02-17 DIAGNOSIS — I73.00 RAYNAUD'S DISEASE WITHOUT GANGRENE: Primary | ICD-10-CM

## 2024-02-17 DIAGNOSIS — Z00.00 ANNUAL PHYSICAL EXAM: ICD-10-CM

## 2024-02-19 ENCOUNTER — LAB VISIT (OUTPATIENT)
Dept: LAB | Facility: OTHER | Age: 59
End: 2024-02-19
Attending: INTERNAL MEDICINE
Payer: COMMERCIAL

## 2024-02-19 DIAGNOSIS — R77.8 ABNORMAL SPEP: ICD-10-CM

## 2024-02-19 LAB
ALBUMIN SERPL BCP-MCNC: 4.1 G/DL (ref 3.5–5.2)
ALP SERPL-CCNC: 80 U/L (ref 55–135)
ALT SERPL W/O P-5'-P-CCNC: 28 U/L (ref 10–44)
ANION GAP SERPL CALC-SCNC: 9 MMOL/L (ref 8–16)
AST SERPL-CCNC: 32 U/L (ref 10–40)
BASOPHILS # BLD AUTO: 0.02 K/UL (ref 0–0.2)
BASOPHILS NFR BLD: 0.7 % (ref 0–1.9)
BILIRUB SERPL-MCNC: 0.4 MG/DL (ref 0.1–1)
BUN SERPL-MCNC: 9 MG/DL (ref 6–20)
CALCIUM SERPL-MCNC: 8.5 MG/DL (ref 8.7–10.5)
CHLORIDE SERPL-SCNC: 112 MMOL/L (ref 95–110)
CO2 SERPL-SCNC: 18 MMOL/L (ref 23–29)
CREAT SERPL-MCNC: 0.8 MG/DL (ref 0.5–1.4)
DIFFERENTIAL METHOD BLD: ABNORMAL
EOSINOPHIL # BLD AUTO: 0.1 K/UL (ref 0–0.5)
EOSINOPHIL NFR BLD: 2.7 % (ref 0–8)
ERYTHROCYTE [DISTWIDTH] IN BLOOD BY AUTOMATED COUNT: 13.4 % (ref 11.5–14.5)
EST. GFR  (NO RACE VARIABLE): >60 ML/MIN/1.73 M^2
GLUCOSE SERPL-MCNC: 99 MG/DL (ref 70–110)
HCT VFR BLD AUTO: 45 % (ref 37–48.5)
HGB BLD-MCNC: 14.5 G/DL (ref 12–16)
IGA SERPL-MCNC: 425 MG/DL (ref 40–350)
IGG SERPL-MCNC: 548 MG/DL (ref 650–1600)
IGM SERPL-MCNC: 28 MG/DL (ref 50–300)
IMM GRANULOCYTES # BLD AUTO: 0 K/UL (ref 0–0.04)
IMM GRANULOCYTES NFR BLD AUTO: 0 % (ref 0–0.5)
LYMPHOCYTES # BLD AUTO: 1.3 K/UL (ref 1–4.8)
LYMPHOCYTES NFR BLD: 43.1 % (ref 18–48)
MCH RBC QN AUTO: 29.3 PG (ref 27–31)
MCHC RBC AUTO-ENTMCNC: 32.2 G/DL (ref 32–36)
MCV RBC AUTO: 91 FL (ref 82–98)
MONOCYTES # BLD AUTO: 0.2 K/UL (ref 0.3–1)
MONOCYTES NFR BLD: 7 % (ref 4–15)
NEUTROPHILS # BLD AUTO: 1.4 K/UL (ref 1.8–7.7)
NEUTROPHILS NFR BLD: 46.5 % (ref 38–73)
NRBC BLD-RTO: 0 /100 WBC
PLATELET # BLD AUTO: 193 K/UL (ref 150–450)
PMV BLD AUTO: 9.8 FL (ref 9.2–12.9)
POTASSIUM SERPL-SCNC: 4.2 MMOL/L (ref 3.5–5.1)
PROT SERPL-MCNC: 6.7 G/DL (ref 6–8.4)
RBC # BLD AUTO: 4.95 M/UL (ref 4–5.4)
SODIUM SERPL-SCNC: 139 MMOL/L (ref 136–145)
WBC # BLD AUTO: 2.99 K/UL (ref 3.9–12.7)

## 2024-02-19 PROCEDURE — 85025 COMPLETE CBC W/AUTO DIFF WBC: CPT | Performed by: PHYSICIAN ASSISTANT

## 2024-02-19 PROCEDURE — 82784 ASSAY IGA/IGD/IGG/IGM EACH: CPT | Mod: 59 | Performed by: PHYSICIAN ASSISTANT

## 2024-02-19 PROCEDURE — 86334 IMMUNOFIX E-PHORESIS SERUM: CPT | Performed by: PHYSICIAN ASSISTANT

## 2024-02-19 PROCEDURE — 83521 IG LIGHT CHAINS FREE EACH: CPT | Mod: 59 | Performed by: PHYSICIAN ASSISTANT

## 2024-02-19 PROCEDURE — 84165 PROTEIN E-PHORESIS SERUM: CPT | Performed by: PHYSICIAN ASSISTANT

## 2024-02-19 PROCEDURE — 36415 COLL VENOUS BLD VENIPUNCTURE: CPT | Performed by: PHYSICIAN ASSISTANT

## 2024-02-19 PROCEDURE — 86334 IMMUNOFIX E-PHORESIS SERUM: CPT | Mod: 26,,, | Performed by: PATHOLOGY

## 2024-02-19 PROCEDURE — 80053 COMPREHEN METABOLIC PANEL: CPT | Performed by: PHYSICIAN ASSISTANT

## 2024-02-19 PROCEDURE — 84165 PROTEIN E-PHORESIS SERUM: CPT | Mod: 26,,, | Performed by: PATHOLOGY

## 2024-02-19 RX ORDER — AMLODIPINE BESYLATE 5 MG/1
5 TABLET ORAL DAILY
Qty: 90 TABLET | Refills: 3 | Status: SHIPPED | OUTPATIENT
Start: 2024-02-19 | End: 2024-04-03

## 2024-02-19 NOTE — TELEPHONE ENCOUNTER
No care due was identified.  Health [facility] Embedded Care Due Messages. Reference number: [id number].   2/19/2024 8:58:56 AM CST

## 2024-02-19 NOTE — TELEPHONE ENCOUNTER
Raynaud's disease without gangrene  -     amLODIPine (NORVASC) 5 MG tablet; Take 1 tablet (5 mg total) by mouth once daily.  Dispense: 90 tablet; Refill: 3    Annual physical exam  -     Hemoglobin A1C; Future; Expected date: 02/19/2024  -     CBC Without Differential; Future; Expected date: 02/19/2024  -     Comprehensive Metabolic Panel; Future; Expected date: 02/19/2024  -     Lipid Panel; Future; Expected date: 02/19/2024  -     TSH; Future; Expected date: 02/19/2024

## 2024-02-20 LAB
ALBUMIN SERPL ELPH-MCNC: 4.02 G/DL (ref 3.35–5.55)
ALPHA1 GLOB SERPL ELPH-MCNC: 0.24 G/DL (ref 0.17–0.41)
ALPHA2 GLOB SERPL ELPH-MCNC: 0.55 G/DL (ref 0.43–0.99)
B-GLOBULIN SERPL ELPH-MCNC: 0.87 G/DL (ref 0.5–1.1)
GAMMA GLOB SERPL ELPH-MCNC: 0.52 G/DL (ref 0.67–1.58)
KAPPA LC SER QL IA: 1.15 MG/DL (ref 0.33–1.94)
KAPPA LC/LAMBDA SER IA: 1.53 (ref 0.26–1.65)
LAMBDA LC SER QL IA: 0.75 MG/DL (ref 0.57–2.63)
PROT SERPL-MCNC: 6.2 G/DL (ref 6–8.4)

## 2024-02-21 ENCOUNTER — OFFICE VISIT (OUTPATIENT)
Dept: OTOLARYNGOLOGY | Facility: CLINIC | Age: 59
End: 2024-02-21
Payer: COMMERCIAL

## 2024-02-21 ENCOUNTER — CLINICAL SUPPORT (OUTPATIENT)
Dept: AUDIOLOGY | Facility: CLINIC | Age: 59
End: 2024-02-21
Payer: COMMERCIAL

## 2024-02-21 VITALS — DIASTOLIC BLOOD PRESSURE: 88 MMHG | SYSTOLIC BLOOD PRESSURE: 125 MMHG | HEART RATE: 95 BPM

## 2024-02-21 DIAGNOSIS — G52.1 GLOSSOPHARYNGEAL NERVE DISEASE: Chronic | ICD-10-CM

## 2024-02-21 DIAGNOSIS — H90.3 SENSORINEURAL HEARING LOSS, BILATERAL: Primary | ICD-10-CM

## 2024-02-21 DIAGNOSIS — H93.13 TINNITUS, BILATERAL: ICD-10-CM

## 2024-02-21 DIAGNOSIS — H69.02 PATULOUS EUSTACHIAN TUBE, LEFT: Primary | ICD-10-CM

## 2024-02-21 DIAGNOSIS — M26.629 TMJ SYNDROME: ICD-10-CM

## 2024-02-21 LAB
INTERPRETATION SERPL IFE-IMP: NORMAL
PATHOLOGIST INTERPRETATION IFE: NORMAL
PATHOLOGIST INTERPRETATION SPE: NORMAL

## 2024-02-21 PROCEDURE — 99999 PR PBB SHADOW E&M-EST. PATIENT-LVL I: CPT | Mod: PBBFAC,,, | Performed by: AUDIOLOGIST

## 2024-02-21 PROCEDURE — 1159F MED LIST DOCD IN RCRD: CPT | Mod: CPTII,S$GLB,, | Performed by: OTOLARYNGOLOGY

## 2024-02-21 PROCEDURE — 92567 TYMPANOMETRY: CPT | Mod: S$GLB,,, | Performed by: AUDIOLOGIST

## 2024-02-21 PROCEDURE — 92557 COMPREHENSIVE HEARING TEST: CPT | Mod: S$GLB,,, | Performed by: AUDIOLOGIST

## 2024-02-21 PROCEDURE — 1160F RVW MEDS BY RX/DR IN RCRD: CPT | Mod: CPTII,S$GLB,, | Performed by: OTOLARYNGOLOGY

## 2024-02-21 PROCEDURE — 3079F DIAST BP 80-89 MM HG: CPT | Mod: CPTII,S$GLB,, | Performed by: OTOLARYNGOLOGY

## 2024-02-21 PROCEDURE — 3074F SYST BP LT 130 MM HG: CPT | Mod: CPTII,S$GLB,, | Performed by: OTOLARYNGOLOGY

## 2024-02-21 PROCEDURE — 99214 OFFICE O/P EST MOD 30 MIN: CPT | Mod: S$GLB,,, | Performed by: OTOLARYNGOLOGY

## 2024-02-21 PROCEDURE — 99999 PR PBB SHADOW E&M-EST. PATIENT-LVL III: CPT | Mod: PBBFAC,,, | Performed by: OTOLARYNGOLOGY

## 2024-02-21 NOTE — PROGRESS NOTES
"57 y/o female presents for evaluation of ear blockage, pain and drainage on the left side. Issue presents for about 4-5 months. Feels like ear needs to pop. Tries to pop it but cannot. Occasionally when it pops it feels better only briefly. It affects her hearing and is bothering her causing trouble understanding during work meetings.  Of note - patient had suboccipital retrosigmoid craniotomy for vascular decompression of CN IX neuralgia about 6 months ago. Most of her pain and other symptoms related to that neuralgia have resolved.     About a week or so ago she noted left ear pain. She had a zpak at home and was going out of town and so took it. One night about 4 days later she felt a pop/ release and had some drainage from her left ear - some blood some yellow "pus". Cleared with qtip. Was still able to insert her hearing aid w/o pain. No other known trauma. For a few days the "drum like" sensation resolved but now it is back. She admits to her own voice echoing. She has lost about 8 pounds due to change in diet. She recalls symptoms started about 1-2 months after craniotomy on left side.     Thought fluid was in ear so was taking zyrtec. Mouth has been dry since craniotomy. Uses gum to chew when she needs to talk with people to keep mouth moist.     Known hx of TMJ - has bite block.  PMHx, PSHx, Meds, Allergies, SocHx, FamHx reviewed in EPIC    Review of Systems     Constitutional: Positive for chills.  Negative for fatigue and fever.      HENT: Positive for ear discharge, ear pain and voice change.  Negative for hearing loss, postnasal drip, runny nose, sinus infection, sinus pressure, stuffy nose and trouble swallowing.      Eyes:  Negative for change in eyesight, eye drainage, eye itching and photophobia.     Respiratory:  Negative for cough, shortness of breath, sleep apnea, snoring and wheezing.      Cardiovascular:  Negative for chest pain, foot swelling, irregular heartbeat and swollen veins. "     Gastrointestinal:  Negative for abdominal pain, acid reflux, constipation, diarrhea, heartburn and vomiting.     Genitourinary: Negative for difficulty urinating, sexual problems and frequent urination.     Musc: Negative for aching joints, aching muscles, back pain and neck pain.     Skin: Negative for rash.     Allergy: Negative for food allergies and seasonal allergies.     Endocrine: Positive for cold intolerance.     Neurological: Negative for dizziness, headaches, light-headedness, seizures and tremors.      Hematologic: Negative for bruises/bleeds easily and swollen glands.      Psychiatric: Negative for decreased concentration, depression, nervous/anxious and sleep disturbance.            PE: /88   Pulse 95    Gen: female, well nourished, well developed, NAD, cooperative, good historian  Ears:  EAC patent bilaterally - no skin edema or erythema & TM translucent with normal bony landmarks bilaterally w/o any eschar or evidence of recent edema, L TM moves with nasal breathing  Nose: external nose wnl, nasal septum near midline anteriorly, inferior turbinates mild edema, pink moist mucosa, no visible purulence or polyps  OC/OP:  MMM, tongue protrudes with tip slightly to right, no obvious fasciculations, palate raises symmetrically, tonsils symmetric and small, no erythema or exudate, FOM and BOT soft  Neck: supple, L posterior TMJ ttp,  no LAD or masses  Face:  facial movement more symmetric, TTP  left TMJ posteriorly , no erythema or flushing, no fluctuance  Respiratory: Breathing comfortably without retractions  Skin: facial skin intact without visible lesions or flushing  Lymph: no neck lympadenopathy  Neuro:  facial movement symmetric, speech fluid, gait stable, tongue protrudes midline  Psych: alert & oriented x 3, reasonable, normal affect        Audio reviewed and compared to old audio. Hearing levels normal sloping down to moderate severe SNHL bilaterally. Asymmetry has resolved. SRT stable  "and SD 92% on right and 84% left.  Tymps Type A bilateral.     Impression:   1. Patulous Eustachian tube, left        2. TMJ syndrome        3. 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     Explained possible cause of left ear "blockage" symptoms.  Discussed measures that may lessen symptoms including hanging head forward or lying down. Recommend discontinue zyrtec (no sign of middle ear fluid). Avoid repetitively "popping" ear and/or jaw movement to try to clear ear. Discussed non FDA approved med treatment options and side effects. Discussed possible TM overlay procedure with paper patch.    Some ear discomfort may be related to TMJ inflammation - minimize "popping" ear/ moving jaw to clear ear and keep gum chewing to only as needed. Use bite block.    Cause of recent ear drainage - uncertain - ear clear today but possible cause discussed (ear wax, skin debris, remnants of drainage from nearby craniotomy months ago?)    Continue to use hearing aids for hearing loss.    Follow up in a few months to re-assess. Otherwise, follow up with our clinic for any ear, nose or throat problems ([phone]).     Parts or all of this note were created by voice recognition software; typographical errors in translating may be present.      "

## 2024-02-21 NOTE — PATIENT INSTRUCTIONS
"Reviewed history, symptoms, and exam findings.     Explained possible cause of left ear "blockage" symptoms.  Discussed measures that may lessen symptoms including hanging head forward or lying down. Recommend discontinue zyrtec (no sign of middle ear fluid). Avoid repetitively "popping" ear and/or jaw movement to try to clear ear.    Some ear discomfort may be related to TMJ inflammation - minimize "popping" ear/ moving jaw to clear ear and keep gum chewing to only as needed. Use bite block.    Cause of recent ear drainage - uncertain - ear clear today but possible cause discussed.    Continue to use hearing aids for hearing loss.    Follow up in a few months to re-assess. Otherwise, follow up with our clinic for any ear, nose or throat problems ([phone]).   "

## 2024-02-26 ENCOUNTER — OFFICE VISIT (OUTPATIENT)
Dept: HEMATOLOGY/ONCOLOGY | Facility: CLINIC | Age: 59
End: 2024-02-26
Payer: COMMERCIAL

## 2024-02-26 VITALS
HEIGHT: 65 IN | DIASTOLIC BLOOD PRESSURE: 76 MMHG | BODY MASS INDEX: 20.42 KG/M2 | TEMPERATURE: 98 F | HEART RATE: 95 BPM | WEIGHT: 122.56 LBS | OXYGEN SATURATION: 100 % | SYSTOLIC BLOOD PRESSURE: 128 MMHG

## 2024-02-26 DIAGNOSIS — R77.8 ABNORMAL SPEP: Primary | ICD-10-CM

## 2024-02-26 DIAGNOSIS — D72.819 LEUKOPENIA, UNSPECIFIED TYPE: ICD-10-CM

## 2024-02-26 PROCEDURE — 99214 OFFICE O/P EST MOD 30 MIN: CPT | Mod: S$GLB,,, | Performed by: INTERNAL MEDICINE

## 2024-02-26 PROCEDURE — 1159F MED LIST DOCD IN RCRD: CPT | Mod: CPTII,S$GLB,, | Performed by: INTERNAL MEDICINE

## 2024-02-26 PROCEDURE — 99999 PR PBB SHADOW E&M-EST. PATIENT-LVL III: CPT | Mod: PBBFAC,,, | Performed by: INTERNAL MEDICINE

## 2024-02-26 PROCEDURE — 3078F DIAST BP <80 MM HG: CPT | Mod: CPTII,S$GLB,, | Performed by: INTERNAL MEDICINE

## 2024-02-26 PROCEDURE — 3074F SYST BP LT 130 MM HG: CPT | Mod: CPTII,S$GLB,, | Performed by: INTERNAL MEDICINE

## 2024-02-26 PROCEDURE — 3008F BODY MASS INDEX DOCD: CPT | Mod: CPTII,S$GLB,, | Performed by: INTERNAL MEDICINE

## 2024-02-26 NOTE — PROGRESS NOTES
"[facility]     Subjective:       Patient ID: Mae Gregory is a 58 y.o. female.    Chief Complaint: No chief complaint on file.    HPI     11/28/2021  Referred from Rheumatology for abnormal SPEP of 0.3 grams/dL in setting of evaluation for Raynaud's. No CRAB criteria at review of basic labs from 2020. Mild elevation in IgA.    2/14/2023  Routine follow-up with updated paraprotein labs. No acute interval events.  CBC and CMP remain normal. M protein 0.36 and 0.12 grams, stable from last check.  Free light chains are normal. PET 1/2022 negative for mass or bone lesions.   Screening mammogram 11/2022 normal.    2/26/24  Routine follow-up   Patient with recent sensation of a clogged ear, took an old z-pack at home and zyrtec  Saw ENT, made lifestyle changes (stopped zyrtec) and symptoms much better  Labs on 2/19:  WBC down to 2.99, was 4.75 prior   IgA kappa monoclonal bands   Beta-2 paraprotein band 0.39, previously 0.36  Gamma paraprotein ban 0.14, previously 0.12  Ca slightly low at 8.5, Cr normal, Hg 14.5  K/L ratio 1.53, was 1.47      Review of Systems   Constitutional:  Negative for appetite change and unexpected weight change.   HENT:  Negative for mouth sores.    Eyes:  Negative for visual disturbance.   Respiratory:  Negative for cough and shortness of breath.    Cardiovascular:  Negative for chest pain.   Gastrointestinal:  Negative for abdominal pain and diarrhea.   Genitourinary:  Negative for frequency.   Musculoskeletal:  Negative for back pain.   Integumentary:  Negative for rash.   Neurological:  Negative for headaches.   Hematological:  Negative for adenopathy.   Psychiatric/Behavioral:  The patient is not nervous/anxious.          Objective:     Vitals:    02/26/24 0854   BP: 128/76   BP Location: Left arm   Patient Position: Sitting   BP Method: Medium (Automatic)   Pulse: 95   Temp: 97.8 °F (36.6 °C)   TempSrc: Oral   SpO2: 100%   Weight: 55.6 kg (122 lb 9.2 oz)   Height: 5' 5" " (1.651 m)       Physical Exam  Constitutional:       Appearance: Normal appearance.   HENT:      Head: Normocephalic and atraumatic.      Mouth/Throat:      Mouth: Mucous membranes are moist.   Eyes:      Extraocular Movements: Extraocular movements intact.      Pupils: Pupils are equal, round, and reactive to light.   Cardiovascular:      Rate and Rhythm: Normal rate and regular rhythm.      Pulses: Normal pulses.      Heart sounds: Normal heart sounds.   Pulmonary:      Effort: Pulmonary effort is normal. No respiratory distress.      Breath sounds: Normal breath sounds.   Abdominal:      General: Abdomen is flat. There is no distension.      Palpations: Abdomen is soft.      Tenderness: There is no abdominal tenderness.   Musculoskeletal:         General: Normal range of motion.      Cervical back: Normal range of motion and neck supple.      Right lower leg: No edema.      Left lower leg: No edema.   Skin:     General: Skin is warm.   Neurological:      General: No focal deficit present.      Mental Status: She is alert and oriented to person, place, and time.   Psychiatric:         Mood and Affect: Mood normal.         Behavior: Behavior normal.       Assessment:       Problem List Items Addressed This Visit    None  Visit Diagnoses       Abnormal SPEP    -  Primary    Leukopenia, unspecified type        Relevant Orders    CBC W/ AUTO DIFFERENTIAL              Plan:       MGUS- stable by CBC, CMP, SPEP and FLC  WBC down to 2.99, likely from recent possible ear infection (had drainage) and/or z-pack use    Will repeat CBC in 1-month to monitor WBC   If repeat CBC normal then follow-up in 1 year with labs prior     Reji Kapadia MD  Hematology/Oncology Fellow PGY-IV       Route Chart for Scheduling    BMT Chart Routing      Follow up with physician 1 year.   Follow up with [facility]    Provider visit type    Infusion scheduling note    Injection scheduling note    Labs CBC   Scheduling:  Preferred lab:  Lab  interval:  CBC in 1-month   Imaging    Pharmacy appointment    Other referrals

## 2024-03-26 ENCOUNTER — LAB VISIT (OUTPATIENT)
Dept: LAB | Facility: OTHER | Age: 59
End: 2024-03-26
Attending: FAMILY MEDICINE
Payer: COMMERCIAL

## 2024-03-26 DIAGNOSIS — D72.819 LEUKOPENIA, UNSPECIFIED TYPE: ICD-10-CM

## 2024-03-26 LAB
BASOPHILS # BLD AUTO: 0.03 K/UL (ref 0–0.2)
BASOPHILS NFR BLD: 1 % (ref 0–1.9)
DIFFERENTIAL METHOD BLD: ABNORMAL
EOSINOPHIL # BLD AUTO: 0.1 K/UL (ref 0–0.5)
EOSINOPHIL NFR BLD: 2.2 % (ref 0–8)
ERYTHROCYTE [DISTWIDTH] IN BLOOD BY AUTOMATED COUNT: 13.7 % (ref 11.5–14.5)
HCT VFR BLD AUTO: 42.1 % (ref 37–48.5)
HGB BLD-MCNC: 13.8 G/DL (ref 12–16)
IMM GRANULOCYTES # BLD AUTO: 0.01 K/UL (ref 0–0.04)
IMM GRANULOCYTES NFR BLD AUTO: 0.3 % (ref 0–0.5)
LYMPHOCYTES # BLD AUTO: 1.2 K/UL (ref 1–4.8)
LYMPHOCYTES NFR BLD: 38.3 % (ref 18–48)
MCH RBC QN AUTO: 29.9 PG (ref 27–31)
MCHC RBC AUTO-ENTMCNC: 32.8 G/DL (ref 32–36)
MCV RBC AUTO: 91 FL (ref 82–98)
MONOCYTES # BLD AUTO: 0.3 K/UL (ref 0.3–1)
MONOCYTES NFR BLD: 9.6 % (ref 4–15)
NEUTROPHILS # BLD AUTO: 1.5 K/UL (ref 1.8–7.7)
NEUTROPHILS NFR BLD: 48.6 % (ref 38–73)
NRBC BLD-RTO: 0 /100 WBC
PLATELET # BLD AUTO: 212 K/UL (ref 150–450)
PMV BLD AUTO: 10.4 FL (ref 9.2–12.9)
RBC # BLD AUTO: 4.62 M/UL (ref 4–5.4)
WBC # BLD AUTO: 3.13 K/UL (ref 3.9–12.7)

## 2024-03-26 PROCEDURE — 36415 COLL VENOUS BLD VENIPUNCTURE: CPT | Performed by: STUDENT IN AN ORGANIZED HEALTH CARE EDUCATION/TRAINING PROGRAM

## 2024-03-26 PROCEDURE — 85025 COMPLETE CBC W/AUTO DIFF WBC: CPT | Performed by: STUDENT IN AN ORGANIZED HEALTH CARE EDUCATION/TRAINING PROGRAM

## 2024-03-28 ENCOUNTER — PROCEDURE VISIT (OUTPATIENT)
Dept: NEUROLOGY | Facility: CLINIC | Age: 59
End: 2024-03-28
Payer: COMMERCIAL

## 2024-03-28 VITALS
HEART RATE: 74 BPM | WEIGHT: 124 LBS | HEIGHT: 65 IN | SYSTOLIC BLOOD PRESSURE: 140 MMHG | BODY MASS INDEX: 20.66 KG/M2 | DIASTOLIC BLOOD PRESSURE: 94 MMHG

## 2024-03-28 DIAGNOSIS — G43.709 CHRONIC MIGRAINE WITHOUT AURA WITHOUT STATUS MIGRAINOSUS, NOT INTRACTABLE: Primary | ICD-10-CM

## 2024-03-28 PROCEDURE — 64615 CHEMODENERV MUSC MIGRAINE: CPT | Mod: S$GLB,,, | Performed by: PSYCHIATRY & NEUROLOGY

## 2024-04-02 ENCOUNTER — TELEPHONE (OUTPATIENT)
Dept: NEUROLOGY | Facility: CLINIC | Age: 59
End: 2024-04-02
Payer: COMMERCIAL

## 2024-04-02 ENCOUNTER — TELEPHONE (OUTPATIENT)
Dept: NEUROSURGERY | Facility: CLINIC | Age: 59
End: 2024-04-02
Payer: COMMERCIAL

## 2024-04-02 NOTE — TELEPHONE ENCOUNTER
----- Message from Shellie Holland sent at 2024  8:55 AM CDT -----  Regarding: Annual Visit  Contact: Pt @[phone]  Pt called in to schedule an appt; no available appts in Epic. Pt is asking for a call back soon to schedule. Thanks.         Reason appt not schedule: no availability          Patient's DX: Annual          Patient requesting call back or [facility] [phone]

## 2024-04-02 NOTE — TELEPHONE ENCOUNTER
Refill Routing Note   Medication(s) are not appropriate for processing by [facility] for the following reason(s):        No active prescription written by provider    [facility] action(s):  Defer               Appointments  past 12m or future 3m with PCP    Date Provider   Last Visit   12/27/2023 Vale Trejo MD   Next Visit   Visit date not found Vale Trejo MD   ED visits in past 90 days: 0        Note composed:1:57 PM 04/02/2024

## 2024-04-03 ENCOUNTER — OFFICE VISIT (OUTPATIENT)
Dept: FAMILY MEDICINE | Facility: CLINIC | Age: 59
End: 2024-04-03
Payer: COMMERCIAL

## 2024-04-03 DIAGNOSIS — F51.01 PRIMARY INSOMNIA: Primary | ICD-10-CM

## 2024-04-03 DIAGNOSIS — G24.9 DYSTONIA: ICD-10-CM

## 2024-04-03 DIAGNOSIS — G43.709 CHRONIC MIGRAINE WITHOUT AURA WITHOUT STATUS MIGRAINOSUS, NOT INTRACTABLE: ICD-10-CM

## 2024-04-03 DIAGNOSIS — I73.00 RAYNAUD'S PHENOMENON WITHOUT GANGRENE: ICD-10-CM

## 2024-04-03 DIAGNOSIS — D70.8 OTHER NEUTROPENIA: ICD-10-CM

## 2024-04-03 PROCEDURE — 1160F RVW MEDS BY RX/DR IN RCRD: CPT | Mod: CPTII,95,, | Performed by: FAMILY MEDICINE

## 2024-04-03 PROCEDURE — 99214 OFFICE O/P EST MOD 30 MIN: CPT | Mod: 95,,, | Performed by: FAMILY MEDICINE

## 2024-04-03 PROCEDURE — 1159F MED LIST DOCD IN RCRD: CPT | Mod: CPTII,95,, | Performed by: FAMILY MEDICINE

## 2024-04-03 RX ORDER — ZOLPIDEM TARTRATE 5 MG/1
5 TABLET ORAL NIGHTLY PRN
Qty: 30 TABLET | Refills: 5 | Status: SHIPPED | OUTPATIENT
Start: 2024-04-03 | End: 2024-10-02

## 2024-04-03 NOTE — PROGRESS NOTES
The patient location is: home  The chief complaint leading to consultation is: Medication Refill    Visit type: Virtual visit with synchronous audio and video  Total time spent with patient: 16 minutes  Each patient to whom he or she provides medical services by telemedicine is:  (1) informed of the relationship between the physician and patient and the respective role of any other health care provider with respect to management of the patient; and (2) notified that he or she may decline to receive medical services by telemedicine and may withdraw from such care at any time.         Office Visit    Patient Name: Mae Gregory    : 1965  MRN: [id number]      Assessment/Plan:  Mae Gregory is a 58 y.o. female who presents today for :    -Insomnia  -     zolpidem (AMBIEN) 5 MG Tab; Take 1 tablet (5 mg total) by mouth nightly as needed (insomnia).   -stable, continue current regimen       Neutropenia  -slightly improved, asymptomatic, f/u with Hematology      -Dystonia - follows Neurology regularly for Botox injections  -Chronic migraine without aura  -stable, continue current management and f/u schedule per Neurology      -Raynaud's phenomenon - follows Rheumatology   -controlled off Norvasc          Follow up 3-4 months    This note was created by combination of typed  and MModal dictation.  Transcription errors may be present.  If there are any questions, please contact me.      ----------------------------------------------------------------------------------------------------------------------      HPI:  Patient Care Team:  Rajiv Garcia MD as PCP - General (Family Medicine)  Tavo Boateng MD as Consulting Physician (General Surgery)  May De La Torre MA (Inactive)  May De La Torre MA (Inactive) as Care Coordinator  Sukumar Gates MD as Consulting Physician (Neurology)    Mae is a 58 y.o. female with      Patient Active Problem List   Diagnosis    Migraine    DDD  (degenerative disc disease), cervical    DDD (degenerative disc disease), lumbar    -Insomnia    -Meniere's disease of both ears    Sensorineural hearing loss (SNHL) of both ears    Hair loss    Glaucoma    -Sleepwalking - controlled on PRN Trazodone    -Dystonia - follows Neurology regularly for Botox injections    Pain    -Raynaud's phenomenon - follows Rheumatology - on Norvasc 2.5mg    -Cervical myofascial pain syndrome - follows Neurology for injections    -Anxiety with depression - controlled off Wellbutrin since 4/2023    -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    Since last visit, she was taken off Norvasc for management of Raynaud's, has not had recurrence. Her migraine is well controlled as managed by Neurology. She still takes Ambien as needed to help with sleep. She reports that healthwise, she feels amazing overall. No other acute issues during this visit.      Answers submitted by the patient for this visit:  Review of Systems Questionnaire (Submitted on 4/3/2024)  activity change: No  unexpected weight change: No  neck pain: No  hearing loss: No  trouble swallowing: No  eye discharge: No  chest tightness: No  wheezing: No  chest pain: No  palpitations: No  blood in stool: No  constipation: No  vomiting: No  diarrhea: No  polydipsia: No  polyuria: No  difficulty urinating: No  hematuria: No  headaches: No  dysphoric mood: No                Patient Active Problem List   Diagnosis    Migraine    DDD (degenerative disc disease), cervical    DDD (degenerative disc disease), lumbar    -Insomnia    -Meniere's disease of both ears    Sensorineural hearing loss (SNHL) of both ears    Hair loss    Glaucoma    -Sleepwalking - controlled on PRN Trazodone    -Dystonia - follows Neurology regularly for Botox injections    Pain    -Raynaud's phenomenon - follows Rheumatology - on Norvasc  2.5mg    -Cervical myofascial pain syndrome - follows Neurology for injections    -Anxiety with depression - controlled off Wellbutrin since 2023    -Hx Glossopharyngeal nerve disease - s/p microvascular decompression 23    Other neutropenia       Current Medications  Medications reviewed/updated.     Current Outpatient Medications on File Prior to Visit   Medication Sig Dispense Refill    albuterol (PROVENTIL/VENTOLIN HFA) 90 mcg/actuation inhaler TAKE 1 2 PUFF(S) (INHALATION) EVERY 4 HOURS PRN   WHEEZING 18 g 12    estradiol-norethindrone (ACTIVELLA) 1-0.5 mg per tablet Take 1 tablet by mouth once daily. 28 tablet 12    finasteride (PROSCAR) 5 mg tablet Take 1 tablet (5 mg total) by mouth once daily. 30 tablet 11    multivitamin with minerals tablet Take 1 tablet by mouth once daily.      NURTEC 75 mg odt DISSOLVE 1 TABLET BY MOUTH ONCE DAILY AS NEEDED FOR MIGRAINE 8 tablet 16    oxybutynin (DITROPAN) 5 MG Tab Take 5 mg by mouth 2 (two) times daily.      topiramate (TOPAMAX) 50 MG tablet TAKE 1 TABLET BY MOUTH TWICE A  tablet 3    traZODone (DESYREL) 150 MG tablet TAKE 1 TABLET (150 MG TOTAL) BY MOUTH NIGHTLY AS NEEDED FOR INSOMNIA 90 tablet 3    [DISCONTINUED] amLODIPine (NORVASC) 5 MG tablet Take 1 tablet (5 mg total) by mouth once daily. 90 tablet 3    [DISCONTINUED] cyclobenzaprine (FLEXERIL) 10 MG tablet Take 1 tablet (10 mg total) by mouth 3 (three) times daily. 60 tablet 11    [DISCONTINUED] zolpidem (AMBIEN) 5 MG Tab Take 1 tablet (5 mg total) by mouth nightly as needed (insomnia). Followup Dr.T Garcia 2024 for more refills. 30 tablet 5     No current facility-administered medications on file prior to visit.           Past Surgical History:   Procedure Laterality Date    AUGMENTATION OF BREAST      BREAST SURGERY       SECTION   and 2001    x2    COLON SURGERY  10/15/2018    colon resection     CRANIOTOMY Left 2023    Procedure: CRANIOTOMY for microvascular  decompression NEURO MONITORING;  Surgeon: Nils Hudson MD;  [facility];  Service: Neurosurgery;  Laterality: Left;  regular bed, supine, bump, hickman, type and cross 2 units, cranial nerve monitoring, optical stealth CO SURGEON DR [place]    LAPAROSCOPIC CHOLECYSTECTOMY N/A 10/16/2023    Procedure: CHOLECYSTECTOMY, LAPAROSCOPIC;  Surgeon: Cali Lowe MD;  Location: [facility] OR;  Service: General;  Laterality: N/A;  RN PREOP 10/11/2023 --JM---CONSENT INCOMPLETE---GLENN MAGANA       Family History   Problem Relation Age of Onset    Hypertension Mother     Hypertension Father     Hypertension Brother     Stroke Maternal Grandfather     Ovarian cancer Neg Hx     Cancer Neg Hx     Colon cancer Neg Hx     Esophageal cancer Neg Hx        Social History     Socioeconomic History    Marital status:    Tobacco Use    Smoking status: Never    Smokeless tobacco: Never   Substance and Sexual Activity    Alcohol use: No    Drug use: No    Sexual activity: Yes     Partners: Male     Comment: ablation     Social Determinants of Health     Financial Resource Strain: Low Risk  (2/19/2024)    Overall Financial Resource Strain (CARDIA)     Difficulty of Paying Living Expenses: Not very hard   Food Insecurity: No Food Insecurity (2/19/2024)    Hunger Vital Sign     Worried About Running Out of Food in the Last Year: Never true     Ran Out of Food in the Last Year: Never true   Transportation Needs: No Transportation Needs (2/19/2024)    PRAPARE - Transportation     Lack of Transportation (Medical): No     Lack of Transportation (Non-Medical): No   Physical Activity: Insufficiently Active (2/19/2024)    Exercise Vital Sign     Days of Exercise per Week: 4 days     Minutes of Exercise per Session: 20 min   Stress: No Stress Concern Present (2/19/2024)    Citizen of [place] of Occupational Health - Occupational Stress Questionnaire     Feeling of Stress : Not at all   Social Connections: Unknown (2/19/2024)     Social Connection and Isolation Panel [NHANES]     Frequency of Communication with Friends and Family: More than three times a week     Frequency of Social Gatherings with Friends and Family: Patient declined     Attends [employer] Services: More than 4 times per year     Active Member of Clubs or Organizations: Patient declined     Attends Club or Organization Meetings: Patient declined     Marital Status:    Housing Stability: Low Risk  (2/19/2024)    Housing Stability Vital Sign     Unable to Pay for Housing in the Last Year: No     Number of Places Lived in the Last Year: 1     Unstable Housing in the Last Year: No             Allergies   Review of patient's allergies indicates:  No Known Allergies          Review of Systems  See HPI        Physical Exam  There were no vitals taken for this visit.    GEN: NAD

## 2024-04-08 RX ORDER — OXYBUTYNIN CHLORIDE 5 MG/1
5 TABLET ORAL 2 TIMES DAILY
Qty: 180 TABLET | Refills: 2 | Status: SHIPPED | OUTPATIENT
Start: 2024-04-08

## 2024-04-23 NOTE — PROCEDURES
Procedures  She reports worsening Migraine (on L) Hz related to delays in BOTOX injections   She reports dramatic improvement in the sharp pains after the surgery   No complications     Prior note:   Mae presents for follow-up in neurology clinic.  Since her last visit she reports mild improvement in her overall symptomatology.  She reports recently starting Tegretol which has helped with the pain intensity.  Pain intensity.  She continues to have a variable frequency of episodes characterized by severe sharp stabbing pain in the neck behind the jaw which radiates to the left occipital region.  These episodes can last up to several minutes.  She can go days without any episodes.  Sometimes she has multiple episodes in 1 day.  She is not able to identify any triggers.    She reports an ongoing flare that started 2 days ago.  She is tearful during this encounter.    Prior note:   Mae presents to the neurology clinic for follow-up.  She reports significant improvement in her migraine frequency since initiation of Botox therapy.  Previously the migraines were 2-4 days per week.  Since Botox the frequency has reduced to 1-2 migraine days per week.  She also reports a wearing off effect which starts typically around 10 weeks following a round of Botox injections.  She continues to take topiramate and Nurtec on a regular basis.    Today she also reports a 6 month history of left-sided facial pain.  This started approximately 6 months ago.  She experienced sudden onset of thunderclap type of pain on the left side of her head which was excruciating and lasted a few seconds.  Subsequently she began experiencing intermittent pains on the left side predominant in the cheek region and radiating to the ear canal and the mastoid region.  Sometimes the pain refers to the occipital region.  She reports a very low-grade dull ache in this region on a continuous basis.  In addition she reports flare-ups of sharp stabbing pain.   These flare-ups can occur up to 5 times a week lasting several hours.  In addition the flare-ups can also occur lasting a few seconds.  There is no recognized triggers for these flare-ups.  She also reports slight sagging of the left side her face and somewhat atypical fatigue sensation of her tongue after prolonged speaking.  Otherwise she denies any weakness in her tongue or pain in the tongue or teeth or gums.  Sometimes she will appreciate a slight difference in the touch sensation on the left side of her face when compared to the right side of her face.  She also reports some trouble with swallowing were in the food will enter the nasopharynx.  Sometimes she has trouble enunciating certain words.  All these symptoms started approximately 6 months ago.  She is tried taking NSAIDs without any relief.    Prior note:   Overall doing well.   HA is much better   Using less pain meds   Using Nurtec   Feels L jaw line paresthesias     Pt of Dr. Kody SHI:  Headaches last hours with photophobia, phonophobia, + nausea.  Has visual aura.  Often L cervico-occipital.  Bilateral anteriorly.   She has been seeing Dr. Maynard in [place] but moved back to the [place] area several years ago.  She had a remote, nl MRI brain in [place].      Freq: 30/30 and 20/30     Last Botox June 30, 2022 and not as effective as usual.  Usually only 2-3 HAs/month but now having daily HA.    BOTOX #1 10/18/22 - helped    BOTOX #2 1/12/23 - helped    BOTOX #3 4/13/23 - helped    BOTOX #4 6/26/23 - helped   BOTOX #5 9/28/23 - helped   BOTOX #6 1/8/24 - helped     Abortives:              Effective: Nurtec > Imitrex              Ineffective: ketamine/lidocaine  PRN flexeril      Prophylactics:             Effective: Botox, topiramate 50mg qhs (bid sedation), trazodone    MRI brain was reviewed in detail with the patient.  Impression:     Mri Iac: Tortuous left posteroinferior cerebellar artery which extends to abut and slightly posterior  displaced the proximal cisternal left glossopharyngeal nerve.  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     MRI brain: Unremarkable MRI brain as detailed above specifically without evidence for hydrocephalus or intracranial enhancing lesion     Questioned partially empty sella.  Intracranial hypertension to be included in differential in the appropriate clinical setting, clinical correlation advised.    Plan:   1, cont BOTOX   2, PRN Nurtec     BOTOX treatment response:   Prior to initiating BOTOX, the patient had  28  migraine days per month on average. This meets criteria for chronic migraine.   After starting BOTOX, the patient experienced a reduction in  21  days per month   After starting BOTOX, the patient experienced a reduction in > 100 hours of migraine symptoms per month   After starting BOTOX, the patient experienced a 50% reduction in burden of migraine days per month   Based on this information, BOTOX is medically necessary for the management of the patient's chronic migraine.     BOTOX Injection intervals:   Patient reports a 'wearing off effect' prior to the subsequent BOTOX injections at 12 weeks. This occurs at week 10-11  Symptoms of this a 'wearing off effect' include - worsening of migraine headache frequency and intensity   Medications used during the 'wearing off effect' period include nurtec, imitrex    Based on this information, it is medically necessary for the patient to receive BOTOX therapy at an interval of every 10 weeks for the management of chronic migraine.     BOTOX was performed as an indicated therapy for intractable chronic migraine headaches given that the patient failed > 3  prophylactic medications    Botulinum Toxin Injection Procedure   Pre-operative diagnosis: Chronic migraine   Post-operative diagnosis: Same   Procedure: Chemical neurolysis    After risks and benefits were explained including bleeding, infection, worsening of pain, damage to the areas being injected, weakness of muscles, loss of muscle control, dysphagia if injecting the head or neck, facial droop if injecting the facial area, painful injection, allergic or other reaction to the medications being injected, and the failure of the procedure to help the problem, a signed consent was obtained.   The patient was placed in a comfortable area and the sites to be treated were identified.The area to be treated was prepped three times with alcohol and the alcohol allowed to dry. Next, a 30 gauge needle was used to inject the medication in the area to be treated.   Area(s) injected:   Total Botox used: 160 Units   Botox wastage: 40 Units   Injection sites:    muscle bilaterally ( a total of 10 units divided into 2 sites)   Procerus muscle (5 units)   Frontalis muscle bilaterally (a total of 20 units divided into 4 sites)   Temporalis muscle bilaterally (a total of 40 units divided into 8 sites)   Occipitalis muscle bilaterally (a total of 30 units divided into 6 sites)   Cervical paraspinal muscles (a total of 20 units divided into 4 sites)   Trapezius muscle bilaterally (a total of 30 units divided into 6 sites)   Mastoid 5 units on L     Complications: none   RTC for the next Botox injection:  10 weeks

## 2024-04-29 ENCOUNTER — HOSPITAL ENCOUNTER (OUTPATIENT)
Dept: RADIOLOGY | Facility: HOSPITAL | Age: 59
Discharge: HOME | End: 2024-04-29
Attending: NEUROLOGICAL SURGERY
Payer: COMMERCIAL

## 2024-04-29 DIAGNOSIS — G52.1: ICD-10-CM

## 2024-04-29 PROCEDURE — 70546 MR ANGIOGRAPH HEAD W/O&W/DYE: CPT | Mod: TC

## 2024-04-29 PROCEDURE — 25500020 PHARM REV CODE 255: Performed by: NEUROLOGICAL SURGERY

## 2024-04-29 PROCEDURE — A9585 GADOBUTROL INJECTION: HCPCS | Performed by: NEUROLOGICAL SURGERY

## 2024-04-29 PROCEDURE — 70546 MR ANGIOGRAPH HEAD W/O&W/DYE: CPT | Mod: 26,,, | Performed by: RADIOLOGY

## 2024-04-29 RX ORDER — GADOBUTROL 604.72 MG/ML
6 INJECTION INTRAVENOUS
Status: COMPLETED | OUTPATIENT
Start: 2024-04-29 | End: 2024-04-29

## 2024-04-29 RX ADMIN — GADOBUTROL 6 ML: 604.72 INJECTION INTRAVENOUS at 07:04

## 2024-05-03 ENCOUNTER — PATIENT OUTREACH (OUTPATIENT)
Dept: ADMINISTRATIVE | Facility: HOSPITAL | Age: 59
End: 2024-05-03
Payer: COMMERCIAL

## 2024-05-21 ENCOUNTER — TELEPHONE (OUTPATIENT)
Dept: NEUROLOGY | Facility: CLINIC | Age: 59
End: 2024-05-21
Payer: COMMERCIAL

## 2024-05-21 ENCOUNTER — PATIENT MESSAGE (OUTPATIENT)
Dept: NEUROLOGY | Facility: CLINIC | Age: 59
End: 2024-05-21
Payer: COMMERCIAL

## 2024-05-21 NOTE — TELEPHONE ENCOUNTER
Gave the patient a call to let her need we had to r/s her Botox on 6/7. The new date is 6/11 at 1pm

## 2024-05-22 ENCOUNTER — OFFICE VISIT (OUTPATIENT)
Dept: NEUROSURGERY | Facility: CLINIC | Age: 59
End: 2024-05-22
Payer: COMMERCIAL

## 2024-05-22 ENCOUNTER — OFFICE VISIT (OUTPATIENT)
Dept: OTOLARYNGOLOGY | Facility: CLINIC | Age: 59
End: 2024-05-22
Payer: COMMERCIAL

## 2024-05-22 VITALS — DIASTOLIC BLOOD PRESSURE: 83 MMHG | HEART RATE: 87 BPM | SYSTOLIC BLOOD PRESSURE: 132 MMHG

## 2024-05-22 DIAGNOSIS — H69.02 PATULOUS EUSTACHIAN TUBE, LEFT: Primary | ICD-10-CM

## 2024-05-22 DIAGNOSIS — H90.3 SENSORINEURAL HEARING LOSS (SNHL) OF BOTH EARS: ICD-10-CM

## 2024-05-22 DIAGNOSIS — G52.1 GLOSSOPHARYNGEAL NERVE DISEASE: Primary | Chronic | ICD-10-CM

## 2024-05-22 PROCEDURE — 99999 PR PBB SHADOW E&M-EST. PATIENT-LVL III: CPT | Mod: PBBFAC,,, | Performed by: OTOLARYNGOLOGY

## 2024-05-22 PROCEDURE — 99999 PR PBB SHADOW E&M-EST. PATIENT-LVL II: CPT | Mod: PBBFAC,,, | Performed by: NEUROLOGICAL SURGERY

## 2024-05-22 PROCEDURE — 99214 OFFICE O/P EST MOD 30 MIN: CPT | Mod: S$GLB,,, | Performed by: NEUROLOGICAL SURGERY

## 2024-05-22 PROCEDURE — 1159F MED LIST DOCD IN RCRD: CPT | Mod: CPTII,S$GLB,, | Performed by: OTOLARYNGOLOGY

## 2024-05-22 PROCEDURE — 99213 OFFICE O/P EST LOW 20 MIN: CPT | Mod: S$GLB,,, | Performed by: OTOLARYNGOLOGY

## 2024-05-22 PROCEDURE — 3075F SYST BP GE 130 - 139MM HG: CPT | Mod: CPTII,S$GLB,, | Performed by: OTOLARYNGOLOGY

## 2024-05-22 PROCEDURE — 1160F RVW MEDS BY RX/DR IN RCRD: CPT | Mod: CPTII,S$GLB,, | Performed by: OTOLARYNGOLOGY

## 2024-05-22 PROCEDURE — 3079F DIAST BP 80-89 MM HG: CPT | Mod: CPTII,S$GLB,, | Performed by: OTOLARYNGOLOGY

## 2024-05-22 PROCEDURE — 1159F MED LIST DOCD IN RCRD: CPT | Mod: CPTII,S$GLB,, | Performed by: NEUROLOGICAL SURGERY

## 2024-05-22 NOTE — PROGRESS NOTES
"59 y/o female returns for follow up on left ear symptoms. No further ear pain. Still some discomfort over neurosurgical incision for craniotomy - she feels is reasonable. That side just feels different than the other. Much less problem with "drum" feeling in left ear and feeling like ear needs to be popped. She stopped Zyrtec - not popping ear. Feels less dry and less ear hearing issues.    Wears hearing aids all day every day. Needs to see audiologist for HA adjustment due to some sounds produced by HA when in high frequency noise.    PMHx, PSHx, Meds, Allergies, SocHx, FamHx reviewed in EPIC    Review of Systems     Constitutional: Negative for appetite change, chills, fatigue, fever and unexpected weight loss.      HENT: Negative for ear discharge, ear infection, ear pain, facial swelling, hearing loss, mouth sores, nosebleeds, postnasal drip, ringing in the ears, runny nose, sinus infection, sinus pressure, sore throat, stuffy nose, tonsil infection, dental problems, trouble swallowing and voice change.      Eyes:  Negative for change in eyesight, eye drainage, eye itching and photophobia.     Respiratory:  Negative for cough, shortness of breath, sleep apnea, snoring and wheezing.      Cardiovascular:  Negative for chest pain, foot swelling, irregular heartbeat and swollen veins.     Gastrointestinal:  Negative for abdominal pain, acid reflux, constipation, diarrhea, heartburn and vomiting.     Genitourinary: Negative for difficulty urinating, sexual problems and frequent urination.     Musc: Negative for aching joints, aching muscles, back pain and neck pain.     Skin: Negative for rash.     Allergy: Negative for food allergies and seasonal allergies.     Endocrine: Negative for cold intolerance and heat intolerance.      Neurological: Negative for dizziness, headaches, light-headedness, seizures and tremors.      Hematologic: Negative for bruises/bleeds easily and swollen glands.      Psychiatric: Negative " for decreased concentration, depression, nervous/anxious and sleep disturbance.        PE: /83   Pulse 87    Gen: female, well nourished, well developed, NAD, cooperative, good historian  Ears:  External auricle wnl, EAC patent & TM translucent with normal bony landmarks bilaterally, no TM movement with nasal breathing  Neck: supple, no TTP, no LAD or masses, well healed L incision - posterior to auricle within hairline  Respiratory: Breathing comfortably without retractions  Skin: facial skin intact without visible lesions or flushing  Lymph: no neck lympadenopathy  Neuro:  facial movement symmetric, speech fluid, gait stable  Psych: alert & oriented x 3, reasonable, normal affect    Impression:   1. Patulous Eustachian tube, left - improved        2. Sensorineural hearing loss (SNHL) of both ears            Discussion and Plan:    Continue to wear hearing aids as much as possible during the day. See audiologist for hearing aid adjustment as needed.    Patient knows how to manage patulous ETD symptoms if they occasionally occur and knows what may make it worse.    Follow up in about a year for recheck and audiogram.    Parts or all of this note were created by voice recognition software; typographical errors in translating may be present.

## 2024-05-22 NOTE — PATIENT INSTRUCTIONS
Continue to wear hearing aids as much as possible during the day. See audiologist for hearing aid adjustment as needed.    Follow up in about a year for recheck and audiogram.

## 2024-05-22 NOTE — PROGRESS NOTES
Neurosurgery  Established Patient    SCRIBE #1 NOTE: I, Mabel Olmstead, am scribing for, and in the presence of,  Nils Tristan. I have scribed the entire note.        SUBJECTIVE:     History of Present Illness:  58 y.o. female, with a PMHx of left retrosigmoid suboccipital craniotomy for microvascular decompression of the glossopharyngeal nerve on 7/31/2023, presents for f/u after last evaluation on 9/12/2023. Today the patient reports that she has been doing well. She states that there is no significant recurrence of her pre-surgical symptoms. She states that she has some pain in the area that comes and goes, but it is not affecting her ability to eat. She feels that her speech is a little different than before, but she isn't concerned. She experiences some discomfort at the surgical site, but agrees that it may just be part of the healing process.     Review of patient's allergies indicates:  No Known Allergies  Current Outpatient Medications   Medication Sig Dispense Refill    albuterol (PROVENTIL/VENTOLIN HFA) 90 mcg/actuation inhaler TAKE 1 2 PUFF(S) (INHALATION) EVERY 4 HOURS PRN   WHEEZING 18 g 12    estradiol-norethindrone (ACTIVELLA) 1-0.5 mg per tablet Take 1 tablet by mouth once daily. 28 tablet 12    finasteride (PROSCAR) 5 mg tablet Take 1 tablet (5 mg total) by mouth once daily. 30 tablet 11    multivitamin with minerals tablet Take 1 tablet by mouth once daily.      NURTEC 75 mg odt DISSOLVE 1 TABLET BY MOUTH ONCE DAILY AS NEEDED FOR MIGRAINE 8 tablet 16    oxybutynin (DITROPAN) 5 MG Tab TAKE 1 TABLET BY MOUTH TWICE A  tablet 2    topiramate (TOPAMAX) 50 MG tablet TAKE 1 TABLET BY MOUTH TWICE A  tablet 3    traZODone (DESYREL) 150 MG tablet TAKE 1 TABLET (150 MG TOTAL) BY MOUTH NIGHTLY AS NEEDED FOR INSOMNIA 90 tablet 3    zolpidem (AMBIEN) 5 MG Tab Take 1 tablet (5 mg total) by mouth nightly as needed (insomnia). Followup Dr.T Garcia SEPT 2024 for more refills, call to schedule/get labs 1wk  before doctor's appointment (ordered). 30 tablet 5     No current facility-administered medications for this visit.     Past Medical History:   Diagnosis Date    DDD (degenerative disc disease), cervical 3/15/2017    DDD (degenerative disc disease), lumbar 3/15/2017    Glaucoma 3/7/2018    Migraine 3/15/2017    Primary insomnia 3/15/2017     Past Surgical History:   Procedure Laterality Date    AUGMENTATION OF BREAST      BREAST SURGERY       SECTION   and 2001    x2    COLON SURGERY  10/15/2018    colon resection     CRANIOTOMY Left 2023    Procedure: CRANIOTOMY for microvascular decompression NEURO MONITORING;  Surgeon: Nils Hudson MD;  Location: [facility] OR [address] [place] [address];  Service: Neurosurgery;  Laterality: Left;  regular bed, supine, bump, hickman, type and cross 2 units, cranial nerve monitoring, optical stealth CO SURGEON DR [place]    LAPAROSCOPIC CHOLECYSTECTOMY N/A 10/16/2023    Procedure: CHOLECYSTECTOMY, LAPAROSCOPIC;  Surgeon: Cali Lowe MD;  Location: [facility] OR;  Service: General;  Laterality: N/A;  RN PREOP 10/11/2023 --JM---CONSENT INCOMPLETE---GLENN MAGANA     Family History       Problem Relation (Age of Onset)    Hypertension Mother, Father, Brother    Stroke Maternal Grandfather          Social History     Socioeconomic History    Marital status:    Tobacco Use    Smoking status: Never    Smokeless tobacco: Never   Substance and Sexual Activity    Alcohol use: No    Drug use: No    Sexual activity: Yes     Partners: Male     Comment: ablation     Social Determinants of Health     Financial Resource Strain: Low Risk  (2024)    Overall Financial Resource Strain (CARDIA)     Difficulty of Paying Living Expenses: Not very hard   Food Insecurity: No Food Insecurity (2024)    Hunger Vital Sign     Worried About Running Out of Food in the Last Year: Never true     Ran Out of Food in the Last Year: Never true   Transportation Needs: No Transportation Needs (2024)     PRAPARE - Transportation     Lack of Transportation (Medical): No     Lack of Transportation (Non-Medical): No   Physical Activity: Insufficiently Active (2/19/2024)    Exercise Vital Sign     Days of Exercise per Week: 4 days     Minutes of Exercise per Session: 20 min   Stress: No Stress Concern Present (2/19/2024)    [employer] [employer] - Occupational Stress Questionnaire     Feeling of Stress : Not at all   Housing Stability: Low Risk  (2/19/2024)    Housing Stability Vital Sign     Unable to Pay for Housing in the Last Year: No     Number of Places Lived in the Last Year: 1     Unstable Housing in the Last Year: No     Review of Systems   All other systems reviewed and are negative.    OBJECTIVE:     Vital Signs     There is no height or weight on file to calculate BMI.  Physical Exam:    Constitutional: She appears well-developed and well-nourished. She is not diaphoretic. No distress.     Psych/Behavior: She is alert. She is oriented to person, place, and time. She has a normal mood and affect.     Diagnostic Results:    01) I have reviewed and independently interpreted the MRA Brain W and WO Contrast from 4/29/2024:     FINDINGS:  Intracranial ICAs, MCAs, and ACAs demonstrate no evidence of high-grade stenosis or occlusion.  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  Small focal outpouching/fullness at the medial aspect of the right ICA terminus this may represent infundibulum with hypoplastic A1 HERIBERTO and unchanged.  Minute aneurysm felt less likely this measures approximately 0.2 cm image 130 series 5     Posterior circulation: Distal vertebral arteries, basilar artery and posterior cerebral arteries are patent without significant focal stenosis.     Again noted is tortuous left posteroinferior cerebellar artery which extends to abut and slightly distort proximal cisternal left glossopharyngeal nerve.  No abnormal wall enhancement left PICA     There is remote operative  change from left retrosigmoid craniectomy included in the field of view with encephalomalacia left cerebellum suggestive for possible prior infarction.  Further evaluation with conventional MRI imaging as warranted.     Impression:  Tortuous left PICA unchanged with continued abutment slight displacement of the left proximal cisternal glossopharyngeal nerve unchanged from prior..     No evidence of abnormal PICA wall thickening and enhancement.     See above for additional details.      ASSESSMENT/PLAN:   Patient is s/p left suboccipital craniotomy for microvascular decompression of the glossopharyngeal nerve, now 10 months out. Clinically doing great and is able to eat normally. She has occasional but transient pain, but nothing like pre-op. I'm happy with the latest MRA finding. At this stage, we'll discharge her from our care. No further imaging or follow up needed unless she has resumption of symptoms.         I, TAYA Hudson, personally performed the services described in this documentation. All medical record entries made by the scribe were at my direction and in my presence. I have reviewed the chart and agree that the record reflects my personal performance and is accurate and complete.     Note dictated with voice recognition software, please excuse any grammatical errors.

## 2024-06-06 ENCOUNTER — TELEPHONE (OUTPATIENT)
Dept: NEUROLOGY | Facility: CLINIC | Age: 59
End: 2024-06-06
Payer: COMMERCIAL

## 2024-06-06 NOTE — TELEPHONE ENCOUNTER
Called the pt to r/s pt stated she can't wait until July she will lose her insurance . Told pt I would speak to provider and get back to her.

## 2024-07-09 DIAGNOSIS — G43.909 MIGRAINE WITHOUT STATUS MIGRAINOSUS, NOT INTRACTABLE, UNSPECIFIED MIGRAINE TYPE: ICD-10-CM

## 2024-07-09 RX ORDER — TOPIRAMATE 50 MG/1
50 TABLET, FILM COATED ORAL 2 TIMES DAILY
Qty: 120 TABLET | Refills: 0 | Status: SHIPPED | OUTPATIENT
Start: 2024-07-09

## 2024-07-09 NOTE — TELEPHONE ENCOUNTER
Refill Routing Note   Medication(s) are not appropriate for processing by [facility] for the following reason(s):        Outside of protocol    [facility] action(s):  Route               Appointments  past 12m or future 3m with PCP    Date Provider   Last Visit   4/3/2024 Rajiv Garcia MD   Next Visit   Visit date not found Rajiv Garcia MD   ED visits in past 90 days: 0        Note composed:8:32 AM 07/09/2024

## 2024-10-08 RX ORDER — OXYBUTYNIN CHLORIDE 5 MG/1
5 TABLET ORAL 2 TIMES DAILY
Qty: 180 TABLET | Refills: 0 | Status: SHIPPED | OUTPATIENT
Start: 2024-10-08

## 2024-10-08 NOTE — TELEPHONE ENCOUNTER
Refill Decision Note   Mae Gregory  is requesting a refill authorization.  Brief Assessment and Rationale for Refill:  Approve     Medication Therapy Plan:         Comments:     Note composed:9:11 AM 10/08/2024

## 2024-10-11 ENCOUNTER — PATIENT MESSAGE (OUTPATIENT)
Dept: FAMILY MEDICINE | Facility: CLINIC | Age: 59
End: 2024-10-11
Payer: COMMERCIAL

## 2024-10-11 DIAGNOSIS — J45.20 MILD INTERMITTENT ASTHMA WITHOUT COMPLICATION: ICD-10-CM

## 2024-10-11 DIAGNOSIS — F51.01 PRIMARY INSOMNIA: ICD-10-CM

## 2024-10-11 RX ORDER — ALBUTEROL SULFATE 90 UG/1
INHALANT RESPIRATORY (INHALATION)
Qty: 18 G | Refills: 12 | Status: SHIPPED | OUTPATIENT
Start: 2024-10-11

## 2024-10-11 NOTE — TELEPHONE ENCOUNTER
Refill Routing Note   Medication(s) are not appropriate for processing by [facility] for the following reason(s):      Due for refill >6 months ago    [facility] action(s):  Defer Care Due:  None identified            Appointments  past 12m or future 3m with PCP    Date Provider   Last Visit   4/3/2024 Rajiv Garcia MD   Next Visit   Visit date not found Rajiv Garcia MD   ED visits in past 90 days: 0        Note composed:12:13 PM 10/11/2024

## 2024-10-11 NOTE — TELEPHONE ENCOUNTER
No care due was identified.  [facility] Embedded Care Due Messages. Reference number: [id number].   10/11/2024 9:11:28 AM CDT

## 2024-10-18 ENCOUNTER — LAB VISIT (OUTPATIENT)
Dept: LAB | Facility: HOSPITAL | Age: 59
End: 2024-10-18
Attending: FAMILY MEDICINE
Payer: COMMERCIAL

## 2024-10-18 DIAGNOSIS — Z00.00 ANNUAL PHYSICAL EXAM: ICD-10-CM

## 2024-10-18 LAB
ALBUMIN SERPL BCP-MCNC: 3.9 G/DL (ref 3.5–5.2)
ALP SERPL-CCNC: 55 U/L (ref 40–150)
ALT SERPL W/O P-5'-P-CCNC: 18 U/L (ref 10–44)
ANION GAP SERPL CALC-SCNC: 9 MMOL/L (ref 8–16)
AST SERPL-CCNC: 21 U/L (ref 10–40)
BILIRUB SERPL-MCNC: 0.5 MG/DL (ref 0.1–1)
BUN SERPL-MCNC: 15 MG/DL (ref 6–20)
CALCIUM SERPL-MCNC: 8.6 MG/DL (ref 8.7–10.5)
CHLORIDE SERPL-SCNC: 111 MMOL/L (ref 95–110)
CHOLEST SERPL-MCNC: 224 MG/DL (ref 120–199)
CHOLEST/HDLC SERPL: 3.7 {RATIO} (ref 2–5)
CO2 SERPL-SCNC: 17 MMOL/L (ref 23–29)
CREAT SERPL-MCNC: 0.9 MG/DL (ref 0.5–1.4)
ERYTHROCYTE [DISTWIDTH] IN BLOOD BY AUTOMATED COUNT: 13.3 % (ref 11.5–14.5)
EST. GFR  (NO RACE VARIABLE): >60 ML/MIN/1.73 M^2
ESTIMATED AVG GLUCOSE: 103 MG/DL (ref 68–131)
GLUCOSE SERPL-MCNC: 96 MG/DL (ref 70–110)
HBA1C MFR BLD: 5.2 % (ref 4–5.6)
HCT VFR BLD AUTO: 44.4 % (ref 37–48.5)
HDLC SERPL-MCNC: 61 MG/DL (ref 40–75)
HDLC SERPL: 27.2 % (ref 20–50)
HGB BLD-MCNC: 14.5 G/DL (ref 12–16)
LDLC SERPL CALC-MCNC: 151.4 MG/DL (ref 63–159)
MCH RBC QN AUTO: 30.6 PG (ref 27–31)
MCHC RBC AUTO-ENTMCNC: 32.7 G/DL (ref 32–36)
MCV RBC AUTO: 94 FL (ref 82–98)
NONHDLC SERPL-MCNC: 163 MG/DL
PLATELET # BLD AUTO: 210 K/UL (ref 150–450)
PMV BLD AUTO: 11.2 FL (ref 9.2–12.9)
POTASSIUM SERPL-SCNC: 4.2 MMOL/L (ref 3.5–5.1)
PROT SERPL-MCNC: 6.5 G/DL (ref 6–8.4)
RBC # BLD AUTO: 4.74 M/UL (ref 4–5.4)
SODIUM SERPL-SCNC: 137 MMOL/L (ref 136–145)
TRIGL SERPL-MCNC: 58 MG/DL (ref 30–150)
TSH SERPL DL<=0.005 MIU/L-ACNC: 1.64 UIU/ML (ref 0.4–4)
WBC # BLD AUTO: 3.56 K/UL (ref 3.9–12.7)

## 2024-10-18 PROCEDURE — 80061 LIPID PANEL: CPT | Performed by: FAMILY MEDICINE

## 2024-10-18 PROCEDURE — 36415 COLL VENOUS BLD VENIPUNCTURE: CPT | Mod: PO | Performed by: FAMILY MEDICINE

## 2024-10-18 PROCEDURE — 85027 COMPLETE CBC AUTOMATED: CPT | Performed by: FAMILY MEDICINE

## 2024-10-18 PROCEDURE — 83036 HEMOGLOBIN GLYCOSYLATED A1C: CPT | Performed by: FAMILY MEDICINE

## 2024-10-18 PROCEDURE — 80053 COMPREHEN METABOLIC PANEL: CPT | Performed by: FAMILY MEDICINE

## 2024-10-18 PROCEDURE — 84443 ASSAY THYROID STIM HORMONE: CPT | Performed by: FAMILY MEDICINE

## 2024-10-25 ENCOUNTER — PATIENT MESSAGE (OUTPATIENT)
Dept: FAMILY MEDICINE | Facility: CLINIC | Age: 59
End: 2024-10-25

## 2024-10-25 ENCOUNTER — OFFICE VISIT (OUTPATIENT)
Dept: FAMILY MEDICINE | Facility: CLINIC | Age: 59
End: 2024-10-25
Payer: COMMERCIAL

## 2024-10-25 VITALS
SYSTOLIC BLOOD PRESSURE: 118 MMHG | HEART RATE: 70 BPM | WEIGHT: 126.31 LBS | TEMPERATURE: 98 F | DIASTOLIC BLOOD PRESSURE: 70 MMHG | BODY MASS INDEX: 21.04 KG/M2 | HEIGHT: 65 IN

## 2024-10-25 DIAGNOSIS — E78.2 MIXED HYPERLIPIDEMIA: ICD-10-CM

## 2024-10-25 DIAGNOSIS — H81.03 MENIERE'S DISEASE OF BOTH EARS: ICD-10-CM

## 2024-10-25 DIAGNOSIS — F41.8 ANXIETY WITH DEPRESSION: ICD-10-CM

## 2024-10-25 DIAGNOSIS — I70.90 ATHEROSCLEROSIS: ICD-10-CM

## 2024-10-25 DIAGNOSIS — F51.3 SLEEPWALKING: ICD-10-CM

## 2024-10-25 DIAGNOSIS — M50.30 DDD (DEGENERATIVE DISC DISEASE), CERVICAL: ICD-10-CM

## 2024-10-25 DIAGNOSIS — F51.01 PRIMARY INSOMNIA: Primary | ICD-10-CM

## 2024-10-25 DIAGNOSIS — G24.9 DYSTONIA: ICD-10-CM

## 2024-10-25 DIAGNOSIS — G43.709 CHRONIC MIGRAINE WITHOUT AURA WITHOUT STATUS MIGRAINOSUS, NOT INTRACTABLE: ICD-10-CM

## 2024-10-25 DIAGNOSIS — Z00.00 ANNUAL PHYSICAL EXAM: Primary | ICD-10-CM

## 2024-10-25 DIAGNOSIS — B35.4 TINEA CORPORIS: ICD-10-CM

## 2024-10-25 DIAGNOSIS — I73.00 RAYNAUD'S PHENOMENON WITHOUT GANGRENE: ICD-10-CM

## 2024-10-25 DIAGNOSIS — F51.01 PRIMARY INSOMNIA: ICD-10-CM

## 2024-10-25 PROCEDURE — 99999 PR PBB SHADOW E&M-EST. PATIENT-LVL III: CPT | Mod: PBBFAC,,, | Performed by: FAMILY MEDICINE

## 2024-10-25 RX ORDER — TRAZODONE HYDROCHLORIDE 150 MG/1
150 TABLET ORAL NIGHTLY PRN
Qty: 90 TABLET | Refills: 3 | Status: SHIPPED | OUTPATIENT
Start: 2024-10-25

## 2024-10-25 RX ORDER — CLOTRIMAZOLE 1 %
CREAM (GRAM) TOPICAL 2 TIMES DAILY
Qty: 60 G | Refills: 3 | Status: SHIPPED | OUTPATIENT
Start: 2024-10-25

## 2024-10-25 NOTE — PROGRESS NOTES
"  Physical Exam  /70 (BP Location: Right arm, Patient Position: Sitting)   Pulse 70   Temp 98.1 °F (36.7 °C) (Oral)   Ht 5' 5" (1.651 m)   Wt 57.3 kg (126 lb 5.2 oz)   BMI 21.02 kg/m²      Office Visit    Patient Name: Mae Gregory    : 1965  MRN: [id number]      Assessment/Plan:  Mae Gregory is a 59 y.o. female who presents today for :    Annual physical exam  -     Hemoglobin A1C; Future; Expected date: 10/25/2024  -     CBC Without Differential; Future; Expected date: 10/25/2024  -     Comprehensive Metabolic Panel; Future; Expected date: 10/25/2024  -     Lipid Panel; Future; Expected date: 10/25/2024  -     TSH; Future; Expected date: 10/25/2024  -previous labs reviewed and discussed with patient  -anticipatory guidance provided with age appropriate preventative services discussed  -continue healthy diet and regular physical exercise      Mixed hyperlipidemia  Atherosclerosis  -discussed the risks and benefits of taking statin to reduce cardiovascular risks, she desires to hold off initiation of medication and do more research before considering starting statin.    -Raynaud's phenomenon - follows Rheumatology  -stable      -Anxiety with depression - controlled off Wellbutrin since 2023    DDD (degenerative disc disease), cervical  -Hx Dystonia  -Chronic migraine without aura  -stable      Tinea corporis  -     clotrimazole (LOTRIMIN) 1 % cream; Apply topically 2 (two) times daily.  Dispense: 60 g; Refill: 3      Sleepwalking - controlled on PRN Trazodone  -     traZODone (DESYREL) 150 MG tablet; Take 1 tablet (150 mg total) by mouth nightly as needed for Insomnia.     -Hx Meniere's disease of both ears        Follow up PRN      This note was created by combination of typed  and MModal dictation.  Transcription errors may be present.  If there are any questions, please contact " me.        ----------------------------------------------------------------------------------------------------------------------      HPI:  Patient Care Team:  Rajiv Garcia MD as PCP - General (Family Medicine)  Tavo Boateng MD as Consulting Physician (General Surgery)  Sukumar Gates MD as Consulting Physician (Neurology)  Georgie Mejia MA as Care Coordinator    Mae is a 59 y.o. female with      Patient Active Problem List   Diagnosis    Migraine    DDD (degenerative disc disease), cervical    DDD (degenerative disc disease), lumbar    -Insomnia    -Meniere's disease of both ears    Sensorineural hearing loss (SNHL) of both ears    Hair loss    Glaucoma    -Sleepwalking - controlled on PRN Trazodone    -Dystonia - follows Neurology regularly for Botox injections    Pain    -Raynaud's phenomenon - follows Rheumatology - on Norvasc 2.5mg    -Cervical myofascial pain syndrome - follows Neurology for injections    -Anxiety with depression - controlled off Wellbutrin since 4/2023    -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  Health maintenance-wise, emiliano is up to date on colon cancer screening/MMG/Pap screening. Since the last visit, she was able to get off of her botox injections for migraines,  she is in the process of trying to get off as many medications as she can and would rather take a more natural approach to manage her health. She does endorse having an itchy patch of skin rash in the R upper back that has been present for over two weeks. We did discuss her prior CT abdomen from 8/2024, where there was an incidental finding of abdominal aorta atherosclerosis. She denies any cardiovascular or neurologic complaints today        Wt Readings from Last 4 Encounters:   10/25/24 57.3 kg (126 lb 5.2 oz)   03/28/24 56.3 kg (124 lb 0.1 oz)   02/26/24 55.6  kg (122 lb 9.2 oz)   24 57.2 kg (126 lb 3.4 oz)           Additional ROS    CONST: no fever, no activity change, weight stable.   EYES: no vision change.   ENT: no sore throat. No dysphagia.   CV: no CP with exertion  RESP: no SOB  GI: no N/V/diarrhea/constipation  : no urinary concerns  MSK: no new myalgias or arthralgias.   SKIN: +rash  NEURO: no focal deficits.   PSYCH: no new issues.   ENDOCRINE: no polyuria.         Current Medications  Medications reviewed and updated.       Current Outpatient Medications:     estradiol-norethindrone (ACTIVELLA) 1-0.5 mg per tablet, Take 1 tablet by mouth once daily., Disp: 28 tablet, Rfl: 12    NURTEC 75 mg odt, DISSOLVE 1 TABLET BY MOUTH ONCE DAILY AS NEEDED FOR MIGRAINE, Disp: 8 tablet, Rfl: 16    oxybutynin (DITROPAN) 5 MG Tab, TAKE 1 TABLET BY MOUTH TWICE A DAY, Disp: 180 tablet, Rfl: 0    topiramate (TOPAMAX) 50 MG tablet, Take 1 tablet (50 mg total) by mouth 2 (two) times daily. **DUE for followup Dr.T Garcia 2024 for more refills, call to schedule/get labs 1wk before doctor's appointment (ordered)., Disp: 120 tablet, Rfl: 0    albuterol (PROVENTIL/VENTOLIN HFA) 90 mcg/actuation inhaler, TAKE 1 2 PUFF(S) (INHALATION) EVERY 4 HOURS PRN   WHEEZING (Patient not taking: Reported on 10/25/2024), Disp: 18 g, Rfl: 12    clotrimazole (LOTRIMIN) 1 % cream, Apply topically 2 (two) times daily., Disp: 60 g, Rfl: 3    traZODone (DESYREL) 150 MG tablet, Take 1 tablet (150 mg total) by mouth nightly as needed for Insomnia. Followup Dr.T Garcia OCT 2025 for more refills, call to schedule/get labs 1wk before doctor's appointment (ordered)., Disp: 90 tablet, Rfl: 3    Past Surgical History:   Procedure Laterality Date    AUGMENTATION OF BREAST      BREAST SURGERY       SECTION   and 2001    x2    COLON SURGERY  10/15/2018    colon resection     CRANIOTOMY Left 2023    Procedure: CRANIOTOMY for microvascular decompression NEURO MONITORING;  Surgeon: Nils DAVIDSON  MD Tristan;  [facility];  Service: Neurosurgery;  Laterality: Left;  regular bed, supine, bump, hickman, type and cross 2 units, cranial nerve monitoring, optical stealth CO SURGEON DR [place]    LAPAROSCOPIC CHOLECYSTECTOMY N/A 10/16/2023    Procedure: CHOLECYSTECTOMY, LAPAROSCOPIC;  Surgeon: Cali Lowe MD;  Location: [facility];  Service: General;  Laterality: N/A;  RN PREOP 10/11/2023 --JM---CONSENT INCOMPLETE---GLENN MAGANA       Family History   Problem Relation Name Age of Onset    Hypertension Mother      Hypertension Father      Hypertension Brother      Stroke Maternal Grandfather      Ovarian cancer Neg Hx      Cancer Neg Hx      Colon cancer Neg Hx      Esophageal cancer Neg Hx         Social History     Socioeconomic History    Marital status:    Tobacco Use    Smoking status: Never    Smokeless tobacco: Never   Substance and Sexual Activity    Alcohol use: No    Drug use: No    Sexual activity: Yes     Partners: Male     Comment: ablation     Social Drivers of Health     Financial Resource Strain: Low Risk  (2/19/2024)    Overall Financial Resource Strain (CARDIA)     Difficulty of Paying Living Expenses: Not very hard   Food Insecurity: No Food Insecurity (2/19/2024)    Hunger Vital Sign     Worried About Running Out of Food in the Last Year: Never true     Ran Out of Food in the Last Year: Never true   Transportation Needs: No Transportation Needs (2/19/2024)    PRAPARE - Transportation     Lack of Transportation (Medical): No     Lack of Transportation (Non-Medical): No   Physical Activity: Insufficiently Active (2/19/2024)    Exercise Vital Sign     Days of Exercise per Week: 4 days     Minutes of Exercise per Session: 20 min   Stress: No Stress Concern Present (2/19/2024)    [employer] - Occupational Stress Questionnaire     Feeling of Stress : Not at all   Housing Stability: Low Risk  (2/19/2024)    Housing Stability Vital Sign     Unable to Pay  "for Housing in the Last Year: No     Number of Places Lived in the Last Year: 1     Unstable Housing in the Last Year: No           Allergies   Review of patient's allergies indicates:  No Known Allergies          Review of Systems  See HPI      [unfilled]  /70 (BP Location: Right arm, Patient Position: Sitting)   Pulse 70   Temp 98.1 °F (36.7 °C) (Oral)   Ht 5' 5" (1.651 m)   Wt 57.3 kg (126 lb 5.2 oz)   BMI 21.02 kg/m²     GEN: NAD, well developed, pleasant  HEENT: NCAT, PERRLA, EOMI, sclera clear, anicteric, bilateral ear exam wnl, O/P clear, MMM with no lesions  NECK: normal, supple with midline trachea, no LAD, no thyromegaly  LUNGS: CTAB, no w/r/r, no increased work of breathing   HEART: RRR, normal S1 and S2, no m/r/g, no edema  ABD: s/nt/nd, NABS  SKIN: normal turgor, no rashes  PSYCH: AOx3, appropriate mood and affect  MSK: warm/well perfused, normal ROM in all extremities, no c/c/e.  NEURO: normal without focal findings, CN II-XII are grossly intact.  Sensation/strength grossly normal, gait and station normal.         Labs  Lab Results   Component Value Date    HGBA1C 5.2 10/18/2024     Lab Results   Component Value Date     10/18/2024    K 4.2 10/18/2024     (H) 10/18/2024    CO2 17 (L) 10/18/2024    BUN 15 10/18/2024    CREATININE 0.9 10/18/2024    CALCIUM 8.6 (L) 10/18/2024    ANIONGAP 9 10/18/2024    ESTGFRAFRICA >60.0 01/04/2022    EGFRNONAA >60.0 01/04/2022     Lab Results   Component Value Date    CHOL 224 (H) 10/18/2024    CHOL 135 02/08/2023    CHOL 143 07/27/2020     Lab Results   Component Value Date    HDL 61 10/18/2024    HDL 46 02/08/2023    HDL 59 07/27/2020     Lab Results   Component Value Date    LDLCALC 151.4 10/18/2024    LDLCALC 77.0 02/08/2023    LDLCALC 68.2 07/27/2020     Lab Results   Component Value Date    TRIG 58 10/18/2024    TRIG 60 02/08/2023    TRIG 79 07/27/2020     Lab Results   Component Value Date    CHOLHDL 27.2 10/18/2024    CHOLHDL 34.1 " 02/08/2023    CHOLHDL 41.3 07/27/2020     Last set of blood work has been reviewed as noted above.

## 2024-10-29 RX ORDER — ZOLPIDEM TARTRATE 5 MG/1
5 TABLET ORAL NIGHTLY PRN
Qty: 30 TABLET | Refills: 5 | Status: SHIPPED | OUTPATIENT
Start: 2024-10-29 | End: 2025-04-29

## 2024-10-29 RX ORDER — ATORVASTATIN CALCIUM 20 MG/1
20 TABLET, FILM COATED ORAL NIGHTLY
Qty: 90 TABLET | Refills: 3 | Status: SHIPPED | OUTPATIENT
Start: 2024-10-29 | End: 2025-10-29

## 2024-12-17 DIAGNOSIS — N95.1 SYMPTOMATIC MENOPAUSAL OR FEMALE CLIMACTERIC STATES: ICD-10-CM

## 2024-12-17 RX ORDER — ESTRADIOL AND NORETHINDRONE ACETATE 1; .5 MG/1; MG/1
1 TABLET ORAL
Qty: 84 TABLET | Refills: 0 | Status: SHIPPED | OUTPATIENT
Start: 2024-12-17

## 2024-12-17 NOTE — TELEPHONE ENCOUNTER
Refill Decision Note   Mae Gregory  is requesting a refill authorization.  Brief Assessment and Rationale for Refill:  Approve     Medication Therapy Plan:         Comments:     Note composed:9:25 AM 12/17/2024

## 2024-12-31 ENCOUNTER — HOSPITAL ENCOUNTER (OUTPATIENT)
Dept: RADIOLOGY | Facility: HOSPITAL | Age: 59
Discharge: HOME | End: 2024-12-31
Attending: FAMILY MEDICINE
Payer: COMMERCIAL

## 2024-12-31 DIAGNOSIS — Z12.31 ENCOUNTER FOR SCREENING MAMMOGRAM FOR BREAST CANCER: ICD-10-CM

## 2024-12-31 PROCEDURE — 77067 SCR MAMMO BI INCL CAD: CPT | Mod: TC,PO

## 2025-03-13 DIAGNOSIS — N95.1 SYMPTOMATIC MENOPAUSAL OR FEMALE CLIMACTERIC STATES: ICD-10-CM

## 2025-03-13 RX ORDER — ESTRADIOL AND NORETHINDRONE ACETATE 1; .5 MG/1; MG/1
1 TABLET ORAL
Qty: 84 TABLET | Refills: 0 | Status: SHIPPED | OUTPATIENT
Start: 2025-03-13

## 2025-03-13 NOTE — TELEPHONE ENCOUNTER
Refill Decision Note   Mae Gregory  is requesting a refill authorization.  Brief Assessment and Rationale for Refill:  Approve     Medication Therapy Plan:         Comments:     Note composed:11:44 AM 03/13/2025

## 2025-04-24 DIAGNOSIS — F51.01 PRIMARY INSOMNIA: ICD-10-CM

## 2025-04-24 NOTE — TELEPHONE ENCOUNTER
No care due was identified.  Health [facility] Embedded Care Due Messages. Reference number: [id number].   4/24/2025 11:41:57 AM CDT

## 2025-04-25 RX ORDER — ZOLPIDEM TARTRATE 5 MG/1
5 TABLET ORAL NIGHTLY PRN
Qty: 21 TABLET | Refills: 0 | Status: SHIPPED | OUTPATIENT
Start: 2025-04-25

## 2025-05-01 ENCOUNTER — PATIENT MESSAGE (OUTPATIENT)
Dept: FAMILY MEDICINE | Facility: CLINIC | Age: 60
End: 2025-05-01

## 2025-05-01 ENCOUNTER — PATIENT MESSAGE (OUTPATIENT)
Dept: FAMILY MEDICINE | Facility: CLINIC | Age: 60
End: 2025-05-01
Payer: COMMERCIAL

## 2025-05-01 DIAGNOSIS — F51.01 PRIMARY INSOMNIA: ICD-10-CM

## 2025-05-01 RX ORDER — ZOLPIDEM TARTRATE 5 MG/1
5 TABLET ORAL NIGHTLY PRN
Qty: 21 TABLET | Refills: 0 | Status: CANCELLED | OUTPATIENT
Start: 2025-05-01

## 2025-05-01 NOTE — TELEPHONE ENCOUNTER
No care due was identified.  Health [facility] Embedded Care Due Messages. Reference number: [id number].   5/01/2025 11:22:43 AM CDT

## 2025-05-05 NOTE — TELEPHONE ENCOUNTER
Call was placed to patient and notified of need to consult/meet with PCP for refills. Patient was scheduled with visit. Confirmation and thank you verbalized.

## 2025-05-06 ENCOUNTER — OFFICE VISIT (OUTPATIENT)
Dept: FAMILY MEDICINE | Facility: CLINIC | Age: 60
End: 2025-05-06

## 2025-05-06 DIAGNOSIS — I70.90 ATHEROSCLEROSIS: ICD-10-CM

## 2025-05-06 DIAGNOSIS — E78.2 MIXED HYPERLIPIDEMIA: ICD-10-CM

## 2025-05-06 DIAGNOSIS — F51.01 PRIMARY INSOMNIA: Primary | ICD-10-CM

## 2025-05-06 DIAGNOSIS — F41.8 ANXIETY WITH DEPRESSION: ICD-10-CM

## 2025-05-06 RX ORDER — ZOLPIDEM TARTRATE 5 MG/1
5 TABLET ORAL NIGHTLY PRN
Qty: 30 TABLET | Refills: 5 | Status: SHIPPED | OUTPATIENT
Start: 2025-05-06

## 2025-05-06 NOTE — PROGRESS NOTES
The patient location is: home  The chief complaint leading to consultation is: medication refill/insomnia follow up    Visit type: Virtual visit with synchronous audio and video  Total time spent with patient: 16 minutes  Each patient to whom he or she provides medical services by telemedicine is:  (1) informed of the relationship between the physician and patient and the respective role of any other health care provider with respect to management of the patient; and (2) notified that he or she may decline to receive medical services by telemedicine and may withdraw from such care at any time.         Office Visit    Patient Name: Mae Gregory    : 1965  MRN: [id number]      Assessment/Plan:  Mae Gregory is a 59 y.o. female who presents today for :    -Insomnia  -     zolpidem (AMBIEN) 5 MG Tab; Take 1 tablet (5 mg total) by mouth nightly as needed (insomnia).   -continue current therapy PRN  -no side effects reported, sleep hygiene        -Anxiety with depression - controlled off Wellbutrin since 2023    -HLD  -Atherosclerosis  -continue statin         Follow up 6 months      This note was created by combination of typed  and MModal dictation.  Transcription errors may be present.  If there are any questions, please contact me.      ----------------------------------------------------------------------------------------------------------------------      HPI:  Patient Care Team:  Rajiv Garcia MD as PCP - General (Family Medicine)  Tavo Boateng MD as Consulting Physician (General Surgery)  Sukumar Gates MD as Consulting Physician (Neurology)    Mae is a 59 y.o. female with    Problem List[1]    Patient last follow up with me in the office was 7 months ago. She presents today for medication refill for insomnia. She takes Trazodone and Ambien as needed for insomnia. She denies any adverse side effects. She is also doing well with the rest of her home medication  regimen.  Otherwise, no other acute issues during this visit.          Answers submitted by the patient for this visit:  Review of Systems Questionnaire (Submitted on 2025)  activity change: No  unexpected weight change: No  neck pain: No  hearing loss: No  rhinorrhea: No  trouble swallowing: No  eye discharge: No  chest tightness: No  wheezing: No  chest pain: No  palpitations: No  constipation: No  vomiting: No  diarrhea: No  polydipsia: No  difficulty urinating: No  hematuria: No  headaches: No  dysphoric mood: No            Problem List[2]    Current Medications  Medications reviewed/updated.     Medications Ordered Prior to Encounter[3]        Past Surgical History:   Procedure Laterality Date    AUGMENTATION OF BREAST      BREAST SURGERY       SECTION   and 2001    x2    COLON SURGERY  10/15/2018    colon resection     CRANIOTOMY Left 2023    Procedure: CRANIOTOMY for microvascular decompression NEURO MONITORING;  Surgeon: Nils Hudson MD;  Location: [address];  Service: Neurosurgery;  Laterality: Left;  regular bed, supine, bump, hickman, type and cross 2 units, cranial nerve monitoring, optical stealth CO SURGEON DR [place]    LAPAROSCOPIC CHOLECYSTECTOMY N/A 10/16/2023    Procedure: CHOLECYSTECTOMY, LAPAROSCOPIC;  Surgeon: Cali Lowe MD;  Location: [facility];  Service: General;  Laterality: N/A;  RN PREOP 10/11/2023 --[place]---CONSENT INCOMPLETE---GLENN MAGANA       Family History   Problem Relation Name Age of Onset    Hypertension Mother      Hypertension Father      Hypertension Brother      Stroke Maternal Grandfather      Ovarian cancer Neg Hx      Cancer Neg Hx      Colon cancer Neg Hx      Esophageal cancer Neg Hx         Social History[4]          Allergies   Review of patient's allergies indicates:  No Known Allergies          Review of Systems  See HPI        Physical Exam  There were no vitals taken for this visit.    GEN: NAD               [1]   Patient Active Problem  List  Diagnosis    Migraine    DDD (degenerative disc disease), cervical    DDD (degenerative disc disease), lumbar    -Insomnia    -Meniere's disease of both ears    Sensorineural hearing loss (SNHL) of both ears    Hair loss    Glaucoma    -Sleepwalking - controlled on PRN Trazodone    -Dystonia - follows Neurology regularly for Botox injections    Pain    -Raynaud's phenomenon - follows Rheumatology - on Norvasc 2.5mg    -Cervical myofascial pain syndrome - follows Neurology for injections    -Anxiety with depression - controlled off Wellbutrin since 4/2023    -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    Other neutropenia    -HLD    -Atherosclerosis   [2]   Patient Active Problem List  Diagnosis    Migraine    DDD (degenerative disc disease), cervical    DDD (degenerative disc disease), lumbar    -Insomnia    -Meniere's disease of both ears    Sensorineural hearing loss (SNHL) of both ears    Hair loss    Glaucoma    -Sleepwalking - controlled on PRN Trazodone    -Dystonia - follows Neurology regularly for Botox injections    Pain    -Raynaud's phenomenon - follows Rheumatology - on Norvasc 2.5mg    -Cervical myofascial pain syndrome - follows Neurology for injections    -Anxiety with depression - controlled off Wellbutrin since 4/2023    -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 Followup Dr.T Garcia OCT 2025 for more refills, call to schedule/get labs 1wk before doctor's appointment (ordered). 90 tablet 3    clotrimazole (LOTRIMIN) 1 % cream Apply topically 2 (two) times daily. 60 g 3    NURTEC 75 mg odt DISSOLVE 1 TABLET BY MOUTH ONCE DAILY AS NEEDED FOR MIGRAINE 8 tablet 16    oxybutynin (DITROPAN) 5 MG Tab  TAKE 1 TABLET BY MOUTH TWICE A  tablet 0    topiramate (TOPAMAX) 50 MG tablet Take 1 tablet (50 mg total) by mouth 2 (two) times daily. **DUE for followup Dr.T Garcia SEPT 2024 for more refills, call to schedule/get labs 1wk before doctor's appointment (ordered). 120 tablet 0    traZODone (DESYREL) 150 MG tablet Take 1 tablet (150 mg total) by mouth nightly as needed for Insomnia. Followup Dr.T Garcia OCT 2025 for more refills, call to schedule/get labs 1wk before doctor's appointment (ordered). 90 tablet 3    [DISCONTINUED] albuterol (PROVENTIL/VENTOLIN HFA) 90 mcg/actuation inhaler TAKE 1 2 PUFF(S) (INHALATION) EVERY 4 HOURS PRN   WHEEZING (Patient not taking: Reported on 10/25/2024) 18 g 12    [DISCONTINUED] estradiol-norethindrone (ACTIVELLA) 1-0.5 mg per tablet TAKE 1 TABLET BY MOUTH EVERY DAY 84 tablet 0    [DISCONTINUED] zolpidem (AMBIEN) 5 MG Tab Take 1 tablet (5 mg total) by mouth nightly as needed (insomnia). **SHORT-TERM refill - call to schedule follow up appointment for more refill. May schedule a VIDEO visit as needed 21 tablet 0     No current facility-administered medications on file prior to visit.   [4]   Social History  Socioeconomic History    Marital status:    Tobacco Use    Smoking status: Never    Smokeless tobacco: Never   Substance and Sexual Activity    Alcohol use: No    Drug use: No    Sexual activity: Yes     Partners: Male     Comment: ablation     Social Drivers of Health     Financial Resource Strain: Medium Risk (5/5/2025)    Overall Financial Resource Strain (CARDIA)     Difficulty of Paying Living Expenses: Somewhat hard   Food Insecurity: No Food Insecurity (5/5/2025)    Hunger Vital Sign     Worried About Running Out of Food in the Last Year: Never true     Ran Out of Food in the Last Year: Never true   Transportation Needs: No Transportation Needs (5/5/2025)    PRAPARE - Transportation     Lack of Transportation (Medical): No     Lack of Transportation  (Non-Medical): No   Physical Activity: Sufficiently Active (5/5/2025)    Exercise Vital Sign     Days of Exercise per Week: 6 days     Minutes of Exercise per Session: 50 min   Stress: No Stress Concern Present (5/5/2025)    [employer] [place] - Occupational Stress Questionnaire     Feeling of Stress : Not at all   Housing Stability: Low Risk  (5/5/2025)    Housing Stability Vital Sign     Unable to Pay for Housing in the Last Year: No     Number of Times Moved in the Last Year: 0     Homeless in the Last Year: No

## (undated) DEVICE — DRESSING TELFA N ADH 3X8

## (undated) DEVICE — SUT VICRYL+ 27 UR-6 VIOL

## (undated) DEVICE — DRAPE STERI INSTRUMENT 1018

## (undated) DEVICE — DRAPE CORETEMP FLD WRM 56X62IN

## (undated) DEVICE — SPRAY MASTISOL

## (undated) DEVICE — TRAY NEURO OMC

## (undated) DEVICE — DRAPE THREE-QTR REINF 53X77IN

## (undated) DEVICE — PACK ENDOSCOPY GENERAL

## (undated) DEVICE — KIT SURGIFLO HEMOSTATIC MATRIX

## (undated) DEVICE — KIT ANTIFOG

## (undated) DEVICE — STAPLER SKIN PROXIMATE WIDE

## (undated) DEVICE — ELECTRODE BLADE INSULATED 1 IN

## (undated) DEVICE — CATH DELEE SUCTION PED 6FR

## (undated) DEVICE — TUBE FRAZIER 5MM 2FT SOFT TIP

## (undated) DEVICE — SEE MEDLINE ITEM 157110

## (undated) DEVICE — DRESSING ADH ISLAND 2.5 X 3

## (undated) DEVICE — HOOK LONE STAR BLUNT 12MM

## (undated) DEVICE — DRESSING SURGICAL 1/2X1/2

## (undated) DEVICE — DIFFUSER

## (undated) DEVICE — Device

## (undated) DEVICE — COVER OVERHEAD SURG LT BLUE

## (undated) DEVICE — NDL HYPO REG 25G X 1 1/2

## (undated) DEVICE — BAG TISS RETRV MONARCH 10MM

## (undated) DEVICE — CLIP HEMO-LOK ML

## (undated) DEVICE — SUT 4/0 18IN NUROLON BLK B

## (undated) DEVICE — SPONGE COTTON TRAY 4X4IN

## (undated) DEVICE — GLOVE BIOGEL 7.5

## (undated) DEVICE — BLANKET UPPER BODY 78.7X29.9IN

## (undated) DEVICE — TROCAR KII BLLN 12MM 10CM

## (undated) DEVICE — TROCAR ENDOPATH XCEL 5X100MM

## (undated) DEVICE — SET DISP INTRO MINOP 19FR

## (undated) DEVICE — SYR 10CC LUER LOCK

## (undated) DEVICE — ROUTER TAPERED 2.3MM

## (undated) DEVICE — SUT VICRYL PLUS 3-0 SH 18IN

## (undated) DEVICE — ELECTRODE REM PLYHSV RETURN 9

## (undated) DEVICE — STRIP MEDI WND CLSR 1/2X4IN

## (undated) DEVICE — APPLICATOR CHLORAPREP ORN 26ML

## (undated) DEVICE — SUT VICRYL PLUS 4-0 P3 18IN

## (undated) DEVICE — CORD BIPOLAR 12 FOOT

## (undated) DEVICE — TRAY CATH FOL SIL URIMTR 16FR

## (undated) DEVICE — SET TUBE PNEUMOCLEAR SE HI FLO

## (undated) DEVICE — MARKER SKIN STND TIP BLUE BARR

## (undated) DEVICE — HEMOSTAT SURGICEL 4X8IN

## (undated) DEVICE — CARTRIDGE OIL

## (undated) DEVICE — SEALANT ADHERUS AUTOSPRY DURAL

## (undated) DEVICE — FORCEP SPETZLER MALIS 8IN 1MM

## (undated) DEVICE — DRESSING SURGICAL 1X3

## (undated) DEVICE — TOWEL OR DISP STRL BLUE 4/PK

## (undated) DEVICE — BLADE SURG CARBON STEEL SZ11

## (undated) DEVICE — ELECTRODE EZ CLEAN L-HOOK 13.5